# Patient Record
Sex: MALE | Race: WHITE | NOT HISPANIC OR LATINO | Employment: FULL TIME | URBAN - METROPOLITAN AREA
[De-identification: names, ages, dates, MRNs, and addresses within clinical notes are randomized per-mention and may not be internally consistent; named-entity substitution may affect disease eponyms.]

---

## 2017-11-10 ENCOUNTER — APPOINTMENT (OUTPATIENT)
Dept: LAB | Facility: MEDICAL CENTER | Age: 57
End: 2017-11-10
Attending: FAMILY MEDICINE

## 2017-11-10 ENCOUNTER — TRANSCRIBE ORDERS (OUTPATIENT)
Dept: URGENT CARE | Facility: MEDICAL CENTER | Age: 57
End: 2017-11-10

## 2017-11-10 ENCOUNTER — APPOINTMENT (OUTPATIENT)
Dept: URGENT CARE | Facility: MEDICAL CENTER | Age: 57
End: 2017-11-10

## 2017-11-10 DIAGNOSIS — Z02.1 PHYSICAL EXAM, PRE-EMPLOYMENT: ICD-10-CM

## 2017-11-10 DIAGNOSIS — Z02.1 PHYSICAL EXAM, PRE-EMPLOYMENT: Primary | ICD-10-CM

## 2017-11-10 LAB — RUBV IGG SERPL IA-ACNC: 1.1 IU/ML

## 2017-11-10 PROCEDURE — 86765 RUBEOLA ANTIBODY: CPT

## 2017-11-10 PROCEDURE — 86480 TB TEST CELL IMMUN MEASURE: CPT

## 2017-11-10 PROCEDURE — 86787 VARICELLA-ZOSTER ANTIBODY: CPT

## 2017-11-10 PROCEDURE — 86762 RUBELLA ANTIBODY: CPT

## 2017-11-10 PROCEDURE — 86735 MUMPS ANTIBODY: CPT

## 2017-11-10 PROCEDURE — 36415 COLL VENOUS BLD VENIPUNCTURE: CPT

## 2017-11-12 LAB
ANNOTATION COMMENT IMP: NORMAL
GAMMA INTERFERON BACKGROUND BLD IA-ACNC: 0.12 IU/ML
M TB IFN-G BLD-IMP: NEGATIVE
M TB IFN-G CD4+ BCKGRND COR BLD-ACNC: 0.01 IU/ML
M TB IFN-G CD4+ T-CELLS BLD-ACNC: 0.13 IU/ML
MITOGEN IGNF BLD-ACNC: >10 IU/ML
QUANTIFERON-TB GOLD IN TUBE: NORMAL
SERVICE CMNT-IMP: NORMAL

## 2017-11-14 LAB
MEV IGG SER QL: NORMAL
MUV IGG SER QL: NORMAL
VZV IGG SER IA-ACNC: NORMAL

## 2018-01-05 ENCOUNTER — ALLSCRIPTS OFFICE VISIT (OUTPATIENT)
Dept: OTHER | Facility: OTHER | Age: 58
End: 2018-01-05

## 2018-01-05 DIAGNOSIS — E78.5 HYPERLIPIDEMIA: ICD-10-CM

## 2018-01-06 NOTE — PROGRESS NOTES
Assessment   1  Overweight (278 02) (E66 3)  2  Muscle cramping (729 82) (R25 2)  3  Nerve pain (729 2) (M79 2)  4  Spasm of muscle, back (724 8) (M62 830)  5  Hypertension (401 9) (I10)  6  Gastroesophageal reflux disease (530 81) (K21 9)  7  Hyperlipemia (272 4) (E78 5)  8  Arteriosclerotic coronary artery disease (414 00) (I25 10)    Plan   Gastroesophageal reflux disease    · Renew: Pantoprazole Sodium 40 MG Oral Tablet Delayed Release; Take 1 tablet daily  History of anaphylaxis    · Start: EpiPen 2-Herbie 0 3 MG/0 3ML Injection Solution Auto-injector; INJECT 0 3ML    INTRAMUSCULARLY AS DIRECTED  History of MI (myocardial infarction)    · Renew: Nitroglycerin 0 4 MG Sublingual Tablet Sublingual; DISSOLVE 1 TABLET UNDER    THE TONGUE AS NEEDED FOR CHEST PAIN  History of MI (myocardial infarction), Hypertension    · Renew: Metoprolol Tartrate 25 MG Oral Tablet; TAKE 1 TABLET DAILY  Hyperlipemia    · Renew: Atorvastatin Calcium 40 MG Oral Tablet; take one tablet by mouth every day   · (1) CBC/PLT/DIFF; Status:Active; Requested IVK:[id number];    · (1) COMPREHENSIVE METABOLIC PANEL; Status:Active; Requested NSO:[id number];    · (1) LIPID PANEL, FASTING; Status:Active; Requested BOP:[id number];    · (1) URINALYSIS (will reflex a microscopy if leukocytes, occult blood, protein or nitrites are    not within normal limits); Status:Active; Requested NRR:[id number];   Hypertension    · Renew: HydroCHLOROthiazide 12 5 MG Oral Tablet; TAKE 1 TABLET DAILY  Nerve pain    · Renew: Gabapentin 300 MG Oral Capsule; one capsule daily  Spasm of muscle, back    · Renew: Cyclobenzaprine HCl - 10 MG Oral Tablet; TAKE 1/2 TO 1 TABLET AT BEDTIME    AS NEEDED  Unlinked    · Stop: Celecoxib 200 MG Oral Capsule   · Stop: Venlafaxine HCl - 37 5 MG Oral Tablet    Discussion/Summary   Discussion Summary:    1  HTN: BP is stable today  I renewed his prescriptions for HCTZ and metoprolol without changes   HLD: status unknown, renewed statin at current dose, FLP ordered  CAD: stable, no reports of chest pain or SOB GERD: stable on meds, prescription renewed  overweight: discussed weight loss  encouraged pt to make lifestyle modifications including diet and exercise modification to minimize risk  spasms: well controlled with medication, prescription renewed nerve pain: well controlled with medication, prescription renewed muscle cramps: CMP/CBC order health maintenance: colonoscopy is current, labs ordered including CBC, CMP, FLP, UA, pt to return for f/u in 6 months  Counseling Documentation With Imm: The patient was counseled regarding risk factor reductions,-- risks and benefits of treatment options  Medication SE Review and Pt Understands Tx: Possible side effects of new medications were reviewed with the patient/guardian today  The treatment plan was reviewed with the patient/guardian  The patient/guardian understands and agrees with the treatment plan      Chief Complaint   Chief Complaint Free Text Note Form: patient is here to establish care      History of Present Illness   HPI: Pt is a 61 y/o male who is seen today to establish care as a new patient  PMH includes HTN, HLD, GERD, and CAD with hx of MI and stent placement, and back pain  Chronic medical conditions are stable, under previous care through [facility]; records yet to be obtained  He denies chest pain or palpitatios, SOB, wheezing, headaches/dizziness  No change in bowel/bladder, no abd pain or N/V  Review of Systems   Complete-Male:      Constitutional: No fever or chills, feels well, no tiredness, no recent weight gain or weight loss  Eyes: no eye pain-- and-- no eyesight problems  ENT: no hearing loss-- and-- no nasal discharge  Cardiovascular: no chest pain,-- no intermittent leg claudication,-- no palpitations-- and-- no extremity edema        Respiratory: no shortness of breath,-- no cough,-- no wheezing,-- no shortness of breath during exertion-- and-- no PND       Gastrointestinal: no abdominal pain,-- no nausea,-- no vomiting,-- no constipation,-- no diarrhea-- and-- no blood in stools  Genitourinary: no dysuria,-- no urinary hesitancy-- and-- no nocturia  Musculoskeletal: myalgias,-- joint stiffness-- and-- baseline 2/2 multiple spinal surgeries and shoulder surgery  Integumentary: no rashes  Neurological: no headache,-- no numbness,-- no tingling,-- no confusion,-- no dizziness,-- no limb weakness-- and-- no fainting  Psychiatric: no anxiety,-- no sleep disturbances-- and-- no depression  ROS Reviewed:    ROS reviewed  Active Problems   1  History of gastric bypass (V45 86) (Z98 84)  2  History of MI (myocardial infarction) (12) (I25 2)    Past Medical History   Active Problems And Past Medical History Reviewed: The active problems and past medical history were reviewed and updated today  Surgical History   1  History of Cath Stent Placement  2  History of Gastric Surgery For Morbid Obesity  3  History of Lower Back Surgery  4  History of Neck Surgery  5  History of Shoulder Surgery  Surgical History Reviewed: The surgical history was reviewed and updated today  Family History   Father   1  Family history of cardiac disorder (V17 49) (Z82 49)  2  Family history of diabetes mellitus (V18 0) (Z83 3)  Brother   3  Family history of coronary artery disease (V17 3) (Z82 49)  Family History Reviewed: The family history was reviewed and updated today  Social History    · Former smokeless tobacco user   · Never a smoker   · No illicit drug use   · Social drinker (V49 89) (Z78 9)  Social History Reviewed: The social history was reviewed and updated today  The social history was reviewed and is unchanged  Current Meds   1  Aspirin EC 81 MG Oral Tablet Delayed Release; TAKE 1 TABLET DAILY AS DIRECTED; Therapy: 85SBM2987 to Recorded  2   Atorvastatin Calcium 40 MG Oral Tablet; take one tablet by mouth every day;     Therapy: 35LYV4585 to (Evaluate:12Jan2018) Recorded  3  B Complex Vitamins Oral Capsule; TAKE 1 CAPSULE ORALLY DAILY (SUPPLEMENT)     *SELF ADMINISTER*;     Therapy: 96RAA3878 to (Evaluate:56Bgb4200) Recorded  4  Celecoxib 200 MG Oral Capsule; TAKE 1 CAPSULE BY MOUTH EVERY DAY; Therapy: 90QGO3102 to Recorded  5  Cyclobenzaprine HCl - 10 MG Oral Tablet; TAKE 1 TABLET 3 TIMES DAILY; Therapy: 33OHL3669 to Recorded  6  Gabapentin 300 MG Oral Capsule; Take one capsule twice daily; Therapy: 54ICC7494 to (Evaluate:15Jan2018) Recorded  7  HydroCHLOROthiazide 12 5 MG Oral Tablet; TAKE 1 TABLET DAILY; Therapy: 54SAL1598 to Recorded  8  Iron (Ferrous Sulfate) 142 (45 Fe) MG Oral Tablet Extended Release; Therapy: 16LTC7068 to Recorded  9  Methocarbamol 500 MG Oral Tablet; TAKE 1 TABLET Every 8 hours; Therapy: 32UMX1775 to (Evaluate:13Jan2018) Recorded  10  Metoprolol Tartrate 25 MG Oral Tablet; TAKE 1 TABLET TWICE DAILY; Therapy: 70SXB2623 to (rAnold Verduzco) Recorded  11  Multivitamins Oral Capsule; take 1 capsule daily; Therapy: 18WNP5967 to Recorded  12  Nitroglycerin 0 4 MG Sublingual Tablet Sublingual; DISSOLVE 1 TABLET UNDER THE      TONGUE AS NEEDED FOR CHEST PAIN;      Therapy: 11ORG6371 to Recorded  13  Omega-3 Fish Oil 1000 MG Oral Capsule; Therapy: 15QMF4941 to Recorded  14  Pantoprazole Sodium 40 MG Oral Tablet Delayed Release; Take 1 tablet daily; Therapy: 37BCU1776 to (Evaluate:40Hcy7595) Recorded  15  Tamsulosin HCl - 0 4 MG Oral Capsule; take 1 capsule daily; Therapy: 79WSL3237 to (Arnold Verduzco) Recorded  16  Venlafaxine HCl - 37 5 MG Oral Tablet; TAKE 1 TABLET DAILY; Therapy: 26MBO1644 to (ZUNUFGJZ:18LYB1761) Recorded  17  Zolpidem Tartrate 10 MG Oral Tablet; TAKE 1 TABLET AT BEDTIME AS NEEDED; Therapy: 49XCX5739 to Recorded    Allergies   1   Penicillins    Vitals   Signs   Recorded: 03ZCB3343 09:07AM   Temperature: 96 9 F  Heart Rate: 80  Respiration: 16  Systolic: 236  Diastolic: 90  Height: 5 ft 9 in  Weight: 253 lb   BMI Calculated: 37 36  BSA Calculated: 2 28  O2 Saturation: 92    Physical Exam        Constitutional      General appearance: No acute distress, well appearing and well nourished  Eyes      Conjunctiva and lids: No swelling, erythema, or discharge  Pupils and irises: Equal, round and reactive to light  Ears, Nose, Mouth, and Throat      External inspection of ears and nose: Normal        Otoscopic examination: Tympanic membrance translucent with normal light reflex  Canals patent without erythema  Nasal mucosa, septum, and turbinates: Normal without edema or erythema  Oropharynx: Normal with no erythema, edema, exudate or lesions  Pulmonary      Respiratory effort: No increased work of breathing or signs of respiratory distress  Auscultation of lungs: Clear to auscultation, equal breath sounds bilaterally, no wheezes, no rales, no rhonci  Cardiovascular      Auscultation of heart: Normal rate and rhythm, normal S1 and S2, without murmurs  Examination of extremities for edema and/or varicosities: Normal        Carotid pulses: Normal        Abdomen      Abdomen: Non-tender, no masses  Liver and spleen: No hepatomegaly or splenomegaly  Lymphatic      Palpation of lymph nodes in neck: No lymphadenopathy  Musculoskeletal      Gait and station: Normal        Neurologic      Reflexes: Abnormal   Deep tendon reflexes: 0 right patella-- and-- 0 left patella  -- baselline per pt 2/2 spinal surgery  Sensation: No sensory loss         Psychiatric      Orientation to person, place and time: Normal        Mood and affect: Normal           Future Appointments      Date/Time Provider Specialty Site   07/20/2018 08:00 AM Xavier Lobato Internal Medicine [facility]     Signatures    Electronically signed by : Gaviota Hogan, ; Jan 5 2018 12: 38PM EST                       (Author)     Electronically signed by : Cheyanne Ling DO; Jan 5 2018  3:59PM EST                       (Author)     Electronically signed by : Cheyanne Ling DO; Jan 5 2018  3:59PM EST                       (Author)

## 2018-01-08 ENCOUNTER — TRANSCRIBE ORDERS (OUTPATIENT)
Dept: ADMINISTRATIVE | Age: 58
End: 2018-01-08

## 2018-01-23 VITALS
WEIGHT: 253 LBS | RESPIRATION RATE: 16 BRPM | TEMPERATURE: 96.9 F | DIASTOLIC BLOOD PRESSURE: 90 MMHG | OXYGEN SATURATION: 92 % | HEART RATE: 80 BPM | BODY MASS INDEX: 37.47 KG/M2 | HEIGHT: 69 IN | SYSTOLIC BLOOD PRESSURE: 120 MMHG

## 2018-03-28 ENCOUNTER — TELEPHONE (OUTPATIENT)
Dept: INTERNAL MEDICINE CLINIC | Facility: CLINIC | Age: 58
End: 2018-03-28

## 2018-03-28 DIAGNOSIS — I10 ESSENTIAL HYPERTENSION: ICD-10-CM

## 2018-03-28 DIAGNOSIS — E78.5 HYPERLIPIDEMIA, UNSPECIFIED HYPERLIPIDEMIA TYPE: Primary | ICD-10-CM

## 2018-03-28 RX ORDER — CYCLOBENZAPRINE HCL 10 MG
.5-1 TABLET ORAL
COMMUNITY
Start: 2018-01-05 | End: 2018-11-08 | Stop reason: SDUPTHER

## 2018-03-28 RX ORDER — GABAPENTIN 300 MG/1
1 CAPSULE ORAL DAILY
COMMUNITY
Start: 2018-01-05 | End: 2018-05-04 | Stop reason: SDUPTHER

## 2018-03-28 RX ORDER — EPINEPHRINE 0.3 MG/.3ML
0.3 INJECTION SUBCUTANEOUS
COMMUNITY
Start: 2018-01-05 | End: 2018-06-08 | Stop reason: SDUPTHER

## 2018-03-28 RX ORDER — HYDROCHLOROTHIAZIDE 12.5 MG/1
12.5 TABLET ORAL DAILY
Qty: 90 TABLET | Refills: 3 | Status: SHIPPED | OUTPATIENT
Start: 2018-03-28 | End: 2018-06-08 | Stop reason: SDUPTHER

## 2018-03-28 RX ORDER — ATORVASTATIN CALCIUM 40 MG/1
1 TABLET, FILM COATED ORAL DAILY
COMMUNITY
Start: 2018-01-05 | End: 2018-03-28 | Stop reason: SDUPTHER

## 2018-03-28 RX ORDER — ATORVASTATIN CALCIUM 40 MG/1
40 TABLET, FILM COATED ORAL DAILY
Qty: 90 TABLET | Refills: 3 | Status: SHIPPED | OUTPATIENT
Start: 2018-03-28 | End: 2018-06-08 | Stop reason: SDUPTHER

## 2018-03-28 RX ORDER — HYDROCHLOROTHIAZIDE 12.5 MG/1
1 TABLET ORAL DAILY
COMMUNITY
Start: 2018-01-05 | End: 2018-03-28 | Stop reason: SDUPTHER

## 2018-03-28 NOTE — TELEPHONE ENCOUNTER
Wants a renewal of the following medications that were prescribed by you for Atorvastatin 40 mg 1 tablet by mouth daily and Hydrochlorothiazidt 12 5 by mouth daily

## 2018-04-20 ENCOUNTER — OFFICE VISIT (OUTPATIENT)
Dept: INTERNAL MEDICINE CLINIC | Facility: CLINIC | Age: 58
End: 2018-04-20
Payer: COMMERCIAL

## 2018-04-20 VITALS
RESPIRATION RATE: 16 BRPM | HEART RATE: 80 BPM | HEIGHT: 69 IN | TEMPERATURE: 97.3 F | OXYGEN SATURATION: 98 % | BODY MASS INDEX: 36.79 KG/M2 | DIASTOLIC BLOOD PRESSURE: 80 MMHG | WEIGHT: 248.4 LBS | SYSTOLIC BLOOD PRESSURE: 124 MMHG

## 2018-04-20 DIAGNOSIS — I10 ESSENTIAL HYPERTENSION: ICD-10-CM

## 2018-04-20 DIAGNOSIS — R19.04 BILATERAL LOWER QUADRANT ABDOMINAL MASS: ICD-10-CM

## 2018-04-20 DIAGNOSIS — M54.50 ACUTE RIGHT-SIDED LOW BACK PAIN WITHOUT SCIATICA: Primary | ICD-10-CM

## 2018-04-20 DIAGNOSIS — E66.9 NON MORBID OBESITY, UNSPECIFIED OBESITY TYPE: ICD-10-CM

## 2018-04-20 DIAGNOSIS — R19.03 BILATERAL LOWER QUADRANT ABDOMINAL MASS: ICD-10-CM

## 2018-04-20 PROBLEM — I25.2 HISTORY OF MI (MYOCARDIAL INFARCTION): Status: ACTIVE | Noted: 2018-01-05

## 2018-04-20 PROCEDURE — 99214 OFFICE O/P EST MOD 30 MIN: CPT | Performed by: NURSE PRACTITIONER

## 2018-04-20 PROCEDURE — 3079F DIAST BP 80-89 MM HG: CPT | Performed by: NURSE PRACTITIONER

## 2018-04-20 PROCEDURE — 3074F SYST BP LT 130 MM HG: CPT | Performed by: NURSE PRACTITIONER

## 2018-04-20 RX ORDER — ASPIRIN 81 MG/1
162 TABLET ORAL DAILY
COMMUNITY
Start: 2018-01-05

## 2018-04-20 RX ORDER — PANTOPRAZOLE SODIUM 40 MG/1
1 TABLET, DELAYED RELEASE ORAL DAILY
COMMUNITY
Start: 2018-01-05 | End: 2018-06-08 | Stop reason: SDUPTHER

## 2018-04-20 RX ORDER — NITROGLYCERIN 0.4 MG/1
1 TABLET SUBLINGUAL
COMMUNITY
Start: 2018-01-05 | End: 2019-10-17 | Stop reason: SDUPTHER

## 2018-04-20 RX ORDER — METHYLPREDNISOLONE 4 MG/1
TABLET ORAL
Qty: 21 TABLET | Refills: 0 | Status: SHIPPED | OUTPATIENT
Start: 2018-04-20 | End: 2018-07-20 | Stop reason: ALTCHOICE

## 2018-04-20 RX ORDER — METHOCARBAMOL 500 MG/1
1 TABLET, FILM COATED ORAL EVERY 8 HOURS
COMMUNITY
Start: 2018-01-05 | End: 2018-06-08 | Stop reason: ALTCHOICE

## 2018-04-20 RX ORDER — BIOTIN 5 MG
TABLET ORAL
COMMUNITY
End: 2020-10-19

## 2018-04-20 RX ORDER — VENLAFAXINE 37.5 MG/1
1 TABLET ORAL DAILY
COMMUNITY
Start: 2018-01-10 | End: 2018-06-08 | Stop reason: SDUPTHER

## 2018-04-20 NOTE — ASSESSMENT & PLAN NOTE
I have ordered a CT of the abdomen to evaluate a mass noted in the R abdomen  Pt has a shellfish allergy and is unable to take contrast so I did order this test without contrast     I did explain to pt that the protruding bulge of tissue he described does sound consistent with a hernia  He is not symptomatic at present but CT may be useful to determine if it requires intervention  Pt to return for f/u in one month  Pt instructed to call for reevaluation sooner if sx worsen or persist

## 2018-04-20 NOTE — PROGRESS NOTES
Assessment/Plan:    Acute right-sided low back pain without sciatica  I feel that pts symptoms represent muscle spasms secondary to over use with work related activities  Pt is already taking flexeril and reports that this does not help prevent morning symptoms  I have referred him to PT for evaluation and treatment recommendations  I did also prescribe a medrol dose pack to help decrease inflammation  Pt states he has taken the medrol dose pack before and tolerated tx well  Bilateral lower quadrant abdominal mass  I have ordered a CT of the abdomen to evaluate a mass noted in the R abdomen  Pt has a shellfish allergy and is unable to take contrast so I did order this test without contrast     I did explain to pt that the protruding bulge of tissue he described does sound consistent with a hernia  He is not symptomatic at present but CT may be useful to determine if it requires intervention  Pt to return for f/u in one month  Pt instructed to call for reevaluation sooner if sx worsen or persist       Hypertension  Blood pressure shows good control on current medication  No change indicated at present  Non morbid obesity, unspecified obesity type  I reinforced lifestyle modifications pt has been working on and commended him for the weight loss he has achieved in the past couple of months  He reports his clothes are all big on him and he feels more healthy and has more energy  He does aim to lose at least 25 more pounds and I encouraged him to continue with everything he has done  Pt will be doing f/u blood work prior to visit scheduled in July Diagnoses and all orders for this visit:    Acute right-sided low back pain without sciatica  -     Methylprednisolone 4 MG TBPK; Use as directed on package  -     Ambulatory referral to Physical Therapy;  Future    Bilateral lower quadrant abdominal mass  -     CT abdomen pelvis wo contrast; Future    Essential hypertension    Non morbid obesity, unspecified obesity type    Other orders  -     venlafaxine (EFFEXOR) 37 5 mg tablet; Take 1 tablet by mouth daily  -     pantoprazole (PROTONIX) 40 mg tablet; Take 1 tablet by mouth daily  -     nitroglycerin (NITROSTAT) 0 4 mg SL tablet; Place 1 tablet under the tongue  -     Nutritional Supplements (OSTEO-MULTIVITAMINS/MINERALS PO); Take 1 capsule by mouth daily  -     metoprolol tartrate (LOPRESSOR) 25 mg tablet; Take 1 tablet by mouth daily  -     methocarbamol (ROBAXIN) 500 mg tablet; Take 1 tablet by mouth every 8 (eight) hours  -     Iron, Ferrous Sulfate, 142 (45 Fe) MG TBCR; Take by mouth  -     B COMPLEX VITAMINS ER PO; Take by mouth  -     aspirin (ECOTRIN LOW STRENGTH) 81 mg EC tablet; Take 1 tablet by mouth daily  -     Krill Oil 1000 MG CAPS; Take by mouth          Subjective:      Patient ID: Ariela Villa is a 62 y o  male  Pt is a 62 y o  y/o male who is seen today for evaluation of back pain and a suspected abdominal hernia  Pt reports that for several days he has woken up first think in the morning with sever pain in the muscles of his R lower back  He states that he massages them out and they resolve within seconds but they are recurrent  He has no associated muscle weakness, numbness or tingling, pain is non radiating, no loss of bowel or bladder control  He does have a lot of occupational overuse of the muscles in his back with bending, lifting, twisting, and pulling of heavy equipment  Symptoms have not caused a change is ADL's  Pt also reports that several days ago he noted a RLQ protruding mass after lifting something heavy  There was no associated pain, no changes in bowel function, no nausea or vomiting  The protruding mass did eventually reduce spontaneously and has not bothered him since  His appetite is normal   He does have a history of gastric bypass  Back Pain   This is a new problem  The current episode started in the past 7 days   The problem occurs daily  The quality of the pain is described as stabbing  The pain does not radiate  The pain is severe  Worse during: first thing in the morning  The symptoms are aggravated by position and lying down  Stiffness is present all day  Pertinent negatives include no abdominal pain, bladder incontinence, bowel incontinence, chest pain, dysuria, fever, headaches, leg pain, numbness, paresis, paresthesias, pelvic pain, perianal numbness, tingling, weakness or weight loss  Risk factors include obesity  He has tried NSAIDs and muscle relaxant for the symptoms  The treatment provided no relief  The following portions of the patient's history were reviewed and updated as appropriate: allergies, current medications, past family history, past medical history, past social history, past surgical history and problem list     Review of Systems   Constitutional: Negative for activity change, appetite change, chills, fatigue, fever, unexpected weight change and weight loss  HENT: Negative for hearing loss  Eyes: Negative for visual disturbance  Respiratory: Negative for cough, chest tightness and shortness of breath  Cardiovascular: Negative for chest pain, palpitations and leg swelling  Gastrointestinal: Positive for abdominal distention  Negative for abdominal pain, blood in stool, bowel incontinence, constipation, diarrhea, nausea, rectal pain and vomiting  Genitourinary: Negative for bladder incontinence, dysuria, frequency and pelvic pain  Musculoskeletal: Positive for back pain  Negative for arthralgias and myalgias  Neurological: Negative for dizziness, tingling, weakness, numbness, headaches and paresthesias  Psychiatric/Behavioral: Negative for dysphoric mood and sleep disturbance  The patient is not nervous/anxious            Objective:      /80 (BP Location: Left arm, Patient Position: Sitting)   Pulse 80   Temp (!) 97 3 °F (36 3 °C)   Resp 16   Ht 5' 9" (1 753 m)   Wt 113 kg (248 lb 6 4 oz)   SpO2 98%   BMI 36 68 kg/m²          Physical Exam   Constitutional: He is oriented to person, place, and time  Vital signs are normal  He appears well-developed and well-nourished  He is cooperative  HENT:   Head: Normocephalic  Right Ear: Hearing, tympanic membrane, external ear and ear canal normal    Left Ear: Hearing, tympanic membrane, external ear and ear canal normal    Nose: Nose normal    Mouth/Throat: Oropharynx is clear and moist and mucous membranes are normal    Eyes: Conjunctivae and lids are normal  Pupils are equal, round, and reactive to light  Neck: Normal range of motion and full passive range of motion without pain  Neck supple  No JVD present  No muscular tenderness present  Carotid bruit is not present  No neck rigidity  Normal range of motion present  No thyromegaly present  Cardiovascular: Normal rate, regular rhythm, S1 normal, S2 normal, normal heart sounds and intact distal pulses  No murmur heard  Pulmonary/Chest: Effort normal and breath sounds normal    Abdominal: Soft  Normal appearance and bowel sounds are normal  He exhibits mass  He exhibits no pulsatile midline mass  There is no tenderness  Musculoskeletal: Normal range of motion  He exhibits no edema  Lumbar back: He exhibits tenderness and spasm  He exhibits normal range of motion and no swelling  Muscle tightness evident on palpation of L lower back; pt did experience spasm when supine for abdominal examination  Pain was worse with legs fully extended and improved slightly by flexion of the knees  No bony abnormality or tenderness over the spine  Lymphadenopathy:     He has no cervical adenopathy  Neurological: He is alert and oriented to person, place, and time  He has normal strength and normal reflexes  No sensory deficit  Skin: Skin is warm, dry and intact  Psychiatric: He has a normal mood and affect   His speech is normal and behavior is normal  Judgment and thought content normal  Cognition and memory are normal

## 2018-04-20 NOTE — ASSESSMENT & PLAN NOTE
I feel that pts symptoms represent muscle spasms secondary to over use with work related activities  Pt is already taking flexeril and reports that this does not help prevent morning symptoms  I have referred him to PT for evaluation and treatment recommendations  I did also prescribe a medrol dose pack to help decrease inflammation  Pt states he has taken the medrol dose pack before and tolerated tx well

## 2018-05-02 ENCOUNTER — HOSPITAL ENCOUNTER (OUTPATIENT)
Dept: RADIOLOGY | Age: 58
Discharge: HOME | End: 2018-05-02
Payer: COMMERCIAL

## 2018-05-02 DIAGNOSIS — R19.04 BILATERAL LOWER QUADRANT ABDOMINAL MASS: ICD-10-CM

## 2018-05-02 DIAGNOSIS — R19.03 BILATERAL LOWER QUADRANT ABDOMINAL MASS: ICD-10-CM

## 2018-05-02 PROCEDURE — 74176 CT ABD & PELVIS W/O CONTRAST: CPT

## 2018-05-04 DIAGNOSIS — M79.2 NERVE PAIN: Primary | ICD-10-CM

## 2018-05-04 RX ORDER — GABAPENTIN 300 MG/1
300 CAPSULE ORAL DAILY
Qty: 90 CAPSULE | Refills: 1 | Status: SHIPPED | OUTPATIENT
Start: 2018-05-04 | End: 2018-06-08 | Stop reason: SDUPTHER

## 2018-05-10 DIAGNOSIS — R19.00 ABDOMINAL MASS, UNSPECIFIED ABDOMINAL LOCATION: Primary | ICD-10-CM

## 2018-05-25 ENCOUNTER — HOSPITAL ENCOUNTER (OUTPATIENT)
Dept: RADIOLOGY | Facility: HOSPITAL | Age: 58
Discharge: HOME | End: 2018-05-25
Attending: RADIOLOGY | Admitting: RADIOLOGY
Payer: COMMERCIAL

## 2018-05-25 VITALS
HEART RATE: 84 BPM | SYSTOLIC BLOOD PRESSURE: 144 MMHG | HEIGHT: 70 IN | WEIGHT: 250 LBS | RESPIRATION RATE: 18 BRPM | DIASTOLIC BLOOD PRESSURE: 76 MMHG | TEMPERATURE: 99.1 F | BODY MASS INDEX: 35.79 KG/M2 | OXYGEN SATURATION: 94 %

## 2018-05-25 DIAGNOSIS — R19.00 ABDOMINAL MASS, UNSPECIFIED ABDOMINAL LOCATION: ICD-10-CM

## 2018-05-25 LAB
ERYTHROCYTE [DISTWIDTH] IN BLOOD BY AUTOMATED COUNT: 12.3 % (ref 11.6–15.1)
HCT VFR BLD AUTO: 47.2 % (ref 36.5–49.3)
HGB BLD-MCNC: 16.1 G/DL (ref 12–17)
MCH RBC QN AUTO: 32.5 PG (ref 26.8–34.3)
MCHC RBC AUTO-ENTMCNC: 34.1 G/DL (ref 31.4–37.4)
MCV RBC AUTO: 95 FL (ref 82–98)
PLATELET # BLD AUTO: 222 THOUSANDS/UL (ref 149–390)
PMV BLD AUTO: 10.4 FL (ref 8.9–12.7)
RBC # BLD AUTO: 4.96 MILLION/UL (ref 3.88–5.62)
WBC # BLD AUTO: 8.26 THOUSAND/UL (ref 4.31–10.16)

## 2018-05-25 PROCEDURE — 88112 CYTOPATH CELL ENHANCE TECH: CPT | Performed by: PATHOLOGY

## 2018-05-25 PROCEDURE — 85027 COMPLETE CBC AUTOMATED: CPT | Performed by: RADIOLOGY

## 2018-05-25 PROCEDURE — 10030 IMG GID FLU COLL DRG SFT TIS: CPT

## 2018-05-25 PROCEDURE — 99152 MOD SED SAME PHYS/QHP 5/>YRS: CPT | Performed by: RADIOLOGY

## 2018-05-25 PROCEDURE — 76942 ECHO GUIDE FOR BIOPSY: CPT

## 2018-05-25 PROCEDURE — 87205 SMEAR GRAM STAIN: CPT | Performed by: INTERNAL MEDICINE

## 2018-05-25 PROCEDURE — 87070 CULTURE OTHR SPECIMN AEROBIC: CPT | Performed by: INTERNAL MEDICINE

## 2018-05-25 PROCEDURE — 99152 MOD SED SAME PHYS/QHP 5/>YRS: CPT

## 2018-05-25 PROCEDURE — 88305 TISSUE EXAM BY PATHOLOGIST: CPT | Performed by: PATHOLOGY

## 2018-05-25 PROCEDURE — 76942 ECHO GUIDE FOR BIOPSY: CPT | Performed by: RADIOLOGY

## 2018-05-25 PROCEDURE — 10022 PR FINE NEEDLE ASP;W/IMAGING GUIDANCE: CPT | Performed by: RADIOLOGY

## 2018-05-25 PROCEDURE — 99153 MOD SED SAME PHYS/QHP EA: CPT

## 2018-05-25 RX ORDER — FENTANYL CITRATE 50 UG/ML
INJECTION, SOLUTION INTRAMUSCULAR; INTRAVENOUS CODE/TRAUMA/SEDATION MEDICATION
Status: COMPLETED | OUTPATIENT
Start: 2018-05-25 | End: 2018-05-25

## 2018-05-25 RX ORDER — MIDAZOLAM HYDROCHLORIDE 1 MG/ML
INJECTION INTRAMUSCULAR; INTRAVENOUS CODE/TRAUMA/SEDATION MEDICATION
Status: COMPLETED | OUTPATIENT
Start: 2018-05-25 | End: 2018-05-25

## 2018-05-25 RX ORDER — SODIUM CHLORIDE 9 MG/ML
75 INJECTION, SOLUTION INTRAVENOUS CONTINUOUS
Status: DISCONTINUED | OUTPATIENT
Start: 2018-05-25 | End: 2018-05-25 | Stop reason: HOSPADM

## 2018-05-25 RX ADMIN — FENTANYL CITRATE 50 MCG: 50 INJECTION, SOLUTION INTRAMUSCULAR; INTRAVENOUS at 10:55

## 2018-05-25 RX ADMIN — MIDAZOLAM 1 MG: 1 INJECTION INTRAMUSCULAR; INTRAVENOUS at 10:50

## 2018-05-25 RX ADMIN — FENTANYL CITRATE 50 MCG: 50 INJECTION, SOLUTION INTRAMUSCULAR; INTRAVENOUS at 10:50

## 2018-05-25 RX ADMIN — SODIUM CHLORIDE 75 ML/HR: 0.9 INJECTION, SOLUTION INTRAVENOUS at 08:08

## 2018-05-25 RX ADMIN — MIDAZOLAM 1 MG: 1 INJECTION INTRAMUSCULAR; INTRAVENOUS at 10:55

## 2018-05-25 NOTE — DISCHARGE INSTRUCTIONS
Routine conscious sedation protocol - notify if abdominal pain continues/worsens and any increased temperatures  No heavy lifting until return to work  Superficial Mass Needle Biopsy   WHAT YOU NEED TO KNOW:   A superficial mass needle biopsy is a procedure to remove cells or tissue located just under your skin  A fine needle aspiration (FNA) biopsy is used to remove a sample of cells or fluid  A core needle biopsy is used to remove tissue  The samples are then sent to a lab and tested for cancer  DISCHARGE INSTRUCTIONS:   Follow up with your healthcare provider as directed: You may need to return for more tests or another procedure  Write down your questions so you remember to ask them during your visits  Contact your healthcare provider if:   · You have a fever  · You have more pain than usual near the mass  · The skin around the mass is more red or swollen than usual     · You have a large bruise near the mass  · You feel a new mass  · You have questions or concerns about your condition or care  Seek care immediately or call 911 if:   · Blood soaks through your bandage  · You cannot feel or move the area near the mass  © 2017 [facility] Information is for End User's use only and may not be sold, redistributed or otherwise used for commercial purposes  All illustrations and images included in CareNotes® are the copyrighted property of [employer]  or Gustavo Squires  The above information is an  only  It is not intended as medical advice for individual conditions or treatments  Talk to your doctor, nurse or pharmacist before following any medical regimen to see if it is safe and effective for you

## 2018-05-25 NOTE — H&P
H&P Exam - Ethelle Libby 62 y o  male MRN: [id number]    Unit/Bed#: [facility] 05-01 Encounter: [id number]    Assessment:  Abdominal Mass  HTN  s/p Gastric bypass    Plan:  IR image guided aspiration/biopsy of abdominal mass  -orders per IR physician    History of Present Illness   63 y/o male presents with abdominal mass present for several months  Patient denies any pain associated with mass  Mass always present  CT scan done 5/2/18 showed peripherally enhancing subcutaneous ventral abdominal collection with some internal heterogeneity suspicious for hematoma formation  Review of Systems   Constitutional: Negative for chills, fatigue and fever  HENT: Negative for congestion, ear pain, hearing loss, postnasal drip, sinus pressure and sore throat  Eyes: Negative for pain, redness and visual disturbance  Respiratory: Negative for cough, chest tightness, shortness of breath and wheezing  Cardiovascular: Negative for chest pain, palpitations and leg swelling  Gastrointestinal: Negative for abdominal distention, abdominal pain, constipation, diarrhea, nausea and vomiting  Abdominal wall mass   Genitourinary: Negative for difficulty urinating, dysuria, frequency, hematuria and urgency  Musculoskeletal: Negative for arthralgias, back pain, joint swelling and myalgias  Skin: Negative for rash and wound  Neurological: Negative for dizziness, weakness, numbness and headaches  Hematological: Negative for adenopathy  Psychiatric/Behavioral: Negative for dysphoric mood  The patient is not nervous/anxious          Historical Information   Past Medical History:   Diagnosis Date    Hypertension      Past Surgical History:   Procedure Laterality Date    BACK SURGERY      GASTRIC BYPASS       Social History   History   Alcohol Use    Yes     Comment: social     History   Drug Use No     History   Smoking Status    Never Smoker   Smokeless Tobacco    Never Used     Family History:   Family History Problem Relation Age of Onset    Heart disease Mother     Diabetes Mother        Meds/Allergies   PTA meds:   Prior to Admission Medications   Prescriptions Last Dose Informant Patient Reported? Taking?    B COMPLEX VITAMINS ER PO   Yes No   Sig: Take by mouth   EPINEPHrine (EPIPEN 2-MOE) 0 3 mg/0 3 mL SOAJ   Yes No   Sig: Inject 0 3 mL as directed   Iron, Ferrous Sulfate, 142 (45 Fe) MG TBCR   Yes No   Sig: Take by mouth   Krill Oil 1000 MG CAPS  Self Yes No   Sig: Take by mouth   Methylprednisolone 4 MG TBPK   No No   Sig: Use as directed on package   Nutritional Supplements (OSTEO-MULTIVITAMINS/MINERALS PO)   Yes No   Sig: Take 1 capsule by mouth daily   aspirin (ECOTRIN LOW STRENGTH) 81 mg EC tablet   Yes No   Sig: Take 1 tablet by mouth daily   atorvastatin (LIPITOR) 40 mg tablet   No No   Sig: Take 1 tablet (40 mg total) by mouth daily   cyclobenzaprine (FLEXERIL) 10 mg tablet   Yes No   Sig: Take 0 5-1 tablets by mouth   gabapentin (NEURONTIN) 300 mg capsule   No No   Sig: Take 1 capsule (300 mg total) by mouth daily   hydrochlorothiazide (HYDRODIURIL) 12 5 mg tablet   No No   Sig: Take 1 tablet (12 5 mg total) by mouth daily   methocarbamol (ROBAXIN) 500 mg tablet   Yes No   Sig: Take 1 tablet by mouth every 8 (eight) hours   metoprolol tartrate (LOPRESSOR) 25 mg tablet   Yes No   Sig: Take 1 tablet by mouth daily   nitroglycerin (NITROSTAT) 0 4 mg SL tablet   Yes No   Sig: Place 1 tablet under the tongue   pantoprazole (PROTONIX) 40 mg tablet   Yes No   Sig: Take 1 tablet by mouth daily   venlafaxine (EFFEXOR) 37 5 mg tablet   Yes No   Sig: Take 1 tablet by mouth daily      Facility-Administered Medications: None     Allergies   Allergen Reactions    Nuts Anaphylaxis    Peanut Oil Anaphylaxis    Isoflavones     Penicillins     Shellfish Allergy        Objective   First Vitals:   Blood Pressure: 139/77 (05/25/18 0800)  Pulse: 93 (05/25/18 0800)  Temperature: 99 1 °F (37 3 °C) (05/25/18 0800)  Temp Source: Oral (05/25/18 0800)  Respirations: 16 (05/25/18 0800)  Height: 5' 10" (177 8 cm) (05/25/18 0800)  Weight - Scale: 113 kg (250 lb) (05/25/18 0800)  SpO2: 95 % (05/25/18 0800)    Current Vitals:   Blood Pressure: 139/77 (05/25/18 0800)  Pulse: 93 (05/25/18 0800)  Temperature: 99 1 °F (37 3 °C) (05/25/18 0800)  Temp Source: Oral (05/25/18 0800)  Respirations: 16 (05/25/18 0800)  Height: 5' 10" (177 8 cm) (05/25/18 0800)  Weight - Scale: 113 kg (250 lb) (05/25/18 0800)  SpO2: 95 % (05/25/18 0800)        Physical Exam   Constitutional: He is oriented to person, place, and time  He appears well-developed and well-nourished  HENT:   Head: Normocephalic and atraumatic  Mouth/Throat: Oropharynx is clear and moist    Eyes: EOM are normal  Pupils are equal, round, and reactive to light  Neck: Neck supple  Cardiovascular: Normal rate and regular rhythm  No murmur heard  Pulmonary/Chest: Breath sounds normal  He has no wheezes  He has no rales  Abdominal: Soft  Bowel sounds are normal  He exhibits no distension  There is no tenderness  There is no rebound and no guarding  Palpable abdominal mass periumbilical  Firm, nontender   Genitourinary:   Genitourinary Comments: deferred   Musculoskeletal: Normal range of motion  He exhibits no edema  Lymphadenopathy:     He has no cervical adenopathy  Neurological: He is alert and oriented to person, place, and time  No cranial nerve deficit  Skin: Skin is warm  No rash noted  No erythema  Psychiatric: He has a normal mood and affect  Lab Results:      No results found for this or any previous visit (from the past 36 hour(s))

## 2018-05-25 NOTE — BRIEF OP NOTE (RAD/CATH)
IR IMAGE GUIDED ASPIRATION / DRAINAGE  Procedure Note    PATIENT NAME: Leilani Dixon  : 1960  MRN: [id number]     Pre-op Diagnosis:   1  Abdominal mass, unspecified abdominal location      Post-op Diagnosis:   1  Abdominal mass, unspecified abdominal location        Surgeon:   Royal Tu MD  Assistants:     No qualified resident was available, Resident is only observing    Estimated Blood Loss: none    Findings:     Abdominal wall collection appeared to be more hemoatoma than mass  Uncertain however  Aspiration of 55 mL of brown fluid was obtained, sent for culture and cytology  Repeat examination to confirm resolution of hematoma suggested  Specimens: 55 mL of brown  Fluid aspirated       Complications:  none    Anesthesia: Conscious sedation and Jerrell Butler MD     Date: 2018  Time: 11:03 AM

## 2018-05-25 NOTE — H&P
HnP Interval Note:     History and physical reviewed, no significant changes  CT reviewed, will plain for aspiration and possible drain placement

## 2018-05-28 LAB
BACTERIA SPEC BFLD CULT: NO GROWTH
GRAM STN SPEC: NORMAL
GRAM STN SPEC: NORMAL

## 2018-05-29 ENCOUNTER — TELEPHONE (OUTPATIENT)
Dept: INTERNAL MEDICINE CLINIC | Facility: CLINIC | Age: 58
End: 2018-05-29

## 2018-05-29 NOTE — TELEPHONE ENCOUNTER
Patient would like you to call him with the results of his biopsy from the mass in his stomach liquor

## 2018-06-08 ENCOUNTER — OFFICE VISIT (OUTPATIENT)
Dept: INTERNAL MEDICINE CLINIC | Facility: CLINIC | Age: 58
End: 2018-06-08
Payer: COMMERCIAL

## 2018-06-08 VITALS
BODY MASS INDEX: 35.65 KG/M2 | RESPIRATION RATE: 14 BRPM | OXYGEN SATURATION: 98 % | WEIGHT: 249 LBS | DIASTOLIC BLOOD PRESSURE: 80 MMHG | HEART RATE: 98 BPM | SYSTOLIC BLOOD PRESSURE: 162 MMHG | HEIGHT: 70 IN | TEMPERATURE: 99.6 F

## 2018-06-08 DIAGNOSIS — B02.8 HERPES ZOSTER WITH COMPLICATION: Primary | ICD-10-CM

## 2018-06-08 DIAGNOSIS — F41.8 DEPRESSION WITH ANXIETY: ICD-10-CM

## 2018-06-08 DIAGNOSIS — I10 ESSENTIAL HYPERTENSION: ICD-10-CM

## 2018-06-08 DIAGNOSIS — Z13.1 SCREENING FOR DIABETES MELLITUS: ICD-10-CM

## 2018-06-08 DIAGNOSIS — Z88.0 PENICILLIN ALLERGY: ICD-10-CM

## 2018-06-08 DIAGNOSIS — K21.9 GASTROESOPHAGEAL REFLUX DISEASE WITHOUT ESOPHAGITIS: ICD-10-CM

## 2018-06-08 DIAGNOSIS — M79.2 NERVE PAIN: ICD-10-CM

## 2018-06-08 DIAGNOSIS — H92.02 EAR PAIN, LEFT: ICD-10-CM

## 2018-06-08 DIAGNOSIS — E78.5 HYPERLIPIDEMIA, UNSPECIFIED HYPERLIPIDEMIA TYPE: ICD-10-CM

## 2018-06-08 DIAGNOSIS — R19.04 BILATERAL LOWER QUADRANT ABDOMINAL MASS: ICD-10-CM

## 2018-06-08 DIAGNOSIS — R19.03 BILATERAL LOWER QUADRANT ABDOMINAL MASS: ICD-10-CM

## 2018-06-08 PROCEDURE — 99214 OFFICE O/P EST MOD 30 MIN: CPT | Performed by: NURSE PRACTITIONER

## 2018-06-08 RX ORDER — GABAPENTIN 300 MG/1
300 CAPSULE ORAL DAILY
Qty: 90 CAPSULE | Refills: 3 | Status: SHIPPED | OUTPATIENT
Start: 2018-06-08 | End: 2018-07-12 | Stop reason: SDUPTHER

## 2018-06-08 RX ORDER — EPINEPHRINE 0.3 MG/.3ML
0.3 INJECTION SUBCUTANEOUS
Qty: 1 EACH | Refills: 0 | Status: SHIPPED | OUTPATIENT
Start: 2018-06-08 | End: 2018-12-21

## 2018-06-08 RX ORDER — VALACYCLOVIR HYDROCHLORIDE 1 G/1
1000 TABLET, FILM COATED ORAL 2 TIMES DAILY
Qty: 14 TABLET | Refills: 0 | Status: SHIPPED | OUTPATIENT
Start: 2018-06-08 | End: 2018-07-20 | Stop reason: ALTCHOICE

## 2018-06-08 RX ORDER — PANTOPRAZOLE SODIUM 40 MG/1
40 TABLET, DELAYED RELEASE ORAL DAILY
Qty: 90 TABLET | Refills: 3 | Status: SHIPPED | OUTPATIENT
Start: 2018-06-08 | End: 2018-12-21 | Stop reason: SDUPTHER

## 2018-06-08 RX ORDER — HYDROCHLOROTHIAZIDE 12.5 MG/1
12.5 TABLET ORAL DAILY
Qty: 90 TABLET | Refills: 3 | Status: SHIPPED | OUTPATIENT
Start: 2018-06-08 | End: 2018-12-21

## 2018-06-08 RX ORDER — VENLAFAXINE 37.5 MG/1
37.5 TABLET ORAL DAILY
Qty: 90 TABLET | Refills: 3 | Status: SHIPPED | OUTPATIENT
Start: 2018-06-08 | End: 2018-12-21 | Stop reason: SDUPTHER

## 2018-06-08 RX ORDER — ATORVASTATIN CALCIUM 40 MG/1
40 TABLET, FILM COATED ORAL DAILY
Qty: 90 TABLET | Refills: 3 | Status: SHIPPED | OUTPATIENT
Start: 2018-06-08 | End: 2018-12-21 | Stop reason: SDUPTHER

## 2018-06-08 NOTE — ASSESSMENT & PLAN NOTE
Blood pressure elevated today but pt reports he had not yet taken his medications  With recheck at the end of his visit, bp had decreased to 140/88

## 2018-06-08 NOTE — ASSESSMENT & PLAN NOTE
This mass detected on prior exam was successfully aspirated, contents were non-infectious  Plan is to repeat abdominal ultrasound to confirm complete resolution in about 3 months

## 2018-06-08 NOTE — ASSESSMENT & PLAN NOTE
Rash on chest consistent in appearance with shingles outbreak  Pt started on valacyclovir 1000 mg TID x 7 days  Skin care reviewed: pt to avoid hot showers/baths and allow skin to be open to air or in loose fitting clothing  Pt has been applying topical hydrocortisone cream and he can continue with this if he feels it helps manage symptoms of itching  Discussed possible spread of infection to pregnant women and unvaccinated individuals  Discussed post herpetic neuralgia; less risk as pt is already on gabapentin  F/u eval in 1 week

## 2018-06-08 NOTE — PROGRESS NOTES
Assessment/Plan:    Hypertension  Blood pressure elevated today but pt reports he had not yet taken his medications  With recheck at the end of his visit, bp had decreased to 140/88  Bilateral lower quadrant abdominal mass  This mass detected on prior exam was successfully aspirated, contents were non-infectious  Plan is to repeat abdominal ultrasound to confirm complete resolution in about 3 months  Herpes zoster with complication  Rash on chest consistent in appearance with shingles outbreak  Pt started on valacyclovir 1000 mg TID x 7 days  Skin care reviewed: pt to avoid hot showers/baths and allow skin to be open to air or in loose fitting clothing  Pt has been applying topical hydrocortisone cream and he can continue with this if he feels it helps manage symptoms of itching  Discussed possible spread of infection to pregnant women and unvaccinated individuals  Discussed post herpetic neuralgia; less risk as pt is already on gabapentin  F/u eval in 1 week  Ear pain, left  Ear canal swelling does not appear to be infectious in nature as there is no erythema  Likely 2/2 to shingles infection on pts neck and should hopefully improve upon starting treatment with anti-virals  Diagnoses and all orders for this visit:    Herpes zoster with complication  -     valACYclovir (VALTREX) 1,000 mg tablet; Take 1 tablet (1,000 mg total) by mouth 2 (two) times a day for 7 days    Essential hypertension  -     hydrochlorothiazide (HYDRODIURIL) 12 5 mg tablet; Take 1 tablet (12 5 mg total) by mouth daily  -     metoprolol tartrate (LOPRESSOR) 25 mg tablet; Take 1 tablet (25 mg total) by mouth daily    Hyperlipidemia, unspecified hyperlipidemia type  -     atorvastatin (LIPITOR) 40 mg tablet; Take 1 tablet (40 mg total) by mouth daily  -     Lipid panel; Future    Nerve pain  -     gabapentin (NEURONTIN) 300 mg capsule;  Take 1 capsule (300 mg total) by mouth daily    Depression with anxiety  - venlafaxine (EFFEXOR) 37 5 mg tablet; Take 1 tablet (37 5 mg total) by mouth daily    Gastroesophageal reflux disease without esophagitis  -     pantoprazole (PROTONIX) 40 mg tablet; Take 1 tablet (40 mg total) by mouth daily    Penicillin allergy  -     EPINEPHrine (EPIPEN 2-MOE) 0 3 mg/0 3 mL SOAJ; Inject 0 3 mL (0 3 mg total) into the shoulder, thigh, or buttocks 1 (one) time orthopaedic injection for anaphylaxis for up to 1 dose    Screening for diabetes mellitus  -     Hemoglobin A1C; Future    Bilateral lower quadrant abdominal mass    Ear pain, left          Subjective:      Patient ID: Delia Grider is a 62 y o  male  Pt is a 62 y o  y/o male who is seen today for evaluation of L ear pain since Thursday  Pt reports that he has pain, pressure, and decreased hearing  He feels very warm, his temperature is slightly elevated  Appetite is normal, no N/V/D  He does have some nasal congestion but denies headache, post nasal drip, sore throat, and coughing  He is very fatigued  He did try to alleviate ear pain by using a q-tip dipped in peroxide  He also has rash on his L neck/chest   The rash was itchy but he has been applying hydrocortisone cream to it and it is no longer pruritic  Earache    Associated symptoms include a rash  Pertinent negatives include no coughing, diarrhea, headaches, hearing loss, rhinorrhea, sore throat or vomiting  The following portions of the patient's history were reviewed and updated as appropriate: allergies, current medications, past family history, past medical history, past social history, past surgical history and problem list     Review of Systems   Constitutional: Positive for fatigue and fever  Negative for appetite change, chills and diaphoresis  HENT: Positive for congestion and ear pain  Negative for hearing loss, postnasal drip, rhinorrhea, sinus pressure, sore throat and tinnitus      Respiratory: Negative for cough, chest tightness, shortness of breath and wheezing  Cardiovascular: Negative for chest pain and palpitations  Gastrointestinal: Negative for diarrhea, nausea and vomiting  Musculoskeletal: Positive for myalgias  Skin: Positive for rash  Neurological: Negative for dizziness, light-headedness and headaches  Hematological: Negative for adenopathy  Psychiatric/Behavioral: Negative for sleep disturbance  Objective:      /80 (BP Location: Right arm, Patient Position: Sitting, Cuff Size: Large)   Pulse 98   Temp 99 6 °F (37 6 °C)   Resp 14   Ht 5' 10" (1 778 m)   Wt 113 kg (249 lb)   SpO2 98%   BMI 35 73 kg/m²          Physical Exam   Constitutional: He is oriented to person, place, and time  He appears well-developed and well-nourished  He is cooperative  HENT:   Right Ear: Hearing, tympanic membrane, external ear and ear canal normal  No middle ear effusion  Left Ear: There is drainage, swelling and tenderness  Decreased hearing is noted  Nose: No mucosal edema  Mouth/Throat: Mucous membranes are not dry  No oropharyngeal exudate, posterior oropharyngeal edema, posterior oropharyngeal erythema or tonsillar abscesses  L ear canal is swollen shut  There is possibly some clear fluid within the canal but visibility is minimal     Eyes: Conjunctivae and lids are normal    Neck: No JVD present  Carotid bruit is not present  Cardiovascular: Normal rate, regular rhythm, normal heart sounds and normal pulses  Pulmonary/Chest: Effort normal and breath sounds normal    Lymphadenopathy:        Head (right side): No submental, no submandibular, no tonsillar, no preauricular, no posterior auricular and no occipital adenopathy present  Head (left side): No submental, no submandibular, no tonsillar, no preauricular, no posterior auricular and no occipital adenopathy present  He has no cervical adenopathy  Neurological: He is alert and oriented to person, place, and time   He has normal strength and normal reflexes  No sensory deficit  Skin: Skin is warm and dry  Rash noted  Rash is vesicular  Vesicles noted on the left chest with an erythematic base; erythema then extends upward on the left chest toward the neck  No distinct vesicles on the neck, the rash is urticarial in appearance  No broken skin or drainage  Psychiatric: He has a normal mood and affect   His speech is normal and behavior is normal  Judgment and thought content normal  Cognition and memory are normal

## 2018-06-08 NOTE — PATIENT INSTRUCTIONS
Shingles   AMBULATORY CARE:   Shingles  is a painful rash  Shingles is caused by the same virus that causes chickenpox (varicella-zoster virus)  After you get chickenpox, the virus stays in your body for several years without causing any symptoms  Shingles occurs when the virus becomes active again  Once active, the virus will travel along a nerve to your skin and cause a rash  Common signs and symptoms include the following:  Shingles often starts with pain in the back, chest, neck, or face  A rash then develops in the same area  The rash is usually found on only one side of the body  The rash may feel itchy or painful  It starts as red dots that become blisters filled with fluid  The blisters usually grow bigger, become filled with pus, and then crust over after a few days  You may also have any of the following:  · Fatigue and muscle weakness    · Pain when your skin is lightly touched    · Headache    · Fever    · Eye pain when exposed to light  Seek care immediately if:   · You have painful, red, warm skin around the blisters, or the blisters drain pus  · Your neck is stiff or you have trouble moving it  · You have trouble moving your arms, legs, or face  · You have a seizure  · You have weakness in an arm or leg  · You become confused, or have difficulty speaking  · You have dizziness, a severe headache, or hearing or vision loss  Contact your healthcare provider if:   · You feel weak or have a headache  · You have a cough, chills, or a fever  · You have abdominal pain or nausea, or you are vomiting  · Your rash becomes more itchy or painful  · Your rash spreads to other parts of your body  · Your pain worsens and does not go away even after you take medicine  · You have questions or concerns about your condition or care  Medicines:   · Antiviral medicine  helps decrease symptoms and healing time  They may also decrease your risk of developing nerve pain   You will need to start taking them within 3 days of the start of symptoms to prevent nerve pain  · Pain medicine  may be prescribed or suggested by your healthcare provider  You may need NSAIDs, acetaminophen, or opioid medicine depending on how much pain you are in  Do not wait until the pain is severe before you take more pain medicine  · Topical anesthetics  are used to numb the skin and decrease pain  They can be a cream, gel, spray, or patch  · Anticonvulsants  decrease nerve pain and may help you sleep at night  · Antidepressants  may be used to decrease nerve pain  Follow up with your healthcare provider as directed:  Write down your questions so you remember to ask them during your visits  Self-care:  Keep your rash clean and dry  Cover your rash with a bandage or clothing  Do not use bandages that stick to your skin  The sticky part may irritate your skin and make your rash last longer  Prevent the spread of shingles: The virus can be passed to a person who has never had chickenpox  This person may get chickenpox, but not shingles  You may pass the virus to others as long as you have a rash  The virus is spread by direct contact with the fluid from the blisters  Usually, you cannot spread the virus once the blisters dry up  Prevent shingles or another shingles outbreak:  A vaccine may be given to help prevent shingles  Ask for more information about this vaccine  © 2017 [employer] Information is for End User's use only and may not be sold, redistributed or otherwise used for commercial purposes  All illustrations and images included in CareNotes® are the copyrighted property of [employer]  or [employer]avo Squires  The above information is an  only  It is not intended as medical advice for individual conditions or treatments  Talk to your doctor, nurse or pharmacist before following any medical regimen to see if it is safe and effective for you

## 2018-06-08 NOTE — ASSESSMENT & PLAN NOTE
Ear canal swelling does not appear to be infectious in nature as there is no erythema  Likely 2/2 to shingles infection on pts neck and should hopefully improve upon starting treatment with anti-virals

## 2018-06-14 ENCOUNTER — OFFICE VISIT (OUTPATIENT)
Dept: INTERNAL MEDICINE CLINIC | Facility: CLINIC | Age: 58
End: 2018-06-14
Payer: COMMERCIAL

## 2018-06-14 VITALS
SYSTOLIC BLOOD PRESSURE: 160 MMHG | BODY MASS INDEX: 35.24 KG/M2 | DIASTOLIC BLOOD PRESSURE: 98 MMHG | WEIGHT: 246.2 LBS | HEIGHT: 70 IN | RESPIRATION RATE: 14 BRPM | HEART RATE: 68 BPM | TEMPERATURE: 97.1 F | OXYGEN SATURATION: 96 %

## 2018-06-14 DIAGNOSIS — H92.02 EAR PAIN, LEFT: Primary | ICD-10-CM

## 2018-06-14 DIAGNOSIS — B02.8 HERPES ZOSTER WITH COMPLICATION: ICD-10-CM

## 2018-06-14 PROCEDURE — 99213 OFFICE O/P EST LOW 20 MIN: CPT | Performed by: NURSE PRACTITIONER

## 2018-06-14 RX ORDER — METHYLPREDNISOLONE 4 MG/1
TABLET ORAL
Qty: 21 TABLET | Refills: 0 | Status: SHIPPED | OUTPATIENT
Start: 2018-06-14 | End: 2018-07-20 | Stop reason: ALTCHOICE

## 2018-06-14 NOTE — ASSESSMENT & PLAN NOTE
Rash has cleared, there is no pain, burning, or sensitivity at the site of the outbreak  Pt is advised to continue with his valacyclovir until tx complete

## 2018-06-14 NOTE — ASSESSMENT & PLAN NOTE
Most likely a complication of shingles outbreak  His ear pain and swelling of the ear canal have worsened since initial evaluation 6/8  I have prescribed a medrol dose pack and referred him to ENT to be evaluated as the inner ear is not visible at all  Upon review of ENT's recommendations I will call the pt to establish when he should return for follow up

## 2018-06-14 NOTE — LETTER
June 14, 2018     Patient: Humberto Cervantes   YOB: 1960   Date of Visit: 6/14/2018       To Whom it May Concern:    Lorrainemelissa Fothergill is under my professional care  He was seen in my office on 6/14/2018  He may return to work on 6/14/18 and his absence on 6/12-6/13 is excused due to a medical condition  If you have any questions or concerns, please don't hesitate to call           Sincerely,          NU Marin        CC: No Recipients

## 2018-06-14 NOTE — PROGRESS NOTES
Assessment/Plan:    Herpes zoster with complication  Rash has cleared, there is no pain, burning, or sensitivity at the site of the outbreak  Pt is advised to continue with his valacyclovir until tx complete  Ear pain, left  Most likely a complication of shingles outbreak  His ear pain and swelling of the ear canal have worsened since initial evaluation 6/8  I have prescribed a medrol dose pack and referred him to ENT to be evaluated as the inner ear is not visible at all  Upon review of ENT's recommendations I will call the pt to establish when he should return for follow up  Diagnoses and all orders for this visit:    Ear pain, left  -     Ambulatory Referral to Otolaryngology; Future  -     Methylprednisolone 4 MG TBPK; Use as directed on package    Herpes zoster with complication          Subjective:      Patient ID: Delia Grider is a 62 y o  male  Pt is a 62 y o  y/o male who is seen today for follow up to treatment of shingles outbreak on 6/8  Pt was prescribed valacyclovir x 7 days, which he continues to use  His rash, which was on the left chest and lateral neck, has resolved today  He presented initially with L ear pain and swelling of the ear canal   These symptoms continue still and are worse  Pt reports that there is drainage out of the ear and his hearing is decreased  When he pulls the tragus of the ear backward he can hear  He reports swelling around the front of the ear as well over the TMJ  He hears popping/crackling sounds in his ear with palpation as well  He denies pain with jaw movement  Pt is very fatigued with intermittent dizziness and headache and a decreased appetite  He reports that he did have a fever yesterday but now denies fever/chills  No nausea/vomiting  Pt has taken tylenol with only slight relief          The following portions of the patient's history were reviewed and updated as appropriate: allergies, current medications, past family history, past medical history, past social history, past surgical history and problem list     Review of Systems   Constitutional: Positive for activity change, appetite change and fatigue  Negative for chills and fever  HENT: Positive for ear discharge, ear pain and hearing loss  Negative for congestion, sinus pressure, sore throat and trouble swallowing  Respiratory: Negative for cough, chest tightness, shortness of breath and wheezing  Cardiovascular: Negative for chest pain and palpitations  Gastrointestinal: Negative for diarrhea, nausea and vomiting  Musculoskeletal: Negative for arthralgias, myalgias and neck pain  Skin: Negative for rash  Neurological: Positive for dizziness and headaches  Negative for light-headedness  Hematological: Negative for adenopathy  Objective:      /98   Pulse 68   Temp (!) 97 1 °F (36 2 °C)   Resp 14   Ht 5' 10" (1 778 m)   Wt 112 kg (246 lb 3 2 oz)   SpO2 96%   BMI 35 33 kg/m²          Physical Exam   Constitutional: He is oriented to person, place, and time  He appears well-developed and well-nourished  He is cooperative  HENT:   Head: Normocephalic  Right Ear: Hearing, tympanic membrane, external ear and ear canal normal    Left Ear: There is swelling and tenderness  No drainage  No mastoid tenderness  Decreased hearing is noted  Mouth/Throat: Oropharynx is clear and moist and mucous membranes are normal    The ear canal of the L ear is swollen closed with no visibility into the canal or of the TM  Tenderness elicited with exam   There does not appear to be any erythema and there is no visible drainage  There is audible crackling with palpation around the front of the ear  Eyes: Conjunctivae and lids are normal    Neck: Normal range of motion and full passive range of motion without pain  Neck supple  No edema and no erythema present  Cardiovascular: Normal rate, regular rhythm and normal pulses      Pulmonary/Chest: Effort normal and breath sounds normal    Lymphadenopathy:        Head (right side): No submental, no submandibular, no tonsillar, no preauricular, no posterior auricular and no occipital adenopathy present  Head (left side): No submental, no submandibular, no tonsillar, no preauricular, no posterior auricular and no occipital adenopathy present  He has no cervical adenopathy  Neurological: He is alert and oriented to person, place, and time  No cranial nerve deficit or sensory deficit  Skin: Skin is warm, dry and intact  Rash on left chest and lateral neck is resolved  Pt does have a flushed complexion as his baseline and says that he is more "red" at present because he just shaved  Psychiatric: He has a normal mood and affect  His speech is normal and behavior is normal  Judgment and thought content normal  Cognition and memory are normal    Vitals reviewed

## 2018-07-12 DIAGNOSIS — M79.2 NERVE PAIN: ICD-10-CM

## 2018-07-12 RX ORDER — GABAPENTIN 300 MG/1
300 CAPSULE ORAL DAILY
Qty: 90 CAPSULE | Refills: 0 | Status: SHIPPED | OUTPATIENT
Start: 2018-07-12 | End: 2019-08-14 | Stop reason: SDUPTHER

## 2018-07-16 ENCOUNTER — APPOINTMENT (OUTPATIENT)
Dept: LAB | Facility: CLINIC | Age: 58
End: 2018-07-16
Payer: COMMERCIAL

## 2018-07-16 ENCOUNTER — TRANSCRIBE ORDERS (OUTPATIENT)
Dept: LAB | Facility: CLINIC | Age: 58
End: 2018-07-16

## 2018-07-16 DIAGNOSIS — Z00.8 HEALTH EXAMINATION IN POPULATION SURVEY: Primary | ICD-10-CM

## 2018-07-16 DIAGNOSIS — Z00.8 HEALTH EXAMINATION IN POPULATION SURVEY: ICD-10-CM

## 2018-07-16 LAB
CHOLEST SERPL-MCNC: 155 MG/DL (ref 50–200)
EST. AVERAGE GLUCOSE BLD GHB EST-MCNC: 88 MG/DL
HBA1C MFR BLD: 4.7 % (ref 4.2–6.3)
HDLC SERPL-MCNC: 64 MG/DL (ref 40–60)
LDLC SERPL CALC-MCNC: 65 MG/DL (ref 0–100)
NONHDLC SERPL-MCNC: 91 MG/DL
TRIGL SERPL-MCNC: 131 MG/DL

## 2018-07-16 PROCEDURE — 80061 LIPID PANEL: CPT

## 2018-07-16 PROCEDURE — 36415 COLL VENOUS BLD VENIPUNCTURE: CPT

## 2018-07-16 PROCEDURE — 83036 HEMOGLOBIN GLYCOSYLATED A1C: CPT

## 2018-07-20 ENCOUNTER — OFFICE VISIT (OUTPATIENT)
Dept: INTERNAL MEDICINE CLINIC | Facility: CLINIC | Age: 58
End: 2018-07-20
Payer: COMMERCIAL

## 2018-07-20 VITALS
WEIGHT: 246 LBS | OXYGEN SATURATION: 94 % | HEIGHT: 70 IN | SYSTOLIC BLOOD PRESSURE: 138 MMHG | BODY MASS INDEX: 35.22 KG/M2 | TEMPERATURE: 98.2 F | DIASTOLIC BLOOD PRESSURE: 80 MMHG | HEART RATE: 73 BPM

## 2018-07-20 DIAGNOSIS — B02.8 HERPES ZOSTER WITH COMPLICATION: ICD-10-CM

## 2018-07-20 DIAGNOSIS — Z91.018 MULTIPLE FOOD ALLERGIES: Primary | ICD-10-CM

## 2018-07-20 DIAGNOSIS — E66.9 NON MORBID OBESITY, UNSPECIFIED OBESITY TYPE: ICD-10-CM

## 2018-07-20 DIAGNOSIS — Z95.5 H/O HEART ARTERY STENT: ICD-10-CM

## 2018-07-20 DIAGNOSIS — I10 ESSENTIAL HYPERTENSION: ICD-10-CM

## 2018-07-20 PROCEDURE — 99214 OFFICE O/P EST MOD 30 MIN: CPT | Performed by: NURSE PRACTITIONER

## 2018-07-20 NOTE — ASSESSMENT & PLAN NOTE
Resolved  L ear assessment is normal   Discussed shingrix vaccine, pt advised to get vaccine later this year or early next year to protect against subsequent outbreaks

## 2018-07-20 NOTE — ASSESSMENT & PLAN NOTE
Pt continues with weight loss and is reporting compliance with a heart healthy diet and exercise  We did discuss his BMI  According to BMI charts his ideal body weight is between 130-160 pounds which pt feels would be an unhealthy weight for him  For now he is encouraged to just continue doing what he was doing and continue to lose weight in this healthy manner  He is successfully managing his other CV risks with normal lipid panel and maintaining healthy blood sugar and A1C

## 2018-07-20 NOTE — ASSESSMENT & PLAN NOTE
Blood pressure has adequate control  Explained to pt that his blood pressure today is borderline for needing his medication increased  Pt will continue on his current medication and we will continue to monitor at f/u visit in 3 months

## 2018-07-20 NOTE — ASSESSMENT & PLAN NOTE
Pt tested for allergies last year and is requesting a second opinion  He states he was told he has an allergy to shrimp and crab but he has never had a problem eating these foods in the past   He is referred to Dr Lindsey Alvarenga

## 2018-07-20 NOTE — ASSESSMENT & PLAN NOTE
Pt in need of in network referral to cardiology for annual monitoring for hx of MI and stents, requests Dr Tez Peña

## 2018-07-20 NOTE — PROGRESS NOTES
Assessment/Plan:    Hypertension  Blood pressure has adequate control  Explained to pt that his blood pressure today is borderline for needing his medication increased  Pt will continue on his current medication and we will continue to monitor at f/u visit in 3 months  H/O heart artery stent  Pt in need of in network referral to cardiology for annual monitoring for hx of MI and stents, requests Dr Nettie Guillen  Herpes zoster with complication  Resolved  L ear assessment is normal   Discussed shingrix vaccine, pt advised to get vaccine later this year or early next year to protect against subsequent outbreaks  Multiple food allergies  Pt tested for allergies last year and is requesting a second opinion  He states he was told he has an allergy to shrimp and crab but he has never had a problem eating these foods in the past   He is referred to Dr Melia Alvarez  Non morbid obesity, unspecified obesity type  Pt continues with weight loss and is reporting compliance with a heart healthy diet and exercise  We did discuss his BMI  According to BMI charts his ideal body weight is between 130-160 pounds which pt feels would be an unhealthy weight for him  For now he is encouraged to just continue doing what he was doing and continue to lose weight in this healthy manner  He is successfully managing his other CV risks with normal lipid panel and maintaining healthy blood sugar and A1C  Diagnoses and all orders for this visit:    Multiple food allergies  -     Ambulatory referral to Allergy; Future    H/O heart artery stent  -     Ambulatory referral to Cardiology; Future    Essential hypertension    Herpes zoster with complication    Non morbid obesity, unspecified obesity type          Subjective:      Patient ID: Barb Murrieta is a 62 y o  male  Pt is a 62y o  year old male who is seen today for 1 month follow up to management of herpes zoster with ear complications    PMH includes hypertension, MI with cardiac stenting, chronic low back pain  Pt was seen in June and treated for shingles with valacyclovir  He ended up requiring referral to ENT for left ear involvement with swelling and severe pain  He was treated with steroids and today he reports that he is no longer symptomatic of ear pain  He does say he feels like there is a "bubble" in his ear  He denies hearing loss and tinnitus  Pt did also complete his blood work today and we reviewed his results which were normal     Pt denies chest pain, palpitations, shortness of breath, headache, dizziness, numbness, tingling  Appetite is normal, no N/V/D  He complains of left knee and ankle pain that occurs most days by the end of his shift at work  He walks a great deal at work and he states he takes aleve prior to his shift  No weakness or swelling reported  The following portions of the patient's history were reviewed and updated as appropriate: allergies, current medications, past family history, past medical history, past social history, past surgical history and problem list     Review of Systems   Constitutional: Negative for activity change, appetite change, chills, fatigue, fever and unexpected weight change  HENT: Negative for hearing loss  Eyes: Negative for visual disturbance  Respiratory: Negative for cough, chest tightness and shortness of breath  Cardiovascular: Negative for chest pain, palpitations and leg swelling  Gastrointestinal: Negative for abdominal distention, abdominal pain, blood in stool, constipation, diarrhea, nausea and vomiting  Genitourinary: Negative for dysuria and frequency  Musculoskeletal: Positive for arthralgias (left knee, left ankle) and back pain  Negative for myalgias  Skin: Negative for color change  Allergic/Immunologic: Positive for food allergies  Neurological: Negative for dizziness, weakness, numbness and headaches     Psychiatric/Behavioral: Negative for dysphoric mood and sleep disturbance  The patient is not nervous/anxious  Objective:      /80   Pulse 73   Temp 98 2 °F (36 8 °C)   Ht 5' 10" (1 778 m)   Wt 112 kg (246 lb)   SpO2 94%   BMI 35 30 kg/m²          Physical Exam   Constitutional: He is oriented to person, place, and time  Vital signs are normal  He appears well-developed and well-nourished  He is cooperative  HENT:   Head: Normocephalic  Right Ear: Hearing, tympanic membrane, external ear and ear canal normal    Left Ear: Hearing, tympanic membrane, external ear and ear canal normal    Nose: Nose normal    Mouth/Throat: Oropharynx is clear and moist and mucous membranes are normal    Eyes: Conjunctivae and lids are normal  Pupils are equal, round, and reactive to light  Neck: No JVD present  Carotid bruit is not present  No thyromegaly present  Cardiovascular: Normal rate, regular rhythm, S1 normal, S2 normal, normal heart sounds and intact distal pulses  No murmur heard  Pulmonary/Chest: Effort normal and breath sounds normal    Abdominal: Soft  Normal appearance and bowel sounds are normal  He exhibits mass  There is no tenderness  Palpable mass felt as noted on diagram at site where hematoma was recently drained  Non tender, non pulsatile  Musculoskeletal: Normal range of motion  He exhibits no edema  Left knee: He exhibits normal range of motion, no swelling, no effusion, no ecchymosis, no erythema, no LCL laxity, normal patellar mobility and no MCL laxity  Tenderness found  Medial joint line tenderness noted  Left ankle: He exhibits normal range of motion, no swelling, no ecchymosis and no deformity  No tenderness  Achilles tendon exhibits no pain  Lymphadenopathy:        Head (right side): No submental, no submandibular, no tonsillar, no preauricular, no posterior auricular and no occipital adenopathy present          Head (left side): No submental, no submandibular, no tonsillar, no preauricular, no posterior auricular and no occipital adenopathy present  He has no cervical adenopathy  Neurological: He is alert and oriented to person, place, and time  He has normal strength  No sensory deficit  Skin: Skin is warm, dry and intact  Psychiatric: He has a normal mood and affect  His speech is normal and behavior is normal  Judgment and thought content normal  Cognition and memory are normal    Vitals reviewed

## 2018-09-26 ENCOUNTER — OFFICE VISIT (OUTPATIENT)
Dept: INTERNAL MEDICINE CLINIC | Facility: CLINIC | Age: 58
End: 2018-09-26
Payer: COMMERCIAL

## 2018-09-26 VITALS
HEART RATE: 87 BPM | HEIGHT: 70 IN | DIASTOLIC BLOOD PRESSURE: 92 MMHG | SYSTOLIC BLOOD PRESSURE: 134 MMHG | OXYGEN SATURATION: 94 % | BODY MASS INDEX: 35.36 KG/M2 | WEIGHT: 247 LBS | TEMPERATURE: 98 F | RESPIRATION RATE: 16 BRPM

## 2018-09-26 DIAGNOSIS — I10 ESSENTIAL HYPERTENSION: ICD-10-CM

## 2018-09-26 DIAGNOSIS — R53.83 FATIGUE, UNSPECIFIED TYPE: Primary | ICD-10-CM

## 2018-09-26 DIAGNOSIS — Z23 NEED FOR INFLUENZA VACCINATION: ICD-10-CM

## 2018-09-26 PROCEDURE — 90682 RIV4 VACC RECOMBINANT DNA IM: CPT

## 2018-09-26 PROCEDURE — 99214 OFFICE O/P EST MOD 30 MIN: CPT | Performed by: NURSE PRACTITIONER

## 2018-09-26 PROCEDURE — 90471 IMMUNIZATION ADMIN: CPT

## 2018-09-26 PROCEDURE — 3008F BODY MASS INDEX DOCD: CPT | Performed by: NURSE PRACTITIONER

## 2018-09-26 NOTE — LETTER
September 26, 2018     Patient: Marie Kelley   YOB: 1960   Date of Visit: 9/26/2018       To Whom it May Concern:    Mukulanna Huntin is under my professional care  He was seen in my office on 9/26/2018  He may return to work 9/26/18  For health reasons it is my recommendation that he be moved to day shift in the near future to minimize risk of adverse health complications  If you have any questions or concerns, please don't hesitate to call           Sincerely,          NU Duggan        CC: No Recipients

## 2018-09-26 NOTE — PROGRESS NOTES
Assessment/Plan:    Hypertension  Blood pressure is mildly elevated today  Pt is concerned about this and he states that he plans to schedule his appointment to the cardiologist   He was referred several months ago but has not yet seen him  No complaints of chest pain, headaches, blurred vision, shortness of breath  Fatigue  Most likely due to his work schedule and active lifestyle  I did order additional blood work to ensure that there is no underlying medical condition contributing to his fatigue  Recommended he be moved to day shift if possible  Need for influenza vaccination  Immunization given  Diagnoses and all orders for this visit:    Fatigue, unspecified type  -     TSH, 3rd generation with Free T4 reflex; Future  -     Comprehensive metabolic panel; Future  -     CBC and differential; Future    Need for influenza vaccination  -     influenza vaccine, [id number], quadrivalent, recombinant, PF, 0 5 mL, for patients 18 yr+ (FLUBLOK)    Essential hypertension          Subjective:      Patient ID: Aime Reese is a 62 y o  male  Pt is a 62 y o  y/o male who is seen today for evaluation of exhaustion  He states 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 and it requires a lot of physical work with lifting and walking  Daytime sleep is frequently interrupted so sleep quality is poor  He denies fever/chills, night sweats, congestion, cough, appetite loss, abdominal pain  Weight is stable  He does report bilateral numbness intermittently in his first 3 fingers  He does not have any associated pain, swelling, erythema, or weakness  The following portions of the patient's history were reviewed and updated as appropriate: allergies, current medications, past family history, past medical history, past social history, past surgical history and problem list     Review of Systems   Constitutional: Positive for fatigue   Negative for activity change, appetite change, chills, fever and unexpected weight change  HENT: Negative for congestion, hearing loss, postnasal drip and sore throat  Eyes: Negative for visual disturbance  Respiratory: Negative for cough, chest tightness and shortness of breath  Cardiovascular: Negative for chest pain, palpitations and leg swelling  Gastrointestinal: Negative for abdominal pain, constipation, diarrhea, nausea and vomiting  Genitourinary: Negative for dysuria and frequency  Musculoskeletal: Negative for arthralgias and myalgias  Baseline aches and pains  Skin: Negative for rash  Allergic/Immunologic: Negative for environmental allergies  Neurological: Negative for dizziness, weakness, numbness and headaches  Hematological: Negative for adenopathy  Psychiatric/Behavioral: Positive for sleep disturbance  The patient is not nervous/anxious  Objective:      /92 (BP Location: Right arm, Patient Position: Sitting, Cuff Size: Large)   Pulse 87   Temp 98 °F (36 7 °C)   Resp 16   Ht 5' 10" (1 778 m)   Wt 112 kg (247 lb)   SpO2 94%   BMI 35 44 kg/m²          Physical Exam   Constitutional: He is oriented to person, place, and time  Vital signs are normal  He appears well-developed and well-nourished  He is cooperative  HENT:   Head: Normocephalic  Right Ear: Hearing, tympanic membrane, external ear and ear canal normal    Left Ear: Hearing, tympanic membrane, external ear and ear canal normal    Nose: Nose normal    Mouth/Throat: Oropharynx is clear and moist and mucous membranes are normal    Eyes: Conjunctivae are normal  Pupils are equal, round, and reactive to light  Neck: Normal range of motion  No JVD present  Carotid bruit is not present  No thyromegaly present  Cardiovascular: Normal rate, regular rhythm, S1 normal, S2 normal, normal heart sounds and intact distal pulses  No murmur heard  Pulmonary/Chest: Effort normal and breath sounds normal    Abdominal: Soft   Normal appearance and bowel sounds are normal  There is no tenderness  Musculoskeletal: Normal range of motion  He exhibits no edema  Lymphadenopathy:        Head (right side): No submental, no submandibular, no tonsillar, no preauricular, no posterior auricular and no occipital adenopathy present  Head (left side): No submental, no submandibular, no tonsillar, no preauricular, no posterior auricular and no occipital adenopathy present  He has no cervical adenopathy  Neurological: He is alert and oriented to person, place, and time  He has normal strength and normal reflexes  A sensory deficit is present  Decreased sensation in the R 1,2,3,5 fingertips  No weakness  Phalen test negative  Skin: Skin is warm, dry and intact  Psychiatric: He has a normal mood and affect  His speech is normal and behavior is normal  Judgment and thought content normal  Cognition and memory are normal    Vitals reviewed

## 2018-09-26 NOTE — ASSESSMENT & PLAN NOTE
Blood pressure is mildly elevated today  Pt is concerned about this and he states that he plans to schedule his appointment to the cardiologist   He was referred several months ago but has not yet seen him  No complaints of chest pain, headaches, blurred vision, shortness of breath

## 2018-09-26 NOTE — ASSESSMENT & PLAN NOTE
Most likely due to his work schedule and active lifestyle  I did order additional blood work to ensure that there is no underlying medical condition contributing to his fatigue  Recommended he be moved to day shift if possible

## 2018-10-19 ENCOUNTER — OFFICE VISIT (OUTPATIENT)
Dept: INTERNAL MEDICINE CLINIC | Facility: CLINIC | Age: 58
End: 2018-10-19
Payer: COMMERCIAL

## 2018-10-19 VITALS
SYSTOLIC BLOOD PRESSURE: 146 MMHG | HEART RATE: 66 BPM | HEIGHT: 70 IN | WEIGHT: 249 LBS | BODY MASS INDEX: 35.65 KG/M2 | TEMPERATURE: 97.6 F | OXYGEN SATURATION: 96 % | DIASTOLIC BLOOD PRESSURE: 88 MMHG

## 2018-10-19 DIAGNOSIS — I25.2 HISTORY OF MI (MYOCARDIAL INFARCTION): ICD-10-CM

## 2018-10-19 DIAGNOSIS — R53.83 FATIGUE, UNSPECIFIED TYPE: ICD-10-CM

## 2018-10-19 DIAGNOSIS — R19.09 ABDOMINAL MASS OF OTHER SITE: Primary | ICD-10-CM

## 2018-10-19 DIAGNOSIS — I10 ESSENTIAL HYPERTENSION: ICD-10-CM

## 2018-10-19 PROBLEM — R19.00 ABDOMINAL LUMP: Status: ACTIVE | Noted: 2018-04-20

## 2018-10-19 PROCEDURE — 3008F BODY MASS INDEX DOCD: CPT | Performed by: NURSE PRACTITIONER

## 2018-10-19 PROCEDURE — 99214 OFFICE O/P EST MOD 30 MIN: CPT | Performed by: NURSE PRACTITIONER

## 2018-10-19 RX ORDER — LISINOPRIL 10 MG/1
10 TABLET ORAL DAILY
Qty: 60 TABLET | Refills: 0 | Status: SHIPPED | OUTPATIENT
Start: 2018-10-19 | End: 2018-12-21

## 2018-10-19 NOTE — ASSESSMENT & PLAN NOTE
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

## 2018-10-19 NOTE — ASSESSMENT & PLAN NOTE
Blood pressure control needs to be tightened  He will continue with current dosage of metoprolol and hctz; I have also added on lisinopril 10 mg daily  Pt will return for f/u in January

## 2018-10-19 NOTE — PROGRESS NOTES
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  He is going to be moved to day shift in January and this will likely help improve his symptoms of fatigue  Pt did not complete the blood work ordered at last visit and he will do this prior to his f/u  History of MI (myocardial infarction)  No sx of chest pain  Pt does have nitroglycerin available and he continues on aspirin and atorvastatin  Diagnoses and all orders for this visit:    Abdominal mass of other site  -     CT abdomen pelvis wo contrast; Future    Essential hypertension  -     lisinopril (ZESTRIL) 10 mg tablet; Take 1 tablet (10 mg total) by mouth daily    Fatigue, unspecified type    History of MI (myocardial infarction)          Subjective:      Patient ID: Nicki Pérez is a 62 y o  male  Pt is a 62y o  year old male who is seen today for 3 month follow up to management of HTN, CVD, and back pain  There was not blood work done prior to today's visit  Pt is currently prescribed hctz and metoprolol for his blood pressure but control has not been marginal with elevation today  He denies chest pain, palpitations, shortness of breath, headache, blurred vision, and dizziness  He does continue with chronic back pain that is adequately controlled with flexeril and gabapentin  Pt states he also just found out that he is being switched to day shift schedule at work              The following portions of the patient's history were reviewed and updated as appropriate: allergies, current medications, past family history, past medical history, past social history, past surgical history and problem list     Review of Systems   Constitutional: Negative for activity change, appetite change, chills, fatigue, fever and unexpected weight change  HENT: Negative for hearing loss  Eyes: Negative for visual disturbance  Respiratory: Negative for cough, chest tightness and shortness of breath  Cardiovascular: Negative for chest pain, palpitations and leg swelling  Gastrointestinal: Negative for constipation, diarrhea, nausea and vomiting  Genitourinary: Negative for dysuria and frequency  Musculoskeletal: Negative for arthralgias and myalgias  Allergic/Immunologic: Negative for environmental allergies  Neurological: Negative for dizziness, weakness, numbness and headaches  Psychiatric/Behavioral: Negative for sleep disturbance  The patient is not nervous/anxious  Objective:      /88 (BP Location: Left arm, Patient Position: Sitting, Cuff Size: Adult)   Pulse 66   Temp 97 6 °F (36 4 °C) (Tympanic)   Ht 5' 10" (1 778 m)   Wt 113 kg (249 lb)   SpO2 96%   BMI 35 73 kg/m²          Physical Exam   Constitutional: He is oriented to person, place, and time  He appears well-developed and well-nourished  He is cooperative  HENT:   Head: Normocephalic  Right Ear: Hearing, tympanic membrane, external ear and ear canal normal    Left Ear: Hearing, tympanic membrane, external ear and ear canal normal    Nose: Nose normal    Mouth/Throat: Oropharynx is clear and moist and mucous membranes are normal    Eyes: Pupils are equal, round, and reactive to light  Conjunctivae are normal    Neck: Normal range of motion  No JVD present  Carotid bruit is not present  Cardiovascular: Normal rate, regular rhythm, S1 normal, S2 normal, normal heart sounds and intact distal pulses  No murmur heard    Pulmonary/Chest: Effort normal and breath sounds normal  Abdominal: Soft  Normal appearance and bowel sounds are normal  There is no tenderness  Musculoskeletal: Normal range of motion  He exhibits no edema  Lumbar back: He exhibits tenderness and spasm  Lymphadenopathy:        Head (right side): No submental, no submandibular, no tonsillar, no preauricular, no posterior auricular and no occipital adenopathy present  Head (left side): No submental, no submandibular, no tonsillar, no preauricular, no posterior auricular and no occipital adenopathy present  He has no cervical adenopathy  Neurological: He is alert and oriented to person, place, and time  He has normal strength  No sensory deficit  Skin: Skin is warm, dry and intact  Psychiatric: He has a normal mood and affect   His speech is normal and behavior is normal  Judgment and thought content normal  Cognition and memory are normal

## 2018-10-19 NOTE — ASSESSMENT & PLAN NOTE
No sx of chest pain  Pt does have nitroglycerin available and he continues on aspirin and atorvastatin

## 2018-10-19 NOTE — ASSESSMENT & PLAN NOTE
Aspiration in may of fluid in the abdomen  At that time, it was recommended to do f/u imaging to confirm resolution  No evidence on exam of recurrence, I have ordered an abd CT to reassess to be done next month

## 2018-11-02 ENCOUNTER — HOSPITAL ENCOUNTER (OUTPATIENT)
Dept: RADIOLOGY | Age: 58
Discharge: HOME | End: 2018-11-02
Payer: COMMERCIAL

## 2018-11-02 DIAGNOSIS — R19.09 ABDOMINAL MASS OF OTHER SITE: ICD-10-CM

## 2018-11-02 PROCEDURE — 74176 CT ABD & PELVIS W/O CONTRAST: CPT

## 2018-11-08 DIAGNOSIS — M54.9 CHRONIC BILATERAL BACK PAIN, UNSPECIFIED BACK LOCATION: Primary | ICD-10-CM

## 2018-11-08 DIAGNOSIS — G89.29 CHRONIC BILATERAL BACK PAIN, UNSPECIFIED BACK LOCATION: Primary | ICD-10-CM

## 2018-11-08 RX ORDER — CYCLOBENZAPRINE HCL 10 MG
TABLET ORAL
Qty: 90 TABLET | Refills: 2 | Status: SHIPPED | OUTPATIENT
Start: 2018-11-08 | End: 2019-08-14 | Stop reason: SDUPTHER

## 2018-11-12 ENCOUNTER — TELEPHONE (OUTPATIENT)
Dept: INTERNAL MEDICINE CLINIC | Facility: CLINIC | Age: 58
End: 2018-11-12

## 2018-11-12 NOTE — TELEPHONE ENCOUNTER
I filled it out last week and it is sitting in my bin  We had called him because he needs a clean EKG on record so I believe he was scheduled for a nurse visit to come get that done

## 2018-11-15 ENCOUNTER — CLINICAL SUPPORT (OUTPATIENT)
Dept: INTERNAL MEDICINE CLINIC | Facility: CLINIC | Age: 58
End: 2018-11-15
Payer: COMMERCIAL

## 2018-11-15 DIAGNOSIS — Z00.00 HEALTH CARE MAINTENANCE: Primary | ICD-10-CM

## 2018-11-26 ENCOUNTER — OFFICE VISIT (OUTPATIENT)
Dept: INTERNAL MEDICINE CLINIC | Facility: CLINIC | Age: 58
End: 2018-11-26
Payer: COMMERCIAL

## 2018-11-26 VITALS
OXYGEN SATURATION: 99 % | SYSTOLIC BLOOD PRESSURE: 160 MMHG | DIASTOLIC BLOOD PRESSURE: 90 MMHG | TEMPERATURE: 97.8 F | WEIGHT: 248 LBS | BODY MASS INDEX: 35.58 KG/M2 | HEART RATE: 87 BPM | RESPIRATION RATE: 16 BRPM

## 2018-11-26 DIAGNOSIS — R19.00 ABDOMINAL MASS, UNSPECIFIED ABDOMINAL LOCATION: Primary | ICD-10-CM

## 2018-11-26 DIAGNOSIS — R42 EPISODE OF DIZZINESS: ICD-10-CM

## 2018-11-26 DIAGNOSIS — R23.8 SKIN IRRITATION: ICD-10-CM

## 2018-11-26 DIAGNOSIS — N40.0 BENIGN PROSTATIC HYPERPLASIA, UNSPECIFIED WHETHER LOWER URINARY TRACT SYMPTOMS PRESENT: ICD-10-CM

## 2018-11-26 PROCEDURE — 1036F TOBACCO NON-USER: CPT | Performed by: NURSE PRACTITIONER

## 2018-11-26 PROCEDURE — 99213 OFFICE O/P EST LOW 20 MIN: CPT | Performed by: NURSE PRACTITIONER

## 2018-11-26 RX ORDER — TAMSULOSIN HYDROCHLORIDE 0.4 MG/1
0.4 CAPSULE ORAL
Qty: 90 CAPSULE | Refills: 3 | Status: SHIPPED | OUTPATIENT
Start: 2018-11-26 | End: 2019-02-22

## 2018-11-26 NOTE — ASSESSMENT & PLAN NOTE
Neurologic exam is normal   Suspect allergic process  Recommended patient is oral antihistamines once daily such as loratadine  Patient also encouraged to increase fluid intake and saline irrigation of the nasal passages  Patient is not tolerant of nasal sprays, however, recommended application of Vaseline over the outer most lining of the nares  Reassured patient that this does not appear to be did a neurological problem and I do not believe he would benefit from being on antibiotics at this point  I have asked him to try this conservative treatment for the time being and he will should call if symptoms worsen or persist   He is already scheduled for follow-up in January and we will re-evaluate at that time

## 2018-11-26 NOTE — ASSESSMENT & PLAN NOTE
Patient requesting referral to Dermatology for evaluation of skin irritation that has been ongoing" for ages   No response to topicals  Referral ordered to Dr Ezio Warner

## 2018-11-26 NOTE — PROGRESS NOTES
Assessment/Plan:    Abdominal lump  Patient is referred to General surgery surgery for discussion of treatment options for recurrent mass in the abdomen following IR drainage earlier this year  CT report suggests seroma versus abscess  Patient is asymptomatic at this time with just mild sensation of abdominal bloating  Skin irritation  Patient requesting referral to Dermatology for evaluation of skin irritation that has been ongoing" for ages   No response to topicals  Referral ordered to Dr Dorita Sierra  Benign prostatic hyperplasia  Renewed prescription for tamsulosin  Episode of dizziness  Neurologic exam is normal   Suspect allergic process  Recommended patient is oral antihistamines once daily such as loratadine  Patient also encouraged to increase fluid intake and saline irrigation of the nasal passages  Patient is not tolerant of nasal sprays, however, recommended application of Vaseline over the outer most lining of the nares  Reassured patient that this does not appear to be did a neurological problem and I do not believe he would benefit from being on antibiotics at this point  I have asked him to try this conservative treatment for the time being and he will should call if symptoms worsen or persist   He is already scheduled for follow-up in January and we will re-evaluate at that time  Diagnoses and all orders for this visit:    Abdominal mass, unspecified abdominal location  -     Ambulatory referral to General Surgery; Future    Benign prostatic hyperplasia, unspecified whether lower urinary tract symptoms present  -     tamsulosin (FLOMAX) 0 4 mg; Take 1 capsule (0 4 mg total) by mouth daily with dinner    Skin irritation  -     Ambulatory referral to Dermatology; Future    Episode of dizziness          Subjective:      Patient ID: Mignon Patel is a 62 y o  male  Pt is a 62 y o  y/o male who is seen today for evaluation of questionable sinus infection    Pt states that last week he started having brief episodes of dizziness associated with turning his head while his eyes were closed  He has also been feeling congested in his frontal sinuses with "burning" in his nose and intermittent headache  No loss of consciousness, no muscle weakness  He is not aware of any environmental allergies  He denies fever/chills  Appetite is normal, no nausea/vomiting  No cough, sputum production, or SOB  Patient is not taking any medications at this time for his symptoms  The following portions of the patient's history were reviewed and updated as appropriate: allergies, current medications, past family history, past medical history, past social history, past surgical history and problem list     Review of Systems   Constitutional: Negative for appetite change, chills, fatigue and fever  HENT: Positive for sinus pain (Burning)  Negative for congestion, ear pain, hearing loss, nosebleeds, postnasal drip, rhinorrhea, sinus pressure and sore throat  Respiratory: Negative for cough, chest tightness, shortness of breath and wheezing  Cardiovascular: Negative for chest pain, palpitations and leg swelling  Gastrointestinal: Negative for abdominal pain, diarrhea, nausea and vomiting  Musculoskeletal: Positive for arthralgias (Knees, secondary to arthritis)  Negative for myalgias  Skin: Positive for color change (Erythema and burning of the cheeks and above the eyebrows  )  Neurological: Positive for dizziness and headaches  Negative for syncope and light-headedness  Hematological: Negative for adenopathy  Psychiatric/Behavioral: Negative for sleep disturbance           Objective:      /90 (BP Location: Right arm, Patient Position: Sitting, Cuff Size: Standard) Comment: pt didn't take bp meds yet  Pulse 87   Temp 97 8 °F (36 6 °C)   Resp 16   Wt 112 kg (248 lb)   SpO2 99%   BMI 35 58 kg/m²          Physical Exam   Constitutional: He is oriented to person, place, and time  He appears well-developed and well-nourished  He is cooperative  HENT:   Head: Normocephalic  Right Ear: Hearing, tympanic membrane, external ear and ear canal normal  No middle ear effusion  Left Ear: Hearing, tympanic membrane, external ear and ear canal normal   No middle ear effusion  Nose: No mucosal edema  Mouth/Throat: Mucous membranes are not dry  No oropharyngeal exudate, posterior oropharyngeal edema, posterior oropharyngeal erythema or tonsillar abscesses  Erythema of the nasal passages noted, no significant swelling  Eyes: Conjunctivae and lids are normal    Neck: Normal range of motion  No JVD present  Carotid bruit is not present  Cardiovascular: Normal rate, regular rhythm, normal heart sounds and normal pulses  Pulmonary/Chest: Effort normal and breath sounds normal    Lymphadenopathy:        Head (right side): No submental, no submandibular, no tonsillar, no preauricular, no posterior auricular and no occipital adenopathy present  Head (left side): No submental, no submandibular, no tonsillar, no preauricular, no posterior auricular and no occipital adenopathy present  He has no cervical adenopathy  Neurological: He is alert and oriented to person, place, and time  He has normal strength  No cranial nerve deficit or sensory deficit  He displays a negative Romberg sign  Skin: Skin is warm, dry and intact  There is erythema  Erythema of the skin on the face noted, no skin breakdown, no abnormal lesions  Psychiatric: He has a normal mood and affect  His speech is normal and behavior is normal  Judgment and thought content normal  Cognition and memory are normal    Vitals reviewed

## 2018-11-26 NOTE — ASSESSMENT & PLAN NOTE
Patient is referred to General surgery surgery for discussion of treatment options for recurrent mass in the abdomen following IR drainage earlier this year  CT report suggests seroma versus abscess  Patient is asymptomatic at this time with just mild sensation of abdominal bloating

## 2018-12-06 PROCEDURE — 93000 ELECTROCARDIOGRAM COMPLETE: CPT | Performed by: NURSE PRACTITIONER

## 2018-12-11 NOTE — PROGRESS NOTES
Cardiology Follow Up    [facility]  1960  [id number]  [address]    1  Essential (primary) hypertension     2  Pure hypercholesterolemia     3  Coronary arteriosclerosis     4  H/O heart artery stent  Ambulatory referral to Cardiology       Interval History:  Patient is here to establish a relationship for ongoing cardiology follow-up  He has a prior history of hypertension and hyperlipidemia  He has a prior history of coronary artery disease with two vessel stenting performed 12/27/ 2010  His most recent nuclear stress test was done year 2015 and showed no evidence of prognostically important ischemia  He exercised 8 min on a treadmill  He had a lipid profile in July of this year which looked quite good  He has had issues with drainage from an abdominal source  He has seen Dr Roselyn Rendon about this  This is a seroma of the abdominal wall status post prior gastric bypass  He will follow up with interventional radiology in reference to drainage options  His blood pressure is high today and he has not been taking his lisinopril  I have instructed him to go back on 10 mg per day in combination with hydrochlorothiazide 12 5 mg per day  Additionally he had been taking his metoprolol only once a day instead of twice a day  I instructed him to take his pills as he is supposed to  He has had no chest pain or significant dyspnea  He has been working for [employer] for the past year in receiving  His prior intervention was a drug-eluting stent in the LAD of 3-0 x 18 size and a drug-eluting stent in the diagonal branch of two 5 x 12 size  Promus stents were utilized  He has had some issues with his knee  EKG today demonstrates sinus rhythm with nonspecific ST segment changes with no significant change compared to a prior tracing done November 15, 2018      Patient Active Problem List   Diagnosis    Acute right-sided low back pain without sciatica    Abdominal lump    Hypertension    History of MI (myocardial infarction)    Non morbid obesity, unspecified obesity type    Herpes zoster with complication    Ear pain, left    Multiple food allergies    H/O heart artery stent    Fatigue    Need for influenza vaccination    Skin irritation    Episode of dizziness    Benign prostatic hyperplasia     Past Medical History:   Diagnosis Date    Hypertension      Social History     Social History    Marital status: /Civil Union     Spouse name: N/A    Number of children: N/A    Years of education: N/A     Occupational History    Not on file       Social History Main Topics    Smoking status: Never Smoker    Smokeless tobacco: Never Used    Alcohol use Yes      Comment: social    Drug use: No    Sexual activity: Yes     Partners: Female     Other Topics Concern    Not on file     Social History Narrative    No narrative on file      Family History   Problem Relation Age of Onset    Heart disease Mother     Diabetes Mother      Past Surgical History:   Procedure Laterality Date    BACK SURGERY      GASTRIC BYPASS         Current Outpatient Prescriptions:     aspirin (ECOTRIN LOW STRENGTH) 81 mg EC tablet, Take 1 tablet by mouth daily, Disp: , Rfl:     atorvastatin (LIPITOR) 40 mg tablet, Take 1 tablet (40 mg total) by mouth daily, Disp: 90 tablet, Rfl: 3    B COMPLEX VITAMINS ER PO, Take by mouth, Disp: , Rfl:     cyclobenzaprine (FLEXERIL) 10 mg tablet, TAKE 1/2 TO 1 TABLET AT BEDTIME AS NEEDED, Disp: 90 tablet, Rfl: 2    EPINEPHrine (EPIPEN 2-MOE) 0 3 mg/0 3 mL SOAJ, Inject 0 3 mL (0 3 mg total) into the shoulder, thigh, or buttocks 1 (one) time orthopaedic injection for anaphylaxis for up to 1 dose, Disp: 1 each, Rfl: 0    gabapentin (NEURONTIN) 300 mg capsule, Take 1 capsule (300 mg total) by mouth daily, Disp: 90 capsule, Rfl: 0    hydrochlorothiazide (HYDRODIURIL) 12 5 mg tablet, Take 1 tablet (12 5 mg total) by mouth daily, Disp: 90 tablet, Rfl: 3    Iron, Ferrous Sulfate, 142 (45 Fe) MG TBCR, Take by mouth, Disp: , Rfl:     Krill Oil 1000 MG CAPS, Take by mouth, Disp: , Rfl:     metoprolol tartrate (LOPRESSOR) 25 mg tablet, Take 1 tablet (25 mg total) by mouth daily (Patient taking differently: Take 25 mg by mouth 2 (two) times a day  ), Disp: 90 tablet, Rfl: 3    nitroglycerin (NITROSTAT) 0 4 mg SL tablet, Place 1 tablet under the tongue, Disp: , Rfl:     Nutritional Supplements (OSTEO-MULTIVITAMINS/MINERALS PO), Take 1 capsule by mouth daily, Disp: , Rfl:     pantoprazole (PROTONIX) 40 mg tablet, Take 1 tablet (40 mg total) by mouth daily, Disp: 90 tablet, Rfl: 3    tamsulosin (FLOMAX) 0 4 mg, Take 1 capsule (0 4 mg total) by mouth daily with dinner (Patient taking differently: Take 0 4 mg by mouth as needed  ), Disp: 90 capsule, Rfl: 3    venlafaxine (EFFEXOR) 37 5 mg tablet, Take 1 tablet (37 5 mg total) by mouth daily, Disp: 90 tablet, Rfl: 3    lisinopril (ZESTRIL) 10 mg tablet, Take 1 tablet (10 mg total) by mouth daily (Patient not taking: Reported on 12/13/2018 ), Disp: 60 tablet, Rfl: 0  Allergies   Allergen Reactions    Nuts Anaphylaxis    Peanut Oil Anaphylaxis    Isoflavones     Penicillins     Shellfish Allergy        Labs:not applicable  Imaging: No results found  Review of Systems:  Review of Systems   HENT:        Occasional positional dizziness   All other systems reviewed and are negative  Physical Exam:  /90 (BP Location: Left arm, Patient Position: Sitting, Cuff Size: Standard)   Pulse 71   Ht 5' 10" (1 778 m)   Wt 112 kg (246 lb 6 4 oz)   BMI 35 35 kg/m²   Physical Exam   Constitutional: He is oriented to person, place, and time  He appears well-developed and well-nourished  HENT:   Head: Normocephalic and atraumatic  Eyes: Pupils are equal, round, and reactive to light  Conjunctivae are normal    Neck: Normal range of motion   Neck supple  Cardiovascular: Normal rate and normal heart sounds  Pulmonary/Chest: Effort normal and breath sounds normal    Neurological: He is alert and oriented to person, place, and time  Skin: Skin is warm and dry  Psychiatric: He has a normal mood and affect  Vitals reviewed  Discussion/Summary:  Patient's blood pressure is mildly elevated today  I have advised that he go back on lisinopril 10 mg per day and he will have his blood pressure checked in about three weeks and call me with the results  I have also instructed him to take his metoprolol as previously prescribed at a dose of 25 mg twice a day  This will help with his blood pressure control as well  He is otherwise well compensated  He is due for an echo prior to his next visit  I will see him in six months time and I have asked him to call me if there is a problem in the interim  He has a physically demanding job and has had no angina

## 2018-12-13 ENCOUNTER — OFFICE VISIT (OUTPATIENT)
Dept: CARDIOLOGY CLINIC | Facility: CLINIC | Age: 58
End: 2018-12-13
Payer: COMMERCIAL

## 2018-12-13 VITALS
HEIGHT: 70 IN | BODY MASS INDEX: 35.28 KG/M2 | WEIGHT: 246.4 LBS | DIASTOLIC BLOOD PRESSURE: 90 MMHG | HEART RATE: 71 BPM | SYSTOLIC BLOOD PRESSURE: 150 MMHG

## 2018-12-13 DIAGNOSIS — I25.10 CORONARY ARTERIOSCLEROSIS: ICD-10-CM

## 2018-12-13 DIAGNOSIS — E78.00 PURE HYPERCHOLESTEROLEMIA: ICD-10-CM

## 2018-12-13 DIAGNOSIS — Z95.5 H/O HEART ARTERY STENT: ICD-10-CM

## 2018-12-13 DIAGNOSIS — I10 ESSENTIAL (PRIMARY) HYPERTENSION: Primary | ICD-10-CM

## 2018-12-13 PROCEDURE — 99244 OFF/OP CNSLTJ NEW/EST MOD 40: CPT | Performed by: INTERNAL MEDICINE

## 2018-12-13 NOTE — PATIENT INSTRUCTIONS
Please restart lisinopril 10 mg per day  Please take your metoprolol twice a day  Please call if there is a problem in the interim  I will arrange an echocardiogram at her convenience  I will see you at the follow-up visit

## 2018-12-21 ENCOUNTER — APPOINTMENT (EMERGENCY)
Dept: RADIOLOGY | Facility: HOSPITAL | Age: 58
End: 2018-12-21
Payer: COMMERCIAL

## 2018-12-21 ENCOUNTER — OFFICE VISIT (OUTPATIENT)
Dept: INTERNAL MEDICINE CLINIC | Facility: CLINIC | Age: 58
End: 2018-12-21
Payer: COMMERCIAL

## 2018-12-21 ENCOUNTER — HOSPITAL ENCOUNTER (EMERGENCY)
Facility: HOSPITAL | Age: 58
Discharge: HOME | End: 2018-12-21
Attending: EMERGENCY MEDICINE
Payer: COMMERCIAL

## 2018-12-21 ENCOUNTER — APPOINTMENT (OUTPATIENT)
Dept: LAB | Facility: CLINIC | Age: 58
End: 2018-12-21
Payer: COMMERCIAL

## 2018-12-21 VITALS
SYSTOLIC BLOOD PRESSURE: 139 MMHG | OXYGEN SATURATION: 94 % | RESPIRATION RATE: 18 BRPM | HEART RATE: 84 BPM | DIASTOLIC BLOOD PRESSURE: 76 MMHG | WEIGHT: 243 LBS | BODY MASS INDEX: 34.87 KG/M2 | TEMPERATURE: 98.5 F

## 2018-12-21 VITALS
BODY MASS INDEX: 34.79 KG/M2 | OXYGEN SATURATION: 96 % | SYSTOLIC BLOOD PRESSURE: 108 MMHG | TEMPERATURE: 97.6 F | HEART RATE: 111 BPM | DIASTOLIC BLOOD PRESSURE: 76 MMHG | WEIGHT: 243 LBS | HEIGHT: 70 IN

## 2018-12-21 DIAGNOSIS — R74.8 ABNORMAL LIVER ENZYMES: Primary | ICD-10-CM

## 2018-12-21 DIAGNOSIS — R17 SCLERAL ICTERUS: ICD-10-CM

## 2018-12-21 DIAGNOSIS — K21.9 GASTROESOPHAGEAL REFLUX DISEASE WITHOUT ESOPHAGITIS: ICD-10-CM

## 2018-12-21 DIAGNOSIS — R74.8 ELEVATED LIVER ENZYMES: Primary | ICD-10-CM

## 2018-12-21 DIAGNOSIS — I10 ESSENTIAL HYPERTENSION: ICD-10-CM

## 2018-12-21 DIAGNOSIS — E78.5 HYPERLIPIDEMIA, UNSPECIFIED HYPERLIPIDEMIA TYPE: ICD-10-CM

## 2018-12-21 DIAGNOSIS — R68.89 FLU-LIKE SYMPTOMS: Primary | ICD-10-CM

## 2018-12-21 DIAGNOSIS — E78.5 HYPERLIPIDEMIA: ICD-10-CM

## 2018-12-21 DIAGNOSIS — F41.8 DEPRESSION WITH ANXIETY: ICD-10-CM

## 2018-12-21 DIAGNOSIS — R53.83 FATIGUE, UNSPECIFIED TYPE: ICD-10-CM

## 2018-12-21 LAB
ALBUMIN SERPL BCP-MCNC: 3.8 G/DL (ref 3.5–5)
ALP SERPL-CCNC: 304 U/L (ref 46–116)
ALT SERPL W P-5'-P-CCNC: 395 U/L (ref 12–78)
ANION GAP SERPL CALCULATED.3IONS-SCNC: 6 MMOL/L (ref 4–13)
AST SERPL W P-5'-P-CCNC: 155 U/L (ref 5–45)
BACTERIA UR QL AUTO: ABNORMAL /HPF
BASOPHILS # BLD AUTO: 0.03 THOUSANDS/ΜL (ref 0–0.1)
BASOPHILS NFR BLD AUTO: 1 % (ref 0–1)
BILIRUB DIRECT SERPL-MCNC: 2.15 MG/DL (ref 0–0.2)
BILIRUB SERPL-MCNC: 3.07 MG/DL (ref 0.2–1)
BILIRUB UR QL STRIP: ABNORMAL
BUN SERPL-MCNC: 32 MG/DL (ref 5–25)
CALCIUM SERPL-MCNC: 9.4 MG/DL (ref 8.3–10.1)
CHLORIDE SERPL-SCNC: 103 MMOL/L (ref 100–108)
CK MB SERPL-MCNC: 2.1 % (ref 0–2.5)
CK MB SERPL-MCNC: 3.6 NG/ML (ref 0–5)
CK SERPL-CCNC: 174 U/L (ref 39–308)
CLARITY UR: ABNORMAL
CO2 SERPL-SCNC: 24 MMOL/L (ref 21–32)
COLOR UR: ABNORMAL
CREAT SERPL-MCNC: 1.24 MG/DL (ref 0.6–1.3)
EOSINOPHIL # BLD AUTO: 0.15 THOUSAND/ΜL (ref 0–0.61)
EOSINOPHIL NFR BLD AUTO: 3 % (ref 0–6)
ERYTHROCYTE [DISTWIDTH] IN BLOOD BY AUTOMATED COUNT: 12.6 % (ref 11.6–15.1)
FINE GRAN CASTS URNS QL MICRO: ABNORMAL /LPF
GFR SERPL CREATININE-BSD FRML MDRD: 64 ML/MIN/1.73SQ M
GLUCOSE P FAST SERPL-MCNC: 105 MG/DL (ref 65–99)
GLUCOSE UR STRIP-MCNC: NEGATIVE MG/DL
HCT VFR BLD AUTO: 47.6 % (ref 36.5–49.3)
HGB BLD-MCNC: 15.9 G/DL (ref 12–17)
HGB UR QL STRIP.AUTO: NEGATIVE
IMM GRANULOCYTES # BLD AUTO: 0.01 THOUSAND/UL (ref 0–0.2)
IMM GRANULOCYTES NFR BLD AUTO: 0 % (ref 0–2)
KETONES UR STRIP-MCNC: ABNORMAL MG/DL
LEUKOCYTE ESTERASE UR QL STRIP: ABNORMAL
LIPASE SERPL-CCNC: 141 U/L (ref 73–393)
LYMPHOCYTES # BLD AUTO: 0.96 THOUSANDS/ΜL (ref 0.6–4.47)
LYMPHOCYTES NFR BLD AUTO: 20 % (ref 14–44)
MCH RBC QN AUTO: 31.5 PG (ref 26.8–34.3)
MCHC RBC AUTO-ENTMCNC: 33.4 G/DL (ref 31.4–37.4)
MCV RBC AUTO: 94 FL (ref 82–98)
MONOCYTES # BLD AUTO: 0.57 THOUSAND/ΜL (ref 0.17–1.22)
MONOCYTES NFR BLD AUTO: 12 % (ref 4–12)
NEUTROPHILS # BLD AUTO: 3.08 THOUSANDS/ΜL (ref 1.85–7.62)
NEUTS SEG NFR BLD AUTO: 64 % (ref 43–75)
NITRITE UR QL STRIP: NEGATIVE
NON-SQ EPI CELLS URNS QL MICRO: ABNORMAL /HPF
NRBC BLD AUTO-RTO: 0 /100 WBCS
PH UR STRIP.AUTO: 6 [PH] (ref 4.5–8)
PLATELET # BLD AUTO: 171 THOUSANDS/UL (ref 149–390)
PMV BLD AUTO: 11.1 FL (ref 8.9–12.7)
POTASSIUM SERPL-SCNC: 3.4 MMOL/L (ref 3.5–5.3)
PROT SERPL-MCNC: 7.9 G/DL (ref 6.4–8.2)
PROT UR STRIP-MCNC: ABNORMAL MG/DL
RBC # BLD AUTO: 5.04 MILLION/UL (ref 3.88–5.62)
RBC #/AREA URNS AUTO: ABNORMAL /HPF
SODIUM SERPL-SCNC: 133 MMOL/L (ref 136–145)
SP GR UR STRIP.AUTO: 1.02 (ref 1–1.03)
TROPONIN I SERPL-MCNC: <0.02 NG/ML
TSH SERPL DL<=0.05 MIU/L-ACNC: 2.04 UIU/ML (ref 0.36–3.74)
UROBILINOGEN UR QL STRIP.AUTO: 2 E.U./DL
WBC # BLD AUTO: 4.8 THOUSAND/UL (ref 4.31–10.16)
WBC #/AREA URNS AUTO: ABNORMAL /HPF
WBC CASTS URNS QL MICRO: ABNORMAL /LPF

## 2018-12-21 PROCEDURE — 3008F BODY MASS INDEX DOCD: CPT | Performed by: NURSE PRACTITIONER

## 2018-12-21 PROCEDURE — 99214 OFFICE O/P EST MOD 30 MIN: CPT | Performed by: NURSE PRACTITIONER

## 2018-12-21 PROCEDURE — 84484 ASSAY OF TROPONIN QUANT: CPT | Performed by: EMERGENCY MEDICINE

## 2018-12-21 PROCEDURE — 82550 ASSAY OF CK (CPK): CPT | Performed by: EMERGENCY MEDICINE

## 2018-12-21 PROCEDURE — 82248 BILIRUBIN DIRECT: CPT | Performed by: EMERGENCY MEDICINE

## 2018-12-21 PROCEDURE — 93005 ELECTROCARDIOGRAM TRACING: CPT

## 2018-12-21 PROCEDURE — 81001 URINALYSIS AUTO W/SCOPE: CPT

## 2018-12-21 PROCEDURE — 3074F SYST BP LT 130 MM HG: CPT | Performed by: NURSE PRACTITIONER

## 2018-12-21 PROCEDURE — 1036F TOBACCO NON-USER: CPT | Performed by: NURSE PRACTITIONER

## 2018-12-21 PROCEDURE — 85025 COMPLETE CBC W/AUTO DIFF WBC: CPT

## 2018-12-21 PROCEDURE — 99284 EMERGENCY DEPT VISIT MOD MDM: CPT

## 2018-12-21 PROCEDURE — 96360 HYDRATION IV INFUSION INIT: CPT

## 2018-12-21 PROCEDURE — 82553 CREATINE MB FRACTION: CPT | Performed by: EMERGENCY MEDICINE

## 2018-12-21 PROCEDURE — 80053 COMPREHEN METABOLIC PANEL: CPT

## 2018-12-21 PROCEDURE — 74177 CT ABD & PELVIS W/CONTRAST: CPT

## 2018-12-21 PROCEDURE — 83690 ASSAY OF LIPASE: CPT | Performed by: EMERGENCY MEDICINE

## 2018-12-21 PROCEDURE — 3078F DIAST BP <80 MM HG: CPT | Performed by: NURSE PRACTITIONER

## 2018-12-21 PROCEDURE — 36415 COLL VENOUS BLD VENIPUNCTURE: CPT

## 2018-12-21 PROCEDURE — 80074 ACUTE HEPATITIS PANEL: CPT | Performed by: EMERGENCY MEDICINE

## 2018-12-21 PROCEDURE — 84443 ASSAY THYROID STIM HORMONE: CPT

## 2018-12-21 RX ORDER — PANTOPRAZOLE SODIUM 40 MG/1
40 TABLET, DELAYED RELEASE ORAL DAILY
Qty: 90 TABLET | Refills: 0 | Status: SHIPPED | OUTPATIENT
Start: 2018-12-21 | End: 2019-08-29 | Stop reason: SDUPTHER

## 2018-12-21 RX ORDER — VENLAFAXINE 37.5 MG/1
37.5 TABLET ORAL DAILY
Qty: 90 TABLET | Refills: 0 | Status: SHIPPED | OUTPATIENT
Start: 2018-12-21 | End: 2019-08-29 | Stop reason: SDUPTHER

## 2018-12-21 RX ORDER — ATORVASTATIN CALCIUM 40 MG/1
40 TABLET, FILM COATED ORAL DAILY
Qty: 90 TABLET | Refills: 4 | Status: SHIPPED | OUTPATIENT
Start: 2018-12-21 | End: 2020-01-23

## 2018-12-21 RX ORDER — LISINOPRIL AND HYDROCHLOROTHIAZIDE 12.5; 1 MG/1; MG/1
1 TABLET ORAL DAILY
Qty: 90 TABLET | Refills: 0 | Status: SHIPPED | OUTPATIENT
Start: 2018-12-21 | End: 2019-05-21 | Stop reason: SDUPTHER

## 2018-12-21 RX ADMIN — IOHEXOL 100 ML: 350 INJECTION, SOLUTION INTRAVENOUS at 20:16

## 2018-12-21 RX ADMIN — SODIUM CHLORIDE 1000 ML: 0.9 INJECTION, SOLUTION INTRAVENOUS at 21:08

## 2018-12-21 NOTE — ASSESSMENT & PLAN NOTE
Yellowing of the eyes noted with questionably enlarged liver on exam   No present symptoms of abdominal pain or tenderness to palpation  Denies nausea vomiting diarrhea  I have sent patient for a CMP and CBC with further evaluation and workup as indicated by his lab results

## 2018-12-21 NOTE — PROGRESS NOTES
Assessment/Plan:    Hypertension  Blood pressure is well controlled now the patient is taking his lisinopril in addition to hydrochlorothiazide and metoprolol  Patient encouraged to continue medications as prescribed  Will continue to monitor  Flu-like symptoms  Patient appears to be recovering from acute viral illness  Ramon Klinefelter concern at this time is for dehydration secondary to poor p o  intake  Patient encouraged to increase fluid intake and food as tolerated  Recommended that patient stay home from work again [employer] as his job is very physically demanding and he should rest as much as possible  Patient should call if symptoms worsen or persist     Scleral icterus  Yellowing of the eyes noted with questionably enlarged liver on exam   No present symptoms of abdominal pain or tenderness to palpation  Denies nausea vomiting diarrhea  I have sent patient for a CMP and CBC with further evaluation and workup as indicated by his lab results  Diagnoses and all orders for this visit:    Flu-like symptoms    Hyperlipidemia, unspecified hyperlipidemia type  -     atorvastatin (LIPITOR) 40 mg tablet; Take 1 tablet (40 mg total) by mouth daily    Gastroesophageal reflux disease without esophagitis  -     pantoprazole (PROTONIX) 40 mg tablet; Take 1 tablet (40 mg total) by mouth daily    Depression with anxiety  -     venlafaxine (EFFEXOR) 37 5 mg tablet; Take 1 tablet (37 5 mg total) by mouth daily    Essential hypertension  -     lisinopril-hydrochlorothiazide (PRINZIDE,ZESTORETIC) 10-12 5 MG per tablet; Take 1 tablet by mouth daily    Scleral icterus  -     Comprehensive metabolic panel; Future  -     CBC and differential; Future          Subjective:      Patient ID: Estela Ax is a 62 y o  male  Pt is a 62 y o  y/o male who is seen today for evaluation of illness that started on Wednesday morning    His symptoms include fever, chills, myalgias, appetite loss with little p o  intake since Tuesday, fatigue, and headache  He denies sinus congestion, cough, shortness of breath, ear pain  He has been taking tylenol for symptom relief  He reports that today he is feeling little bit better though very fatigued  He is also concerned because he feels he looks yellow  No reports of nausea or vomiting, he did initially have abdominal pain at symptom onset but this has resolved  The following portions of the patient's history were reviewed and updated as appropriate: allergies, current medications, past family history, past medical history, past social history, past surgical history and problem list     Review of Systems   Constitutional: Positive for appetite change, chills, fatigue and fever  HENT: Negative for congestion, hearing loss, sinus pressure and sore throat  Eyes: Negative for discharge and visual disturbance  Respiratory: Negative for cough, chest tightness, shortness of breath and wheezing  Gastrointestinal: Negative for abdominal pain, diarrhea, nausea and vomiting  Genitourinary: Negative for dysuria  Musculoskeletal: Negative for myalgias  Skin: Positive for color change  Neurological: Negative for dizziness, light-headedness and headaches  Hematological: Negative for adenopathy  Objective:      /76 (BP Location: Left arm, Patient Position: Sitting, Cuff Size: Adult)   Pulse (!) 111   Temp 97 6 °F (36 4 °C) (Tympanic)   Ht 5' 10" (1 778 m)   Wt 110 kg (243 lb)   SpO2 96%   BMI 34 87 kg/m²          Physical Exam   Constitutional: He is oriented to person, place, and time  He appears well-developed and well-nourished  He is cooperative  Tachycardia possibly secondary to dehydration  HENT:   Right Ear: Hearing, tympanic membrane, external ear and ear canal normal  No middle ear effusion  Left Ear: Hearing, tympanic membrane, external ear and ear canal normal   No middle ear effusion  Nose: No mucosal edema     Mouth/Throat: Mucous membranes are not dry  No oropharyngeal exudate, posterior oropharyngeal edema, posterior oropharyngeal erythema or tonsillar abscesses  Eyes: Pupils are equal, round, and reactive to light  Conjunctivae and lids are normal  Scleral icterus is present  Cardiovascular: Regular rhythm and normal heart sounds  Tachycardia present  Pulmonary/Chest: Effort normal and breath sounds normal    Abdominal: Soft  Normal appearance and bowel sounds are normal  He exhibits mass (Previously diagnosed abdominal mass is again noted on exam today)  There is hepatomegaly (Questionably enlarged liver with no noted tenderness to palpation  )  There is no tenderness  Musculoskeletal: Normal range of motion  Lymphadenopathy:        Head (right side): No submental, no submandibular, no tonsillar, no preauricular, no posterior auricular and no occipital adenopathy present  Head (left side): No submental, no submandibular, no tonsillar, no preauricular, no posterior auricular and no occipital adenopathy present  He has no cervical adenopathy  Neurological: He is alert and oriented to person, place, and time  He has normal strength  Skin: Skin is warm, dry and intact  Psychiatric: He has a normal mood and affect  His speech is normal and behavior is normal  Judgment and thought content normal  Cognition and memory are normal    Vitals reviewed

## 2018-12-21 NOTE — ASSESSMENT & PLAN NOTE
Patient appears to be recovering from acute viral illness  St vines concern at this time is for dehydration secondary to poor p o  intake  Patient encouraged to increase fluid intake and food as tolerated  Recommended that patient stay home from work again [employer] as his job is very physically demanding and he should rest as much as possible    Patient should call if symptoms worsen or persist

## 2018-12-21 NOTE — ASSESSMENT & PLAN NOTE
Blood pressure is well controlled now the patient is taking his lisinopril in addition to hydrochlorothiazide and metoprolol  Patient encouraged to continue medications as prescribed  Will continue to monitor

## 2018-12-21 NOTE — LETTER
December 21, 2018     Patient: Ghislaine Place   YOB: 1960   Date of Visit: 12/21/2018       To Whom it May Concern:    Funmi Solis is under my professional care  He was seen in my office on 12/21/2018  He may return to work on 12/24/18  If you have any questions or concerns, please don't hesitate to call           Sincerely,          NU Mary        CC: No Recipients

## 2018-12-22 LAB
ATRIAL RATE: 85 BPM
HAV IGM SER QL: NORMAL
HBV CORE IGM SER QL: NORMAL
HBV SURFACE AG SER QL: NORMAL
HCV AB SER QL: NORMAL
P AXIS: 42 DEGREES
PR INTERVAL: 174 MS
QRS AXIS: 17 DEGREES
QRSD INTERVAL: 102 MS
QT INTERVAL: 344 MS
QTC INTERVAL: 409 MS
T WAVE AXIS: -8 DEGREES
VENTRICULAR RATE: 85 BPM

## 2018-12-22 PROCEDURE — 93010 ELECTROCARDIOGRAM REPORT: CPT | Performed by: INTERNAL MEDICINE

## 2018-12-22 NOTE — ED PROVIDER NOTES
History  Chief Complaint   Patient presents with    Abnormal Lab     Pt was sent in for elevated liver enzymes     Patient is a 70-year-old male who presents for elevated liver enzymes  Patient says that he had blood work done at his PCP and was called and told to come to the ED for further evaluation  Patient says that for the past few days he has had muscle aches, fatigue and generally not feeling well  He also admits to his eyes looking yellow over the past few days  Patient says he had a temperature of 102° yesterday  Patient denies nausea, vomiting, abdominal pain, diarrhea, blood in his stools  He says that yesterday he had some minor epigastric pain that radiated into his scapulas that went away on its own  He admits to a history of gastric bypass 12 years ago but denies any other abdominal surgical history  Prior to Admission Medications   Prescriptions Last Dose Informant Patient Reported? Taking?    B COMPLEX VITAMINS ER PO   Yes Yes   Sig: Take by mouth   Iron, Ferrous Sulfate, 142 (45 Fe) MG TBCR   Yes Yes   Sig: Take by mouth   Krill Oil 1000 MG CAPS  Self Yes Yes   Sig: Take by mouth   Nutritional Supplements (OSTEO-MULTIVITAMINS/MINERALS PO)   Yes Yes   Sig: Take 1 capsule by mouth daily   aspirin (ECOTRIN LOW STRENGTH) 81 mg EC tablet   Yes Yes   Sig: Take 1 tablet by mouth daily   atorvastatin (LIPITOR) 40 mg tablet   No Yes   Sig: Take 1 tablet (40 mg total) by mouth daily   cyclobenzaprine (FLEXERIL) 10 mg tablet   No Yes   Sig: TAKE 1/2 TO 1 TABLET AT BEDTIME AS NEEDED   gabapentin (NEURONTIN) 300 mg capsule   No Yes   Sig: Take 1 capsule (300 mg total) by mouth daily   lisinopril-hydrochlorothiazide (PRINZIDE,ZESTORETIC) 10-12 5 MG per tablet   No Yes   Sig: Take 1 tablet by mouth daily   metoprolol tartrate (LOPRESSOR) 25 mg tablet   No Yes   Sig: Take 1 tablet (25 mg total) by mouth daily   Patient taking differently: Take 25 mg by mouth 2 (two) times a day     nitroglycerin (NITROSTAT) 0 4 mg SL tablet   Yes Yes   Sig: Place 1 tablet under the tongue   pantoprazole (PROTONIX) 40 mg tablet   No Yes   Sig: Take 1 tablet (40 mg total) by mouth daily   tamsulosin (FLOMAX) 0 4 mg   No Yes   Sig: Take 1 capsule (0 4 mg total) by mouth daily with dinner   Patient taking differently: Take 0 4 mg by mouth as needed     venlafaxine (EFFEXOR) 37 5 mg tablet   No Yes   Sig: Take 1 tablet (37 5 mg total) by mouth daily      Facility-Administered Medications: None       Past Medical History:   Diagnosis Date    Cardiac disease     Hypertension        Past Surgical History:   Procedure Laterality Date    BACK SURGERY      CARDIAC SURGERY      GASTRIC BYPASS      NECK SURGERY      SHOULDER SURGERY         Family History   Problem Relation Age of Onset    Heart disease Mother     Diabetes Mother      I have reviewed and agree with the history as documented  Social History   Substance Use Topics    Smoking status: Never Smoker    Smokeless tobacco: Never Used    Alcohol use Yes      Comment: social        Review of Systems   Constitutional: Positive for appetite change and fever  Negative for chills and unexpected weight change  HENT: Negative for congestion, sore throat and trouble swallowing  Eyes: Negative for pain, discharge and itching  Respiratory: Negative for cough, chest tightness, shortness of breath and wheezing  Cardiovascular: Negative for chest pain, palpitations and leg swelling  Gastrointestinal: Negative for abdominal pain, blood in stool, diarrhea, nausea and vomiting  Endocrine: Negative for polyuria  Genitourinary: Negative for difficulty urinating, dysuria, frequency and hematuria  Musculoskeletal: Positive for myalgias  Negative for arthralgias, back pain, neck pain and neck stiffness  Skin: Negative for color change and rash  Neurological: Negative for dizziness, syncope, weakness, light-headedness and headaches         Physical Exam  ED Triage Vitals [12/21/18 1649]   Temperature Pulse Respirations Blood Pressure SpO2   98 5 °F (36 9 °C) 99 18 140/72 97 %      Temp Source Heart Rate Source Patient Position - Orthostatic VS BP Location FiO2 (%)   Tympanic Monitor Sitting Left arm --      Pain Score       3           Orthostatic Vital Signs  Vitals:    12/21/18 1649 12/21/18 1930 12/21/18 2030 12/21/18 2115   BP: 140/72 136/88 162/83 139/76   Pulse: 99 96 88 84   Patient Position - Orthostatic VS: Sitting Lying Lying Lying       Physical Exam   Constitutional: He is oriented to person, place, and time  He appears well-developed and well-nourished  No distress  HENT:   Head: Normocephalic and atraumatic  Right Ear: External ear normal    Left Ear: External ear normal    Eyes: Pupils are equal, round, and reactive to light  EOM are normal  Right eye exhibits no discharge  Left eye exhibits no discharge  Scleral icterus bilaterally   Neck: Normal range of motion  Cardiovascular: Normal rate, regular rhythm, normal heart sounds and intact distal pulses  Exam reveals no gallop and no friction rub  No murmur heard  Pulmonary/Chest: Effort normal and breath sounds normal  No respiratory distress  He has no wheezes  He has no rales  Abdominal: Soft  Bowel sounds are normal  He exhibits no distension  There is no tenderness  There is no guarding  Musculoskeletal: Normal range of motion  He exhibits no edema, tenderness or deformity  Lymphadenopathy:     He has no cervical adenopathy  Neurological: He is alert and oriented to person, place, and time  No cranial nerve deficit or sensory deficit  He exhibits normal muscle tone  Skin: Skin is warm and dry  Psychiatric: He has a normal mood and affect  His behavior is normal    Nursing note and vitals reviewed        ED Medications  Medications   iohexol (OMNIPAQUE) 350 MG/ML injection (MULTI-DOSE) 100 mL (100 mL Intravenous Given 12/21/18 2016)   sodium chloride 0 9 % bolus 1,000 mL (0 mL Intravenous Stopped 12/21/18 2145)       Diagnostic Studies  Results Reviewed     Procedure Component Value Units Date/Time    Hepatitis panel, acute [[id number]]  (Normal) Collected:  12/21/18 2027    Lab Status:  Final result Specimen:  Blood from Arm, Left Updated:  12/22/18 1132     Hepatitis B Surface Ag Non-reactive     Hep A IgM Non-reactive     Hepatitis C Ab Non-reactive     Hep B C IgM Non-reactive    CKMB [[id number]]  (Normal) Collected:  12/21/18 1944    Lab Status:  Final result Specimen:  Blood from Arm, Left Updated:  12/21/18 2137     CK-MB Index 2 1 %      CK-MB 3 6 ng/mL     Lipase [[id number]]  (Normal) Collected:  12/21/18 1944    Lab Status:  Final result Specimen:  Blood from Arm, Left Updated:  12/21/18 2121     Lipase 141 u/L     CK (with reflex to MB) [[id number]]  (Normal) Collected:  12/21/18 1944    Lab Status:  Final result Specimen:  Blood from Arm, Left Updated:  12/21/18 2121     Total  U/L     Troponin I [[id number]]  (Normal) Collected:  12/21/18 2027    Lab Status:  Final result Specimen:  Blood from Arm, Left Updated:  12/21/18 2100     Troponin I <0 02 ng/mL     Bilirubin, direct [[id number]]  (Abnormal) Collected:  12/21/18 1944    Lab Status:  Final result Specimen:  Blood from Arm, Left Updated:  12/21/18 2005     Bilirubin, Direct 2 15 (H) mg/dL                  CT abdomen pelvis with contrast   Final Result by Stevens Runner, MD (12/21 2043)      Unchanged appearance of the liver  No new biliary findings  Persistent, unchanged 15 7 x 4 0 x 7 8 cm collection in the anterior abdominal wall with a thick surrounding rind, likely hematoma seroma or abscess                 Workstation performed: [id number]               Procedures  ECG 12 Lead Documentation  Date/Time: 12/21/2018 8:50 PM  Performed by: Lars Brody  Authorized by: Lars Brody     Indications / Diagnosis:  Epigstric pain   ECG reviewed by me, the ED Provider: yes    Patient location:  ED  Previous ECG:     Previous ECG:  Unavailable    Comparison to cardiac monitor: Yes    Interpretation:     Interpretation: normal    Rate:     ECG rate:  85    ECG rate assessment: normal    Rhythm:     Rhythm: sinus rhythm    Ectopy:     Ectopy: none    QRS:     QRS axis:  Normal  Conduction:     Conduction: normal    ST segments:     ST segments:  Normal  T waves:     T waves: normal            Phone Consults  ED Phone Contact    ED Course                               MDM  Number of Diagnoses or Management Options  Elevated liver enzymes:   Diagnosis management comments: 72-year-old male presenting for elevated liver enzymes on blood work done today  Admits to fever and myalgias over the past few days  Denies abdominal pain, nausea, vomiting, diarrhea, blood in his stools  Admits to gastric bypass surgery 12 years ago  Will obtain lipase, hepatitis panel, direct bilirubin, will give IV fluids  Will get CT abdomen of the pelvis given elevated liver enzymes  CT negative for biliary disease or any other acute pathology  Patient feeling well  Believe patient's symptoms elevated liver enzymes are a viral etiology  Patient told that he will get results of hepatitis panel  Told to follow up with PCP  Told to return to the ED if he develops worsening abdominal pain, vomiting, diarrhea or any other concerning symptoms    CritCare Time    Disposition  Final diagnoses:   Elevated liver enzymes     Time reflects when diagnosis was documented in both MDM as applicable and the Disposition within this note     Time User Action Codes Description Comment    12/21/2018  9:34 PM Ambrose Graham Add [R74 8] Elevated liver enzymes       ED Disposition     ED Disposition Condition Comment    Discharge  179-00 Minesh Hawkins discharge to home/self care      Condition at discharge: Stable        Follow-up Information     Follow up With Specialties Details Why Contact Info Additional Michelle Galindo Internal Medicine, Nurse Practitioner Schedule an appointment as soon as possible for a visit in 1 week For follow up of liver enzymes [address] [address] [address] [facility]  [phone]       [address] [place] [address] Emergency Department Emergency Medicine  If symptoms worsen, if you develop worsening abdominal pain, nausea, vomiting, or any other concerning symptoms  [address] [place] [address], [address], [address]          Discharge Medication List as of 12/21/2018  9:36 PM      CONTINUE these medications which have NOT CHANGED    Details   aspirin (ECOTRIN LOW STRENGTH) 81 mg EC tablet Take 1 tablet by mouth daily, Starting Fri 1/5/2018, Historical Med      atorvastatin (LIPITOR) 40 mg tablet Take 1 tablet (40 mg total) by mouth daily, Starting Fri 12/21/2018, Normal      B COMPLEX VITAMINS ER PO Take by mouth, Starting Fri 1/5/2018, Historical Med      cyclobenzaprine (FLEXERIL) 10 mg tablet TAKE 1/2 TO 1 TABLET AT BEDTIME AS NEEDED, Normal      gabapentin (NEURONTIN) 300 mg capsule Take 1 capsule (300 mg total) by mouth daily, Starting u 7/12/2018, Normal      Iron, Ferrous Sulfate, 142 (45 Fe) MG TBCR Take by mouth, Starting Fri 1/5/2018, Historical Med      Krill Oil 1000 MG CAPS Take by mouth, Historical Med      lisinopril-hydrochlorothiazide (PRINZIDE,ZESTORETIC) 10-12 5 MG per tablet Take 1 tablet by mouth daily, Starting Fri 12/21/2018, Normal      metoprolol tartrate (LOPRESSOR) 25 mg tablet Take 1 tablet (25 mg total) by mouth daily, Starting Fri 6/8/2018, Normal      nitroglycerin (NITROSTAT) 0 4 mg SL tablet Place 1 tablet under the tongue, Starting Fri 1/5/2018, Historical Med      Nutritional Supplements (OSTEO-MULTIVITAMINS/MINERALS PO) Take 1 capsule by mouth daily, Starting Fri 1/5/2018, Historical Med      pantoprazole (PROTONIX) 40 mg tablet Take 1 tablet (40 mg total) by mouth daily, Starting Fri 12/21/2018, Normal      tamsulosin (FLOMAX) 0 4 mg Take 1 capsule (0 4 mg total) by mouth daily with dinner, Starting Mon 11/26/2018, Normal      venlafaxine (EFFEXOR) 37 5 mg tablet Take 1 tablet (37 5 mg total) by mouth daily, Starting Fri 12/21/2018, Normal           No discharge procedures on file  ED Provider  Attending physically available and evaluated Negin Gutiérrez I managed the patient along with the ED Attending      Electronically Signed by         Tato Morgan DO  12/22/18 5962

## 2018-12-22 NOTE — ED ATTENDING ATTESTATION
Nadira Roman MD, saw and evaluated the patient  I have discussed the patient with the resident/non-physician practitioner and agree with the resident's/non-physician practitioner's findings, Plan of Care, and MDM as documented in the resident's/non-physician practitioner's note, except where noted  All available labs and Radiology studies were reviewed  At this point I agree with the current assessment done in the Emergency Department  I have conducted an independent evaluation of this patient a history and physical is as follows:     The patient presents for evaluation of elevated liver enzymes the patient had a gastric bypass 12 years ago   roustephane horton     The patient also had a drainage of a abdominal wall seroma in July  Three days ago he developed chills with a fever to 102 but only had fever early wed morning and none since,  has myalgias and some mild upper abdominal pain  he had no nausea vomiting no melena or bright red blood per rectum he has noticed that his urine is somewhat darker than usual  Patient was recently started on lisinopril he is also on a statin    For elevated cholesterol  Patient still has a gallbladder,   occasional alcohol use no over-the-counter medication use    Patient denies headache  Patient had outpatient labs done by his family doctor which showed mildly elevated liver enzymes today AST was approximately 160s  ALT in the 390s   bilirubin is 3 07  White count/ hemoglobin normal /renal function normal  I suspect this is a viral illness with a mild transaminitis,  other possibilities include gallbladder disease pancreatitis   Will give a L of IV NSS   f Will check CT  A/p  Will add acute hepatitis panel  Lipase and cpk       Critical Care Time  CritCare Time    Procedures

## 2018-12-22 NOTE — DISCHARGE INSTRUCTIONS
Jaundice   WHAT YOU NEED TO KNOW:   What is jaundice? Jaundice is yellowing of your skin and eyes  It is caused by high levels of bilirubin in your body  Bilirubin is a substance that is produced when the liver breaks down red blood cells  Jaundice may be a symptom of liver, pancreas, or gallbladder problems  Genetic disorders, medicines such as acetaminophen, or excess alcohol use may also cause jaundice  What other signs and symptoms may occur with jaundice? · Tea-colored urine     · Pale or gray bowel movements    · Itching    · Fatigue or unexplained weight loss    · Muscle or joint pain    · Nausea, vomiting, or abdominal pain    · Confusion  How is jaundice diagnosed? Your healthcare provider will ask how long you have had symptoms  He will ask what medicines you take and how much alcohol you drink  He will also ask if you recently had surgery, an injury, or a blood transfusion  Tell him if you or anyone in your family has a history of liver disease  Your healthcare provider will order blood and urine tests to measure your bilirubin levels  You may need other tests to find the cause of your jaundice  How is jaundice treated? Your healthcare provider will try to find and treat the cause of your jaundice  Medicine may be given to decrease bilirubin levels and reduce itching  Your healthcare provider may have you stop taking a medicine if it is causing your symptoms  You may need one or more procedures to find or remove a blockage in your pancreas or gallbladder  How can I manage my symptoms? · Drink more liquids as directed  Liquids help you stay hydrated and urinate more  This helps prevent harm to your kidneys  Ask your healthcare provider how much liquid to drink each day and which liquids are best for you  · Eat foods low in fat  Healthy low-fat foods include fruits, vegetables, whole-grain breads, low-fat dairy products, beans, lean meats, and fish   These foods are easier to digest and may help reduce your symptoms  · Do not drink alcohol  Alcohol harms your liver and may make your symptoms worse  When should I seek immediate care? · You have severe abdominal pain or a fever  · You suddenly feel lightheaded or faint  When should I contact my healthcare provider? · You begin to have tea-colored urine or pale, gray bowel movements  · Your skin and eyes become more yellow, or other symptoms get worse  · You are confused, or others notice changes in your behavior  · You have questions or concerns about your condition or care  CARE AGREEMENT:   You have the right to help plan your care  Learn about your health condition and how it may be treated  Discuss treatment options with your caregivers to decide what care you want to receive  You always have the right to refuse treatment  The above information is an  only  It is not intended as medical advice for individual conditions or treatments  Talk to your doctor, nurse or pharmacist before following any medical regimen to see if it is safe and effective for you  © 2017 [employer] Information is for End User's use only and may not be sold, redistributed or otherwise used for commercial purposes  All illustrations and images included in CareNotes® are the copyrighted property of [employer]  or Gustavo Squires

## 2018-12-31 ENCOUNTER — TELEPHONE (OUTPATIENT)
Dept: RADIOLOGY | Facility: HOSPITAL | Age: 58
End: 2018-12-31

## 2018-12-31 RX ORDER — SODIUM CHLORIDE 9 MG/ML
75 INJECTION, SOLUTION INTRAVENOUS CONTINUOUS
Status: CANCELLED | OUTPATIENT
Start: 2018-12-31

## 2019-01-03 ENCOUNTER — TELEPHONE (OUTPATIENT)
Dept: INPATIENT UNIT | Facility: HOSPITAL | Age: 59
End: 2019-01-03

## 2019-01-04 ENCOUNTER — HOSPITAL ENCOUNTER (OUTPATIENT)
Dept: RADIOLOGY | Facility: HOSPITAL | Age: 59
Discharge: HOME | End: 2019-01-04
Attending: SURGERY | Admitting: RADIOLOGY
Payer: COMMERCIAL

## 2019-01-04 VITALS
BODY MASS INDEX: 34.79 KG/M2 | RESPIRATION RATE: 18 BRPM | TEMPERATURE: 97.9 F | DIASTOLIC BLOOD PRESSURE: 74 MMHG | HEART RATE: 61 BPM | OXYGEN SATURATION: 96 % | SYSTOLIC BLOOD PRESSURE: 149 MMHG | HEIGHT: 70 IN | WEIGHT: 243 LBS

## 2019-01-04 DIAGNOSIS — S30.1XXA ABDOMINAL WALL SEROMA, INITIAL ENCOUNTER: ICD-10-CM

## 2019-01-04 DIAGNOSIS — R10.9 STOMACH DISCOMFORT: ICD-10-CM

## 2019-01-04 PROCEDURE — C1769 GUIDE WIRE: HCPCS

## 2019-01-04 PROCEDURE — 10030 IMG GID FLU COLL DRG SFT TIS: CPT

## 2019-01-04 PROCEDURE — 87147 CULTURE TYPE IMMUNOLOGIC: CPT | Performed by: SURGERY

## 2019-01-04 PROCEDURE — 49185 SCLEROTX FLUID COLLECTION: CPT

## 2019-01-04 PROCEDURE — 99152 MOD SED SAME PHYS/QHP 5/>YRS: CPT | Performed by: RADIOLOGY

## 2019-01-04 PROCEDURE — 87070 CULTURE OTHR SPECIMN AEROBIC: CPT | Performed by: SURGERY

## 2019-01-04 PROCEDURE — 99152 MOD SED SAME PHYS/QHP 5/>YRS: CPT

## 2019-01-04 PROCEDURE — C1729 CATH, DRAINAGE: HCPCS

## 2019-01-04 PROCEDURE — 87205 SMEAR GRAM STAIN: CPT | Performed by: SURGERY

## 2019-01-04 PROCEDURE — 10030 IMG GID FLU COLL DRG SFT TIS: CPT | Performed by: RADIOLOGY

## 2019-01-04 RX ORDER — MIDAZOLAM HYDROCHLORIDE 1 MG/ML
INJECTION INTRAMUSCULAR; INTRAVENOUS CODE/TRAUMA/SEDATION MEDICATION
Status: COMPLETED | OUTPATIENT
Start: 2019-01-04 | End: 2019-01-04

## 2019-01-04 RX ORDER — FENTANYL CITRATE 50 UG/ML
INJECTION, SOLUTION INTRAMUSCULAR; INTRAVENOUS CODE/TRAUMA/SEDATION MEDICATION
Status: COMPLETED | OUTPATIENT
Start: 2019-01-04 | End: 2019-01-04

## 2019-01-04 RX ORDER — SODIUM CHLORIDE 9 MG/ML
75 INJECTION, SOLUTION INTRAVENOUS CONTINUOUS
Status: DISCONTINUED | OUTPATIENT
Start: 2019-01-04 | End: 2019-01-04 | Stop reason: HOSPADM

## 2019-01-04 RX ADMIN — MIDAZOLAM 2 MG: 1 INJECTION INTRAMUSCULAR; INTRAVENOUS at 08:08

## 2019-01-04 RX ADMIN — FENTANYL CITRATE 50 MCG: 50 INJECTION, SOLUTION INTRAMUSCULAR; INTRAVENOUS at 08:08

## 2019-01-04 RX ADMIN — SODIUM CHLORIDE 75 ML/HR: 0.9 INJECTION, SOLUTION INTRAVENOUS at 07:07

## 2019-01-04 RX ADMIN — MIDAZOLAM 2 MG: 1 INJECTION INTRAMUSCULAR; INTRAVENOUS at 08:14

## 2019-01-04 RX ADMIN — FENTANYL CITRATE 50 MCG: 50 INJECTION, SOLUTION INTRAMUSCULAR; INTRAVENOUS at 08:14

## 2019-01-04 NOTE — H&P (VIEW-ONLY)
History  Chief Complaint   Patient presents with    Abnormal Lab     Pt was sent in for elevated liver enzymes     Patient is a 60-year-old male who presents for elevated liver enzymes  Patient says that he had blood work done at his PCP and was called and told to come to the ED for further evaluation  Patient says that for the past few days he has had muscle aches, fatigue and generally not feeling well  He also admits to his eyes looking yellow over the past few days  Patient says he had a temperature of 102° yesterday  Patient denies nausea, vomiting, abdominal pain, diarrhea, blood in his stools  He says that yesterday he had some minor epigastric pain that radiated into his scapulas that went away on its own  He admits to a history of gastric bypass 12 years ago but denies any other abdominal surgical history  Prior to Admission Medications   Prescriptions Last Dose Informant Patient Reported? Taking?    B COMPLEX VITAMINS ER PO   Yes Yes   Sig: Take by mouth   Iron, Ferrous Sulfate, 142 (45 Fe) MG TBCR   Yes Yes   Sig: Take by mouth   Krill Oil 1000 MG CAPS  Self Yes Yes   Sig: Take by mouth   Nutritional Supplements (OSTEO-MULTIVITAMINS/MINERALS PO)   Yes Yes   Sig: Take 1 capsule by mouth daily   aspirin (ECOTRIN LOW STRENGTH) 81 mg EC tablet   Yes Yes   Sig: Take 1 tablet by mouth daily   atorvastatin (LIPITOR) 40 mg tablet   No Yes   Sig: Take 1 tablet (40 mg total) by mouth daily   cyclobenzaprine (FLEXERIL) 10 mg tablet   No Yes   Sig: TAKE 1/2 TO 1 TABLET AT BEDTIME AS NEEDED   gabapentin (NEURONTIN) 300 mg capsule   No Yes   Sig: Take 1 capsule (300 mg total) by mouth daily   lisinopril-hydrochlorothiazide (PRINZIDE,ZESTORETIC) 10-12 5 MG per tablet   No Yes   Sig: Take 1 tablet by mouth daily   metoprolol tartrate (LOPRESSOR) 25 mg tablet   No Yes   Sig: Take 1 tablet (25 mg total) by mouth daily   Patient taking differently: Take 25 mg by mouth 2 (two) times a day     nitroglycerin (NITROSTAT) 0 4 mg SL tablet   Yes Yes   Sig: Place 1 tablet under the tongue   pantoprazole (PROTONIX) 40 mg tablet   No Yes   Sig: Take 1 tablet (40 mg total) by mouth daily   tamsulosin (FLOMAX) 0 4 mg   No Yes   Sig: Take 1 capsule (0 4 mg total) by mouth daily with dinner   Patient taking differently: Take 0 4 mg by mouth as needed     venlafaxine (EFFEXOR) 37 5 mg tablet   No Yes   Sig: Take 1 tablet (37 5 mg total) by mouth daily      Facility-Administered Medications: None       Past Medical History:   Diagnosis Date    Cardiac disease     Hypertension        Past Surgical History:   Procedure Laterality Date    BACK SURGERY      CARDIAC SURGERY      GASTRIC BYPASS      NECK SURGERY      SHOULDER SURGERY         Family History   Problem Relation Age of Onset    Heart disease Mother     Diabetes Mother      I have reviewed and agree with the history as documented  Social History   Substance Use Topics    Smoking status: Never Smoker    Smokeless tobacco: Never Used    Alcohol use Yes      Comment: social        Review of Systems   Constitutional: Positive for appetite change and fever  Negative for chills and unexpected weight change  HENT: Negative for congestion, sore throat and trouble swallowing  Eyes: Negative for pain, discharge and itching  Respiratory: Negative for cough, chest tightness, shortness of breath and wheezing  Cardiovascular: Negative for chest pain, palpitations and leg swelling  Gastrointestinal: Negative for abdominal pain, blood in stool, diarrhea, nausea and vomiting  Endocrine: Negative for polyuria  Genitourinary: Negative for difficulty urinating, dysuria, frequency and hematuria  Musculoskeletal: Positive for myalgias  Negative for arthralgias, back pain, neck pain and neck stiffness  Skin: Negative for color change and rash  Neurological: Negative for dizziness, syncope, weakness, light-headedness and headaches         Physical Exam  ED Triage Vitals [12/21/18 1649]   Temperature Pulse Respirations Blood Pressure SpO2   98 5 °F (36 9 °C) 99 18 140/72 97 %      Temp Source Heart Rate Source Patient Position - Orthostatic VS BP Location FiO2 (%)   Tympanic Monitor Sitting Left arm --      Pain Score       3           Orthostatic Vital Signs  Vitals:    12/21/18 1649 12/21/18 1930 12/21/18 2030 12/21/18 2115   BP: 140/72 136/88 162/83 139/76   Pulse: 99 96 88 84   Patient Position - Orthostatic VS: Sitting Lying Lying Lying       Physical Exam   Constitutional: He is oriented to person, place, and time  He appears well-developed and well-nourished  No distress  HENT:   Head: Normocephalic and atraumatic  Right Ear: External ear normal    Left Ear: External ear normal    Eyes: Pupils are equal, round, and reactive to light  EOM are normal  Right eye exhibits no discharge  Left eye exhibits no discharge  Scleral icterus bilaterally   Neck: Normal range of motion  Cardiovascular: Normal rate, regular rhythm, normal heart sounds and intact distal pulses  Exam reveals no gallop and no friction rub  No murmur heard  Pulmonary/Chest: Effort normal and breath sounds normal  No respiratory distress  He has no wheezes  He has no rales  Abdominal: Soft  Bowel sounds are normal  He exhibits no distension  There is no tenderness  There is no guarding  Musculoskeletal: Normal range of motion  He exhibits no edema, tenderness or deformity  Lymphadenopathy:     He has no cervical adenopathy  Neurological: He is alert and oriented to person, place, and time  No cranial nerve deficit or sensory deficit  He exhibits normal muscle tone  Skin: Skin is warm and dry  Psychiatric: He has a normal mood and affect  His behavior is normal    Nursing note and vitals reviewed        ED Medications  Medications   iohexol (OMNIPAQUE) 350 MG/ML injection (MULTI-DOSE) 100 mL (100 mL Intravenous Given 12/21/18 2016)   sodium chloride 0 9 % bolus 1,000 mL (0 mL Intravenous Stopped 12/21/18 2145)       Diagnostic Studies  Results Reviewed     Procedure Component Value Units Date/Time    Hepatitis panel, acute [[id number]]  (Normal) Collected:  12/21/18 2027    Lab Status:  Final result Specimen:  Blood from Arm, Left Updated:  12/22/18 1132     Hepatitis B Surface Ag Non-reactive     Hep A IgM Non-reactive     Hepatitis C Ab Non-reactive     Hep B C IgM Non-reactive    CKMB [[id number]]  (Normal) Collected:  12/21/18 1944    Lab Status:  Final result Specimen:  Blood from Arm, Left Updated:  12/21/18 2137     CK-MB Index 2 1 %      CK-MB 3 6 ng/mL     Lipase [[id number]]  (Normal) Collected:  12/21/18 1944    Lab Status:  Final result Specimen:  Blood from Arm, Left Updated:  12/21/18 2121     Lipase 141 u/L     CK (with reflex to MB) [[id number]]  (Normal) Collected:  12/21/18 1944    Lab Status:  Final result Specimen:  Blood from Arm, Left Updated:  12/21/18 2121     Total  U/L     Troponin I [[id number]]  (Normal) Collected:  12/21/18 2027    Lab Status:  Final result Specimen:  Blood from Arm, Left Updated:  12/21/18 2100     Troponin I <0 02 ng/mL     Bilirubin, direct [[id number]]  (Abnormal) Collected:  12/21/18 1944    Lab Status:  Final result Specimen:  Blood from Arm, Left Updated:  12/21/18 2005     Bilirubin, Direct 2 15 (H) mg/dL                  CT abdomen pelvis with contrast   Final Result by Trudy Delarosa MD (12/21 2043)      Unchanged appearance of the liver  No new biliary findings  Persistent, unchanged 15 7 x 4 0 x 7 8 cm collection in the anterior abdominal wall with a thick surrounding rind, likely hematoma seroma or abscess                 Workstation performed: [id number]               Procedures  ECG 12 Lead Documentation  Date/Time: 12/21/2018 8:50 PM  Performed by: Bernardino Arzate  Authorized by: Bernardino Arzate     Indications / Diagnosis:  Epigstric pain   ECG reviewed by me, the ED Provider: yes    Patient location:  ED  Previous ECG:     Previous ECG:  Unavailable    Comparison to cardiac monitor: Yes    Interpretation:     Interpretation: normal    Rate:     ECG rate:  85    ECG rate assessment: normal    Rhythm:     Rhythm: sinus rhythm    Ectopy:     Ectopy: none    QRS:     QRS axis:  Normal  Conduction:     Conduction: normal    ST segments:     ST segments:  Normal  T waves:     T waves: normal            Phone Consults  ED Phone Contact    ED Course                               MDM  Number of Diagnoses or Management Options  Elevated liver enzymes:   Diagnosis management comments: 59-year-old male presenting for elevated liver enzymes on blood work done today  Admits to fever and myalgias over the past few days  Denies abdominal pain, nausea, vomiting, diarrhea, blood in his stools  Admits to gastric bypass surgery 12 years ago  Will obtain lipase, hepatitis panel, direct bilirubin, will give IV fluids  Will get CT abdomen of the pelvis given elevated liver enzymes  CT negative for biliary disease or any other acute pathology  Patient feeling well  Believe patient's symptoms elevated liver enzymes are a viral etiology  Patient told that he will get results of hepatitis panel  Told to follow up with PCP  Told to return to the ED if he develops worsening abdominal pain, vomiting, diarrhea or any other concerning symptoms    CritCare Time    Disposition  Final diagnoses:   Elevated liver enzymes     Time reflects when diagnosis was documented in both MDM as applicable and the Disposition within this note     Time User Action Codes Description Comment    12/21/2018  9:34 PM Arlen Garcia Add [R74 8] Elevated liver enzymes       ED Disposition     ED Disposition Condition Comment    Discharge  179-00 Minesh Hawkins discharge to home/self care      Condition at discharge: Stable        Follow-up Information     Follow up With Specialties Details Why Contact Info Additional Michelle Galindo Internal Medicine, Nurse Practitioner Schedule an appointment as soon as possible for a visit in 1 week For follow up of liver enzymes [facility]  [address]  [phone]       [address] [place] [address] Emergency Department Emergency Medicine  If symptoms worsen, if you develop worsening abdominal pain, nausea, vomiting, or any other concerning symptoms  [address] [place] [address], [address], [place], [address] [facility], [address]          Discharge Medication List as of 12/21/2018  9:36 PM      CONTINUE these medications which have NOT CHANGED    Details   aspirin (ECOTRIN LOW STRENGTH) 81 mg EC tablet Take 1 tablet by mouth daily, Starting Fri 1/5/2018, Historical Med      atorvastatin (LIPITOR) 40 mg tablet Take 1 tablet (40 mg total) by mouth daily, Starting Fri 12/21/2018, Normal      B COMPLEX VITAMINS ER PO Take by mouth, Starting Fri 1/5/2018, Historical Med      cyclobenzaprine (FLEXERIL) 10 mg tablet TAKE 1/2 TO 1 TABLET AT BEDTIME AS NEEDED, Normal      gabapentin (NEURONTIN) 300 mg capsule Take 1 capsule (300 mg total) by mouth daily, Starting Thu 7/12/2018, Normal      Iron, Ferrous Sulfate, 142 (45 Fe) MG TBCR Take by mouth, Starting Fri 1/5/2018, Historical Med      Krill Oil 1000 MG CAPS Take by mouth, Historical Med      lisinopril-hydrochlorothiazide (PRINZIDE,ZESTORETIC) 10-12 5 MG per tablet Take 1 tablet by mouth daily, Starting Fri 12/21/2018, Normal      metoprolol tartrate (LOPRESSOR) 25 mg tablet Take 1 tablet (25 mg total) by mouth daily, Starting Fri 6/8/2018, Normal      nitroglycerin (NITROSTAT) 0 4 mg SL tablet Place 1 tablet under the tongue, Starting Fri 1/5/2018, Historical Med      Nutritional Supplements (OSTEO-MULTIVITAMINS/MINERALS PO) Take 1 capsule by mouth daily, Starting Fri 1/5/2018, Historical Med      pantoprazole (PROTONIX) 40 mg tablet Take 1 tablet (40 mg total) by mouth daily, Starting Fri 12/21/2018, Normal      tamsulosin (FLOMAX) 0 4 mg Take 1 capsule (0 4 mg total) by mouth daily with dinner, Starting Mon 11/26/2018, Normal      venlafaxine (EFFEXOR) 37 5 mg tablet Take 1 tablet (37 5 mg total) by mouth daily, Starting Fri 12/21/2018, Normal           No discharge procedures on file  ED Provider  Attending physically available and evaluated Papo Landaverde I managed the patient along with the ED Attending      Electronically Signed by         Dileep Mota DO  12/22/18 5297

## 2019-01-04 NOTE — BRIEF OP NOTE (RAD/CATH)
IR ABSCESS/SEROMA DRAINAGE - Procedure Note    PATIENT NAME: Kathy Maddox  : 1960  MRN: [id number]     Pre-op Diagnosis:   1  Abdominal wall seroma, initial encounter ([facility] )    2  Stomach discomfort      Post-op Diagnosis:   1  Abdominal wall seroma, initial encounter ([facility] )    2  Stomach discomfort        Surgeon:   Alfa Caballero MD  Assistants:     No qualified resident was available, Resident is only observing    Estimated Blood Loss: minimal  Findings: Successful seroma drainage tube placement with 60cc of thick fluid withdrawn and sent for analysis  Tube to YOSEF bulb suction  Betadine sclerotherapy at home every other day and return in 2 weeks for an ultrasound check      Specimens: seroma fluid    Complications:  None immediate    Anesthesia: Conscious sedation    Alfa Caballero MD     Date: 2019  Time: 9:23 AM

## 2019-01-04 NOTE — DISCHARGE INSTRUCTIONS
TUBE CARE INSTRUCTIONS    Care after your procedure:    Resume your normal diet  Small sips of flat soda will help with nausea  1  The properly functioning catheter should be forward flushed once (1x) daily with 10ml of normal saline using clean technique  You will be given a prescription for flushes  To flush the tube, clean both connections with alcohol swab  Twist off the drainage bag/ bulb  tubing and twist the saline syringe into the drainage tube and flush  Remove the syringe and twist the drainage bag / bulb tubing tubing back on     2  The drainage bag/bulb may be emptied as necessary  Keep a record of the amount of fluid you drain from your tube  This should be done with clean technique as well  3  A fresh dressing should be applied daily over the tube insertion site  4  As the tube is secured to the skin with only a suture,try not to pull on your tube  Tub baths are not permitted  Showers are permitted if the patient's skin entry site is prevented from getting wet  Similarly, washcloth "baths" are acceptable  Contact Interventional Radiology at [phone] Jagjit PATIENTS: Contact Interventional Radiology at [phone]) Alexx Wick PATIENTS: Contact Interventional Radiology at [phone]) if:    1  Leakage or large amounts of liquid around the catheter  2  Fever of 101 degrees lasting several hours without other obvious cause (such as sore throat, flu, etc)  3  Persistent nausea or vomiting  4  Diminished drainage, which may be associated with pressure or pain  Or when the     drainage from your tube is less than 10mls for 48 hours  5  Catheter pulled back or falls out  The following pharmacies carry the flush syringes         [facility]                     [facility]  [address] [facility]                         [address] [facility]  Phone [phone]            Phone [phone]   [place] [address]                                [phone]  [address] [place] [address] Zachary RIZO                      [address] [place]  Phone [phone]            Phone [phone]                      Reva Grubbs                                                                                                          [phone]  [facility]  [facility] [address]    [address]  Phone [phone]

## 2019-01-04 NOTE — PROGRESS NOTES
Pt returned to unit with no complications  R abdominal drain dressing clean, dry and intact  Will continue to monitor pt until bedrest is complete

## 2019-01-04 NOTE — INTERVAL H&P NOTE
Update: (This section must be completed if the H&P was completed greater than 24 hrs to procedure or admission)    H&P reviewed  After examining the patient, I find no changed to the H&P since it had been written  Patient re-evaluated   Accept as history and physical     Debi Kelly MD/January 4, 2019/9:23 AM

## 2019-01-04 NOTE — SEDATION DOCUMENTATION
Right abdominal drain tube placed by Dr Bee Soto without complications  VSS  Denies pain  40 ml Betadine instilled via drain tube for sclerosis  To dwell for 2 hours  With repositioning every 15 minutes  Patient instructed to instill 40 ml betadine every other day into drain tube and to dwell x 2 hours each dwell otherwise place to bulb suction

## 2019-01-07 ENCOUNTER — HOSPITAL ENCOUNTER (OUTPATIENT)
Dept: RADIOLOGY | Facility: HOSPITAL | Age: 59
Discharge: HOME | End: 2019-01-07
Attending: SURGERY | Admitting: RADIOLOGY
Payer: COMMERCIAL

## 2019-01-07 DIAGNOSIS — S30.1XXA ABDOMINAL WALL SEROMA, INITIAL ENCOUNTER: ICD-10-CM

## 2019-01-07 LAB
BACTERIA SPEC BFLD CULT: NO GROWTH
GRAM STN SPEC: NORMAL
GRAM STN SPEC: NORMAL

## 2019-01-07 PROCEDURE — 75984 XRAY CONTROL CATHETER CHANGE: CPT

## 2019-01-07 PROCEDURE — C1769 GUIDE WIRE: HCPCS

## 2019-01-07 PROCEDURE — 75984 XRAY CONTROL CATHETER CHANGE: CPT | Performed by: RADIOLOGY

## 2019-01-07 PROCEDURE — 49423 EXCHANGE DRAINAGE CATHETER: CPT

## 2019-01-07 PROCEDURE — 49185 SCLEROTX FLUID COLLECTION: CPT | Performed by: RADIOLOGY

## 2019-01-07 PROCEDURE — 49423 EXCHANGE DRAINAGE CATHETER: CPT | Performed by: RADIOLOGY

## 2019-01-07 PROCEDURE — C1729 CATH, DRAINAGE: HCPCS

## 2019-01-07 RX ADMIN — IOHEXOL 50 ML: 300 INJECTION, SOLUTION INTRAVENOUS at 11:36

## 2019-01-07 NOTE — SEDATION DOCUMENTATION
Right abdominal drain tube exchanged by Dr Pita Horne to 14 5 fr APD  5ml dehydrated alcohol instilled by Dr Pita Horne to dwell x 2 hours and then place to bulb suction  Instructed to  Flush drain with 10 ml Normal saline daily and place to bulb suction  Will follow up this Friday 1/11/19 for tube check with Dr Pita Horne

## 2019-01-07 NOTE — BRIEF OP NOTE (RAD/CATH)
IR TUBE CHANGE, upsize, and sclerosis  Procedure Note    PATIENT NAME: Ghislaine Jordan  : 1960  MRN: [id number]     Pre-op Diagnosis:   1  Abdominal wall seroma, initial encounter ([facility] )      Post-op Diagnosis:   1  Abdominal wall seroma, initial encounter [facility])        Surgeon:   Maddi Reyez MD  Assistants:     No qualified resident was available, Resident is only observing    Estimated Blood Loss: Minimal     Findings:     Indwelling abscess drain with large cavity remaining  Decreased output  This was exchanged over a wire and upsized to 14 [place]  50 mL additional fluid was aspirated  Sclerosis with 4 ml of alcohol was performed  Will have patient return on Friday for tube check, possible additional sclerosis       Specimens: none    Complications:  none    Anesthesia: Ayan Powell MD     Date: 2019  Time: 1:38 PM

## 2019-01-07 NOTE — LETTER
[facility]  [address] [address]  Dept: [phone]    January 8, 2019     Patient: Bobbi Cantrell   YOB: 1960   Date of Visit: 1/7/2019       To Whom it May Concern:    Diana Sewell is under my professional care  He was seen in the hospital from 1/7/2019   to 01/08/19  He may return to work on 1/9/19  If you have any questions or concerns, please don't hesitate to call           Sincerely,          Judd Quispe MD

## 2019-01-07 NOTE — LETTER
[facility]  [address] [id number]  Dept: [phone]    January 8, 2019     Patient: Ricardo Lopez   YOB: 1960   Date of Visit: 1/7/2019       To Whom it May Concern:    Prashant Andre is under my professional care  He was seen in the hospital from 1/7/2019   to 01/08/19  He may return to work on 1/9/2019  If you have any questions or concerns, please don't hesitate to call           Sincerely,          Ernestine Nagy MD

## 2019-01-08 NOTE — PROCEDURES
Procedures     Tube upsize and sclerosis       Patient is okay to return to work on 1/9/19     Dr Chavira Flatten

## 2019-01-11 ENCOUNTER — HOSPITAL ENCOUNTER (OUTPATIENT)
Dept: RADIOLOGY | Facility: HOSPITAL | Age: 59
Discharge: HOME | End: 2019-01-11
Attending: SURGERY | Admitting: RADIOLOGY
Payer: COMMERCIAL

## 2019-01-11 DIAGNOSIS — S30.1XXA ABDOMINAL WALL SEROMA, INITIAL ENCOUNTER: ICD-10-CM

## 2019-01-11 PROCEDURE — 49185 SCLEROTX FLUID COLLECTION: CPT | Performed by: RADIOLOGY

## 2019-01-11 PROCEDURE — 76080 X-RAY EXAM OF FISTULA: CPT

## 2019-01-11 PROCEDURE — 49185 SCLEROTX FLUID COLLECTION: CPT

## 2019-01-11 RX ORDER — LIDOCAINE HYDROCHLORIDE 10 MG/ML
INJECTION, SOLUTION INFILTRATION; PERINEURAL CODE/TRAUMA/SEDATION MEDICATION
Status: DISCONTINUED | OUTPATIENT
Start: 2019-01-11 | End: 2019-01-12 | Stop reason: HOSPADM

## 2019-01-11 RX ADMIN — IOHEXOL 2 ML: 300 INJECTION, SOLUTION INTRAVENOUS at 09:18

## 2019-01-11 RX ADMIN — LIDOCAINE HYDROCHLORIDE 5 ML: 10 INJECTION, SOLUTION INFILTRATION; PERINEURAL at 08:56

## 2019-01-14 ENCOUNTER — HOSPITAL ENCOUNTER (EMERGENCY)
Facility: HOSPITAL | Age: 59
Discharge: HOME | End: 2019-01-15
Attending: EMERGENCY MEDICINE | Admitting: EMERGENCY MEDICINE
Payer: COMMERCIAL

## 2019-01-14 ENCOUNTER — APPOINTMENT (EMERGENCY)
Dept: RADIOLOGY | Facility: HOSPITAL | Age: 59
End: 2019-01-14
Payer: COMMERCIAL

## 2019-01-14 DIAGNOSIS — G89.18 POST-OP PAIN: ICD-10-CM

## 2019-01-14 DIAGNOSIS — R23.8 SKIN IRRITATION: ICD-10-CM

## 2019-01-14 DIAGNOSIS — S30.1XXS ABDOMINAL WALL SEROMA, SEQUELA: ICD-10-CM

## 2019-01-14 DIAGNOSIS — J18.9 PNEUMONIA: Primary | ICD-10-CM

## 2019-01-14 PROCEDURE — 80053 COMPREHEN METABOLIC PANEL: CPT | Performed by: EMERGENCY MEDICINE

## 2019-01-14 PROCEDURE — 96374 THER/PROPH/DIAG INJ IV PUSH: CPT

## 2019-01-14 PROCEDURE — 96361 HYDRATE IV INFUSION ADD-ON: CPT

## 2019-01-14 PROCEDURE — 99284 EMERGENCY DEPT VISIT MOD MDM: CPT

## 2019-01-14 PROCEDURE — 36415 COLL VENOUS BLD VENIPUNCTURE: CPT | Performed by: EMERGENCY MEDICINE

## 2019-01-14 PROCEDURE — 85025 COMPLETE CBC W/AUTO DIFF WBC: CPT | Performed by: EMERGENCY MEDICINE

## 2019-01-14 RX ORDER — KETOROLAC TROMETHAMINE 30 MG/ML
15 INJECTION, SOLUTION INTRAMUSCULAR; INTRAVENOUS ONCE
Status: COMPLETED | OUTPATIENT
Start: 2019-01-14 | End: 2019-01-14

## 2019-01-14 RX ADMIN — SODIUM CHLORIDE 1000 ML: 0.9 INJECTION, SOLUTION INTRAVENOUS at 23:43

## 2019-01-14 RX ADMIN — KETOROLAC TROMETHAMINE 15 MG: 30 INJECTION, SOLUTION INTRAMUSCULAR at 23:44

## 2019-01-15 ENCOUNTER — APPOINTMENT (EMERGENCY)
Dept: RADIOLOGY | Facility: HOSPITAL | Age: 59
End: 2019-01-15
Payer: COMMERCIAL

## 2019-01-15 VITALS
RESPIRATION RATE: 16 BRPM | OXYGEN SATURATION: 98 % | TEMPERATURE: 99.2 F | HEART RATE: 84 BPM | SYSTOLIC BLOOD PRESSURE: 152 MMHG | DIASTOLIC BLOOD PRESSURE: 83 MMHG

## 2019-01-15 LAB
ALBUMIN SERPL BCP-MCNC: 3.8 G/DL (ref 3.5–5)
ALP SERPL-CCNC: 181 U/L (ref 46–116)
ALT SERPL W P-5'-P-CCNC: 58 U/L (ref 12–78)
ANION GAP SERPL CALCULATED.3IONS-SCNC: 8 MMOL/L (ref 4–13)
AST SERPL W P-5'-P-CCNC: 29 U/L (ref 5–45)
BASOPHILS # BLD AUTO: 0.06 THOUSANDS/ΜL (ref 0–0.1)
BASOPHILS NFR BLD AUTO: 1 % (ref 0–1)
BILIRUB SERPL-MCNC: 1.58 MG/DL (ref 0.2–1)
BUN SERPL-MCNC: 15 MG/DL (ref 5–25)
CALCIUM SERPL-MCNC: 9.2 MG/DL (ref 8.3–10.1)
CHLORIDE SERPL-SCNC: 99 MMOL/L (ref 100–108)
CO2 SERPL-SCNC: 27 MMOL/L (ref 21–32)
CREAT SERPL-MCNC: 1.06 MG/DL (ref 0.6–1.3)
EOSINOPHIL # BLD AUTO: 0.13 THOUSAND/ΜL (ref 0–0.61)
EOSINOPHIL NFR BLD AUTO: 1 % (ref 0–6)
ERYTHROCYTE [DISTWIDTH] IN BLOOD BY AUTOMATED COUNT: 11.8 % (ref 11.6–15.1)
GFR SERPL CREATININE-BSD FRML MDRD: 77 ML/MIN/1.73SQ M
GLUCOSE SERPL-MCNC: 131 MG/DL (ref 65–140)
HCT VFR BLD AUTO: 44.3 % (ref 36.5–49.3)
HGB BLD-MCNC: 15 G/DL (ref 12–17)
IMM GRANULOCYTES # BLD AUTO: 0.04 THOUSAND/UL (ref 0–0.2)
IMM GRANULOCYTES NFR BLD AUTO: 0 % (ref 0–2)
LYMPHOCYTES # BLD AUTO: 0.99 THOUSANDS/ΜL (ref 0.6–4.47)
LYMPHOCYTES NFR BLD AUTO: 10 % (ref 14–44)
MCH RBC QN AUTO: 31.9 PG (ref 26.8–34.3)
MCHC RBC AUTO-ENTMCNC: 33.9 G/DL (ref 31.4–37.4)
MCV RBC AUTO: 94 FL (ref 82–98)
MONOCYTES # BLD AUTO: 0.95 THOUSAND/ΜL (ref 0.17–1.22)
MONOCYTES NFR BLD AUTO: 10 % (ref 4–12)
NEUTROPHILS # BLD AUTO: 7.66 THOUSANDS/ΜL (ref 1.85–7.62)
NEUTS SEG NFR BLD AUTO: 78 % (ref 43–75)
NRBC BLD AUTO-RTO: 0 /100 WBCS
PLATELET # BLD AUTO: 219 THOUSANDS/UL (ref 149–390)
PMV BLD AUTO: 10.7 FL (ref 8.9–12.7)
POTASSIUM SERPL-SCNC: 3.7 MMOL/L (ref 3.5–5.3)
PROT SERPL-MCNC: 8.3 G/DL (ref 6.4–8.2)
RBC # BLD AUTO: 4.7 MILLION/UL (ref 3.88–5.62)
SODIUM SERPL-SCNC: 134 MMOL/L (ref 136–145)
WBC # BLD AUTO: 9.83 THOUSAND/UL (ref 4.31–10.16)

## 2019-01-15 PROCEDURE — 71046 X-RAY EXAM CHEST 2 VIEWS: CPT

## 2019-01-15 PROCEDURE — 74177 CT ABD & PELVIS W/CONTRAST: CPT

## 2019-01-15 RX ORDER — SULFAMETHOXAZOLE AND TRIMETHOPRIM 800; 160 MG/1; MG/1
1 TABLET ORAL 2 TIMES DAILY
Qty: 14 TABLET | Refills: 0 | Status: SHIPPED | OUTPATIENT
Start: 2019-01-15 | End: 2019-01-22

## 2019-01-15 RX ORDER — AZITHROMYCIN 250 MG/1
TABLET, FILM COATED ORAL
Qty: 6 TABLET | Refills: 0 | Status: SHIPPED | OUTPATIENT
Start: 2019-01-15 | End: 2019-01-19

## 2019-01-15 RX ADMIN — IOHEXOL 100 ML: 350 INJECTION, SOLUTION INTRAVENOUS at 01:53

## 2019-01-15 NOTE — ED ATTENDING ATTESTATION
I, [address], DO, saw and evaluated the patient  I have discussed the patient with the resident/non-physician practitioner and agree with the resident's/non-physician practitioner's findings, Plan of Care, and MDM as documented in the resident's/non-physician practitioner's note, except where noted  All available labs and Radiology studies were reviewed  At this point I agree with the current assessment done in the Emergency Department  I have conducted an independent evaluation of this patient a history and physical is as follows:     70-year-old male presents with abdominal pain  Patient had an abdominal wall seroma drained by interventional radiology on January 4th with 8 tube placed at that time  Tube was exchanged for larger tube on January 7  Patient has been getting alcohol injections into it weekly  Patient states has been having worsening pain over the past few days  Tonight noted that he had surrounding erythema around the site  Had fever earlier in the day to 100 5  Also complains of diffuse body aches  No nausea or vomiting  Does admit to some diarrhea  On exam-no acute distress, heart regular, no respiratory distress, abdomen soft with diffuse tenderness, positive rebound, drain noted lower right of midline with surrounding erythema and induration    Plan-CT abdomen, check labs, IV fluids    Critical Care Time  CritCare Time    Procedures

## 2019-01-15 NOTE — DISCHARGE INSTRUCTIONS
Community Acquired Pneumonia   WHAT YOU NEED TO KNOW:   Community-acquired pneumonia (CAP) is a lung infection that you get outside of a hospital or nursing home setting  Your lungs become inflamed and cannot work well  CAP may be caused by bacteria, viruses, or fungi  DISCHARGE INSTRUCTIONS:   Return to the emergency department if:   · You are confused and cannot think clearly  · You have increased trouble breathing  · Your lips or fingernails turn gray or blue  Contact your healthcare provider if:   · Your symptoms do not get better, or they get worse  · You are urinating less, or not at all  · You have questions or concerns about your condition or care  Medicines:   · Medicines  may be given to treat a bacterial, viral, or fungal infection  You may also be given medicines to dilate your bronchial tubes to help you breathe more easily  · Take your medicine as directed  Contact your healthcare provider if you think your medicine is not helping or if you have side effects  Tell him or her if you are allergic to any medicine  Keep a list of the medicines, vitamins, and herbs you take  Include the amounts, and when and why you take them  Bring the list or the pill bottles to follow-up visits  Carry your medicine list with you in case of an emergency  Follow up with your healthcare provider within 3 days or as directed: You may need another x-ray  Write down your questions so you remember to ask them during your visits  Deep breathing and coughing:  Deep breathing helps open the air passages in your lungs  Coughing helps bring up mucus from your lungs  Take a deep breath and hold the breath as long as you can  Then push the air out of your lungs with a deep, strong cough  Spit out any mucus you have coughed up  Take 10 deep breaths in a row every hour that you are awake  Remember to follow each deep breath with a cough     Do not smoke or allow others to smoke around you:  Nicotine and other chemicals in cigarettes and cigars can cause lung damage  Ask your healthcare provider for information if you currently smoke and need help to quit  E-cigarettes or smokeless tobacco still contain nicotine  Talk to your healthcare provider before you use these products  Manage CAP at home:   · Breathe warm, moist air  This helps loosen mucus  Loosely place a warm, wet washcloth over your nose and mouth  A room humidifier may also help make the air moist     · Drink liquids as directed  Ask your healthcare provider how much liquid to drink each day and which liquids to drink  Liquids help make mucus thin and easier to get out of your body  · Gently tap your chest   This helps loosen mucus so it is easier to cough  Lie with your head lower than your chest several times a day and tap your chest      · Get plenty of rest   Rest helps your body heal   Prevent CAP:   · Wash your hands often with soap and water  Carry germ-killing hand gel with you  You can use the gel to clean your hands when soap and water are not available  Do not touch your eyes, nose, or mouth unless you have washed your hands first      · Clean surfaces often  Clean doorknobs, countertops, cell phones, and other surfaces that are touched often  · Always cover your mouth when you cough  Cough into a tissue or your shirtsleeve so you do not spread germs from your hands  · Try to avoid people who have a cold or the flu  If you are sick, stay away from others as much as possible  · Ask about vaccines  You may need a vaccine to help prevent pneumonia  Get an influenza (flu) vaccine every year as soon as it becomes available  © 2017 [employer] Information is for End User's use only and may not be sold, redistributed or otherwise used for commercial purposes  All illustrations and images included in CareNotes® are the copyrighted property of [employer]  or Gustavo Squires    The above information is an  only  It is not intended as medical advice for individual conditions or treatments  Talk to your doctor, nurse or pharmacist before following any medical regimen to see if it is safe and effective for you  Cellulitis   WHAT YOU NEED TO KNOW:   Cellulitis is a skin infection caused by bacteria  Cellulitis may go away on its own or you may need treatment  Your healthcare provider may draw a Unga around the outside edges of your cellulitis  If your cellulitis spreads, your healthcare provider will see it outside of the Unga  DISCHARGE INSTRUCTIONS:   Call 911 if:   · You have sudden trouble breathing or chest pain  Return to the emergency department if:   · Your wound gets larger and more painful  · You feel a crackling under your skin when you touch it  · You have purple dots or bumps on your skin, or you see bleeding under your skin  · You have new swelling and pain in your legs  · The red, warm, swollen area gets larger  · You see red streaks coming from the infected area  Contact your healthcare provider if:   · You have a fever  · Your fever or pain does not go away or gets worse  · The area does not get smaller after 2 days of antibiotics  · Your skin is flaking or peeling off  · You have questions or concerns about your condition or care  Medicines:   · Antibiotics  help treat the bacterial infection  · NSAIDs , such as ibuprofen, help decrease swelling, pain, and fever  NSAIDs can cause stomach bleeding or kidney problems in certain people  If you take blood thinner medicine, always ask if NSAIDs are safe for you  Always read the medicine label and follow directions  Do not give these medicines to children under 10months of age without direction from your child's healthcare provider  · Acetaminophen  decreases pain and fever  It is available without a doctor's order  Ask how much to take and how often to take it  Follow directions   Read the labels of all other medicines you are using to see if they also contain acetaminophen, or ask your doctor or pharmacist  Acetaminophen can cause liver damage if not taken correctly  Do not use more than 4 grams (4,000 milligrams) total of acetaminophen in one day  · Take your medicine as directed  Contact your healthcare provider if you think your medicine is not helping or if you have side effects  Tell him or her if you are allergic to any medicine  Keep a list of the medicines, vitamins, and herbs you take  Include the amounts, and when and why you take them  Bring the list or the pill bottles to follow-up visits  Carry your medicine list with you in case of an emergency  Self-care:   · Elevate the area above the level of your heart  as often as you can  This will help decrease swelling and pain  Prop the area on pillows or blankets to keep it elevated comfortably  · Clean the area daily until the wound scabs over  Gently wash the area with soap and water  Pat dry  Use dressings as directed  · Place cool or warm, wet cloths on the area as directed  Use clean cloths and clean water  Leave it on the area until the cloth is room temperature  Pat the area dry with a clean, dry cloth  The cloths may help decrease pain  Prevent cellulitis:   · Do not scratch bug bites or areas of injury  You increase your risk for cellulitis by scratching these areas  · Do not share personal items, such as towels, clothing, and razors  · Clean exercise equipment  with germ-killing  before and after you use it  · Wash your hands often  Use soap and water  Wash your hands after you use the bathroom, change a child's diapers, or sneeze  Wash your hands before you prepare or eat food  Use lotion to prevent dry, cracked skin  · Wear pressure stockings as directed  You may be told to wear the stockings if you have peripheral edema   The stockings improve blood flow and decrease swelling  · Treat athlete's foot  This can help prevent the spread of a bacterial skin infection  Follow up with your healthcare provider within 3 days, or as directed: Your healthcare provider will check if your cellulitis is getting better  You may need different medicine  Write down your questions so you remember to ask them during your visits  © 2017 [employer] Information is for End User's use only and may not be sold, redistributed or otherwise used for commercial purposes  All illustrations and images included in CareNotes® are the copyrighted property of [employer]  or Gustavo Squires  The above information is an  only  It is not intended as medical advice for individual conditions or treatments  Talk to your doctor, nurse or pharmacist before following any medical regimen to see if it is safe and effective for you

## 2019-01-15 NOTE — ED PROVIDER NOTES
History  Chief Complaint   Patient presents with    Post-Op Infection     Per pt  report, "I have a seroma in my abdomen, and the tube was too small, so the switched it out the other day  Today, I was getting ready for work, and I took the dressing off, and it's purple around the insertion site  There's not too much coming out of it anymore  I don't know if I have an infection or what "  Reports fever of 100 5  This is a 77-year-old male with history of CAD, hypertension, hyperlipidemia who presents with a possible postop infection  The patient had an abdominal wall seroma drained by interventional radiology on January 4th  He subsequently had a tube exchange on on January 7th for a larger tube with sclerosis of seroma with alcohol injection  The patient presents today with 2 days of fatigue, intermittent fevers not being treated, worsening abdominal pain  The patient states that he called interventional Radiology today who told him that it is normal for the pain to become worse  The patient was getting ready for work tonight and noticed an area of erythema around the tube site  He decided to come the emergency department for evaluation  States that he had a fever of 100 5 at home  Of note, the patient states that he began experiencing symptoms of viral URI type symptoms on Saturday  States that he was having a runny nose, cough, and fevers  States that his fevers and runny nose have subsided but he still has a cough  He did receive the flu shot this year  Denies nausea/vomiting, lightheadedness/dizziness, numbness/weakness, headache, change in vision, URI symptoms, neck pain, chest pain, palpitations, shortness of breath, cough, back pain, flank pain, diarrhea, hematochezia, melena, dysuria, hematuria  Prior to Admission Medications   Prescriptions Last Dose Informant Patient Reported? Taking?    B COMPLEX VITAMINS ER PO 1/14/2019 at Unknown time  Yes Yes   Sig: Take by mouth   Iron, Ferrous Sulfate, 142 (45 Fe) MG TBCR 1/14/2019 at Unknown time  Yes Yes   Sig: Take by mouth   Krill Oil 1000 MG CAPS 1/14/2019 at Unknown time Self Yes Yes   Sig: Take by mouth   Nutritional Supplements (OSTEO-MULTIVITAMINS/MINERALS PO) 1/14/2019 at Unknown time  Yes Yes   Sig: Take 1 capsule by mouth daily   aspirin (ECOTRIN LOW STRENGTH) 81 mg EC tablet 1/14/2019 at Unknown time  Yes Yes   Sig: Take 1 tablet by mouth daily   atorvastatin (LIPITOR) 40 mg tablet 1/14/2019 at Unknown time  No Yes   Sig: Take 1 tablet (40 mg total) by mouth daily   cyclobenzaprine (FLEXERIL) 10 mg tablet 1/14/2019 at Unknown time  No Yes   Sig: TAKE 1/2 TO 1 TABLET AT BEDTIME AS NEEDED   gabapentin (NEURONTIN) 300 mg capsule 1/14/2019 at Unknown time  No Yes   Sig: Take 1 capsule (300 mg total) by mouth daily   lisinopril-hydrochlorothiazide (PRINZIDE,ZESTORETIC) 10-12 5 MG per tablet 1/14/2019 at Unknown time  No Yes   Sig: Take 1 tablet by mouth daily   metoprolol tartrate (LOPRESSOR) 25 mg tablet 1/14/2019 at Unknown time  No Yes   Sig: Take 1 tablet (25 mg total) by mouth daily   Patient taking differently: Take 25 mg by mouth 2 (two) times a day     nitroglycerin (NITROSTAT) 0 4 mg SL tablet 1/14/2019 at Unknown time  Yes Yes   Sig: Place 1 tablet under the tongue   pantoprazole (PROTONIX) 40 mg tablet 1/14/2019 at Unknown time  No Yes   Sig: Take 1 tablet (40 mg total) by mouth daily   tamsulosin (FLOMAX) 0 4 mg 1/14/2019 at Unknown time  No Yes   Sig: Take 1 capsule (0 4 mg total) by mouth daily with dinner   Patient taking differently: Take 0 4 mg by mouth as needed     venlafaxine (EFFEXOR) 37 5 mg tablet 1/14/2019 at Unknown time  No Yes   Sig: Take 1 tablet (37 5 mg total) by mouth daily      Facility-Administered Medications: None       Past Medical History:   Diagnosis Date    Cardiac disease     Hypertension        Past Surgical History:   Procedure Laterality Date    BACK SURGERY      CARDIAC SURGERY      GASTRIC BYPASS      IR ABSCESS/SEROMA DRAINAGE  1/4/2019    NECK SURGERY      SHOULDER SURGERY         Family History   Problem Relation Age of Onset    Heart disease Mother     Diabetes Mother      I have reviewed and agree with the history as documented  Social History   Substance Use Topics    Smoking status: Never Smoker    Smokeless tobacco: Never Used    Alcohol use No        Review of Systems   Constitutional: Positive for fatigue and fever  Negative for chills  HENT: Negative for rhinorrhea, sore throat and trouble swallowing  Eyes: Negative for photophobia and visual disturbance  Respiratory: Negative for cough, chest tightness and shortness of breath  Cardiovascular: Negative for chest pain, palpitations and leg swelling  Gastrointestinal: Positive for abdominal pain  Negative for blood in stool, diarrhea, nausea and vomiting  Endocrine: Negative for polyuria  Genitourinary: Negative for dysuria, flank pain and hematuria  Musculoskeletal: Negative for back pain and neck pain  Skin: Positive for wound  Negative for color change and rash  Allergic/Immunologic: Negative for immunocompromised state  Neurological: Negative for dizziness, weakness, light-headedness, numbness and headaches  All other systems reviewed and are negative        Physical Exam  ED Triage Vitals   Temperature Pulse Respirations Blood Pressure SpO2   01/14/19 2246 01/14/19 2246 01/14/19 2246 01/14/19 2246 01/14/19 2246   99 2 °F (37 3 °C) 85 18 109/76 95 %      Temp Source Heart Rate Source Patient Position - Orthostatic VS BP Location FiO2 (%)   01/14/19 2246 01/14/19 2246 01/14/19 2246 01/14/19 2246 --   Oral Monitor Sitting Right arm       Pain Score       01/14/19 2328       7           Orthostatic Vital Signs  Vitals:    01/14/19 2246 01/15/19 0310 01/15/19 0500   BP: 109/76 145/79 152/83   Pulse: 85 85 84   Patient Position - Orthostatic VS: Sitting         Physical Exam   Constitutional: Vital signs are normal  He appears well-developed and well-nourished  He is cooperative  No distress  HENT:   Mouth/Throat: Uvula is midline and oropharynx is clear and moist    Eyes: Pupils are equal, round, and reactive to light  Conjunctivae, EOM and lids are normal    Neck: Trachea normal  No thyroid mass and no thyromegaly present  Cardiovascular: Normal rate, regular rhythm, normal heart sounds, intact distal pulses and normal pulses  No murmur heard  Pulmonary/Chest: Effort normal and breath sounds normal    Abdominal: Soft  Normal appearance and bowel sounds are normal  There is generalized tenderness  There is no rebound, no guarding, no CVA tenderness and negative Orlando's sign  IR drain in right lower quadrant  Surrounding erythema and induration  No drainage noted  Neurological: He is alert  Skin: Skin is warm, dry and intact  Psychiatric: He has a normal mood and affect   His speech is normal and behavior is normal  Thought content normal        ED Medications  Medications   sodium chloride 0 9 % bolus 1,000 mL (0 mL Intravenous Stopped 1/15/19 0100)   ketorolac (TORADOL) injection 15 mg (15 mg Intravenous Given 1/14/19 2344)   iohexol (OMNIPAQUE) 350 MG/ML injection (MULTI-DOSE) 100 mL (100 mL Intravenous Given 1/15/19 0153)       Diagnostic Studies  Results Reviewed     Procedure Component Value Units Date/Time    Comprehensive metabolic panel [[id number]]  (Abnormal) Collected:  01/14/19 2345    Lab Status:  Final result Specimen:  Blood from Arm, Left Updated:  01/15/19 0134     Sodium 134 (L) mmol/L      Potassium 3 7 mmol/L      Chloride 99 (L) mmol/L      CO2 27 mmol/L      ANION GAP 8 mmol/L      BUN 15 mg/dL      Creatinine 1 06 mg/dL      Glucose 131 mg/dL      Calcium 9 2 mg/dL      AST 29 U/L      ALT 58 U/L      Alkaline Phosphatase 181 (H) U/L      Total Protein 8 3 (H) g/dL      Albumin 3 8 g/dL      Total Bilirubin 1 58 (H) mg/dL      eGFR 77 ml/min/1 73sq m     Narrative: National Kidney Disease Education Program recommendations are as follows:  GFR calculation is accurate only with a steady state creatinine  Chronic Kidney disease less than 60 ml/min/1 73 sq  meters  Kidney failure less than 15 ml/min/1 73 sq  meters  CBC and differential [[id number]]  (Abnormal) Collected:  01/14/19 0183    Lab Status:  Final result Specimen:  Blood from Arm, Left Updated:  01/15/19 0012     WBC 9 83 Thousand/uL      RBC 4 70 Million/uL      Hemoglobin 15 0 g/dL      Hematocrit 44 3 %      MCV 94 fL      MCH 31 9 pg      MCHC 33 9 g/dL      RDW 11 8 %      MPV 10 7 fL      Platelets 023 Thousands/uL      nRBC 0 /100 WBCs      Neutrophils Relative 78 (H) %      Immat GRANS % 0 %      Lymphocytes Relative 10 (L) %      Monocytes Relative 10 %      Eosinophils Relative 1 %      Basophils Relative 1 %      Neutrophils Absolute 7 66 (H) Thousands/µL      Immature Grans Absolute 0 04 Thousand/uL      Lymphocytes Absolute 0 99 Thousands/µL      Monocytes Absolute 0 95 Thousand/µL      Eosinophils Absolute 0 13 Thousand/µL      Basophils Absolute 0 06 Thousands/µL                  XR chest 2 views   ED Interpretation by Sivakumar Webster MD (01/15 0210)   Abnormal   JILLIAN, LLL infiltrates      Final Result by Octavia Jimenes MD (01/15 1926)      No acute cardiopulmonary disease  Workstation performed: [id number]         CT abdomen pelvis with contrast   Final Result by Rose Early MD (01/15 0240)      The liver demonstrates cirrhotic morphology  There is an ill-defined area of hypodensity in the right lobe of the liver concerning for an area of washout; recommend elective MRI of the abdomen without and with contrast         Previously seen seroma is again noted with interval placement of a drainage catheter in the cavity and a few droplets of air seen within the cavity possibly iatrogenic or representing superinfection; correlate with fluid in the collection bag; is it    purulent? Overall, the cavity is smaller in size  Bilateral nonobstructing renal stones measuring up to 4 mm  The study was marked in EPIC for significant notification  Workstation performed: [id number]               Procedures  Procedures      Phone Consults  ED Phone Contact    ED Course  ED Course as of Nick 15 2036   Tue Nick 15, 2019   0020 WBC: 9 83   0152 improved TOTAL BILIRUBIN: (!) 1 58                               [facility]  Number of Diagnoses or Management Options  Diagnosis management comments: Labs, fluids, Toradol for pain  Chest x-ray and CT abdomen and pelvis for infectious source  Disposition pending results  CritCare Time    Disposition  Final diagnoses:   Skin irritation   Post-op pain   Pneumonia   Abdominal wall seroma, sequela     Time reflects when diagnosis was documented in both MDM as applicable and the Disposition within this note     Time User Action Codes Description Comment    1/15/2019  3:03 AM Lucy Merigold Add [R23 8] Skin irritation     1/15/2019  3:03 AM Lucy Jacob Modify [R23 8] Skin irritation     1/15/2019  3:03 AM Lucy Merigold Modify [R23 8] Skin irritation     1/15/2019  3:04 AM Lucy Merigold Add [G89 18] Post-op pain     1/15/2019  5:02 AM Lucy Jacob Add [J18 9] Pneumonia     1/15/2019  5:03 AM Lucy Jacob Add [T88  8XXS,  T79 2XXS] Abdominal wall seroma, sequela     1/15/2019  5:03 AM Lucy Merigold Modify [R23 8] Skin irritation     1/15/2019  5:03 AM Lucy Merigold Modify [J18 9] Pneumonia       ED Disposition     ED Disposition Condition Comment    Discharge  179-00 Minesh Hawkins discharge to home/self care      Condition at discharge: Good        Follow-up Information     Follow up With Specialties Details Why Contact Info Michelle Delarosa Internal Medicine, Nurse Practitioner Go in 3 days As needed, If symptoms worsen [address] [facility]  [address] [place] [address] [facility] Emergency Department Emergency Medicine Go to As needed, If symptoms worsen [address]  [phone]  ED, [address] [facility], [place], [place], [facility], MD Diagnostic Radiology, Interventional Radiology Go in 3 days For wound re-check [facility] Michael [facility]  [phone]             Discharge Medication List as of 1/15/2019  5:11 AM      START taking these medications    Details   azithromycin (ZITHROMAX) 250 mg tablet Take 2 tablets today then 1 tablet daily x 4 days, Normal      sulfamethoxazole-trimethoprim (BACTRIM DS) 800-160 mg per tablet Take 1 tablet by mouth 2 (two) times a day for 7 days smx-tmp DS (BACTRIM) 800-160 mg tabs (1tab q12 D10), Starting Tue 1/15/2019, Until Tue 1/22/2019, Normal         CONTINUE these medications which have NOT CHANGED    Details   aspirin (ECOTRIN LOW STRENGTH) 81 mg EC tablet Take 1 tablet by mouth daily, Starting Fri 1/5/2018, Historical Med      atorvastatin (LIPITOR) 40 mg tablet Take 1 tablet (40 mg total) by mouth daily, Starting Fri 12/21/2018, Normal      B COMPLEX VITAMINS ER PO Take by mouth, Starting Fri 1/5/2018, Historical Med      cyclobenzaprine (FLEXERIL) 10 mg tablet TAKE 1/2 TO 1 TABLET AT BEDTIME AS NEEDED, Normal      gabapentin (NEURONTIN) 300 mg capsule Take 1 capsule (300 mg total) by mouth daily, Starting Thu 7/12/2018, Normal      Iron, Ferrous Sulfate, 142 (45 Fe) MG TBCR Take by mouth, Starting Fri 1/5/2018, Historical Med      Krill Oil 1000 MG CAPS Take by mouth, Historical Med      lisinopril-hydrochlorothiazide (PRINZIDE,ZESTORETIC) 10-12 5 MG per tablet Take 1 tablet by mouth daily, Starting Fri 12/21/2018, Normal      metoprolol tartrate (LOPRESSOR) 25 mg tablet Take 1 tablet (25 mg total) by mouth daily, Starting Fri 6/8/2018, Normal      nitroglycerin (NITROSTAT) 0 4 mg SL tablet Place 1 tablet under the tongue, Starting Fri 1/5/2018, Historical Med      Nutritional Supplements (OSTEO-MULTIVITAMINS/MINERALS PO) Take 1 capsule by mouth daily, Starting Fri 1/5/2018, Historical Med      pantoprazole (PROTONIX) 40 mg tablet Take 1 tablet (40 mg total) by mouth daily, Starting Fri 12/21/2018, Normal      tamsulosin (FLOMAX) 0 4 mg Take 1 capsule (0 4 mg total) by mouth daily with dinner, Starting Mon 11/26/2018, Normal      venlafaxine (EFFEXOR) 37 5 mg tablet Take 1 tablet (37 5 mg total) by mouth daily, Starting Fri 12/21/2018, Normal           No discharge procedures on file  ED Provider  Attending physically available and evaluated Dhaval Loera I managed the patient along with the ED Attending      Electronically Signed by         Snow Winslow MD  01/15/19 2037

## 2019-01-16 DIAGNOSIS — T79.2XXA SEROMA DUE TO TRAUMA (HCC): ICD-10-CM

## 2019-01-17 ENCOUNTER — HOSPITAL ENCOUNTER (OUTPATIENT)
Dept: NON INVASIVE DIAGNOSTICS | Facility: CLINIC | Age: 59
Discharge: HOME | End: 2019-01-17
Payer: COMMERCIAL

## 2019-01-17 ENCOUNTER — HOSPITAL ENCOUNTER (OUTPATIENT)
Dept: RADIOLOGY | Facility: HOSPITAL | Age: 59
Discharge: HOME | End: 2019-01-17
Attending: RADIOLOGY | Admitting: RADIOLOGY
Payer: COMMERCIAL

## 2019-01-17 DIAGNOSIS — S30.1XXD ABDOMINAL WALL SEROMA, SUBSEQUENT ENCOUNTER: ICD-10-CM

## 2019-01-17 DIAGNOSIS — I25.10 CORONARY ARTERIOSCLEROSIS: ICD-10-CM

## 2019-01-17 DIAGNOSIS — I10 ESSENTIAL (PRIMARY) HYPERTENSION: ICD-10-CM

## 2019-01-17 DIAGNOSIS — E78.00 PURE HYPERCHOLESTEROLEMIA: ICD-10-CM

## 2019-01-17 DIAGNOSIS — Z95.5 H/O HEART ARTERY STENT: ICD-10-CM

## 2019-01-17 PROCEDURE — 93306 TTE W/DOPPLER COMPLETE: CPT | Performed by: INTERNAL MEDICINE

## 2019-01-17 PROCEDURE — 49424 ASSESS CYST CONTRAST INJECT: CPT

## 2019-01-17 PROCEDURE — 49424 ASSESS CYST CONTRAST INJECT: CPT | Performed by: RADIOLOGY

## 2019-01-17 PROCEDURE — 76080 X-RAY EXAM OF FISTULA: CPT

## 2019-01-17 PROCEDURE — 76080 X-RAY EXAM OF FISTULA: CPT | Performed by: RADIOLOGY

## 2019-01-17 PROCEDURE — 93306 TTE W/DOPPLER COMPLETE: CPT

## 2019-01-17 RX ADMIN — IOHEXOL 5 ML: 300 INJECTION, SOLUTION INTRAVENOUS at 09:52

## 2019-01-17 NOTE — LETTER
[facility] [address]  [address] [address]  Dept: [phone]    January 17, 2019     Patient: Mariana Grullon   YOB: 1960   Date of Visit: 1/17/2019       To Whom it May Concern:    Yumiko Ralph is under my professional care  He was seen in the hospital from 1/17/2019   to 01/17/19  He may return to work on Monday 1/21/19  If you have any questions or concerns, please don't hesitate to call           Sincerely,          Dr Laurita Cummings

## 2019-01-17 NOTE — LETTER
January 17, 2019     Sherwin Espinosa MD  [facility]  [address] [address]    Patient: Jeff Delgado   YOB: 1960   Date of Visit: 1/17/2019       Dear Dr Amanda Ames: Thank you for referring Aurora Jeff to me for evaluation  Below are my notes for this consultation  If you have questions, please do not hesitate to call me  I look forward to following your patient along with you           Sincerely,        [facility]        CC: NU Bose, DO

## 2019-01-17 NOTE — SEDATION DOCUMENTATION
Right abdominal drain tube check completed  Seroma drain tube to remain to bulb suction and IR will repeat tube check in one week Wednesday 1/23/19 with Dr Nicolasa Cornell

## 2019-01-22 ENCOUNTER — OFFICE VISIT (OUTPATIENT)
Dept: INTERNAL MEDICINE CLINIC | Facility: CLINIC | Age: 59
End: 2019-01-22
Payer: COMMERCIAL

## 2019-01-22 ENCOUNTER — TELEPHONE (OUTPATIENT)
Dept: INTERNAL MEDICINE CLINIC | Facility: CLINIC | Age: 59
End: 2019-01-22

## 2019-01-22 VITALS
RESPIRATION RATE: 18 BRPM | OXYGEN SATURATION: 96 % | HEIGHT: 70 IN | BODY MASS INDEX: 35.65 KG/M2 | DIASTOLIC BLOOD PRESSURE: 80 MMHG | WEIGHT: 249 LBS | SYSTOLIC BLOOD PRESSURE: 132 MMHG | HEART RATE: 91 BPM | TEMPERATURE: 98.2 F

## 2019-01-22 DIAGNOSIS — S30.1XXS ABDOMINAL WALL SEROMA, SEQUELA: ICD-10-CM

## 2019-01-22 DIAGNOSIS — R93.5 ABNORMAL CT OF THE ABDOMEN: ICD-10-CM

## 2019-01-22 DIAGNOSIS — I10 ESSENTIAL HYPERTENSION: Primary | ICD-10-CM

## 2019-01-22 PROBLEM — S30.1XXA ABDOMINAL WALL SEROMA: Status: ACTIVE | Noted: 2019-01-22

## 2019-01-22 PROCEDURE — 3075F SYST BP GE 130 - 139MM HG: CPT | Performed by: NURSE PRACTITIONER

## 2019-01-22 PROCEDURE — 3079F DIAST BP 80-89 MM HG: CPT | Performed by: NURSE PRACTITIONER

## 2019-01-22 PROCEDURE — 99214 OFFICE O/P EST MOD 30 MIN: CPT | Performed by: NURSE PRACTITIONER

## 2019-01-22 NOTE — ASSESSMENT & PLAN NOTE
Abnormal findings of the liver on recent CT scan showing cirrhotic changes of the liver as well as an area in the right lower lobe of white out; recommendation for follow-up MRI with and without contrast   Patient will be seeing IR for routine visit tomorrow and I asked that he discuss this with them to ensure that this is not something related to his abdominal wall seroma

## 2019-01-22 NOTE — ASSESSMENT & PLAN NOTE
Case was discussed with internal medicine at the hospital because I felt it may be reasonable to have patient admitted for further evaluation and monitoring due to the persistent nature of his symptoms  The internist in the hospital felt that patient should proceed with his follow-up with IR which is scheduled for tomorrow and discuss with them whether they feel his symptoms are consistent with the typical course given his treatment  If IR feels that is necessary we can proceed with admission at that time  Patient will call the office to notify me wet IR wants him to do  Otherwise patient will return to our office for follow-up in 1 month

## 2019-01-22 NOTE — LETTER
Encounter Date: 1/23/2017       History     Chief Complaint   Patient presents with    Hemorrhoids     bleeding hemorrhoid since yesterday     Review of patient's allergies indicates:  No Known Allergies  HPI Comments: 57 year old male with complaint of bleeding hemorrhoid since yesterday.  No pain at present.  Reports pain resolved once bleeding started yesterday.  Reports mild bleeding.  No worsening or alleviating factors.      Past Medical History   Diagnosis Date    Fractures      Bilateral open tib/fib fractures    Hematuria - cause not known 7/18/2013    Hyperlipidemia     Osteomyelitis of ankle      right ankle - chronic    Testicular pain, right 7/18/2013    Testosterone deficiency 8/8/2013     No past medical history pertinent negatives.  Past Surgical History   Procedure Laterality Date    Nasal sx      External fixation femur fracture      Ankle fracture surgery      Rotational flap  2014     right leg     Family History   Problem Relation Age of Onset    Heart attack Father     Obesity Mother     Heart disease Brother     Heart disease Paternal Grandfather      Social History   Substance Use Topics    Smoking status: Former Smoker     Packs/day: 1.00     Years: 10.00     Types: Cigars     Quit date: 5/28/2013    Smokeless tobacco: Never Used    Alcohol use No      Comment: social     Review of Systems   Constitutional: Negative for fever.   HENT: Negative for sore throat.    Respiratory: Negative for shortness of breath.    Cardiovascular: Negative for chest pain.   Gastrointestinal: Positive for blood in stool. Negative for nausea.   Genitourinary: Negative for dysuria.   Musculoskeletal: Negative for back pain.   Skin: Negative for rash.   Neurological: Negative for weakness.   Hematological: Does not bruise/bleed easily.       Physical Exam   Initial Vitals   BP Pulse Resp Temp SpO2   01/23/17 0900 01/23/17 0900 01/23/17 0900 01/23/17 0900 01/23/17 0900   175/93 93 18 98.1 °F (36.7  January 22, 2019     Patient: Chaim Junior   YOB: 1960   Date of Visit: 1/22/2019       To Whom it May Concern:    Diamond Bates is under my professional care  He was seen in my office on 1/22/2019  He may return to work on 1/24/19  He was unable to work 1/21-1/24    If you have any questions or concerns, please don't hesitate to call           Sincerely,          NU Kumar        CC: No Recipients °C) 97 %     Physical Exam    Nursing note and vitals reviewed.  Constitutional: He appears well-developed and well-nourished.   HENT:   Head: Normocephalic and atraumatic.   Eyes: Conjunctivae are normal. Pupils are equal, round, and reactive to light.   Neck: Normal range of motion. Neck supple.   Cardiovascular: Normal rate, regular rhythm, normal heart sounds and intact distal pulses.   Pulmonary/Chest: Breath sounds normal.   Abdominal: Soft. There is no rebound and no guarding.   Genitourinary:   Genitourinary Comments: Small non thrombosed external hemorrhoid, no anal tenderness   Musculoskeletal: Normal range of motion.   Neurological: He is alert.   Skin: Skin is warm and dry.   Psychiatric: He has a normal mood and affect. His behavior is normal. Thought content normal.         ED Course   Procedures  Labs Reviewed - No data to display                            ED Course     Clinical Impression:   The encounter diagnosis was External hemorrhoid, bleeding.          Vincent Jane NP  01/23/17 0915

## 2019-01-22 NOTE — PROGRESS NOTES
Assessment/Plan:    Hypertension  Blood pressure is presently stable on lisinopril hydrochlorothiazide as well as metoprolol  No change indicated at this time  Will continue to monitor  Abdominal wall seroma ([address] )  Case was discussed with internal medicine at the hospital because I felt it may be reasonable to have patient admitted for further evaluation and monitoring due to the persistent nature of his symptoms  The internist in the hospital felt that patient should proceed with his follow-up with IR which is scheduled for tomorrow and discuss with them whether they feel his symptoms are consistent with the typical course given his treatment  If IR feels that is necessary we can proceed with admission at that time  Patient will call the office to notify me wet IR wants him to do  Otherwise patient will return to our office for follow-up in 1 month  Abnormal CT of the abdomen  Abnormal findings of the liver on recent CT scan showing cirrhotic changes of the liver as well as an area in the right lower lobe of white out; recommendation for follow-up MRI with and without contrast   Patient will be seeing IR for routine visit tomorrow and I asked that he discuss this with them to ensure that this is not something related to his abdominal wall seroma  Diagnoses and all orders for this visit:    Essential hypertension    Abdominal wall seroma, sequela    Abnormal CT of the abdomen          Subjective:      Patient ID: Bobbi Cantrell is a 62 y o  male  Patient is a 70-year-old male here today for follow-up to recent ER evaluation on 01/15  Patient has been under treatment for abdominal wall seroma with IR since January 4th this year  He has an indwelling to which is getting weekly alcohol flushes  Patient went to the emergency room for concern with intermittent low-grade temperatures and discoloration around the insertion site with brown discharge   He was evaluated in the emergency room with a CT scan and chest x-ray  He was started on a course of Bactrim as well as a Z-Herbie, diagnosed with both the pneumonia and an infection  Upon review of his chest x-ray it is unclear where the diagnosis of pneumonia came from however he did complete the course of antibiotics as prescribed  He states he did not feel any improvement while on the Bactrim for the azithromycin  He continues with pain, he is unable to work presently due to discomfort and inability to lift heavy things  He states he spends most of his time lying in bed  He is drinking plenty of water and urinating without difficulty, no constipation but he did have some episodes of diarrhea likely due to his antibiotics  He states he does continue with intermittent low-grade temperatures around 100° F  he continues with brown discharge from the insertion site of the tube  He states that he is are moving around 15-20 cc from his drain after each alcohol flush and discharge is clear with some blood  Patient does also have some elevation of his liver enzymes, a CT that was done in the ER shows cirrhotic changes in the liver and an area that the report describes as washout with recommendation for follow-up MRI of the abdomen with and without contrast         The following portions of the patient's history were reviewed and updated as appropriate: allergies, current medications, past family history, past medical history, past social history, past surgical history and problem list     Review of Systems   Constitutional: Positive for appetite change, fatigue, fever and unexpected weight change  Negative for activity change and chills  Respiratory: Negative for cough, chest tightness and shortness of breath  Cardiovascular: Negative for chest pain, palpitations and leg swelling  Gastrointestinal: Positive for abdominal pain  Negative for abdominal distention, constipation, diarrhea, nausea and vomiting     Genitourinary: Negative for dysuria and frequency  Musculoskeletal: Negative for arthralgias and myalgias  Skin: Positive for color change and wound  Allergic/Immunologic: Negative for environmental allergies  Neurological: Negative for dizziness, weakness, numbness and headaches  Psychiatric/Behavioral: Positive for sleep disturbance  The patient is not nervous/anxious  Objective:      /80 (BP Location: Left arm, Patient Position: Sitting, Cuff Size: Large)   Pulse 91   Temp 98 2 °F (36 8 °C)   Resp 18   Ht 5' 10" (1 778 m)   Wt 113 kg (249 lb)   SpO2 96%   BMI 35 73 kg/m²          Physical Exam   Constitutional: He is oriented to person, place, and time  Vital signs are normal  He appears well-developed and well-nourished  He is cooperative  HENT:   Head: Normocephalic  Right Ear: Hearing and external ear normal    Left Ear: Hearing and external ear normal    Mouth/Throat: Oropharynx is clear and moist and mucous membranes are normal    Eyes: Pupils are equal, round, and reactive to light  Conjunctivae and lids are normal    Neck: No JVD present  Carotid bruit is not present  Cardiovascular: Normal rate, regular rhythm, S1 normal, S2 normal, normal heart sounds and intact distal pulses  No murmur heard  Pulmonary/Chest: Effort normal and breath sounds normal    Abdominal: Soft  Normal appearance and bowel sounds are normal  There is no tenderness  Musculoskeletal: Normal range of motion  He exhibits no edema  Lymphadenopathy:     He has no cervical adenopathy  Neurological: He is alert and oriented to person, place, and time  He has normal strength  No sensory deficit  Skin: Skin is warm, dry and intact  There is erythema  Psychiatric: He has a normal mood and affect  His speech is normal and behavior is normal  Judgment and thought content normal  Cognition and memory are normal    Vitals reviewed

## 2019-01-22 NOTE — ASSESSMENT & PLAN NOTE
Blood pressure is presently stable on lisinopril hydrochlorothiazide as well as metoprolol  No change indicated at this time  Will continue to monitor

## 2019-01-23 ENCOUNTER — TELEPHONE (OUTPATIENT)
Dept: INTERNAL MEDICINE CLINIC | Facility: CLINIC | Age: 59
End: 2019-01-23

## 2019-01-23 ENCOUNTER — HOSPITAL ENCOUNTER (OUTPATIENT)
Dept: RADIOLOGY | Facility: HOSPITAL | Age: 59
Discharge: HOME | End: 2019-01-23
Attending: RADIOLOGY | Admitting: RADIOLOGY
Payer: COMMERCIAL

## 2019-01-23 DIAGNOSIS — S30.1XXS ABDOMINAL WALL SEROMA, SEQUELA: ICD-10-CM

## 2019-01-23 PROCEDURE — 49185 SCLEROTX FLUID COLLECTION: CPT | Performed by: RADIOLOGY

## 2019-01-23 PROCEDURE — 49185 SCLEROTX FLUID COLLECTION: CPT

## 2019-01-23 PROCEDURE — 76080 X-RAY EXAM OF FISTULA: CPT

## 2019-01-23 RX ADMIN — IOHEXOL 5 ML: 300 INJECTION, SOLUTION INTRAVENOUS at 08:03

## 2019-01-23 NOTE — TELEPHONE ENCOUNTER
I put in a new work not per pt request   We can print it out and let him know so he can pick it up  Thanks

## 2019-01-23 NOTE — SEDATION DOCUMENTATION
Pt had a tube check in IR with Dr Valentino Schooling  Dehydrated Alcohol and lidocaine injected into cavity  Pt to return for tube check in  Pt tube hooked up to YOSEF drain, bulb suction  Pt to return in 1 week for a tube check

## 2019-01-23 NOTE — DISCHARGE INSTRUCTIONS
TUBE CARE INSTRUCTIONS    Care after your procedure:    Resume your normal diet  Small sips of flat soda will help with nausea  1  The properly functioning catheter should be forward flushed once (1x) daily with 10ml of normal saline using clean technique  You will be given a prescription for flushes  To flush the tube, clean both connections with alcohol swab  Twist off the drainage bag/ bulb  tubing and twist the saline syringe into the drainage tube and flush  Remove the syringe and twist the drainage bag / bulb tubing tubing back on     2  The drainage bag/bulb may be emptied as necessary  Keep a record of the amount of fluid you drain from your tube  This should be done with clean technique as well  3  A fresh dressing should be applied daily over the tube insertion site  4  As the tube is secured to the skin with only a suture,try not to pull on your tube  Tub baths are not permitted  Showers are permitted if the patient's skin entry site is prevented from getting wet  Similarly, washcloth "baths" are acceptable  Contact Interventional Radiology at [phone] Jagjit PATIENTS: Contact Interventional Radiology at [phone]) Mark Saenz PATIENTS: Contact Interventional Radiology at [phone]) if:    1  Leakage or large amounts of liquid around the catheter  2  Fever of 101 degrees lasting several hours without other obvious cause (such as sore throat, flu, etc)  3  Persistent nausea or vomiting  4  Diminished drainage, which may be associated with pressure or pain  Or when the     drainage from your tube is less than 10mls for 48 hours  5  Catheter pulled back or falls out  The following pharmacies carry the flush syringes         [facility]                     [facility]  [address]                         [address] [facility] [place]  Phone [phone]            Phone [phone]   [place] [address]                                [phone]  [address] [facility] [address] [place]                      [address] [place]  Phone [phone]            Phone [phone]                      Lila Smith                                                                                                          [phone]  [facility]  [facility] [address]    [address]  Phone [phone]

## 2019-01-23 NOTE — TELEPHONE ENCOUNTER
Tooth wasn't removed  They dont believe in surgery  Do not think he needs to be admitted, no infection  Can you extend time off till Friday  He would like to return to work on Monday  Fax to [phone] karl Juan     PLEASE SEND BACK TO [facility]>>>>AS I WILL BE GONE BY THE TIME YOU GET IN THE OFFICE>

## 2019-01-23 NOTE — BRIEF OP NOTE (RAD/CATH)
IR TUBE CHECK  Procedure Note    PATIENT NAME: Klaudia Falcon  : 1960  MRN: [id number]     Pre-op Diagnosis:   1  Abdominal wall seroma, sequela      Post-op Diagnosis:   1  Abdominal wall seroma, sequela        Surgeon:   Merlin Morel, MD    Estimated Blood Loss: None    Findings:   1  5 cc of contrast injection through the abdominal wall drain showed persistent fluid cavity  2  Lidocaine and 5 cc alcohol was injected through the abdominal wall drain and drain capped for 2 hours  3  Patient will return in 1 week for drain check      Specimens: None    Complications:  None    Anesthesia: None    Merlin Morel, MD     Date: 2019  Time: 8:06 AM

## 2019-01-30 ENCOUNTER — HOSPITAL ENCOUNTER (OUTPATIENT)
Dept: RADIOLOGY | Facility: HOSPITAL | Age: 59
Discharge: HOME | End: 2019-01-30
Attending: SURGERY | Admitting: RADIOLOGY
Payer: COMMERCIAL

## 2019-01-30 DIAGNOSIS — S30.1XXD ABDOMINAL WALL SEROMA, SUBSEQUENT ENCOUNTER: ICD-10-CM

## 2019-01-30 PROCEDURE — 76080 X-RAY EXAM OF FISTULA: CPT | Performed by: RADIOLOGY

## 2019-01-30 PROCEDURE — 76080 X-RAY EXAM OF FISTULA: CPT

## 2019-01-30 PROCEDURE — 49424 ASSESS CYST CONTRAST INJECT: CPT | Performed by: RADIOLOGY

## 2019-01-30 PROCEDURE — 49424 ASSESS CYST CONTRAST INJECT: CPT

## 2019-01-30 RX ADMIN — IOHEXOL 10 ML: 300 INJECTION, SOLUTION INTRAVENOUS at 08:25

## 2019-02-06 ENCOUNTER — HOSPITAL ENCOUNTER (OUTPATIENT)
Dept: RADIOLOGY | Facility: HOSPITAL | Age: 59
Discharge: HOME | End: 2019-02-06
Attending: SURGERY | Admitting: RADIOLOGY
Payer: COMMERCIAL

## 2019-02-06 DIAGNOSIS — S30.1XXD ABDOMINAL WALL SEROMA, SUBSEQUENT ENCOUNTER: ICD-10-CM

## 2019-02-06 PROCEDURE — 76080 X-RAY EXAM OF FISTULA: CPT

## 2019-02-06 PROCEDURE — 49424 ASSESS CYST CONTRAST INJECT: CPT

## 2019-02-06 PROCEDURE — 49185 SCLEROTX FLUID COLLECTION: CPT | Performed by: RADIOLOGY

## 2019-02-06 RX ADMIN — ALTEPLASE: 2.2 INJECTION, POWDER, LYOPHILIZED, FOR SOLUTION INTRAVENOUS at 08:28

## 2019-02-06 NOTE — BRIEF OP NOTE (RAD/CATH)
IR TUBE CHECK  Procedure Note    PATIENT NAME: Chris Bartlett  : 1960  MRN: [id number]     Pre-op Diagnosis:   1  Abdominal wall seroma, subsequent encounter      Post-op Diagnosis:   1   Abdominal wall seroma, subsequent encounter        Surgeon:   Angelika Palm MD  Assistants:       Estimated Blood Loss: 0  Findings:  Increase in size of the cavity, now holding 30 cc    Cavity instilled with tPA and allowed to dwell    Repeat sclerosis performed with 5 CC ethanol after lidocaine    Specimens: none    Complications:  None immediate    Anesthesia: Local     Plan:  Patient to resume Betadine sclerosis at home, repeat drain check/sclerosis in 7-10 days    Angelika Palm MD     Date: 2019  Time: 9:59 AM

## 2019-02-06 NOTE — SEDATION DOCUMENTATION
Pt here for evaluation of persistent seroma  Pt reports flushing daily  Cavity thought to be larger as per Dr Koenig Morning  TPA injected as ordered and patient waiting in bay for 30 minutes  Appears stable and comfortable at this time  09 50: drain aspirated and lidocaina nd ethanol injected by Dr Koenig Morning  Pt to return in 10 days for further tube check

## 2019-02-15 ENCOUNTER — HOSPITAL ENCOUNTER (OUTPATIENT)
Dept: RADIOLOGY | Facility: HOSPITAL | Age: 59
Discharge: HOME | End: 2019-02-15
Attending: SURGERY | Admitting: RADIOLOGY
Payer: COMMERCIAL

## 2019-02-15 DIAGNOSIS — S30.1XXD ABDOMINAL WALL SEROMA, SUBSEQUENT ENCOUNTER: ICD-10-CM

## 2019-02-15 PROCEDURE — 49424 ASSESS CYST CONTRAST INJECT: CPT

## 2019-02-15 PROCEDURE — 49424 ASSESS CYST CONTRAST INJECT: CPT | Performed by: RADIOLOGY

## 2019-02-15 PROCEDURE — 76080 X-RAY EXAM OF FISTULA: CPT | Performed by: RADIOLOGY

## 2019-02-15 PROCEDURE — 76080 X-RAY EXAM OF FISTULA: CPT

## 2019-02-15 RX ADMIN — IOHEXOL 10 ML: 300 INJECTION, SOLUTION INTRAVENOUS at 09:30

## 2019-02-15 NOTE — SEDATION DOCUMENTATION
Pt had a abdominal wall seroma tube check in IR with Dr Johann Johns  Pt to follow up with surgeon Dr Lennox Ober, per Dr Johann Johns

## 2019-02-19 ENCOUNTER — OFFICE VISIT (OUTPATIENT)
Dept: INTERNAL MEDICINE CLINIC | Facility: CLINIC | Age: 59
End: 2019-02-19
Payer: COMMERCIAL

## 2019-02-19 VITALS
OXYGEN SATURATION: 97 % | HEART RATE: 90 BPM | WEIGHT: 243 LBS | BODY MASS INDEX: 34.79 KG/M2 | SYSTOLIC BLOOD PRESSURE: 132 MMHG | RESPIRATION RATE: 16 BRPM | DIASTOLIC BLOOD PRESSURE: 90 MMHG | TEMPERATURE: 98.4 F | HEIGHT: 70 IN

## 2019-02-19 DIAGNOSIS — R93.2 ABNORMAL CT OF LIVER: ICD-10-CM

## 2019-02-19 DIAGNOSIS — L24.9 IRRITANT CONTACT DERMATITIS, UNSPECIFIED TRIGGER: Primary | ICD-10-CM

## 2019-02-19 DIAGNOSIS — S30.1XXS ABDOMINAL WALL SEROMA, SEQUELA: ICD-10-CM

## 2019-02-19 PROCEDURE — 3008F BODY MASS INDEX DOCD: CPT | Performed by: NURSE PRACTITIONER

## 2019-02-19 PROCEDURE — 99214 OFFICE O/P EST MOD 30 MIN: CPT | Performed by: NURSE PRACTITIONER

## 2019-02-19 RX ORDER — CLOTRIMAZOLE AND BETAMETHASONE DIPROPIONATE 10; .64 MG/G; MG/G
CREAM TOPICAL 2 TIMES DAILY
Qty: 45 G | Refills: 0 | Status: SHIPPED | OUTPATIENT
Start: 2019-02-19 | End: 2019-02-22

## 2019-02-19 NOTE — ASSESSMENT & PLAN NOTE
Further testing ordered for follow-up assessment of abnormal findings on his abdominal CT done in January  There were some changes of the liver that were new compared with studies done in 2018 showing cirrhotic changes and evidence of washout  The recommendation on the radiology report was for an MRI of the abdomen with and without contrast   This will be scheduled at the patient's earliest convenience  Patient did have elevated liver enzymes in December however his recent blood work from January does show that his alk-phos and bilirubin both started trending toward normal   I have ordered a repeat of the metabolic panel to reassess

## 2019-02-19 NOTE — PROGRESS NOTES
Assessment/Plan:    Irritant contact dermatitis  Rash appears consistent with contact dermatitis  The offending agent is not clear  Patient is given a prescription for Lotrisone to apply twice daily to the affected area  Recommended only mild cleansers to the skin, allow skin to be opened areas often as possible  He will return for follow-up evaluation    Abnormal CT of liver  Further testing ordered for follow-up assessment of abnormal findings on his abdominal CT done in January  There were some changes of the liver that were new compared with studies done in 2018 showing cirrhotic changes and evidence of washout  The recommendation on the radiology report was for an MRI of the abdomen with and without contrast   This will be scheduled at the patient's earliest convenience  Patient did have elevated liver enzymes in December however his recent blood work from January does show that his alk-phos and bilirubin both started trending toward normal   I have ordered a repeat of the metabolic panel to reassess  Abdominal wall seroma (HCC)  No improvement with interventional radiology treatment with Betadine and alcohol instillation  He has seen General surgery today to discuss surgical removal   He does continue with a drain in place to bulb suction with serous output  Diagnoses and all orders for this visit:    Irritant contact dermatitis, unspecified trigger  -     clotrimazole-betamethasone (LOTRISONE) 1-0 05 % cream; Apply topically 2 (two) times a day    Abnormal CT of liver  -     MRI abdomen w wo contrast; Future  -     Comprehensive metabolic panel; Future    Abdominal wall seroma, sequela          Subjective:      Patient ID: Maury Garza is a 62 y o  male  Pt is a 62 y o  y/o male who is seen today for evaluation of a rash on his neck and chest that started about a week ago  The rash is intermittently pruritic and believes it has spread somewhat since he 1st noticed it    He does report that the day he 1st noted the rash she had used new lotion on his face neck and chest   He typically apply the lotion to his face her dry skin and he states that his wife recently bought him another bottle this however was not exactly the same product  He has not used it since  He does also note that they put new sheets on the bed however states that sheath were washed in the detergent that they have always use  The following portions of the patient's history were reviewed and updated as appropriate: allergies, current medications, past family history, past medical history, past social history, past surgical history and problem list     Review of Systems   Constitutional: Positive for fatigue  Negative for activity change, appetite change, chills and fever  Gastrointestinal: Positive for abdominal pain  Negative for constipation, diarrhea, nausea and vomiting  Musculoskeletal: Positive for back pain  Skin: Positive for rash  Neurological: Negative for weakness, numbness and headaches  Objective:      /90 (BP Location: Left arm, Patient Position: Sitting, Cuff Size: Large)   Pulse 90   Temp 98 4 °F (36 9 °C)   Resp 16   Ht 5' 10" (1 778 m)   Wt 110 kg (243 lb)   SpO2 97%   BMI 34 87 kg/m²          Physical Exam   Constitutional: He is oriented to person, place, and time  Vital signs are normal  He appears well-developed and well-nourished  He is cooperative  Neck: No JVD present  Carotid bruit is not present  Cardiovascular: Normal rate, regular rhythm and normal pulses  Pulmonary/Chest: Effort normal    Abdominal: Normal appearance and bowel sounds are normal    Abdominal drain to bulb suction with serous output   Neurological: He is alert and oriented to person, place, and time  Skin: Skin is warm, dry and intact  Rash noted  Rash is macular  There is an erythematous rash across the chest, slightly edematous with no skin breakdown, no lesion formation  Irregular border  Psychiatric: He has a normal mood and affect  His speech is normal and behavior is normal  Judgment and thought content normal  Cognition and memory are normal    Vitals reviewed

## 2019-02-19 NOTE — ASSESSMENT & PLAN NOTE
Rash appears consistent with contact dermatitis  The offending agent is not clear  Patient is given a prescription for Lotrisone to apply twice daily to the affected area  Recommended only mild cleansers to the skin, allow skin to be opened areas often as possible    He will return for follow-up evaluation

## 2019-02-19 NOTE — ASSESSMENT & PLAN NOTE
No improvement with interventional radiology treatment with Betadine and alcohol instillation  He has seen General surgery today to discuss surgical removal   He does continue with a drain in place to bulb suction with serous output

## 2019-02-22 NOTE — PRE-PROCEDURE INSTRUCTIONS
Pre-Surgery Instructions:   Medication Instructions    aspirin (ECOTRIN LOW STRENGTH) 81 mg EC tablet Instructed patient per Anesthesia Guidelines   atorvastatin (LIPITOR) 40 mg tablet Instructed patient per Anesthesia Guidelines   B COMPLEX VITAMINS ER PO Instructed patient per Anesthesia Guidelines   cyclobenzaprine (FLEXERIL) 10 mg tablet Instructed patient per Anesthesia Guidelines   gabapentin (NEURONTIN) 300 mg capsule Instructed patient per Anesthesia Guidelines   Iron, Ferrous Sulfate, 142 (45 Fe) MG TBCR Instructed patient per Anesthesia Guidelines   Krill Oil 1000 MG CAPS Instructed patient per Anesthesia Guidelines   lisinopril-hydrochlorothiazide (PRINZIDE,ZESTORETIC) 10-12 5 MG per tablet Instructed patient per Anesthesia Guidelines   metoprolol tartrate (LOPRESSOR) 25 mg tablet Instructed patient per Anesthesia Guidelines   nitroglycerin (NITROSTAT) 0 4 mg SL tablet Instructed patient per Anesthesia Guidelines   Nutritional Supplements (OSTEO-MULTIVITAMINS/MINERALS PO) Instructed patient per Anesthesia Guidelines   pantoprazole (PROTONIX) 40 mg tablet Instructed patient per Anesthesia Guidelines   venlafaxine (EFFEXOR) 37 5 mg tablet Instructed patient per Anesthesia Guidelines  REVIEWED  PRINTED SURGICAL INSTRUCTIONS WITH PATIENT , PATIENT VERBALIZED UNDERSTANDING  MEDICATIONS REVIEWED  Soap given by Dr Charlie Guillermo, reviewed instructions with patient  ACE/ARB Med Class     Continue this medication up to the evening before surgery/procedure, but do not take the morning of the day of surgery  Antidepressant Med Class     Continue to take this medication on your normal schedule  If this is an oral medication and you take it in the morning, then you may take this medicine with a sip of water  Antiepileptic Med Class     Continue to take this medication on your normal schedule    If this is an oral medication and you take it in the morning, then you may take this medicine with a sip of water  ASA Med Class: Aspirin     Should be discontinued at least one week prior to planned operation, unless specifically stated otherwise by surgical service  Your Surgeon may have patient stop taking aspirin up to a week before surgery if having intracranial, middle ear, posterior eye, spine surgery or prostate surgery  [Patients taking aspirin for coronary stents should be reviewed by an anesthesiologist in the optimization clinic  Please do not discontinue aspirin in patients with coronary stents unless given specific permission to do so by the cardiologist who prescribed medication ]   If your surgeon approves please continue to take this medication on your normal schedule  You may take this medication on the morning of your surgery with a sip of water  Beta blocker Med Class     Continue to take this heart medication on your normal schedule  If this is an oral medication and you take it in the morning, then you may take this medicine with a sip of water  Herbal Med Class     Stop taking this herbal medications at least one week prior to surgery/procedure  Nitroglycerin Med Class     Continue to take your nitroglyerin as directed by your prescribing Physician and notify your Physician and Anesthesiologist if you have needed to increase the frequency or dosage  Statin Med Class     Continue to take this medication on your normal schedule  If this is an oral medication and you take it in the morning, then you may take this medicine with a sip of water

## 2019-02-25 ENCOUNTER — APPOINTMENT (OUTPATIENT)
Dept: LAB | Facility: CLINIC | Age: 59
End: 2019-02-25
Payer: COMMERCIAL

## 2019-02-25 DIAGNOSIS — R93.2 ABNORMAL CT OF LIVER: ICD-10-CM

## 2019-02-25 DIAGNOSIS — E78.5 HYPERLIPIDEMIA, UNSPECIFIED HYPERLIPIDEMIA TYPE: ICD-10-CM

## 2019-02-25 DIAGNOSIS — Z13.1 SCREENING FOR DIABETES MELLITUS: ICD-10-CM

## 2019-02-25 LAB
ALBUMIN SERPL BCP-MCNC: 3.8 G/DL (ref 3.5–5)
ALP SERPL-CCNC: 186 U/L (ref 46–116)
ALT SERPL W P-5'-P-CCNC: 35 U/L (ref 12–78)
ANION GAP SERPL CALCULATED.3IONS-SCNC: 7 MMOL/L (ref 4–13)
AST SERPL W P-5'-P-CCNC: 22 U/L (ref 5–45)
BILIRUB SERPL-MCNC: 0.8 MG/DL (ref 0.2–1)
BUN SERPL-MCNC: 18 MG/DL (ref 5–25)
CALCIUM SERPL-MCNC: 9.2 MG/DL (ref 8.3–10.1)
CHLORIDE SERPL-SCNC: 103 MMOL/L (ref 100–108)
CHOLEST SERPL-MCNC: 227 MG/DL (ref 50–200)
CO2 SERPL-SCNC: 27 MMOL/L (ref 21–32)
CREAT SERPL-MCNC: 0.92 MG/DL (ref 0.6–1.3)
EST. AVERAGE GLUCOSE BLD GHB EST-MCNC: 105 MG/DL
GFR SERPL CREATININE-BSD FRML MDRD: 91 ML/MIN/1.73SQ M
GLUCOSE P FAST SERPL-MCNC: 115 MG/DL (ref 65–99)
HBA1C MFR BLD: 5.3 % (ref 4.2–6.3)
HDLC SERPL-MCNC: 54 MG/DL (ref 40–60)
LDLC SERPL CALC-MCNC: 143 MG/DL (ref 0–100)
NONHDLC SERPL-MCNC: 173 MG/DL
POTASSIUM SERPL-SCNC: 3.7 MMOL/L (ref 3.5–5.3)
PROT SERPL-MCNC: 8 G/DL (ref 6.4–8.2)
SODIUM SERPL-SCNC: 137 MMOL/L (ref 136–145)
TRIGL SERPL-MCNC: 150 MG/DL

## 2019-02-25 PROCEDURE — 80053 COMPREHEN METABOLIC PANEL: CPT

## 2019-02-25 PROCEDURE — 83036 HEMOGLOBIN GLYCOSYLATED A1C: CPT

## 2019-02-25 PROCEDURE — 36415 COLL VENOUS BLD VENIPUNCTURE: CPT

## 2019-02-25 PROCEDURE — 80061 LIPID PANEL: CPT

## 2019-02-26 ENCOUNTER — ANESTHESIA EVENT (OUTPATIENT)
Dept: PERIOP | Facility: HOSPITAL | Age: 59
End: 2019-02-26
Payer: COMMERCIAL

## 2019-02-27 ENCOUNTER — HOSPITAL ENCOUNTER (OUTPATIENT)
Facility: HOSPITAL | Age: 59
Setting detail: OUTPATIENT SURGERY
Discharge: HOME | End: 2019-02-27
Attending: SURGERY | Admitting: SURGERY
Payer: COMMERCIAL

## 2019-02-27 ENCOUNTER — ANESTHESIA (OUTPATIENT)
Dept: PERIOP | Facility: HOSPITAL | Age: 59
End: 2019-02-27
Payer: COMMERCIAL

## 2019-02-27 VITALS
BODY MASS INDEX: 34.36 KG/M2 | WEIGHT: 240 LBS | RESPIRATION RATE: 16 BRPM | SYSTOLIC BLOOD PRESSURE: 150 MMHG | TEMPERATURE: 96.9 F | HEART RATE: 95 BPM | DIASTOLIC BLOOD PRESSURE: 84 MMHG | HEIGHT: 70 IN | OXYGEN SATURATION: 95 %

## 2019-02-27 DIAGNOSIS — L76.34 POSTPROCEDURAL SEROMA OF SKIN AND SUBCUTANEOUS TISSUE FOLLOWING OTHER PROCEDURE: ICD-10-CM

## 2019-02-27 PROCEDURE — 87075 CULTR BACTERIA EXCEPT BLOOD: CPT | Performed by: SURGERY

## 2019-02-27 PROCEDURE — 88312 SPECIAL STAINS GROUP 1: CPT | Performed by: PATHOLOGY

## 2019-02-27 PROCEDURE — 88304 TISSUE EXAM BY PATHOLOGIST: CPT | Performed by: PATHOLOGY

## 2019-02-27 PROCEDURE — 87147 CULTURE TYPE IMMUNOLOGIC: CPT | Performed by: SURGERY

## 2019-02-27 PROCEDURE — 87070 CULTURE OTHR SPECIMN AEROBIC: CPT | Performed by: SURGERY

## 2019-02-27 PROCEDURE — 87205 SMEAR GRAM STAIN: CPT | Performed by: SURGERY

## 2019-02-27 PROCEDURE — 87176 TISSUE HOMOGENIZATION CULTR: CPT | Performed by: SURGERY

## 2019-02-27 PROCEDURE — 87186 SC STD MICRODIL/AGAR DIL: CPT | Performed by: SURGERY

## 2019-02-27 RX ORDER — FENTANYL CITRATE 50 UG/ML
INJECTION, SOLUTION INTRAMUSCULAR; INTRAVENOUS AS NEEDED
Status: DISCONTINUED | OUTPATIENT
Start: 2019-02-27 | End: 2019-02-27 | Stop reason: SURG

## 2019-02-27 RX ORDER — HYDROMORPHONE HCL/PF 1 MG/ML
0.5 SYRINGE (ML) INJECTION
Status: DISCONTINUED | OUTPATIENT
Start: 2019-02-27 | End: 2019-02-27 | Stop reason: HOSPADM

## 2019-02-27 RX ORDER — MIDAZOLAM HYDROCHLORIDE 1 MG/ML
INJECTION INTRAMUSCULAR; INTRAVENOUS AS NEEDED
Status: DISCONTINUED | OUTPATIENT
Start: 2019-02-27 | End: 2019-02-27 | Stop reason: SURG

## 2019-02-27 RX ORDER — OXYCODONE HYDROCHLORIDE AND ACETAMINOPHEN 5; 325 MG/1; MG/1
1 TABLET ORAL EVERY 4 HOURS PRN
Status: DISCONTINUED | OUTPATIENT
Start: 2019-02-27 | End: 2019-02-27 | Stop reason: HOSPADM

## 2019-02-27 RX ORDER — EPHEDRINE SULFATE 50 MG/ML
INJECTION, SOLUTION INTRAVENOUS AS NEEDED
Status: DISCONTINUED | OUTPATIENT
Start: 2019-02-27 | End: 2019-02-27 | Stop reason: SURG

## 2019-02-27 RX ORDER — SODIUM CHLORIDE, SODIUM LACTATE, POTASSIUM CHLORIDE, CALCIUM CHLORIDE 600; 310; 30; 20 MG/100ML; MG/100ML; MG/100ML; MG/100ML
75 INJECTION, SOLUTION INTRAVENOUS CONTINUOUS
Status: DISCONTINUED | OUTPATIENT
Start: 2019-02-27 | End: 2019-02-27 | Stop reason: HOSPADM

## 2019-02-27 RX ORDER — ONDANSETRON 2 MG/ML
4 INJECTION INTRAMUSCULAR; INTRAVENOUS EVERY 8 HOURS PRN
Status: DISCONTINUED | OUTPATIENT
Start: 2019-02-27 | End: 2019-02-27 | Stop reason: HOSPADM

## 2019-02-27 RX ORDER — BUPIVACAINE HYDROCHLORIDE AND EPINEPHRINE 2.5; 5 MG/ML; UG/ML
INJECTION, SOLUTION EPIDURAL; INFILTRATION; INTRACAUDAL; PERINEURAL AS NEEDED
Status: DISCONTINUED | OUTPATIENT
Start: 2019-02-27 | End: 2019-02-27 | Stop reason: HOSPADM

## 2019-02-27 RX ORDER — ONDANSETRON 2 MG/ML
INJECTION INTRAMUSCULAR; INTRAVENOUS AS NEEDED
Status: DISCONTINUED | OUTPATIENT
Start: 2019-02-27 | End: 2019-02-27 | Stop reason: SURG

## 2019-02-27 RX ORDER — DIPHENHYDRAMINE HYDROCHLORIDE 50 MG/ML
12.5 INJECTION INTRAMUSCULAR; INTRAVENOUS ONCE AS NEEDED
Status: DISCONTINUED | OUTPATIENT
Start: 2019-02-27 | End: 2019-02-27 | Stop reason: HOSPADM

## 2019-02-27 RX ORDER — ALBUMIN, HUMAN INJ 5% 5 %
SOLUTION INTRAVENOUS CONTINUOUS PRN
Status: DISCONTINUED | OUTPATIENT
Start: 2019-02-27 | End: 2019-02-27 | Stop reason: SURG

## 2019-02-27 RX ORDER — HYDROMORPHONE HCL/PF 1 MG/ML
SYRINGE (ML) INJECTION AS NEEDED
Status: DISCONTINUED | OUTPATIENT
Start: 2019-02-27 | End: 2019-02-27 | Stop reason: SURG

## 2019-02-27 RX ORDER — ONDANSETRON 2 MG/ML
4 INJECTION INTRAMUSCULAR; INTRAVENOUS ONCE AS NEEDED
Status: DISCONTINUED | OUTPATIENT
Start: 2019-02-27 | End: 2019-02-27 | Stop reason: HOSPADM

## 2019-02-27 RX ORDER — PROPOFOL 10 MG/ML
INJECTION, EMULSION INTRAVENOUS AS NEEDED
Status: DISCONTINUED | OUTPATIENT
Start: 2019-02-27 | End: 2019-02-27 | Stop reason: SURG

## 2019-02-27 RX ORDER — CLINDAMYCIN PHOSPHATE 900 MG/50ML
900 INJECTION INTRAVENOUS ONCE
Status: DISCONTINUED | OUTPATIENT
Start: 2019-02-27 | End: 2019-02-27 | Stop reason: HOSPADM

## 2019-02-27 RX ORDER — CLINDAMYCIN PHOSPHATE 900 MG/50ML
INJECTION INTRAVENOUS AS NEEDED
Status: DISCONTINUED | OUTPATIENT
Start: 2019-02-27 | End: 2019-02-27 | Stop reason: SURG

## 2019-02-27 RX ORDER — FENTANYL CITRATE/PF 50 MCG/ML
50 SYRINGE (ML) INJECTION
Status: DISCONTINUED | OUTPATIENT
Start: 2019-02-27 | End: 2019-02-27 | Stop reason: HOSPADM

## 2019-02-27 RX ADMIN — OXYCODONE HYDROCHLORIDE AND ACETAMINOPHEN 1 TABLET: 5; 325 TABLET ORAL at 15:53

## 2019-02-27 RX ADMIN — EPHEDRINE SULFATE 10 MG: 50 INJECTION, SOLUTION INTRAMUSCULAR; INTRAVENOUS; SUBCUTANEOUS at 12:49

## 2019-02-27 RX ADMIN — SODIUM CHLORIDE, SODIUM LACTATE, POTASSIUM CHLORIDE, AND CALCIUM CHLORIDE: .6; .31; .03; .02 INJECTION, SOLUTION INTRAVENOUS at 12:18

## 2019-02-27 RX ADMIN — DEXAMETHASONE SODIUM PHOSPHATE 10 MG: 10 INJECTION INTRAMUSCULAR; INTRAVENOUS at 12:40

## 2019-02-27 RX ADMIN — HYDROMORPHONE HYDROCHLORIDE 0.4 MG: 1 INJECTION, SOLUTION INTRAMUSCULAR; INTRAVENOUS; SUBCUTANEOUS at 13:07

## 2019-02-27 RX ADMIN — PROPOFOL 20 MG: 10 INJECTION, EMULSION INTRAVENOUS at 14:02

## 2019-02-27 RX ADMIN — HYDROMORPHONE HYDROCHLORIDE 0.3 MG: 1 INJECTION, SOLUTION INTRAMUSCULAR; INTRAVENOUS; SUBCUTANEOUS at 14:02

## 2019-02-27 RX ADMIN — HYDROMORPHONE HYDROCHLORIDE 0.3 MG: 1 INJECTION, SOLUTION INTRAMUSCULAR; INTRAVENOUS; SUBCUTANEOUS at 13:39

## 2019-02-27 RX ADMIN — ONDANSETRON 4 MG: 2 INJECTION INTRAMUSCULAR; INTRAVENOUS at 13:11

## 2019-02-27 RX ADMIN — FENTANYL CITRATE 50 MCG: 50 INJECTION, SOLUTION INTRAMUSCULAR; INTRAVENOUS at 12:23

## 2019-02-27 RX ADMIN — MIDAZOLAM 2 MG: 1 INJECTION INTRAMUSCULAR; INTRAVENOUS at 12:15

## 2019-02-27 RX ADMIN — PROPOFOL 100 MG: 10 INJECTION, EMULSION INTRAVENOUS at 12:26

## 2019-02-27 RX ADMIN — ALBUMIN (HUMAN): 12.5 SOLUTION INTRAVENOUS at 13:00

## 2019-02-27 RX ADMIN — CLINDAMYCIN PHOSPHATE 900 MG: 900 INJECTION, SOLUTION INTRAVENOUS at 12:32

## 2019-02-27 RX ADMIN — PROPOFOL 200 MG: 10 INJECTION, EMULSION INTRAVENOUS at 12:24

## 2019-02-27 RX ADMIN — SODIUM CHLORIDE, SODIUM LACTATE, POTASSIUM CHLORIDE, AND CALCIUM CHLORIDE 75 ML/HR: .6; .31; .03; .02 INJECTION, SOLUTION INTRAVENOUS at 12:00

## 2019-02-27 RX ADMIN — FENTANYL CITRATE 50 MCG: 50 INJECTION, SOLUTION INTRAMUSCULAR; INTRAVENOUS at 13:04

## 2019-02-27 RX ADMIN — EPHEDRINE SULFATE 10 MG: 50 INJECTION, SOLUTION INTRAMUSCULAR; INTRAVENOUS; SUBCUTANEOUS at 13:45

## 2019-02-27 RX ADMIN — FENTANYL CITRATE 50 MCG: 50 INJECTION, SOLUTION INTRAMUSCULAR; INTRAVENOUS at 14:30

## 2019-02-27 NOTE — ANESTHESIA PREPROCEDURE EVALUATION
Review of Systems/Medical History  Patient summary reviewed  Chart reviewed      Cardiovascular  Exercise tolerance (METS): >4,  Hyperlipidemia, Hypertension , Past MI > 6 months, CAD , Cardiac stents > 1 year No angina , No CHF ,    Pulmonary  Negative pulmonary ROS        GI/Hepatic    Bariatric surgery, Intestinal obstruction,        Kidney stones,        Endo/Other  Negative endo/other ROS      GYN  Negative gynecology ROS          Hematology  Negative hematology ROS      Musculoskeletal  Negative musculoskeletal ROS        Neurology  Negative neurology ROS      Psychology   Anxiety,              Physical Exam    Airway    Mallampati score: II         Dental   No notable dental hx     Cardiovascular  Rhythm: regular, Rate: normal,     Pulmonary  Pulmonary exam normal     Other Findings        Anesthesia Plan  ASA Score- 2     Anesthesia Type- general with ASA Monitors  Additional Monitors:   Airway Plan: LMA  Plan Factors-Patient not instructed to abstain from smoking on day of procedure  Patient did not smoke on day of surgery  Induction- intravenous  Postoperative Plan-     Informed Consent- Anesthetic plan and risks discussed with patient  I personally reviewed this patient with the CRNA  Discussed and agreed on the Anesthesia Plan with the CRNA  Matt Aguilar

## 2019-02-27 NOTE — DISCHARGE INSTRUCTIONS
Call the office to follow up next Monday 3/4/19 at 12pm to have your wound checked and skin jayshree (white tabs in your incision) removed    ([phone])    Keep your abdominal binder in place    Please call the office when you leave to schedule an appointment to be seen  Anesthesia Precautions:  1 ) Have a responsible person drive you home and someone to stay with you at home (in case of dizziness)  2 ) Rest and relax for 24 hours  3 ) Drink Clear liquids until there is no nausea or vomiting, then resume diet as normal   4 ) Diet as tolerated  5 ) Do not drink alcohol, drive any vehicle, operate mechanical equipment (e g  Sewing machine, kitchen stove, etc ) or make any critical decisions for 24 hours  Activity:    Do not lift more than 10 pounds (a gallon of milk) for 1-2 weeks post-operatively    Walking is encouraged  Normal daily activities including climbing steps are okay  Do not engage in strenuous activity or contact sports for 4-6 weeks post-operatively  Return to work:    Return to work to be discussed at first post-operative visit  Diet:    You may return to your normal heart healthy diet  Wound Care: Your wound is closed with sutures which will have to be removed    May use a sponge bath  No showers initially  No soaking or baths for several weeks  Do not soak incisions in bath water or swim for two weeks  Do not apply any creams or ointments  Ice as needed  Pain Medication:    Please take as directed  No driving while taking narcotic pain medications    Other:  If you have questions after discharge please call the office    If you have increased pain, fever >101 5, increased drainage, redness or a bad smell at your surgery site, please call us immediately or come directly to the Emergency Room

## 2019-02-27 NOTE — OP NOTE
OPERATIVE REPORT  PATIENT NAME: Allyn Garcia    :  1960  MRN: [id number]  Pt Location: [facility] OR ROOM 07    SURGERY DATE: 2019    Surgeon(s) and Role:     * Keya White MD - Primary     * Kesha Malone MD - Assisting    Preop Diagnosis:  Postprocedural seroma of skin and subcutaneous tissue following other procedure [L76 34]    Post-Op Diagnosis Codes:     * Postprocedural seroma of skin and subcutaneous tissue following other procedure [L76 34]    Procedure(s) (LRB):  ABDOMINAL SEROMA EXCISION (N/A)    Specimen(s):  ID Type Source Tests Collected by Time Destination   1 :  Tissue Abdominal TISSUE Miya Arnold MD 2019 1247    A :  Tissue Abdominal ANAEROBIC CULTURE AND GRAM STAIN, CULTURE, TISSUE AND GRAM STAIN Keya White MD 2019 1246        Estimated Blood Loss:   Minimal    Drains:  Closed/Suction Drain Right Abdomen 14 5 Fr  (Active)   Site Description Reddened 2019 11:27 AM   Status Clamped 2019 11:27 AM   Number of days: 51       Anesthesia Type:   General    Operative Indications:  Postprocedural seroma of skin and subcutaneous tissue following other procedure [L76 34]      Operative Findings:  Independent, nonsmoker, ASA 2, wound class  3, BMI 34, weight 240, heig  Ht 70  Cardiovascular  Exercise tolerance (METS): >4,  Hyperlipidemia, Hypertension , Past MI > 6 months, CAD , Cardiac stents > 1 year No angina , No CHF ,     Pulmonary  Negative pulmonary ROS          GI/Hepatic     Bariatric surgery, Intestinal obstruction,          Kidney stones,          Endo/Other  Negative endo/other ROS        GYN  Negative gynecology ROS             Hematology  Negative hematology ROS        Musculoskeletal  Negative musculoskeletal ROS          Neurology  Negative neurology ROS        Psychology   Anxiety,                  Complications:   None    Procedure and Technique:   patient was identified visually and via armband  Placed in the supine position    After general anesthesia the abdomen was prepped and draped in a sterile fashion  0 25% Marcaine with epinephrine was used high least throughout the procedure  Incision was made in the right mid abdomen  This carried down through skin subcutaneous tissue  The percutaneously placed IR drain was found in the deep subcutaneous space  This was eventually cut and removed  There was a very thick rind 1 cm in depth surrounding the old hematoma seroma pocket  This extended for  15 cm  The pseudo capsule and rind over this region was then excised  This was easily complete from the anterior and lateral aspects  Above defect of the cavity was contiguous with the fascia of the anterior abdominal wall  This was removed using sharp dissection with scalpel  The entire cyst cavity measured 15 cm by 6 cm  The wound was copiously irrigated  Aspirated dry  Hemostasis was assured  At this point the tract from the IR drain was also excised  Cultures were also taken during the procedure to help rule out contamination or infection  The wound was then reapproximated with several 0 Vicryl sutures placing subcutaneous tissue against the fascia all base  Were tied 3 0 vertical mattress sutures were applied to the skin  Wound jayshree were then applied  Sterile dressings were then applied  Abdominal binder was utilized  The patient was awakened anesthesia and transferred recovery room stable condition  Sponge instrument count correct     I was present for the entire procedure    Patient Disposition:  PACU     SIGNATURE: Vicki Arita MD  DATE: February 27, 2019  TIME: 2:55 PM

## 2019-02-27 NOTE — ANESTHESIA POSTPROCEDURE EVALUATION
Post-Op Assessment Note    CV Status:  Stable  Pain Score: 0    Pain management: satisfactory to patient     Mental Status:  Alert   Hydration Status:  Stable   PONV Controlled:  None   Airway Patency:  Patent   Post Op Vitals Reviewed: Yes      Staff: Anesthesiologist           BP   110/65   Temp   98   Pulse  80   Resp   16   SpO2   99

## 2019-02-27 NOTE — PROGRESS NOTES
Assumed care of patient at 1705 hours  Report received from [facility] in [address]  Patient in supine position with HOB elevated to 40 degrees  In NAD  Pt's wife Cintia Joana at bedside  Noted tolerated light snack PO

## 2019-03-01 ENCOUNTER — TELEPHONE (OUTPATIENT)
Dept: INTERNAL MEDICINE CLINIC | Facility: CLINIC | Age: 59
End: 2019-03-01

## 2019-03-01 LAB
BACTERIA SPEC ANAEROBE CULT: NORMAL
BACTERIA TISS AEROBE CULT: ABNORMAL
BACTERIA TISS AEROBE CULT: ABNORMAL
GRAM STN SPEC: ABNORMAL

## 2019-03-01 NOTE — TELEPHONE ENCOUNTER
Spoke with pt regarding his elevated cholesterol  He states that he ran out of his medication and he forgot to refill it  It has since been refilled and he states he restarted it yesterday  We will recheck his lipids in 3 months

## 2019-03-04 ENCOUNTER — HOSPITAL ENCOUNTER (INPATIENT)
Facility: HOSPITAL | Age: 59
LOS: 9 days | Discharge: HOME HEALTH | DRG: 858 | End: 2019-03-13
Attending: SURGERY | Admitting: SURGERY
Payer: COMMERCIAL

## 2019-03-04 DIAGNOSIS — S30.1XXD ABDOMINAL WALL SEROMA, SUBSEQUENT ENCOUNTER: ICD-10-CM

## 2019-03-04 DIAGNOSIS — S30.1XXS ABDOMINAL WALL SEROMA, SEQUELA: Primary | ICD-10-CM

## 2019-03-04 PROCEDURE — 87070 CULTURE OTHR SPECIMN AEROBIC: CPT | Performed by: PHYSICIAN ASSISTANT

## 2019-03-04 PROCEDURE — 87205 SMEAR GRAM STAIN: CPT | Performed by: PHYSICIAN ASSISTANT

## 2019-03-04 PROCEDURE — 87186 SC STD MICRODIL/AGAR DIL: CPT | Performed by: PHYSICIAN ASSISTANT

## 2019-03-04 PROCEDURE — 86803 HEPATITIS C AB TEST: CPT | Performed by: PHYSICIAN ASSISTANT

## 2019-03-04 PROCEDURE — 87147 CULTURE TYPE IMMUNOLOGIC: CPT | Performed by: PHYSICIAN ASSISTANT

## 2019-03-04 RX ORDER — ATORVASTATIN CALCIUM 40 MG/1
40 TABLET, FILM COATED ORAL DAILY
Status: DISCONTINUED | OUTPATIENT
Start: 2019-03-04 | End: 2019-03-04 | Stop reason: SDUPTHER

## 2019-03-04 RX ORDER — ONDANSETRON 2 MG/ML
4 INJECTION INTRAMUSCULAR; INTRAVENOUS EVERY 4 HOURS PRN
Status: DISCONTINUED | OUTPATIENT
Start: 2019-03-04 | End: 2019-03-13 | Stop reason: HOSPADM

## 2019-03-04 RX ORDER — ATORVASTATIN CALCIUM 40 MG/1
40 TABLET, FILM COATED ORAL
Status: DISCONTINUED | OUTPATIENT
Start: 2019-03-04 | End: 2019-03-04

## 2019-03-04 RX ORDER — ATORVASTATIN CALCIUM 40 MG/1
40 TABLET, FILM COATED ORAL DAILY
Status: DISCONTINUED | OUTPATIENT
Start: 2019-03-05 | End: 2019-03-13 | Stop reason: HOSPADM

## 2019-03-04 RX ORDER — VENLAFAXINE 37.5 MG/1
37.5 TABLET ORAL DAILY
Status: DISCONTINUED | OUTPATIENT
Start: 2019-03-04 | End: 2019-03-13 | Stop reason: HOSPADM

## 2019-03-04 RX ORDER — CIPROFLOXACIN 2 MG/ML
400 INJECTION, SOLUTION INTRAVENOUS EVERY 12 HOURS
Status: DISCONTINUED | OUTPATIENT
Start: 2019-03-04 | End: 2019-03-06

## 2019-03-04 RX ORDER — ACETAMINOPHEN 325 MG/1
650 TABLET ORAL EVERY 4 HOURS PRN
Status: DISCONTINUED | OUTPATIENT
Start: 2019-03-04 | End: 2019-03-13 | Stop reason: HOSPADM

## 2019-03-04 RX ORDER — TRAMADOL HYDROCHLORIDE 50 MG/1
50 TABLET ORAL EVERY 4 HOURS PRN
Status: DISCONTINUED | OUTPATIENT
Start: 2019-03-04 | End: 2019-03-13

## 2019-03-04 RX ORDER — GABAPENTIN 300 MG/1
300 CAPSULE ORAL
Status: DISCONTINUED | OUTPATIENT
Start: 2019-03-04 | End: 2019-03-05

## 2019-03-04 RX ORDER — OXYCODONE HYDROCHLORIDE 10 MG/1
10 TABLET ORAL EVERY 4 HOURS PRN
Status: DISCONTINUED | OUTPATIENT
Start: 2019-03-04 | End: 2019-03-04

## 2019-03-04 RX ORDER — HYDROMORPHONE HCL/PF 1 MG/ML
0.5 SYRINGE (ML) INJECTION
Status: DISCONTINUED | OUTPATIENT
Start: 2019-03-04 | End: 2019-03-13 | Stop reason: HOSPADM

## 2019-03-04 RX ORDER — PANTOPRAZOLE SODIUM 40 MG/1
40 TABLET, DELAYED RELEASE ORAL
Status: DISCONTINUED | OUTPATIENT
Start: 2019-03-05 | End: 2019-03-13 | Stop reason: HOSPADM

## 2019-03-04 RX ORDER — CYCLOBENZAPRINE HCL 10 MG
10 TABLET ORAL
Status: DISCONTINUED | OUTPATIENT
Start: 2019-03-04 | End: 2019-03-05

## 2019-03-04 RX ORDER — ASPIRIN 81 MG/1
162 TABLET ORAL DAILY
Status: DISCONTINUED | OUTPATIENT
Start: 2019-03-04 | End: 2019-03-13 | Stop reason: HOSPADM

## 2019-03-04 RX ORDER — NITROGLYCERIN 0.4 MG/1
0.4 TABLET SUBLINGUAL
Status: DISCONTINUED | OUTPATIENT
Start: 2019-03-04 | End: 2019-03-13 | Stop reason: HOSPADM

## 2019-03-04 RX ORDER — OXYCODONE HYDROCHLORIDE 5 MG/1
5 TABLET ORAL EVERY 4 HOURS PRN
Status: DISCONTINUED | OUTPATIENT
Start: 2019-03-04 | End: 2019-03-04

## 2019-03-04 RX ADMIN — CIPROFLOXACIN 400 MG: 2 INJECTION, SOLUTION INTRAVENOUS at 14:56

## 2019-03-04 RX ADMIN — TRAMADOL HYDROCHLORIDE 50 MG: 50 TABLET, COATED ORAL at 22:22

## 2019-03-04 RX ADMIN — CYCLOBENZAPRINE HYDROCHLORIDE 10 MG: 10 TABLET, FILM COATED ORAL at 22:03

## 2019-03-04 RX ADMIN — METOPROLOL TARTRATE 25 MG: 25 TABLET, FILM COATED ORAL at 22:02

## 2019-03-04 RX ADMIN — GABAPENTIN 300 MG: 300 CAPSULE ORAL at 22:03

## 2019-03-04 RX ADMIN — ENOXAPARIN SODIUM 40 MG: 40 INJECTION SUBCUTANEOUS at 14:56

## 2019-03-04 NOTE — H&P
H&P General Surgery  Dhaval Loera 62 y o  male   MRN: [id number]  Unit/Bed#: -01 Encounter: [id number]        ASSESSMENT/PLAN:  Problem List Abdominal wall seroma, s/p excision of seroma 2/27 now with post op infection    Hypertension    History of MI (myocardial infarction)    Non morbid obesity, unspecified obesity type    H/O heart artery stent    Benign prostatic hyperplasia   63 yo F s/p excision of abdominal wall seroma that failed outpatient therapy admitted for IV antibiotics for wound infection  Culture from 2/27 is growing staph aureus  · IV abx  · Pack changes Q shift  · Serial exams  · OOB and ambulate  · Pain control   · DVT ppx   · Continue home meds  · Follow CBC  · Follow up culture       Reason for Consult / Principal Problem: abdominal wall infection    HPI: Dhaval Loera is a 62y o  year old male who presents with history of seroma BLQ found last year on US  Seroma was aspirated and cultures were originally negative for growth  The seroma re accumulated and an IR drain was place and alcohol sclerotherapy was done which also failed  Dr Roc Catherine took him to the OR on 2/27 and the seroma rind was removed  Carroll were placed post op  The patient has had increasing pain and was seen in the office by Dr Roc Catherine today  The wound was opened and was found to have purulent drainage and malodorous  A culture was obtained and he has been referred for IV abx  Review of Systems   Constitutional: Positive for activity change, diaphoresis, fatigue and fever  Negative for appetite change, chills and unexpected weight change  HENT: Negative  Eyes: Negative  Respiratory: Negative  Cardiovascular: Negative  Gastrointestinal: Positive for abdominal pain  Negative for abdominal distention, blood in stool, constipation, diarrhea, nausea and vomiting  Endocrine: Negative  Genitourinary: Negative  Musculoskeletal: Negative      Allergic/Immunologic: Positive for environmental allergies and food allergies  Negative for immunocompromised state  Neurological: Negative  Hematological: Negative  Psychiatric/Behavioral: Negative          Historical Information   Past Medical History:   Diagnosis Date    Anxiety     Cardiac disease     Hyperlipidemia     Hypertension     Kidney stone     Myocardial infarction ([address] )     PONV (postoperative nausea and vomiting)     in the past     Past Surgical History:   Procedure Laterality Date    BACK SURGERY      CARDIAC SURGERY      angio with stents    GASTRIC BYPASS      IR ABSCESS/SEROMA DRAINAGE  1/4/2019    NECK SURGERY      MN EXC TUMOR SOFT TISSUE ABDOMINAL WALL SUBQ <3CM N/A 2/27/2019    Procedure: ABDOMINAL SEROMA EXCISION;  Surgeon: Vanessa Arizmendi MD;  Location: [facility];  Service: General    SHOULDER SURGERY       Social History   Social History     Substance and Sexual Activity   Alcohol Use Yes    Binge frequency: Less than monthly    Comment: occasional     Social History     Substance and Sexual Activity   Drug Use No     Social History     Tobacco Use   Smoking Status Never Smoker   Smokeless Tobacco Never Used     Family History   Problem Relation Age of Onset    Heart disease Mother     Diabetes Mother        Meds/Allergies     Medications Prior to Admission   Medication    aspirin (ECOTRIN LOW STRENGTH) 81 mg EC tablet    atorvastatin (LIPITOR) 40 mg tablet    B COMPLEX VITAMINS ER PO    cyclobenzaprine (FLEXERIL) 10 mg tablet    gabapentin (NEURONTIN) 300 mg capsule    Iron, Ferrous Sulfate, 142 (45 Fe) MG TBCR    Krill Oil 1000 MG CAPS    lisinopril-hydrochlorothiazide (PRINZIDE,ZESTORETIC) 10-12 5 MG per tablet    metoprolol tartrate (LOPRESSOR) 25 mg tablet    nitroglycerin (NITROSTAT) 0 4 mg SL tablet    Nutritional Supplements (OSTEO-MULTIVITAMINS/MINERALS PO)    pantoprazole (PROTONIX) 40 mg tablet    venlafaxine (EFFEXOR) 37 5 mg tablet     Current Facility-Administered Medications   Medication Dose Route Frequency    acetaminophen (TYLENOL) tablet 650 mg  650 mg Oral Q4H PRN    ciprofloxacin (CIPRO) IVPB (premix) 400 mg  400 mg Intravenous Q12H    enoxaparin (LOVENOX) subcutaneous injection 40 mg  40 mg Subcutaneous Daily    HYDROmorphone (DILAUDID) injection 0 5 mg  0 5 mg Intravenous Q1H PRN    ondansetron (ZOFRAN) injection 4 mg  4 mg Intravenous Q4H PRN    traMADol (ULTRAM) tablet 50 mg  50 mg Oral Q4H PRN       Allergies   Allergen Reactions    Nuts Anaphylaxis    Peanut Oil Anaphylaxis    Isoflavones     Other      Soy  Patient can not have an NGT due to bariatric surgery    Penicillins     Shellfish Allergy        Objective     Blood pressure 110/70, pulse 96, temperature 99 2 °F (37 3 °C), temperature source Oral, resp  rate 18, height 5' 10" (1 778 m), weight 109 kg (240 lb), SpO2 97 %  No intake or output data in the 24 hours ending 03/04/19 1434    PHYSICAL EXAM  General appearance: alert and oriented, in no acute distress  Skin: Skin color, texture, turgor normal  No rashes or lesions  Head: Normocephalic, without obvious abnormality  Heart: regular rate and rhythm, S1, S2 normal, no murmur, click, rub or gallop  Lungs: clear to auscultation bilaterally  Abdomen: rounded and soft, +BS, open wound central abdomen with wick and more later wound with wick  +slight erythema on left side  Back: negative  Rectal: deferred  Neurological: normal without focal findings    Lab Results:   No visits with results within 1 Day(s) from this visit     Latest known visit with results is:   Admission on 02/27/2019, Discharged on 02/27/2019   Component Date Value    Anaerobic Culture 02/27/2019 No anaerobes isolated     Tissue Culture 02/27/2019 3+ Growth of Staphylococcus aureus*    Tissue Culture 02/27/2019 2+ Growth of      Gram Stain Result 02/27/2019 1+ Polys*    Gram Stain Result 02/27/2019 1+ Gram positive cocci in clusters*    Gram Stain Result 02/27/2019 1+ Gram positive cocci in pairs*     Imaging Studies: N/A    Counseling / Coordination of Care  Total time spent today  20 minutes  Greater than 50% of total time was spent with the patient and / or family counseling and / or coordination of care

## 2019-03-05 LAB
BASOPHILS # BLD AUTO: 0.06 THOUSANDS/ΜL (ref 0–0.1)
BASOPHILS NFR BLD AUTO: 1 % (ref 0–1)
EOSINOPHIL # BLD AUTO: 0.4 THOUSAND/ΜL (ref 0–0.61)
EOSINOPHIL NFR BLD AUTO: 4 % (ref 0–6)
ERYTHROCYTE [DISTWIDTH] IN BLOOD BY AUTOMATED COUNT: 11.9 % (ref 11.6–15.1)
HCT VFR BLD AUTO: 37.2 % (ref 36.5–49.3)
HCV AB SER QL: NORMAL
HGB BLD-MCNC: 12.6 G/DL (ref 12–17)
IMM GRANULOCYTES # BLD AUTO: 0.04 THOUSAND/UL (ref 0–0.2)
IMM GRANULOCYTES NFR BLD AUTO: 0 % (ref 0–2)
LYMPHOCYTES # BLD AUTO: 1.87 THOUSANDS/ΜL (ref 0.6–4.47)
LYMPHOCYTES NFR BLD AUTO: 18 % (ref 14–44)
MCH RBC QN AUTO: 31.4 PG (ref 26.8–34.3)
MCHC RBC AUTO-ENTMCNC: 33.9 G/DL (ref 31.4–37.4)
MCV RBC AUTO: 93 FL (ref 82–98)
MONOCYTES # BLD AUTO: 1.33 THOUSAND/ΜL (ref 0.17–1.22)
MONOCYTES NFR BLD AUTO: 13 % (ref 4–12)
NEUTROPHILS # BLD AUTO: 6.6 THOUSANDS/ΜL (ref 1.85–7.62)
NEUTS SEG NFR BLD AUTO: 64 % (ref 43–75)
NRBC BLD AUTO-RTO: 0 /100 WBCS
PLATELET # BLD AUTO: 315 THOUSANDS/UL (ref 149–390)
PMV BLD AUTO: 10.1 FL (ref 8.9–12.7)
RBC # BLD AUTO: 4.01 MILLION/UL (ref 3.88–5.62)
WBC # BLD AUTO: 10.3 THOUSAND/UL (ref 4.31–10.16)

## 2019-03-05 PROCEDURE — 85025 COMPLETE CBC W/AUTO DIFF WBC: CPT | Performed by: PHYSICIAN ASSISTANT

## 2019-03-05 RX ORDER — CYCLOBENZAPRINE HCL 10 MG
10 TABLET ORAL EVERY 12 HOURS PRN
Status: DISCONTINUED | OUTPATIENT
Start: 2019-03-05 | End: 2019-03-13 | Stop reason: HOSPADM

## 2019-03-05 RX ORDER — GABAPENTIN 300 MG/1
300 CAPSULE ORAL EVERY 12 HOURS PRN
Status: DISCONTINUED | OUTPATIENT
Start: 2019-03-05 | End: 2019-03-13 | Stop reason: HOSPADM

## 2019-03-05 RX ADMIN — CIPROFLOXACIN 400 MG: 2 INJECTION, SOLUTION INTRAVENOUS at 02:49

## 2019-03-05 RX ADMIN — CYCLOBENZAPRINE HYDROCHLORIDE 10 MG: 10 TABLET, FILM COATED ORAL at 23:32

## 2019-03-05 RX ADMIN — ATORVASTATIN CALCIUM 40 MG: 40 TABLET, FILM COATED ORAL at 09:34

## 2019-03-05 RX ADMIN — PANTOPRAZOLE SODIUM 40 MG: 40 TABLET, DELAYED RELEASE ORAL at 06:10

## 2019-03-05 RX ADMIN — METRONIDAZOLE 500 MG: 500 INJECTION, SOLUTION INTRAVENOUS at 23:23

## 2019-03-05 RX ADMIN — METRONIDAZOLE 500 MG: 500 INJECTION, SOLUTION INTRAVENOUS at 14:15

## 2019-03-05 RX ADMIN — VENLAFAXINE 37.5 MG: 37.5 TABLET ORAL at 10:24

## 2019-03-05 RX ADMIN — ASPIRIN 162 MG: 81 TABLET, COATED ORAL at 09:34

## 2019-03-05 RX ADMIN — TRAMADOL HYDROCHLORIDE 50 MG: 50 TABLET, COATED ORAL at 09:37

## 2019-03-05 RX ADMIN — LISINOPRIL: 10 TABLET ORAL at 09:34

## 2019-03-05 RX ADMIN — GABAPENTIN 300 MG: 300 CAPSULE ORAL at 11:08

## 2019-03-05 RX ADMIN — CIPROFLOXACIN 400 MG: 2 INJECTION, SOLUTION INTRAVENOUS at 14:15

## 2019-03-05 RX ADMIN — CYCLOBENZAPRINE HYDROCHLORIDE 10 MG: 10 TABLET, FILM COATED ORAL at 11:08

## 2019-03-05 RX ADMIN — GABAPENTIN 300 MG: 300 CAPSULE ORAL at 23:32

## 2019-03-05 RX ADMIN — ENOXAPARIN SODIUM 40 MG: 40 INJECTION SUBCUTANEOUS at 09:34

## 2019-03-05 RX ADMIN — METOPROLOL TARTRATE 25 MG: 25 TABLET, FILM COATED ORAL at 23:23

## 2019-03-05 NOTE — PLAN OF CARE
Problem: SKIN/TISSUE INTEGRITY - ADULT  Goal: Incision(s), wounds(s) or drain site(s) healing without S/S of infection  Description  INTERVENTIONS  - Assess and document risk factors for skin impairment   - Assess and document dressing, incision, wound bed, drain sites and surrounding tissue  - Initiate Nutrition services consult and/or wound management as needed  Outcome: Progressing     Problem: PAIN - ADULT  Goal: Verbalizes/displays adequate comfort level or baseline comfort level  Description  Interventions:  - Encourage patient to monitor pain and request assistance  - Assess pain using appropriate pain scale  - Administer analgesics based on type and severity of pain and evaluate response  - Implement non-pharmacological measures as appropriate and evaluate response  - Consider cultural and social influences on pain and pain management  - Notify physician/advanced practitioner if interventions unsuccessful or patient reports new pain  Outcome: Progressing     Problem: INFECTION - ADULT  Goal: Absence or prevention of progression during hospitalization  Description  INTERVENTIONS:  - Assess and monitor for signs and symptoms of infection  - Monitor lab/diagnostic results  - Monitor all insertion sites, i e  indwelling lines, tubes, and drains  - Monitor endotracheal (as able) and nasal secretions for changes in amount and color  - [place] appropriate cooling/warming therapies per order  - Administer medications as ordered  - Instruct and encourage patient and family to use good hand hygiene technique  - Identify and instruct in appropriate isolation precautions for identified infection/condition  Outcome: Progressing

## 2019-03-05 NOTE — PLAN OF CARE
Problem: SKIN/TISSUE INTEGRITY - ADULT  Goal: Incision(s), wounds(s) or drain site(s) healing without S/S of infection  Description  INTERVENTIONS  - Assess and document risk factors for skin impairment   - Assess and document dressing, incision, wound bed, drain sites and surrounding tissue  - Initiate Nutrition services consult and/or wound management as needed  Outcome: Progressing     Problem: PAIN - ADULT  Goal: Verbalizes/displays adequate comfort level or baseline comfort level  Description  Interventions:  - Encourage patient to monitor pain and request assistance  - Assess pain using appropriate pain scale  - Administer analgesics based on type and severity of pain and evaluate response  - Implement non-pharmacological measures as appropriate and evaluate response  - Consider cultural and social influences on pain and pain management  - Notify physician/advanced practitioner if interventions unsuccessful or patient reports new pain  Outcome: Progressing     Problem: INFECTION - ADULT  Goal: Absence or prevention of progression during hospitalization  Description  INTERVENTIONS:  - Assess and monitor for signs and symptoms of infection  - Monitor lab/diagnostic results  - Monitor all insertion sites, i e  indwelling lines, tubes, and drains  - Monitor endotracheal (as able) and nasal secretions for changes in amount and color  - Center appropriate cooling/warming therapies per order  - Administer medications as ordered  - Instruct and encourage patient and family to use good hand hygiene technique  - Identify and instruct in appropriate isolation precautions for identified infection/condition  Outcome: Progressing

## 2019-03-05 NOTE — PROGRESS NOTES
Progress Note -Surgery PA  Reather Smoker 62 y o  male MRN: [id number]  Unit/Bed#: -01 Encounter: [id number]      Assessment:  62year old male with post op infection s/p excision of abdominal seroma  Plan:  -continue cipro  -pain control  -diet as tolerated  -ambulate  -irrigation with wall suction/packing change once a day/ Twice a day regular irrigation/packing change  Subjective/Objective     Subjective: Has some pain but overall feels well  Objective:     /67 (BP Location: Right arm)   Pulse 92   Temp 100 1 °F (37 8 °C) (Oral)   Resp 18   Ht 5' 10" (1 778 m)   Wt 109 kg (240 lb)   SpO2 97%   BMI 34 44 kg/m²   I/O last 24 hours: In: 480 [P O :480]  Out: 1475 [Urine:1475]        Intake/Output Summary (Last 24 hours) at 3/5/2019 0832  Last data filed at 3/5/2019 0819  Gross per 24 hour   Intake 480 ml   Output 1475 ml   Net -995 ml       Invasive Devices     Peripheral Intravenous Line            Peripheral IV 03/04/19 Right Forearm less than 1 day          Drain            Closed/Suction Drain Right Abdomen 14 5 Fr  56 days                Physical Exam:  /67 (BP Location: Right arm)   Pulse 92   Temp 100 1 °F (37 8 °C) (Oral)   Resp 18   Ht 5' 10" (1 778 m)   Wt 109 kg (240 lb)   SpO2 97%   BMI 34 44 kg/m²   General appearance: alert and oriented, in no acute distress and alert  Lungs: clear to auscultation bilaterally  Heart: regular rate and rhythm, S1, S2 normal, no murmur, click, rub or gallop  Abdomen: wound irrigated with 500ml of normal saline and suction  pus was sucked out of wound  wet to dry dressing placed        Current Facility-Administered Medications:     acetaminophen (TYLENOL) tablet 650 mg, 650 mg, Oral, Q4H PRN, Jeanne Nolasco PA-C    aspirin (ECOTRIN LOW STRENGTH) EC tablet 162 mg, 162 mg, Oral, Daily, Jeanne Nolasco PA-C    atorvastatin (LIPITOR) tablet 40 mg, 40 mg, Oral, Daily, Chadwick Rodriguez MD    ciprofloxacin (CIPRO) IVPB (premix) 400 mg, 400 mg, Intravenous, Q12H, DARRIUS Barboza-C, Last Rate: 200 mL/hr at 03/05/19 0249, 400 mg at 03/05/19 0249    cyclobenzaprine (FLEXERIL) tablet 10 mg, 10 mg, Oral, HS PRN, DARRIUS Hinson-C, 10 mg at 03/04/19 2203    enoxaparin (LOVENOX) subcutaneous injection 40 mg, 40 mg, Subcutaneous, Daily, Jeanne Nolasco PA-C, 40 mg at 03/04/19 1456    gabapentin (NEURONTIN) capsule 300 mg, 300 mg, Oral, HS, Marlo Nolasco PA-C, 300 mg at 03/04/19 2203    HYDROmorphone (DILAUDID) injection 0 5 mg, 0 5 mg, Intravenous, Q1H PRN, Fabiano Craig PA-C    lisinopril-hydrochlorothiazide (PRINZIDE 10/12  5) combo dose, , Oral, Daily, Marlo Nolasco PA-C    metoprolol tartrate (LOPRESSOR) tablet 25 mg, 25 mg, Oral, HS, Marlo Nolasco PA-C, 25 mg at 03/04/19 2202    nitroglycerin (NITROSTAT) SL tablet 0 4 mg, 0 4 mg, Sublingual, Q5 Min PRN, Jeanne Nolasco PA-C    ondansetron [facility]) injection 4 mg, 4 mg, Intravenous, Q4H PRN, Jeanne Nolasco PA-C    pantoprazole (PROTONIX) EC tablet 40 mg, 40 mg, Oral, Daily Before Breakfast, DARRIUS Barboza-C, 40 mg at 03/05/19 0610    traMADol (ULTRAM) tablet 50 mg, 50 mg, Oral, Q4H PRN, DARRIUS Wilks-MAGGIE, 50 mg at 03/04/19 2222    venlafaxine [facility]) tablet 37 5 mg, 37 5 mg, Oral, Daily, Fabiano Craig PA-C           Lab, Imaging and other studies:  CBC:   Lab Results   Component Value Date    WBC 10 30 (H) 03/05/2019    HGB 12 6 03/05/2019    HCT 37 2 03/05/2019    MCV 93 03/05/2019     03/05/2019    MCH 31 4 03/05/2019    MCHC 33 9 03/05/2019    RDW 11 9 03/05/2019    MPV 10 1 03/05/2019    NRBC 0 03/05/2019   , CMP: No results found for: SODIUM, K, CL, CO2, ANIONGAP, BUN, CREATININE, GLUCOSE, CALCIUM, AST, ALT, ALKPHOS, PROT, BILITOT, EGFR      VTE Pharmacologic Prophylaxis: Sequential compression device (Venodyne)  and Enoxaparin (Lovenox)  VTE Mechanical Prophylaxis: sequential compression device    Rounds performed with nursing

## 2019-03-05 NOTE — UTILIZATION REVIEW
Initial Clinical Review    Admission: Date/Time/Statement: 3/4/19 @ 1357   Orders Placed This Encounter   Procedures    Inpatient Admission     Standing Status:   Standing     Number of Occurrences:   1     Order Specific Question:   Admitting Physician     Answer:   Viet Wong [[id number]]     Order Specific Question:   Level of Care     Answer:   Med Surg [[id number]]     Order Specific Question:   Estimated length of stay     Answer:   More than 2 Midnights     Order Specific Question:   Certification     Answer:   I certify that inpatient services are medically necessary for this patient for a duration of greater than two midnights  See H&P and MD Progress Notes for additional information about the patient's course of treatment  Chief Complaint: No chief complaint on file  Assessment/Plan: This is a 62year old direct admission, who lives at home, has past medical history of hypertension, MI, obesity, BPH who had an excision of abdominal wall seroma on 2/27/2019 who presents with wound infection  Patient has a history of of seroma BLQ found last year on US  Seroma was aspirated and cultures were originally negative for growth  The seroma re accumulated and an IR drain was place and alcohol sclerotherapy was done which also failed  Then on 2/27, patient went to OR and seroma rind removed  Today the patient has increasing pain, presented to surgeon's office where wound was opened and found with purulent malodorous drainage and erythema   This was cultured  Patient is a direct inpatient admission for post op infection s/p excision of abdominal seroma    Plan includes: IV antibiotics, packing changes q shift, serial exams, pain control, follow up on culture, follow CBC       Vital Signs:   Triage Vitals   Temperature Pulse Respirations Blood Pressure SpO2   03/04/19 1401 03/04/19 1401 03/04/19 1401 03/04/19 1401 03/04/19 1401   99 2 °F (37 3 °C) 96 18 110/70 97 %      Temp Source Heart Rate Source Patient Position - Orthostatic VS BP Location FiO2 (%)   03/04/19 1401 -- 03/04/19 1401 03/04/19 1401 --   Oral  Sitting Left arm       Pain Score       03/04/19 1406       4        Wt Readings from Last 1 Encounters:   03/04/19 109 kg (240 lb)     Vital Signs (abnormal):   03/05/19 0756  100 1 °F (37 8 °C)  92  18  134/67  92  97 %  None (Room air)  Lying   03/04/19 2226  99 3 °F (37 4 °C)  124Abnormal   20  132/67    97 %  None (Room air)       Exam - open wound central abdomen with wick and more later wound with wick, erythema left side  Pertinent Labs/Diagnostic Test Results:   Wound culture and Gram stain [[id number]] (Abnormal) Collected: 03/04/19 1623   Lab Status: Preliminary result Specimen: Wound from Abdominal Updated: 03/04/19 2159    Gram Stain Result Rare PolysAbnormal      2+ Gram positive cocci in pairsAbnormal      2+ Gram positive rodsAbnormal        4/5/2019  Wbc 10 30, hgb 12 6, hct 37 2    Past Medical/Surgical History:  2/27/2019- OP - abdominal seroma excision  Past Medical History:   Diagnosis Date    Anxiety     Cardiac disease     Hyperlipidemia     Hypertension     Kidney stone     Myocardial infarction ([facility] [address] )     PONV (postoperative nausea and vomiting)      Admitting Diagnosis: Postoperative MRSA infection of lower abdominal wound [T81 49XA, B95 62]  Age/Sex: 62 y o  male     Admission Orders: 3/4/2019  1359 INPATIENT   Scheduled Meds:   Current Facility-Administered Medications:  acetaminophen 650 mg Oral Q4H PRN    aspirin 162 mg Oral Daily    atorvastatin 40 mg Oral Daily    ciprofloxacin 400 mg Intravenous Q12H Last Rate: 400 mg (03/05/19 0249)   cyclobenzaprine 10 mg Oral HS PRN    enoxaparin 40 mg Subcutaneous Daily    gabapentin 300 mg Oral HS    HYDROmorphone 0 5 mg Intravenous Q1H PRN    lisinopril-hydrochlorothiazide (PRINZIDE 10/12  5) combo dose  Oral Daily    metoprolol tartrate 25 mg Oral HS    nitroglycerin 0 4 mg Sublingual Q5 Min PRN    ondansetron 4 mg Intravenous Q4H PRN    pantoprazole 40 mg Oral Daily Before Breakfast    traMADol 50 mg Oral Q4H PRN    venlafaxine 37 5 mg Oral Daily      Continuous Infusions:    PRN Meds:     Cyclobenzaprine 10 mg - used x 1      traMADol - used x 1    Wound care BID - medial abdomen- 200 cc of NSS irrigation and guaze wicking  Cover with ABD  scds  Up as tolerated  Network Utilization Review Department  Phone: [phone]; Fax [phone]  [email or web address]  [email or web address]  ATTENTION: Please call with any questions or concerns to [phone]  and carefully listen to the prompts so that you are directed to the right person  Send all requests for admission clinical reviews, approved or denied determinations and any other requests to fax [phone]   All voicemails are confidential

## 2019-03-06 LAB
BASOPHILS # BLD AUTO: 0.05 THOUSANDS/ΜL (ref 0–0.1)
BASOPHILS NFR BLD AUTO: 1 % (ref 0–1)
EOSINOPHIL # BLD AUTO: 0.5 THOUSAND/ΜL (ref 0–0.61)
EOSINOPHIL NFR BLD AUTO: 5 % (ref 0–6)
ERYTHROCYTE [DISTWIDTH] IN BLOOD BY AUTOMATED COUNT: 11.9 % (ref 11.6–15.1)
HCT VFR BLD AUTO: 37.2 % (ref 36.5–49.3)
HGB BLD-MCNC: 12.5 G/DL (ref 12–17)
IMM GRANULOCYTES # BLD AUTO: 0.04 THOUSAND/UL (ref 0–0.2)
IMM GRANULOCYTES NFR BLD AUTO: 0 % (ref 0–2)
LYMPHOCYTES # BLD AUTO: 1.66 THOUSANDS/ΜL (ref 0.6–4.47)
LYMPHOCYTES NFR BLD AUTO: 18 % (ref 14–44)
MCH RBC QN AUTO: 30.9 PG (ref 26.8–34.3)
MCHC RBC AUTO-ENTMCNC: 33.6 G/DL (ref 31.4–37.4)
MCV RBC AUTO: 92 FL (ref 82–98)
MONOCYTES # BLD AUTO: 1.01 THOUSAND/ΜL (ref 0.17–1.22)
MONOCYTES NFR BLD AUTO: 11 % (ref 4–12)
NEUTROPHILS # BLD AUTO: 5.92 THOUSANDS/ΜL (ref 1.85–7.62)
NEUTS SEG NFR BLD AUTO: 65 % (ref 43–75)
NRBC BLD AUTO-RTO: 0 /100 WBCS
PLATELET # BLD AUTO: 346 THOUSANDS/UL (ref 149–390)
PMV BLD AUTO: 10 FL (ref 8.9–12.7)
RBC # BLD AUTO: 4.05 MILLION/UL (ref 3.88–5.62)
WBC # BLD AUTO: 9.18 THOUSAND/UL (ref 4.31–10.16)

## 2019-03-06 PROCEDURE — 85025 COMPLETE CBC W/AUTO DIFF WBC: CPT | Performed by: PHYSICIAN ASSISTANT

## 2019-03-06 RX ORDER — CEFAZOLIN SODIUM 2 G/50ML
2000 SOLUTION INTRAVENOUS EVERY 8 HOURS
Status: DISCONTINUED | OUTPATIENT
Start: 2019-03-06 | End: 2019-03-13 | Stop reason: HOSPADM

## 2019-03-06 RX ADMIN — CIPROFLOXACIN 400 MG: 2 INJECTION, SOLUTION INTRAVENOUS at 01:37

## 2019-03-06 RX ADMIN — VENLAFAXINE 37.5 MG: 37.5 TABLET ORAL at 09:13

## 2019-03-06 RX ADMIN — LISINOPRIL: 10 TABLET ORAL at 08:27

## 2019-03-06 RX ADMIN — GABAPENTIN 300 MG: 300 CAPSULE ORAL at 22:12

## 2019-03-06 RX ADMIN — ATORVASTATIN CALCIUM 40 MG: 40 TABLET, FILM COATED ORAL at 08:27

## 2019-03-06 RX ADMIN — TRAMADOL HYDROCHLORIDE 50 MG: 50 TABLET, COATED ORAL at 22:12

## 2019-03-06 RX ADMIN — ASPIRIN 162 MG: 81 TABLET, COATED ORAL at 08:27

## 2019-03-06 RX ADMIN — CEFAZOLIN SODIUM 2000 MG: 2 SOLUTION INTRAVENOUS at 10:12

## 2019-03-06 RX ADMIN — PANTOPRAZOLE SODIUM 40 MG: 40 TABLET, DELAYED RELEASE ORAL at 06:08

## 2019-03-06 RX ADMIN — ENOXAPARIN SODIUM 40 MG: 40 INJECTION SUBCUTANEOUS at 08:27

## 2019-03-06 RX ADMIN — METRONIDAZOLE 500 MG: 500 INJECTION, SOLUTION INTRAVENOUS at 06:07

## 2019-03-06 RX ADMIN — CEFAZOLIN SODIUM 2000 MG: 2 SOLUTION INTRAVENOUS at 18:01

## 2019-03-06 RX ADMIN — CYCLOBENZAPRINE HYDROCHLORIDE 10 MG: 10 TABLET, FILM COATED ORAL at 11:16

## 2019-03-06 RX ADMIN — METOPROLOL TARTRATE 25 MG: 25 TABLET, FILM COATED ORAL at 21:53

## 2019-03-06 RX ADMIN — TRAMADOL HYDROCHLORIDE 50 MG: 50 TABLET, COATED ORAL at 08:33

## 2019-03-06 RX ADMIN — GABAPENTIN 300 MG: 300 CAPSULE ORAL at 11:16

## 2019-03-06 RX ADMIN — CYCLOBENZAPRINE HYDROCHLORIDE 10 MG: 10 TABLET, FILM COATED ORAL at 22:12

## 2019-03-06 NOTE — PLAN OF CARE
Problem: SKIN/TISSUE INTEGRITY - ADULT  Goal: Incision(s), wounds(s) or drain site(s) healing without S/S of infection  Description  INTERVENTIONS  - Assess and document risk factors for skin impairment   - Assess and document dressing, incision, wound bed, drain sites and surrounding tissue  - Initiate Nutrition services consult and/or wound management as needed  Outcome: Progressing     Problem: PAIN - ADULT  Goal: Verbalizes/displays adequate comfort level or baseline comfort level  Description  Interventions:  - Encourage patient to monitor pain and request assistance  - Assess pain using appropriate pain scale  - Administer analgesics based on type and severity of pain and evaluate response  - Implement non-pharmacological measures as appropriate and evaluate response  - Consider cultural and social influences on pain and pain management  - Notify physician/advanced practitioner if interventions unsuccessful or patient reports new pain  Outcome: Progressing     Problem: INFECTION - ADULT  Goal: Absence or prevention of progression during hospitalization  Description  INTERVENTIONS:  - Assess and monitor for signs and symptoms of infection  - Monitor lab/diagnostic results  - Monitor all insertion sites, i e  indwelling lines, tubes, and drains  - Monitor endotracheal (as able) and nasal secretions for changes in amount and color  - Hamer appropriate cooling/warming therapies per order  - Administer medications as ordered  - Instruct and encourage patient and family to use good hand hygiene technique  - Identify and instruct in appropriate isolation precautions for identified infection/condition  Outcome: Progressing

## 2019-03-06 NOTE — PROGRESS NOTES
Progress Note -Surgery DARRIUS Lopez 62 y o  male MRN: [id number]  Unit/Bed#: -01 Encounter: [id number]      Assessment:  62year old male with post op infection s/p excision of abdominal seroma  Plan:  -Continue PO cipro  -await final cultures  -wound irrigated with 500cc of NS and wall suction  Area with significant foul smelling drainage  Not ready for vac  -Continue morning wash out by pa/resident and nursing to do wet to dry twice a day  -pain control as needed      Subjective/Objective     Subjective: Feels well overall  No significant pain  Objective:     /60 (BP Location: Right arm)   Pulse 87   Temp 97 9 °F (36 6 °C) (Oral)   Resp 18   Ht 5' 10" (1 778 m)   Wt 109 kg (240 lb)   SpO2 97%   BMI 34 44 kg/m²   I/O last 24 hours: In: 2160 [P O :2160]  Out: 1175 [Urine:1175]        Intake/Output Summary (Last 24 hours) at 3/6/2019 0819  Last data filed at 3/5/2019 2240  Gross per 24 hour   Intake 1560 ml   Output 300 ml   Net 1260 ml       Invasive Devices     Peripheral Intravenous Line            Peripheral IV 03/04/19 Right Forearm 1 day          Drain            Closed/Suction Drain Right Abdomen 14 5 Fr  57 days                Physical Exam:  /60 (BP Location: Right arm)   Pulse 87   Temp 97 9 °F (36 6 °C) (Oral)   Resp 18   Ht 5' 10" (1 778 m)   Wt 109 kg (240 lb)   SpO2 97%   BMI 34 44 kg/m²   General appearance: alert and oriented, in no acute distress and alert  Lungs: clear to auscultation bilaterally  Heart: regular rate and rhythm, S1, S2 normal, no murmur, click, rub or gallop  Abdomen: significant foul smelling drainage from wound  tissue with good perfusion         Current Facility-Administered Medications:     acetaminophen (TYLENOL) tablet 650 mg, 650 mg, Oral, Q4H PRN, Jeanne Nolasco PA-C    aspirin (ECOTRIN LOW STRENGTH) EC tablet 162 mg, 162 mg, Oral, Daily, Diilp Chung PA-C, 162 mg at 03/05/19 0934    atorvastatin (LIPITOR) tablet 40 mg, 40 mg, Oral, Daily, Abelardo Velazquez MD, 40 mg at 03/05/19 0934    ciprofloxacin (CIPRO) IVPB (premix) 400 mg, 400 mg, Intravenous, Q12H, Tereza Reyes PA-C, Last Rate: 200 mL/hr at 03/06/19 0137, 400 mg at 03/06/19 0137    cyclobenzaprine (FLEXERIL) tablet 10 mg, 10 mg, Oral, Q12H PRN, Tereza Reyes PA-C, 10 mg at 03/05/19 2332    enoxaparin (LOVENOX) subcutaneous injection 40 mg, 40 mg, Subcutaneous, Daily, Jeanne Nolasco PA-C, 40 mg at 03/05/19 3101    gabapentin (NEURONTIN) capsule 300 mg, 300 mg, Oral, Q12H PRN, Tereza Reyes PA-C, 300 mg at 03/05/19 2332    HYDROmorphone (DILAUDID) injection 0 5 mg, 0 5 mg, Intravenous, Q1H PRN, Tereza Reyes PA-C    lisinopril-hydrochlorothiazide (PRINZIDE 10/12  5) combo dose, , Oral, Daily, Vinnie Nolasco PA-C    metoprolol tartrate (LOPRESSOR) tablet 25 mg, 25 mg, Oral, HS, Jeanne Nolasco PA-C, 25 mg at 03/05/19 2323    metroNIDAZOLE (FLAGYL) IVPB (premix) 500 mg, 500 mg, Intravenous, Q8H, Abelardo Velazquez MD, Last Rate: 200 mL/hr at 03/06/19 0607, 500 mg at 03/06/19 0607    nitroglycerin (NITROSTAT) SL tablet 0 4 mg, 0 4 mg, Sublingual, Q5 Min PRN, Jeanne Nolasco PA-C    ondansetron [facility]) injection 4 mg, 4 mg, Intravenous, Q4H PRN, Jeanne Nolasco PA-C    pantoprazole (PROTONIX) EC tablet 40 mg, 40 mg, Oral, Daily Before Breakfast, Tereza Reyes PA-C, 40 mg at 03/06/19 0608    traMADol (ULTRAM) tablet 50 mg, 50 mg, Oral, Q4H PRN, Lysbeth Jyothi, PA-C, 50 mg at 03/05/19 0937    venlafaxine [facility]) tablet 37 5 mg, 37 5 mg, Oral, Daily, Tereza Reyes PA-C, 37 5 mg at 03/05/19 1024           Lab, Imaging and other studies:  CBC:   Lab Results   Component Value Date    WBC 9 18 03/06/2019    HGB 12 5 03/06/2019    HCT 37 2 03/06/2019    MCV 92 03/06/2019     03/06/2019    MCH 30 9 03/06/2019    MCHC 33 6 03/06/2019    RDW 11 9 03/06/2019    MPV 10 0 03/06/2019    NRBC 0 03/06/2019   , CMP: No results found for: SODIUM, K, CL, CO2, ANIONGAP, BUN, CREATININE, GLUCOSE, CALCIUM, AST, ALT, ALKPHOS, PROT, BILITOT, EGFR      VTE Pharmacologic Prophylaxis: Sequential compression device (Venodyne)  and Enoxaparin (Lovenox)  VTE Mechanical Prophylaxis: sequential compression device    Rounds performed with nursing

## 2019-03-06 NOTE — SOCIAL WORK
LOS 2, patient is not a bundle, patient is not a readmission  CM met with patient at bedside to discuss discharge planning  CM name and role reviewed  Patient reports living with his wife, kids and 4 dogs; reports being independent, employed with the [employer], drives, doesn't use any DME, fills his prescriptions at [facility]  Spouse Rosa Elena to transport  Patient is currently on IV Abx and according to surgery resident, plan is for Wound Vac once cultures are clear  CM will continue to follow patient's hospital progression and assist through discharge  CM reviewed discharge planning process including the following: identifying caregivers at home, preference for d/c planning needs, [facility] Meds to Bed program, availability of treatment team to discuss questions or concerns patient and/or family may have regarding diagnosis, plan of care, old or new medications and discharge planning   CM will continue to follow for care coordination and update assessment as necessary

## 2019-03-06 NOTE — PLAN OF CARE
LOS 2, patient is not a bundle, patient is not a readmission  CM met with patient at bedside to discuss discharge planning  CM name and role reviewed  Patient reports living with his wife, kids and 4 dogs; reports being independent, employed with the [employer], drives, doesn't use any DME, fills his prescriptions at [facility]  Spouse Rosa Elena to transport  Patient is currently on IV Abx and according to surgery resident, plan is for Wound Vac once cultures are clear  CM will continue to follow patient's hospital progression and assist through discharge  CM reviewed discharge planning process including the following: identifying caregivers at home, preference for d/c planning needs, [facility] Meds to Bed program, availability of treatment team to discuss questions or concerns patient and/or family may have regarding diagnosis, plan of care, old or new medications and discharge planning   CM will continue to follow for care coordination and update assessment as necessary

## 2019-03-07 ENCOUNTER — ANESTHESIA EVENT (INPATIENT)
Dept: PERIOP | Facility: HOSPITAL | Age: 59
DRG: 858 | End: 2019-03-07
Payer: COMMERCIAL

## 2019-03-07 ENCOUNTER — APPOINTMENT (INPATIENT)
Dept: MRI IMAGING | Facility: HOSPITAL | Age: 59
DRG: 858 | End: 2019-03-07
Payer: COMMERCIAL

## 2019-03-07 ENCOUNTER — ANESTHESIA (INPATIENT)
Dept: PERIOP | Facility: HOSPITAL | Age: 59
DRG: 858 | End: 2019-03-07
Payer: COMMERCIAL

## 2019-03-07 LAB
BACTERIA WND AEROBE CULT: ABNORMAL
BACTERIA WND AEROBE CULT: ABNORMAL
GRAM STN SPEC: ABNORMAL

## 2019-03-07 PROCEDURE — 87205 SMEAR GRAM STAIN: CPT | Performed by: SURGERY

## 2019-03-07 PROCEDURE — 87075 CULTR BACTERIA EXCEPT BLOOD: CPT | Performed by: SURGERY

## 2019-03-07 PROCEDURE — 74183 MRI ABD W/O CNTR FLWD CNTR: CPT

## 2019-03-07 PROCEDURE — A9585 GADOBUTROL INJECTION: HCPCS | Performed by: SURGERY

## 2019-03-07 PROCEDURE — 0KBL0ZZ EXCISION OF LEFT ABDOMEN MUSCLE, OPEN APPROACH: ICD-10-PCS | Performed by: SURGERY

## 2019-03-07 PROCEDURE — 87186 SC STD MICRODIL/AGAR DIL: CPT | Performed by: SURGERY

## 2019-03-07 PROCEDURE — 87147 CULTURE TYPE IMMUNOLOGIC: CPT | Performed by: SURGERY

## 2019-03-07 PROCEDURE — 87070 CULTURE OTHR SPECIMN AEROBIC: CPT | Performed by: SURGERY

## 2019-03-07 PROCEDURE — 0KBK0ZZ EXCISION OF RIGHT ABDOMEN MUSCLE, OPEN APPROACH: ICD-10-PCS | Performed by: SURGERY

## 2019-03-07 RX ORDER — ONDANSETRON 2 MG/ML
INJECTION INTRAMUSCULAR; INTRAVENOUS AS NEEDED
Status: DISCONTINUED | OUTPATIENT
Start: 2019-03-07 | End: 2019-03-07 | Stop reason: SURG

## 2019-03-07 RX ORDER — MAGNESIUM HYDROXIDE 1200 MG/15ML
LIQUID ORAL AS NEEDED
Status: DISCONTINUED | OUTPATIENT
Start: 2019-03-07 | End: 2019-03-07 | Stop reason: HOSPADM

## 2019-03-07 RX ORDER — HYDROMORPHONE HCL/PF 1 MG/ML
1 SYRINGE (ML) INJECTION ONCE
Status: DISCONTINUED | OUTPATIENT
Start: 2019-03-07 | End: 2019-03-07

## 2019-03-07 RX ORDER — DIPHENHYDRAMINE HYDROCHLORIDE 50 MG/ML
25 INJECTION INTRAMUSCULAR; INTRAVENOUS EVERY 6 HOURS PRN
Status: DISCONTINUED | OUTPATIENT
Start: 2019-03-07 | End: 2019-03-13

## 2019-03-07 RX ORDER — OXYCODONE HYDROCHLORIDE 5 MG/1
5 TABLET ORAL EVERY 4 HOURS PRN
Status: DISCONTINUED | OUTPATIENT
Start: 2019-03-07 | End: 2019-03-13 | Stop reason: HOSPADM

## 2019-03-07 RX ORDER — FENTANYL CITRATE 50 UG/ML
INJECTION, SOLUTION INTRAMUSCULAR; INTRAVENOUS AS NEEDED
Status: DISCONTINUED | OUTPATIENT
Start: 2019-03-07 | End: 2019-03-07 | Stop reason: SURG

## 2019-03-07 RX ORDER — CEFAZOLIN SODIUM 1 G/3ML
INJECTION, POWDER, FOR SOLUTION INTRAMUSCULAR; INTRAVENOUS AS NEEDED
Status: DISCONTINUED | OUTPATIENT
Start: 2019-03-07 | End: 2019-03-07 | Stop reason: SURG

## 2019-03-07 RX ORDER — MIDAZOLAM HYDROCHLORIDE 1 MG/ML
INJECTION INTRAMUSCULAR; INTRAVENOUS AS NEEDED
Status: DISCONTINUED | OUTPATIENT
Start: 2019-03-07 | End: 2019-03-07 | Stop reason: SURG

## 2019-03-07 RX ORDER — SUCCINYLCHOLINE/SOD CL,ISO/PF 100 MG/5ML
SYRINGE (ML) INTRAVENOUS AS NEEDED
Status: DISCONTINUED | OUTPATIENT
Start: 2019-03-07 | End: 2019-03-07 | Stop reason: SURG

## 2019-03-07 RX ORDER — SODIUM CHLORIDE 9 MG/ML
125 INJECTION, SOLUTION INTRAVENOUS CONTINUOUS
Status: DISPENSED | OUTPATIENT
Start: 2019-03-07 | End: 2019-03-07

## 2019-03-07 RX ORDER — LORAZEPAM 2 MG/ML
1 INJECTION INTRAMUSCULAR ONCE
Status: COMPLETED | OUTPATIENT
Start: 2019-03-07 | End: 2019-03-07

## 2019-03-07 RX ORDER — OXYCODONE HYDROCHLORIDE 10 MG/1
10 TABLET ORAL EVERY 4 HOURS PRN
Status: DISCONTINUED | OUTPATIENT
Start: 2019-03-07 | End: 2019-03-13 | Stop reason: HOSPADM

## 2019-03-07 RX ORDER — LIDOCAINE HYDROCHLORIDE 10 MG/ML
INJECTION, SOLUTION INFILTRATION; PERINEURAL AS NEEDED
Status: DISCONTINUED | OUTPATIENT
Start: 2019-03-07 | End: 2019-03-07 | Stop reason: SURG

## 2019-03-07 RX ORDER — HYDROMORPHONE HYDROCHLORIDE 2 MG/ML
INJECTION, SOLUTION INTRAMUSCULAR; INTRAVENOUS; SUBCUTANEOUS AS NEEDED
Status: DISCONTINUED | OUTPATIENT
Start: 2019-03-07 | End: 2019-03-07 | Stop reason: SURG

## 2019-03-07 RX ORDER — PROPOFOL 10 MG/ML
INJECTION, EMULSION INTRAVENOUS AS NEEDED
Status: DISCONTINUED | OUTPATIENT
Start: 2019-03-07 | End: 2019-03-07 | Stop reason: SURG

## 2019-03-07 RX ORDER — FENTANYL CITRATE/PF 50 MCG/ML
25 SYRINGE (ML) INJECTION
Status: DISCONTINUED | OUTPATIENT
Start: 2019-03-07 | End: 2019-03-07 | Stop reason: HOSPADM

## 2019-03-07 RX ORDER — ONDANSETRON 2 MG/ML
4 INJECTION INTRAMUSCULAR; INTRAVENOUS EVERY 4 HOURS PRN
Status: DISCONTINUED | OUTPATIENT
Start: 2019-03-07 | End: 2019-03-13

## 2019-03-07 RX ORDER — ONDANSETRON 2 MG/ML
4 INJECTION INTRAMUSCULAR; INTRAVENOUS ONCE AS NEEDED
Status: DISCONTINUED | OUTPATIENT
Start: 2019-03-07 | End: 2019-03-07 | Stop reason: HOSPADM

## 2019-03-07 RX ORDER — ACETAMINOPHEN 325 MG/1
650 TABLET ORAL EVERY 6 HOURS PRN
Status: DISCONTINUED | OUTPATIENT
Start: 2019-03-07 | End: 2019-03-13

## 2019-03-07 RX ORDER — SODIUM CHLORIDE, SODIUM LACTATE, POTASSIUM CHLORIDE, CALCIUM CHLORIDE 600; 310; 30; 20 MG/100ML; MG/100ML; MG/100ML; MG/100ML
100 INJECTION, SOLUTION INTRAVENOUS CONTINUOUS
Status: DISCONTINUED | OUTPATIENT
Start: 2019-03-07 | End: 2019-03-09

## 2019-03-07 RX ADMIN — CEFAZOLIN SODIUM 2000 MG: 1 INJECTION, POWDER, FOR SOLUTION INTRAMUSCULAR; INTRAVENOUS at 17:22

## 2019-03-07 RX ADMIN — SODIUM CHLORIDE, SODIUM LACTATE, POTASSIUM CHLORIDE, AND CALCIUM CHLORIDE 100 ML/HR: .6; .31; .03; .02 INJECTION, SOLUTION INTRAVENOUS at 11:17

## 2019-03-07 RX ADMIN — GABAPENTIN 300 MG: 300 CAPSULE ORAL at 22:52

## 2019-03-07 RX ADMIN — ASPIRIN 162 MG: 81 TABLET, COATED ORAL at 09:43

## 2019-03-07 RX ADMIN — Medication 100 MG: at 17:14

## 2019-03-07 RX ADMIN — LISINOPRIL: 10 TABLET ORAL at 09:43

## 2019-03-07 RX ADMIN — FENTANYL CITRATE 25 MCG: 50 INJECTION, SOLUTION INTRAMUSCULAR; INTRAVENOUS at 18:39

## 2019-03-07 RX ADMIN — MIDAZOLAM HYDROCHLORIDE 2 MG: 1 INJECTION, SOLUTION INTRAMUSCULAR; INTRAVENOUS at 17:09

## 2019-03-07 RX ADMIN — FENTANYL CITRATE 50 MCG: 50 INJECTION INTRAMUSCULAR; INTRAVENOUS at 17:51

## 2019-03-07 RX ADMIN — PHENYLEPHRINE HYDROCHLORIDE 200 MCG: 10 INJECTION INTRAVENOUS at 18:04

## 2019-03-07 RX ADMIN — ONDANSETRON 4 MG: 2 INJECTION INTRAMUSCULAR; INTRAVENOUS at 17:14

## 2019-03-07 RX ADMIN — PHENYLEPHRINE HYDROCHLORIDE 100 MCG: 10 INJECTION INTRAVENOUS at 18:03

## 2019-03-07 RX ADMIN — GADOBUTROL 11 ML: 604.72 INJECTION INTRAVENOUS at 10:33

## 2019-03-07 RX ADMIN — FENTANYL CITRATE 50 MCG: 50 INJECTION INTRAMUSCULAR; INTRAVENOUS at 18:24

## 2019-03-07 RX ADMIN — SODIUM CHLORIDE, SODIUM LACTATE, POTASSIUM CHLORIDE, AND CALCIUM CHLORIDE: .6; .31; .03; .02 INJECTION, SOLUTION INTRAVENOUS at 17:09

## 2019-03-07 RX ADMIN — METOPROLOL TARTRATE 25 MG: 25 TABLET, FILM COATED ORAL at 21:28

## 2019-03-07 RX ADMIN — FENTANYL CITRATE 50 MCG: 50 INJECTION INTRAMUSCULAR; INTRAVENOUS at 17:14

## 2019-03-07 RX ADMIN — FENTANYL CITRATE 25 MCG: 50 INJECTION, SOLUTION INTRAMUSCULAR; INTRAVENOUS at 18:46

## 2019-03-07 RX ADMIN — ATORVASTATIN CALCIUM 40 MG: 40 TABLET, FILM COATED ORAL at 09:43

## 2019-03-07 RX ADMIN — CYCLOBENZAPRINE HYDROCHLORIDE 10 MG: 10 TABLET, FILM COATED ORAL at 22:52

## 2019-03-07 RX ADMIN — FENTANYL CITRATE 25 MCG: 50 INJECTION, SOLUTION INTRAMUSCULAR; INTRAVENOUS at 18:34

## 2019-03-07 RX ADMIN — VENLAFAXINE 37.5 MG: 37.5 TABLET ORAL at 09:43

## 2019-03-07 RX ADMIN — FENTANYL CITRATE 50 MCG: 50 INJECTION INTRAMUSCULAR; INTRAVENOUS at 17:24

## 2019-03-07 RX ADMIN — HYDROMORPHONE HYDROCHLORIDE 0.5 MG: 2 INJECTION, SOLUTION INTRAMUSCULAR; INTRAVENOUS; SUBCUTANEOUS at 17:39

## 2019-03-07 RX ADMIN — PROPOFOL 200 MG: 10 INJECTION, EMULSION INTRAVENOUS at 17:14

## 2019-03-07 RX ADMIN — CEFAZOLIN SODIUM 2000 MG: 2 SOLUTION INTRAVENOUS at 09:38

## 2019-03-07 RX ADMIN — PHENYLEPHRINE HYDROCHLORIDE 100 MCG: 10 INJECTION INTRAVENOUS at 18:10

## 2019-03-07 RX ADMIN — CEFAZOLIN SODIUM 2000 MG: 2 SOLUTION INTRAVENOUS at 01:30

## 2019-03-07 RX ADMIN — LIDOCAINE HYDROCHLORIDE ANHYDROUS 50 MG: 10 INJECTION, SOLUTION INFILTRATION at 17:14

## 2019-03-07 RX ADMIN — LORAZEPAM 1 MG: 2 INJECTION INTRAMUSCULAR; INTRAVENOUS at 09:51

## 2019-03-07 RX ADMIN — DEXAMETHASONE SODIUM PHOSPHATE 4 MG: 10 INJECTION INTRAMUSCULAR; INTRAVENOUS at 17:14

## 2019-03-07 RX ADMIN — SODIUM CHLORIDE, SODIUM LACTATE, POTASSIUM CHLORIDE, AND CALCIUM CHLORIDE 100 ML/HR: .6; .31; .03; .02 INJECTION, SOLUTION INTRAVENOUS at 19:35

## 2019-03-07 RX ADMIN — ENOXAPARIN SODIUM 40 MG: 40 INJECTION SUBCUTANEOUS at 09:44

## 2019-03-07 RX ADMIN — TRAMADOL HYDROCHLORIDE 50 MG: 50 TABLET, COATED ORAL at 21:31

## 2019-03-07 RX ADMIN — PANTOPRAZOLE SODIUM 40 MG: 40 TABLET, DELAYED RELEASE ORAL at 06:38

## 2019-03-07 NOTE — OP NOTE
OPERATIVE REPORT  PATIENT NAME: Estela Jones    :  1960  MRN: [id number]  Pt Location: AN OR ROOM 02    SURGERY DATE: 3/7/2019    Surgeon(s) and Role:     * Keven Case MD - Primary     * Nelsy Cochran MD - Assisting    Preop Diagnosis:  Abdominal wall seroma, sequela [T88  8XXS, T79 2XXS]    Post-Op Diagnosis Codes:     * Abdominal wall seroma, sequela [T88  8XXS, T79 2XXS]    Procedure(s) (LRB):  DEBRIDEMENT WOUND ABDOMINAL ([address] OUT) AND WOUND VAC APPLICATION (N/A)    Specimen(s):  ID Type Source Tests Collected by Time Destination   A : abdomen Wound Wound ANAEROBIC CULTURE AND GRAM STAIN, WOUND CULTURE Keven Case MD 3/7/2019 1729        Estimated Blood Loss:   Minimal    Drains:  Closed/Suction Drain Right Abdomen 14 5 Fr  (Active)   Number of days: 59       Negative Pressure Wound Therapy (V A C ) Abdomen Mid (Active)   Black foam- # applied 1 3/7/2019  6:04 PM   Cycle Continuous 3/7/2019  6:04 PM   Target Pressure (mmHg) 125 3/7/2019  6:25 PM   Dressing Status Clean;Dry; Intact 3/7/2019  6:25 PM   Number of days: 0       Anesthesia Type:   General    Operative Indications:  Abdominal wall seroma, sequela [T88  8XXS, T79 2XXS]      Operative Findings:  Independent, nonsmoker, ASA 2, wound class 4, BMI 34, weight 240, height 70    History of anesthetic complications PONV    Cardiovascular  EKG reviewed, Hyperlipidemia, Hypertension , Past MI > 6 months, Cardiac stents (X2) > 1 year   Comment:   LEFT VENTRICLE: Size was normal  Systolic function was normal  Ejection fraction was estimated to be 60 %  There were no regional wall motion abnormalities  Wall thickness was mildly increased   There was mild concentric hypertrophy,  Pulmonary  Not a smoker ,          GI/Hepatic     Bariatric surgery,          Kidney stones,          Endo/Other     Obesity     GYN         Hematology    Musculoskeletal  Sciatica (R sided),          Neurology    Psychology   Anxiety,                      Complications:   None    Procedure and Technique:  Patient was identified bili and via armband  Placed in the supine position  After general anesthesia the abdomen was prepped in Betadine  Dressings were removed from the abdominal incision  Purulent fluid was cultured  Time-out was completed  Add the subcutaneous tissue was mostly viable however the base of the wound which includes the rectus abdominus fascia is the necrotic  Debridement continued of skin subcutaneous tissue fascia as well as muscle  Sharp blunt sharp dissection was utilized with scissor, knife, and uterine curette  The area debrided is 15 x 8 cm in size  Pulsavac was then utilized throughout the wound to obtain clean bleeding margins  A 15 x 8 inch cm medium VAC dressing was then inserted  This was hooked to low suction  This could create a good seal   The patient was awakened anesthesia and transferred to the recovery room stable condition  Sponge instrument count correct     I was present for the entire procedure    Patient Disposition:  PACU     SIGNATURE: Keven Case MD  DATE: March 7, 2019  TIME: 6:29 PM

## 2019-03-07 NOTE — ANESTHESIA PREPROCEDURE EVALUATION
Breakfast at 0915 [address]      Review of Systems/Medical History      History of anesthetic complications PONV    Cardiovascular  EKG reviewed, Hyperlipidemia, Hypertension , Past MI > 6 months, Cardiac stents (X2) > 1 year   Comment: 1/19  LEFT VENTRICLE: Size was normal  Systolic function was normal  Ejection fraction was estimated to be 60 %  There were no regional wall motion abnormalities  Wall thickness was mildly increased  There was mild concentric hypertrophy,  Pulmonary  Not a smoker ,        GI/Hepatic    Bariatric surgery,        Kidney stones,        Endo/Other    Obesity    GYN       Hematology   Musculoskeletal  Sciatica (R sided),        Neurology   Psychology   Anxiety,              Physical Exam    Airway    Mallampati score: I  TM Distance: >3 FB  Neck ROM: full     Dental   No notable dental hx     Cardiovascular      Pulmonary      Other Findings        Anesthesia Plan  ASA Score- 3     Anesthesia Type- general with ASA Monitors  Additional Monitors:   Airway Plan: ETT  Plan Factors-Patient not instructed to abstain from smoking on day of procedure       Induction- intravenous  Postoperative Plan-     Informed Consent- Anesthetic plan and risks discussed with patient  I personally reviewed this patient with the CRNA  Discussed and agreed on the Anesthesia Plan with the CRNA               Lab Results   Component Value Date    GLUC 131 01/14/2019    GLUF 115 (H) 02/25/2019    ALT 35 02/25/2019    AST 22 02/25/2019    BUN 18 02/25/2019    CALCIUM 9 2 02/25/2019     02/25/2019    CO2 27 02/25/2019    CREATININE 0 92 02/25/2019    HDL 54 02/25/2019    HCT 37 2 03/06/2019    HGB 12 5 03/06/2019    HGBA1C 5 3 02/25/2019     03/06/2019    K 3 7 02/25/2019    TRIG 150 02/25/2019    WBC 9 18 03/06/2019

## 2019-03-07 NOTE — ANESTHESIA POSTPROCEDURE EVALUATION
Post-Op Assessment Note    CV Status:  Stable  Pain Score: 3    Pain management: adequate     Mental Status:  Alert and awake   Hydration Status:  Euvolemic   PONV Controlled:  Controlled   Airway Patency:  Patent   Post Op Vitals Reviewed: Yes      Staff: CRNA, Anesthesiologist   Comments: fentanyl given for pain in PACU, pt talking and oriented          BP (P) 117/62 (03/07/19 1825)    Temp (P) 98 4 °F (36 9 °C) (03/07/19 1825)    Pulse (P) 92 (03/07/19 1825)   Resp (P) 16 (03/07/19 1825)    SpO2 (P) 96 % (03/07/19 1825)

## 2019-03-07 NOTE — PROGRESS NOTES
Remains with foul smelling thicker drainage from his abdominal wound  Vital signs stable      Will plan to bring to the OR for further washout and debridement if necessary    Discussed with patient and he agrees    Will make NPO

## 2019-03-08 RX ADMIN — OXYCODONE HYDROCHLORIDE 5 MG: 5 TABLET ORAL at 15:07

## 2019-03-08 RX ADMIN — CYCLOBENZAPRINE HYDROCHLORIDE 10 MG: 10 TABLET, FILM COATED ORAL at 10:34

## 2019-03-08 RX ADMIN — HYDROMORPHONE HYDROCHLORIDE 0.5 MG: 1 INJECTION, SOLUTION INTRAMUSCULAR; INTRAVENOUS; SUBCUTANEOUS at 12:09

## 2019-03-08 RX ADMIN — GABAPENTIN 300 MG: 300 CAPSULE ORAL at 22:35

## 2019-03-08 RX ADMIN — SODIUM CHLORIDE, SODIUM LACTATE, POTASSIUM CHLORIDE, AND CALCIUM CHLORIDE 100 ML/HR: .6; .31; .03; .02 INJECTION, SOLUTION INTRAVENOUS at 15:07

## 2019-03-08 RX ADMIN — OXYCODONE HYDROCHLORIDE 10 MG: 10 TABLET ORAL at 20:43

## 2019-03-08 RX ADMIN — SODIUM CHLORIDE, SODIUM LACTATE, POTASSIUM CHLORIDE, AND CALCIUM CHLORIDE 100 ML/HR: .6; .31; .03; .02 INJECTION, SOLUTION INTRAVENOUS at 01:03

## 2019-03-08 RX ADMIN — OXYCODONE HYDROCHLORIDE 10 MG: 10 TABLET ORAL at 01:08

## 2019-03-08 RX ADMIN — GABAPENTIN 300 MG: 300 CAPSULE ORAL at 10:34

## 2019-03-08 RX ADMIN — CEFAZOLIN SODIUM 2000 MG: 2 SOLUTION INTRAVENOUS at 17:25

## 2019-03-08 RX ADMIN — ATORVASTATIN CALCIUM 40 MG: 40 TABLET, FILM COATED ORAL at 08:13

## 2019-03-08 RX ADMIN — VENLAFAXINE 37.5 MG: 37.5 TABLET ORAL at 08:15

## 2019-03-08 RX ADMIN — OXYCODONE HYDROCHLORIDE 10 MG: 10 TABLET ORAL at 08:14

## 2019-03-08 RX ADMIN — CEFAZOLIN SODIUM 2000 MG: 2 SOLUTION INTRAVENOUS at 09:08

## 2019-03-08 RX ADMIN — HYDROMORPHONE HYDROCHLORIDE 0.5 MG: 1 INJECTION, SOLUTION INTRAMUSCULAR; INTRAVENOUS; SUBCUTANEOUS at 05:04

## 2019-03-08 RX ADMIN — CYCLOBENZAPRINE HYDROCHLORIDE 10 MG: 10 TABLET, FILM COATED ORAL at 22:38

## 2019-03-08 RX ADMIN — CEFAZOLIN SODIUM 2000 MG: 2 SOLUTION INTRAVENOUS at 01:04

## 2019-03-08 RX ADMIN — PANTOPRAZOLE SODIUM 40 MG: 40 TABLET, DELAYED RELEASE ORAL at 06:36

## 2019-03-08 RX ADMIN — ENOXAPARIN SODIUM 40 MG: 40 INJECTION SUBCUTANEOUS at 08:15

## 2019-03-08 RX ADMIN — LISINOPRIL: 10 TABLET ORAL at 08:13

## 2019-03-08 RX ADMIN — ASPIRIN 162 MG: 81 TABLET, COATED ORAL at 08:14

## 2019-03-08 RX ADMIN — METOPROLOL TARTRATE 25 MG: 25 TABLET, FILM COATED ORAL at 22:38

## 2019-03-08 NOTE — PLAN OF CARE
Problem: SKIN/TISSUE INTEGRITY - ADULT  Goal: Incision(s), wounds(s) or drain site(s) healing without S/S of infection  Description  INTERVENTIONS  - Assess and document risk factors for skin impairment   - Assess and document dressing, incision, wound bed, drain sites and surrounding tissue  - Initiate Nutrition services consult and/or wound management as needed  Outcome: Progressing     Problem: PAIN - ADULT  Goal: Verbalizes/displays adequate comfort level or baseline comfort level  Description  Interventions:  - Encourage patient to monitor pain and request assistance  - Assess pain using appropriate pain scale  - Administer analgesics based on type and severity of pain and evaluate response  - Implement non-pharmacological measures as appropriate and evaluate response  - Consider cultural and social influences on pain and pain management  - Notify physician/advanced practitioner if interventions unsuccessful or patient reports new pain  Outcome: Progressing     Problem: INFECTION - ADULT  Goal: Absence or prevention of progression during hospitalization  Description  INTERVENTIONS:  - Assess and monitor for signs and symptoms of infection  - Monitor lab/diagnostic results  - Monitor all insertion sites, i e  indwelling lines, tubes, and drains  - Monitor endotracheal (as able) and nasal secretions for changes in amount and color  - [place] appropriate cooling/warming therapies per order  - Administer medications as ordered  - Instruct and encourage patient and family to use good hand hygiene technique  - Identify and instruct in appropriate isolation precautions for identified infection/condition  Outcome: Progressing     Problem: DISCHARGE PLANNING - CARE MANAGEMENT  Goal: Discharge to post-acute care or home with appropriate resources  Description  INTERVENTIONS:  - Conduct assessment to determine patient/family and health care team treatment goals, and need for post-acute services based on payer coverage, community resources, and patient preferences, and barriers to discharge  - Address psychosocial, clinical, and financial barriers to discharge as identified in assessment in conjunction with the patient/family and health care team  - Arrange appropriate level of post-acute services according to patient?s   needs and preference and payer coverage in collaboration with the physician and health care team  - Communicate with and update the patient/family, physician, and health care team regarding progress on the discharge plan  - Arrange appropriate transportation to post-acute venues  Outcome: Progressing

## 2019-03-09 ENCOUNTER — ANESTHESIA EVENT (INPATIENT)
Dept: PERIOP | Facility: HOSPITAL | Age: 59
DRG: 858 | End: 2019-03-09
Payer: COMMERCIAL

## 2019-03-09 ENCOUNTER — ANESTHESIA (INPATIENT)
Dept: PERIOP | Facility: HOSPITAL | Age: 59
DRG: 858 | End: 2019-03-09
Payer: COMMERCIAL

## 2019-03-09 LAB — BACTERIA SPEC ANAEROBE CULT: NORMAL

## 2019-03-09 PROCEDURE — 0JD80ZZ EXTRACTION OF ABDOMEN SUBCUTANEOUS TISSUE AND FASCIA, OPEN APPROACH: ICD-10-PCS | Performed by: SURGERY

## 2019-03-09 RX ORDER — HYDROMORPHONE HCL/PF 1 MG/ML
0.5 SYRINGE (ML) INJECTION
Status: DISCONTINUED | OUTPATIENT
Start: 2019-03-09 | End: 2019-03-09 | Stop reason: HOSPADM

## 2019-03-09 RX ORDER — MAGNESIUM HYDROXIDE 1200 MG/15ML
LIQUID ORAL AS NEEDED
Status: DISCONTINUED | OUTPATIENT
Start: 2019-03-09 | End: 2019-03-09 | Stop reason: HOSPADM

## 2019-03-09 RX ORDER — FENTANYL CITRATE/PF 50 MCG/ML
25 SYRINGE (ML) INJECTION
Status: DISCONTINUED | OUTPATIENT
Start: 2019-03-09 | End: 2019-03-09 | Stop reason: HOSPADM

## 2019-03-09 RX ORDER — SODIUM CHLORIDE, SODIUM LACTATE, POTASSIUM CHLORIDE, CALCIUM CHLORIDE 600; 310; 30; 20 MG/100ML; MG/100ML; MG/100ML; MG/100ML
INJECTION, SOLUTION INTRAVENOUS CONTINUOUS PRN
Status: DISCONTINUED | OUTPATIENT
Start: 2019-03-09 | End: 2019-03-09 | Stop reason: SURG

## 2019-03-09 RX ORDER — FENTANYL CITRATE 50 UG/ML
INJECTION, SOLUTION INTRAMUSCULAR; INTRAVENOUS AS NEEDED
Status: DISCONTINUED | OUTPATIENT
Start: 2019-03-09 | End: 2019-03-09 | Stop reason: SURG

## 2019-03-09 RX ORDER — ONDANSETRON 2 MG/ML
4 INJECTION INTRAMUSCULAR; INTRAVENOUS EVERY 4 HOURS PRN
Status: DISCONTINUED | OUTPATIENT
Start: 2019-03-09 | End: 2019-03-09 | Stop reason: HOSPADM

## 2019-03-09 RX ORDER — SODIUM CHLORIDE, SODIUM LACTATE, POTASSIUM CHLORIDE, CALCIUM CHLORIDE 600; 310; 30; 20 MG/100ML; MG/100ML; MG/100ML; MG/100ML
50 INJECTION, SOLUTION INTRAVENOUS CONTINUOUS
Status: DISCONTINUED | OUTPATIENT
Start: 2019-03-09 | End: 2019-03-13 | Stop reason: HOSPADM

## 2019-03-09 RX ORDER — ONDANSETRON 2 MG/ML
4 INJECTION INTRAMUSCULAR; INTRAVENOUS ONCE AS NEEDED
Status: DISCONTINUED | OUTPATIENT
Start: 2019-03-09 | End: 2019-03-09 | Stop reason: HOSPADM

## 2019-03-09 RX ORDER — HYDROMORPHONE HCL/PF 1 MG/ML
0.2 SYRINGE (ML) INJECTION
Status: DISCONTINUED | OUTPATIENT
Start: 2019-03-09 | End: 2019-03-09 | Stop reason: HOSPADM

## 2019-03-09 RX ORDER — PROPOFOL 10 MG/ML
INJECTION, EMULSION INTRAVENOUS AS NEEDED
Status: DISCONTINUED | OUTPATIENT
Start: 2019-03-09 | End: 2019-03-09 | Stop reason: SURG

## 2019-03-09 RX ORDER — METOCLOPRAMIDE HYDROCHLORIDE 5 MG/ML
10 INJECTION INTRAMUSCULAR; INTRAVENOUS ONCE AS NEEDED
Status: DISCONTINUED | OUTPATIENT
Start: 2019-03-09 | End: 2019-03-09 | Stop reason: HOSPADM

## 2019-03-09 RX ADMIN — FENTANYL CITRATE 25 MCG: 50 INJECTION INTRAMUSCULAR; INTRAVENOUS at 12:13

## 2019-03-09 RX ADMIN — FENTANYL CITRATE 25 MCG: 50 INJECTION INTRAMUSCULAR; INTRAVENOUS at 12:04

## 2019-03-09 RX ADMIN — SODIUM CHLORIDE, SODIUM LACTATE, POTASSIUM CHLORIDE, AND CALCIUM CHLORIDE: .6; .31; .03; .02 INJECTION, SOLUTION INTRAVENOUS at 11:34

## 2019-03-09 RX ADMIN — LIDOCAINE HYDROCHLORIDE 100 MG: 20 INJECTION, SOLUTION INTRAVENOUS at 11:58

## 2019-03-09 RX ADMIN — OXYCODONE HYDROCHLORIDE 10 MG: 10 TABLET ORAL at 01:53

## 2019-03-09 RX ADMIN — CEFAZOLIN SODIUM 2000 MG: 2 SOLUTION INTRAVENOUS at 10:08

## 2019-03-09 RX ADMIN — OXYCODONE HYDROCHLORIDE 5 MG: 5 TABLET ORAL at 18:27

## 2019-03-09 RX ADMIN — DIPHENHYDRAMINE HYDROCHLORIDE 25 MG: 50 INJECTION, SOLUTION INTRAMUSCULAR; INTRAVENOUS at 14:25

## 2019-03-09 RX ADMIN — ASPIRIN 162 MG: 81 TABLET, COATED ORAL at 14:25

## 2019-03-09 RX ADMIN — TRAMADOL HYDROCHLORIDE 50 MG: 50 TABLET, COATED ORAL at 13:37

## 2019-03-09 RX ADMIN — ENOXAPARIN SODIUM 40 MG: 40 INJECTION SUBCUTANEOUS at 14:25

## 2019-03-09 RX ADMIN — SODIUM CHLORIDE, SODIUM LACTATE, POTASSIUM CHLORIDE, AND CALCIUM CHLORIDE 100 ML/HR: .6; .31; .03; .02 INJECTION, SOLUTION INTRAVENOUS at 01:38

## 2019-03-09 RX ADMIN — GABAPENTIN 300 MG: 300 CAPSULE ORAL at 21:09

## 2019-03-09 RX ADMIN — HYDROMORPHONE HYDROCHLORIDE 0.5 MG: 1 INJECTION, SOLUTION INTRAMUSCULAR; INTRAVENOUS; SUBCUTANEOUS at 19:41

## 2019-03-09 RX ADMIN — ATORVASTATIN CALCIUM 40 MG: 40 TABLET, FILM COATED ORAL at 14:27

## 2019-03-09 RX ADMIN — SODIUM CHLORIDE, SODIUM LACTATE, POTASSIUM CHLORIDE, AND CALCIUM CHLORIDE 50 ML/HR: .6; .31; .03; .02 INJECTION, SOLUTION INTRAVENOUS at 13:00

## 2019-03-09 RX ADMIN — METOPROLOL TARTRATE 25 MG: 25 TABLET, FILM COATED ORAL at 21:09

## 2019-03-09 RX ADMIN — CYCLOBENZAPRINE HYDROCHLORIDE 10 MG: 10 TABLET, FILM COATED ORAL at 21:09

## 2019-03-09 RX ADMIN — CEFAZOLIN SODIUM 2000 MG: 2 SOLUTION INTRAVENOUS at 01:40

## 2019-03-09 RX ADMIN — CEFAZOLIN SODIUM 2000 MG: 2 SOLUTION INTRAVENOUS at 16:57

## 2019-03-09 RX ADMIN — ONDANSETRON 4 MG: 2 INJECTION INTRAMUSCULAR; INTRAVENOUS at 12:00

## 2019-03-09 RX ADMIN — VENLAFAXINE 37.5 MG: 37.5 TABLET ORAL at 14:25

## 2019-03-09 RX ADMIN — PROPOFOL 200 MG: 10 INJECTION, EMULSION INTRAVENOUS at 11:58

## 2019-03-09 NOTE — ANESTHESIA POSTPROCEDURE EVALUATION
Post-Op Assessment Note    CV Status:  Stable  Pain Score: 0    Pain management: adequate     Mental Status:  Alert and awake   Hydration Status:  Euvolemic   PONV Controlled:  Controlled   Airway Patency:  Patent   Post Op Vitals Reviewed: Yes      Staff: CRNA           /74 (03/09/19 1229)    Temp 98 2 °F (36 8 °C) (03/09/19 1229)    Pulse 85 (03/09/19 1229)   Resp 15 (03/09/19 1229)    SpO2 98 % (03/09/19 1229)

## 2019-03-09 NOTE — PLAN OF CARE
Problem: SKIN/TISSUE INTEGRITY - ADULT  Goal: Incision(s), wounds(s) or drain site(s) healing without S/S of infection  Description  INTERVENTIONS  - Assess and document risk factors for skin impairment   - Assess and document dressing, incision, wound bed, drain sites and surrounding tissue  - Initiate Nutrition services consult and/or wound management as needed  Outcome: Progressing     Problem: PAIN - ADULT  Goal: Verbalizes/displays adequate comfort level or baseline comfort level  Description  Interventions:  - Encourage patient to monitor pain and request assistance  - Assess pain using appropriate pain scale  - Administer analgesics based on type and severity of pain and evaluate response  - Implement non-pharmacological measures as appropriate and evaluate response  - Consider cultural and social influences on pain and pain management  - Notify physician/advanced practitioner if interventions unsuccessful or patient reports new pain  Outcome: Progressing     Problem: INFECTION - ADULT  Goal: Absence or prevention of progression during hospitalization  Description  INTERVENTIONS:  - Assess and monitor for signs and symptoms of infection  - Monitor lab/diagnostic results  - Monitor all insertion sites, i e  indwelling lines, tubes, and drains  - Monitor endotracheal (as able) and nasal secretions for changes in amount and color  - [place] appropriate cooling/warming therapies per order  - Administer medications as ordered  - Instruct and encourage patient and family to use good hand hygiene technique  - Identify and instruct in appropriate isolation precautions for identified infection/condition  Outcome: Progressing

## 2019-03-09 NOTE — UTILIZATION REVIEW
Continued Stay Review    Date: 3/9/2019    SURGERY DATE: 3/9/2019  Procedure(s) (LRB):  DEBRIDEMENT WOUND ABDOMINAL [facility] OUT), wound vac dressing change (N/A)  Anesthesia Type:   General  Operative Findings:  Previous cavity was quite clean  Only scant amount of fibrinous material was debrided with a curette  Single black sponge was placed into the wound  Wound measured 15 cm wide by 6 cm in length by 4 cm deep     Height 70 inches weight 108 kg/240 lb  BMI 34  Wound class 2  ASA 2     Vital Signs: /61 (BP Location: Right arm)   Pulse 79   Temp 98 5 °F (36 9 °C) (Oral)   Resp 18   Ht 5' 10" (1 778 m)   Wt 108 kg (239 lb)   SpO2 98%   BMI 34 29 kg/m²      Assessment/Plan:   S/p washout and debridement of abdominal wound  NPO for OR today  Initial wound cx MSSA  · Continue NPO  · Follow up on repeat cxs  · Continue ancef  · OOB and ambulate  · Pain control        Medications:   Scheduled Meds:   Current Facility-Administered Medications:  acetaminophen 650 mg Oral Q4H PRN Walter Garcia, DO    acetaminophen 650 mg Oral Q6H PRN Walter Garcia, DO    aspirin 162 mg Oral Daily Walter Garcia, DO    atorvastatin 40 mg Oral Daily Walter Garcia, DO    cefazolin 2,000 mg Intravenous Q8H Walter Garcia DO Last Rate: 2,000 mg (03/09/19 1657)   cyclobenzaprine 10 mg Oral Q12H PRN Walter Garcia, DO    diphenhydrAMINE 25 mg Intravenous Q6H PRN Walter Garcia, DO    enoxaparin 40 mg Subcutaneous Daily Walter Garcia, DO    gabapentin 300 mg Oral Q12H PRN Walter Garcia, DO    HYDROmorphone 0 5 mg Intravenous Q1H PRN Walter Garcia, DO    lactated ringers 50 mL/hr Intravenous Continuous Walter Garcia DO Last Rate: 50 mL/hr (03/09/19 1300)   lisinopril-hydrochlorothiazide (PRINZIDE 10/12  5) combo dose  Oral Daily Walter Garcia, DO    metoprolol tartrate 25 mg Oral HS Walter Garcia, DO    nitroglycerin 0 4 mg Sublingual Q5 Min PRN Walter Garcia, DO    ondansetron 4 mg Intravenous Q4H PRN Walter Garcia, DO ondansetron 4 mg Intravenous Q4H PRN Walter Garcia, DO    oxyCODONE 10 mg Oral Q4H PRN Walter Garcia, DO    oxyCODONE 5 mg Oral Q4H PRN Walter Garcia, DO    pantoprazole 40 mg Oral Daily Before Breakfast Walter Garcia, DO    traMADol 50 mg Oral Q4H PRN Walter Garcia, DO    venlafaxine 37 5 mg Oral Daily Walter Garcia, DO      Continuous Infusions:   lactated ringers 50 mL/hr Last Rate: 50 mL/hr (03/09/19 1300)     PRN Meds:   Pertinent Labs/Diagnostic Results: none  Age/Sex: 62 y o  male   Discharge Plan: tbd

## 2019-03-09 NOTE — PLAN OF CARE
Problem: SKIN/TISSUE INTEGRITY - ADULT  Goal: Incision(s), wounds(s) or drain site(s) healing without S/S of infection  Description  INTERVENTIONS  - Assess and document risk factors for skin impairment   - Assess and document dressing, incision, wound bed, drain sites and surrounding tissue  - Initiate Nutrition services consult and/or wound management as needed  Outcome: Progressing     Problem: PAIN - ADULT  Goal: Verbalizes/displays adequate comfort level or baseline comfort level  Description  Interventions:  - Encourage patient to monitor pain and request assistance  - Assess pain using appropriate pain scale  - Administer analgesics based on type and severity of pain and evaluate response  - Implement non-pharmacological measures as appropriate and evaluate response  - Consider cultural and social influences on pain and pain management  - Notify physician/advanced practitioner if interventions unsuccessful or patient reports new pain  Outcome: Progressing     Problem: INFECTION - ADULT  Goal: Absence or prevention of progression during hospitalization  Description  INTERVENTIONS:  - Assess and monitor for signs and symptoms of infection  - Monitor lab/diagnostic results  - Monitor all insertion sites, i e  indwelling lines, tubes, and drains  - Monitor endotracheal (as able) and nasal secretions for changes in amount and color  - [place] appropriate cooling/warming therapies per order  - Administer medications as ordered  - Instruct and encourage patient and family to use good hand hygiene technique  - Identify and instruct in appropriate isolation precautions for identified infection/condition  Outcome: Progressing     Problem: DISCHARGE PLANNING - CARE MANAGEMENT  Goal: Discharge to post-acute care or home with appropriate resources  Description  INTERVENTIONS:  - Conduct assessment to determine patient/family and health care team treatment goals, and need for post-acute services based on payer coverage, community resources, and patient preferences, and barriers to discharge  - Address psychosocial, clinical, and financial barriers to discharge as identified in assessment in conjunction with the patient/family and health care team  - Arrange appropriate level of post-acute services according to patient?s   needs and preference and payer coverage in collaboration with the physician and health care team  - Communicate with and update the patient/family, physician, and health care team regarding progress on the discharge plan  - Arrange appropriate transportation to post-acute venues  Outcome: Progressing

## 2019-03-09 NOTE — OP NOTE
OPERATIVE REPORT  PATIENT NAME: Ricardo Lopez    :  1960  MRN: [id number]  Pt Location: AN OR ROOM 04    SURGERY DATE: 3/9/2019    Surgeon(s) and Role:     * Walter Garcia, DO - Primary    Preop Diagnosis:  Abdominal wall seroma, sequela [T88  8XXS, T79 2XXS]    Post-Op Diagnosis Codes:     * Abdominal wall seroma, sequela [T88  8XXS, T79 2XXS]    Procedure(s) (LRB):  DEBRIDEMENT WOUND ABDOMINAL ([address] OUT), wound vac dressing change (N/A)    Specimen(s):  * No specimens in log *    Estimated Blood Loss:   Minimal    Drains:  Closed/Suction Drain Right Abdomen 14 5 Fr  (Active)   Number of days: 61       Negative Pressure Wound Therapy (V A C ) Abdomen Mid (Active)   Black foam- # applied 1 3/9/2019 12:11 PM   Cycle Continuous 3/9/2019 10:27 AM   Target Pressure (mmHg) 125 3/9/2019 10:27 AM   Canister Changed Yes 3/9/2019 12:11 PM   Dressing Status Clean;Dry;New drainage 3/9/2019 12:11 PM   Black foam- # removed 1 3/9/2019 12:11 PM   Output (mL) 250 mL 3/9/2019  7:21 AM   Number of days: 2       Anesthesia Type:   General    Operative Indications:  Abdominal wall seroma, sequela [T88  8XXS, T79 2XXS]      Operative Findings:  Previous cavity was quite clean  Only scant amount of fibrinous material was debrided with a curette  Single black sponge was placed into the wound  Wound measured 15 cm wide by 6 cm in length by 4 cm deep    Height 70 inches weight 108 kg/240 lb  BMI 34  Wound class 2  ASA 2    Complications:   None    Procedure and Technique:  Patient was brought to the operative suite and identified by visualization, conversation, by armband  He was on antibiotics on the floor  Sequential compression pumps were placed  Once under anesthesia his previous VAC dressing was removed  The single black sponge was also removed  Wound was then prepped and draped in a sterile fashion  Time-out was performed  The wound was then thoroughly inspected    He had a scant amount of fibrinous material at the inferior aspect of the wound which was removed with a curette  Rest wound was quite healthy with normal appearing tissues  3 L of Pulsavac saline irrigation was then performed  A single black sponge was then fashioned into the wound  I cut the edge of it to create somewhat of a skin bridge to allow room for the suction portion of the VAC dressing  This was hooked up to 125 mm of suction  Suction held well  Was awakened in the operating returned to the recovery area in stable condition having tolerated the procedure well     I was present for the entire procedure    Patient Disposition:  PACU     SIGNATURE: Stu Villeda DO  DATE: March 9, 2019  TIME: 12:25 PM

## 2019-03-09 NOTE — PROGRESS NOTES
Progress Note -Surgery DARRIUS Barros Saima 62 y o  male MRN: [id number]  Unit/Bed#: -01 Encounter: [id number]    ASSESSMENT/PLAN:  Problem List     * (Principal) Abdominal wall seroma ([address] )    Hypertension    History of MI (myocardial infarction)    Non morbid obesity, unspecified obesity type    H/O heart artery stent    Benign prostatic hyperplasia   S/p washout and debridement of abdominal wound  NPO for OR today  Initial wound cx MSSA  · Continue NPO  · Follow up on repeat cxs  · Continue ancef  · OOB and ambulate  · Pain control     VTE Pharmacologic Prophylaxis: Sequential compression device (Venodyne)  and Enoxaparin (Lovenox)    Subjective/Objective     Subjective:   No complaints or events  NPO for OR    Objective/Physical Exam: Blood pressure 112/63, pulse 78, temperature 98 1 °F (36 7 °C), temperature source Oral, resp  rate 18, height 5' 10" (1 778 m), weight 108 kg (239 lb), SpO2 99 %  ,Body mass index is 34 29 kg/m²      General appearance: alert and oriented, in no acute distress  Heart: regular rate and rhythm, S1, S2 normal, no murmur, click, rub or gallop  Lungs: clear to auscultation bilaterally  Abdomen: VAC in place, soft, tender around wound, superficial skin irritation from tape        Lab, Imaging and other studies:  Admission on 03/04/2019   Component Date Value Ref Range Status    Hepatitis C Ab 03/04/2019 Non-reactive  Non-reactive Final    Wound Culture 03/04/2019 2+ Growth of Staphylococcus aureus*  Final    Wound Culture 03/04/2019 2+ Growth of    Final    Gram Stain Result 03/04/2019 Rare Polys*  Final    Gram Stain Result 03/04/2019 2+ Gram positive cocci in pairs*  Final    Gram Stain Result 03/04/2019 2+ Gram positive rods*  Final    WBC 03/05/2019 10 30* 4 31 - 10 16 Thousand/uL Final    RBC 03/05/2019 4 01  3 88 - 5 62 Million/uL Final    Hemoglobin 03/05/2019 12 6  12 0 - 17 0 g/dL Final    Hematocrit 03/05/2019 37 2  36 5 - 49 3 % Final    MCV 03/05/2019 93 82 - 98 fL Final    MCH 03/05/2019 31 4  26 8 - 34 3 pg Final    MCHC 03/05/2019 33 9  31 4 - 37 4 g/dL Final    RDW 03/05/2019 11 9  11 6 - 15 1 % Final    MPV 03/05/2019 10 1  8 9 - 12 7 fL Final    Platelets 53/78/8954 315  149 - 390 Thousands/uL Final    nRBC 03/05/2019 0  /100 WBCs Final    Neutrophils Relative 03/05/2019 64  43 - 75 % Final    Immat GRANS % 03/05/2019 0  0 - 2 % Final    Lymphocytes Relative 03/05/2019 18  14 - 44 % Final    Monocytes Relative 03/05/2019 13* 4 - 12 % Final    Eosinophils Relative 03/05/2019 4  0 - 6 % Final    Basophils Relative 03/05/2019 1  0 - 1 % Final    Neutrophils Absolute 03/05/2019 6 60  1 85 - 7 62 Thousands/µL Final    Immature Grans Absolute 03/05/2019 0 04  0 00 - 0 20 Thousand/uL Final    Lymphocytes Absolute 03/05/2019 1 87  0 60 - 4 47 Thousands/µL Final    Monocytes Absolute 03/05/2019 1 33* 0 17 - 1 22 Thousand/µL Final    Eosinophils Absolute 03/05/2019 0 40  0 00 - 0 61 Thousand/µL Final    Basophils Absolute 03/05/2019 0 06  0 00 - 0 10 Thousands/µL Final    WBC 03/06/2019 9 18  4 31 - 10 16 Thousand/uL Final    RBC 03/06/2019 4 05  3 88 - 5 62 Million/uL Final    Hemoglobin 03/06/2019 12 5  12 0 - 17 0 g/dL Final    Hematocrit 03/06/2019 37 2  36 5 - 49 3 % Final    MCV 03/06/2019 92  82 - 98 fL Final    MCH 03/06/2019 30 9  26 8 - 34 3 pg Final    MCHC 03/06/2019 33 6  31 4 - 37 4 g/dL Final    RDW 03/06/2019 11 9  11 6 - 15 1 % Final    MPV 03/06/2019 10 0  8 9 - 12 7 fL Final    Platelets 06/70/6068 346  149 - 390 Thousands/uL Final    nRBC 03/06/2019 0  /100 WBCs Final    Neutrophils Relative 03/06/2019 65  43 - 75 % Final    Immat GRANS % 03/06/2019 0  0 - 2 % Final    Lymphocytes Relative 03/06/2019 18  14 - 44 % Final    Monocytes Relative 03/06/2019 11  4 - 12 % Final    Eosinophils Relative 03/06/2019 5  0 - 6 % Final    Basophils Relative 03/06/2019 1  0 - 1 % Final    Neutrophils Absolute 03/06/2019 5 92 1 85 - 7 62 Thousands/µL Final    Immature Grans Absolute 03/06/2019 0 04  0 00 - 0 20 Thousand/uL Final    Lymphocytes Absolute 03/06/2019 1 66  0 60 - 4 47 Thousands/µL Final    Monocytes Absolute 03/06/2019 1 01  0 17 - 1 22 Thousand/µL Final    Eosinophils Absolute 03/06/2019 0 50  0 00 - 0 61 Thousand/µL Final    Basophils Absolute 03/06/2019 0 05  0 00 - 0 10 Thousands/µL Final    Anaerobic Culture 03/07/2019 Culture results to follow     Preliminary    Wound Culture 03/07/2019 Culture too young- will reincubate   Preliminary    Gram Stain Result 03/07/2019 3+ Polys*  Preliminary    Gram Stain Result 03/07/2019 2+ Gram positive cocci in clusters*  Preliminary    Gram Stain Result 03/07/2019 1+ Gram positive rods*  Preliminary

## 2019-03-09 NOTE — ANESTHESIA PREPROCEDURE EVALUATION
Review of Systems/Medical History      History of anesthetic complications PONV    Cardiovascular  EKG reviewed, Hyperlipidemia, Hypertension , Past MI > 6 months, Cardiac stents (X2) > 1 year   Comment: 1/19  LEFT VENTRICLE: Size was normal  Systolic function was normal  Ejection fraction was estimated to be 60 %  There were no regional wall motion abnormalities  Wall thickness was mildly increased  There was mild concentric hypertrophy,  Pulmonary  Not a smoker ,        GI/Hepatic    Bariatric surgery,        Kidney stones,        Endo/Other    Obesity    GYN       Hematology   Musculoskeletal  Sciatica (R sided),        Neurology   Psychology   Anxiety,              Physical Exam    Airway    Mallampati score: II  TM Distance: >3 FB  Neck ROM: full     Dental       Cardiovascular      Pulmonary      Other Findings        Anesthesia Plan  ASA Score- 2     Anesthesia Type- general with ASA Monitors  Additional Monitors:   Airway Plan: LMA  Plan Factors-    Induction- intravenous  Postoperative Plan-     Informed Consent- Anesthetic plan and risks discussed with patient  I personally reviewed this patient with the CRNA  Discussed and agreed on the Anesthesia Plan with the PATRICIA [place]

## 2019-03-10 LAB
BACTERIA WND AEROBE CULT: ABNORMAL
BACTERIA WND AEROBE CULT: ABNORMAL
GRAM STN SPEC: ABNORMAL

## 2019-03-10 RX ADMIN — DIPHENHYDRAMINE HYDROCHLORIDE 25 MG: 50 INJECTION, SOLUTION INTRAMUSCULAR; INTRAVENOUS at 05:38

## 2019-03-10 RX ADMIN — SODIUM CHLORIDE, SODIUM LACTATE, POTASSIUM CHLORIDE, AND CALCIUM CHLORIDE 50 ML/HR: .6; .31; .03; .02 INJECTION, SOLUTION INTRAVENOUS at 10:04

## 2019-03-10 RX ADMIN — TRAMADOL HYDROCHLORIDE 50 MG: 50 TABLET, COATED ORAL at 15:27

## 2019-03-10 RX ADMIN — LISINOPRIL: 10 TABLET ORAL at 09:09

## 2019-03-10 RX ADMIN — CEFAZOLIN SODIUM 2000 MG: 2 SOLUTION INTRAVENOUS at 03:10

## 2019-03-10 RX ADMIN — TRAMADOL HYDROCHLORIDE 50 MG: 50 TABLET, COATED ORAL at 09:12

## 2019-03-10 RX ADMIN — ASPIRIN 162 MG: 81 TABLET, COATED ORAL at 09:09

## 2019-03-10 RX ADMIN — CEFAZOLIN SODIUM 2000 MG: 2 SOLUTION INTRAVENOUS at 17:40

## 2019-03-10 RX ADMIN — METOPROLOL TARTRATE 25 MG: 25 TABLET, FILM COATED ORAL at 22:46

## 2019-03-10 RX ADMIN — PANTOPRAZOLE SODIUM 40 MG: 40 TABLET, DELAYED RELEASE ORAL at 09:18

## 2019-03-10 RX ADMIN — VENLAFAXINE 37.5 MG: 37.5 TABLET ORAL at 09:10

## 2019-03-10 RX ADMIN — ATORVASTATIN CALCIUM 40 MG: 40 TABLET, FILM COATED ORAL at 09:09

## 2019-03-10 RX ADMIN — ENOXAPARIN SODIUM 40 MG: 40 INJECTION SUBCUTANEOUS at 09:10

## 2019-03-10 RX ADMIN — GABAPENTIN 300 MG: 300 CAPSULE ORAL at 22:46

## 2019-03-10 RX ADMIN — DIPHENHYDRAMINE HYDROCHLORIDE 25 MG: 50 INJECTION, SOLUTION INTRAMUSCULAR; INTRAVENOUS at 15:36

## 2019-03-10 RX ADMIN — CYCLOBENZAPRINE HYDROCHLORIDE 10 MG: 10 TABLET, FILM COATED ORAL at 22:46

## 2019-03-10 RX ADMIN — CEFAZOLIN SODIUM 2000 MG: 2 SOLUTION INTRAVENOUS at 10:01

## 2019-03-10 NOTE — PROGRESS NOTES
Progress Note -Surgery PA  Debbie Francisco 62 y o  male MRN: [id number]  Unit/Bed#: -01 Encounter: [id number]    ASSESSMENT/PLAN:  Problem List     * (Principal) Abdominal wall seroma ([address] )    Hypertension    History of MI (myocardial infarction)    Non morbid obesity, unspecified obesity type   POD 1 s/p repeat washout and debridement of abdominal wound  Initial and prelim repeat wound cxs MSSA  · Diet as tolerated  · Follow up on final of repeat cxs  · Continue ancef  · OOB and ambulate  · Pain control PO    VTE Pharmacologic Prophylaxis: Sequential compression device (Venodyne)  and Enoxaparin (Lovenox)    Subjective/Objective     Subjective: no events or complaints    Objective/Physical Exam: Blood pressure 114/63, pulse 79, temperature 98 5 °F (36 9 °C), temperature source Oral, resp  rate 18, height 5' 10" (1 778 m), weight 108 kg (239 lb), SpO2 97 %  ,Body mass index is 34 29 kg/m²  General appearance: alert and oriented, in no acute distress  Heart: regular rate and rhythm, S1, S2 normal, no murmur, click, rub or gallop  Lungs: clear to auscultation bilaterally  Abdomen: soft and rounded  VAC in place   +BS appropriate tenderness    Current Facility-Administered Medications:     acetaminophen (TYLENOL) tablet 650 mg, 650 mg, Oral, Q4H PRN, Walter Garcia DO    acetaminophen (TYLENOL) tablet 650 mg, 650 mg, Oral, Q6H PRN, Walter Garcia DO    aspirin (ECOTRIN LOW STRENGTH) EC tablet 162 mg, 162 mg, Oral, Daily, Walter Garcia DO, 162 mg at 03/10/19 0909    atorvastatin (LIPITOR) tablet 40 mg, 40 mg, Oral, Daily, Walter Garcia DO, 40 mg at 03/10/19 0909    ceFAZolin (ANCEF) IVPB (premix) 2,000 mg, 2,000 mg, Intravenous, Q8H, Walter Garcia DO, Last Rate: 100 mL/hr at 03/10/19 0310, 2,000 mg at 03/10/19 0310    cyclobenzaprine (FLEXERIL) tablet 10 mg, 10 mg, Oral, Q12H PRN, Walter Garcia DO, 10 mg at 03/09/19 2109    diphenhydrAMINE (BENADRYL) injection 25 mg, 25 mg, Intravenous, Q6H PRN, Walter Garcia DO, 25 mg at 03/10/19 0538    enoxaparin (LOVENOX) subcutaneous injection 40 mg, 40 mg, Subcutaneous, Daily, Walter Garcia DO, 40 mg at 03/10/19 0910    gabapentin (NEURONTIN) capsule 300 mg, 300 mg, Oral, Q12H PRN, Walter Garcia DO, 300 mg at 03/09/19 2109    HYDROmorphone (DILAUDID) injection 0 5 mg, 0 5 mg, Intravenous, Q1H PRN, Walter Garcia DO, 0 5 mg at 03/09/19 1941    lactated ringers infusion, 50 mL/hr, Intravenous, Continuous, Walter Garcia DO, Last Rate: 50 mL/hr at 03/09/19 1300, 50 mL/hr at 03/09/19 1300    lisinopril-hydrochlorothiazide (PRINZIDE 10/12  5) combo dose, , Oral, Daily, Walter Garcia DO    metoprolol tartrate (LOPRESSOR) tablet 25 mg, 25 mg, Oral, HS, Walter Garcia DO, 25 mg at 03/09/19 2109    nitroglycerin (NITROSTAT) SL tablet 0 4 mg, 0 4 mg, Sublingual, Q5 Min PRN, Walter Garcia DO    ondansetron (ZOFRAN) injection 4 mg, 4 mg, Intravenous, Q4H PRN, Walter Garcia DO    ondansetron (ZOFRAN) injection 4 mg, 4 mg, Intravenous, Q4H PRN, Walter Garcia DO, 4 mg at 03/09/19 1200    oxyCODONE (ROXICODONE) immediate release tablet 10 mg, 10 mg, Oral, Q4H PRN, Walter Garcia DO, 10 mg at 03/09/19 0153    oxyCODONE (ROXICODONE) IR tablet 5 mg, 5 mg, Oral, Q4H PRN, Walter Garcia DO, 5 mg at 03/09/19 1827    pantoprazole (PROTONIX) EC tablet 40 mg, 40 mg, Oral, Daily Before Breakfast, Walter Garcia DO, 40 mg at 03/08/19 0636    traMADol (ULTRAM) tablet 50 mg, 50 mg, Oral, Q4H PRN, Walter Garcia DO, 50 mg at 03/10/19 0912    venlafaxine (EFFEXOR) tablet 37 5 mg, 37 5 mg, Oral, Daily, Walter Garcia DO, 37 5 mg at 03/10/19 8103          Lab, Imaging and other studies:  Admission on 03/04/2019   Component Date Value Ref Range Status    Hepatitis C Ab 03/04/2019 Non-reactive  Non-reactive Final    Wound Culture 03/04/2019 2+ Growth of Staphylococcus aureus*  Final    Wound Culture 03/04/2019 2+ Growth of    Final    Gram Stain Result 03/04/2019 Rare Polys*  Final    Gram Stain Result 03/04/2019 2+ Gram positive cocci in pairs*  Final    Gram Stain Result 03/04/2019 2+ Gram positive rods*  Final    WBC 03/05/2019 10 30* 4 31 - 10 16 Thousand/uL Final    RBC 03/05/2019 4 01  3 88 - 5 62 Million/uL Final    Hemoglobin 03/05/2019 12 6  12 0 - 17 0 g/dL Final    Hematocrit 03/05/2019 37 2  36 5 - 49 3 % Final    MCV 03/05/2019 93  82 - 98 fL Final    MCH 03/05/2019 31 4  26 8 - 34 3 pg Final    MCHC 03/05/2019 33 9  31 4 - 37 4 g/dL Final    RDW 03/05/2019 11 9  11 6 - 15 1 % Final    MPV 03/05/2019 10 1  8 9 - 12 7 fL Final    Platelets 83/24/7050 315  149 - 390 Thousands/uL Final    nRBC 03/05/2019 0  /100 WBCs Final    Neutrophils Relative 03/05/2019 64  43 - 75 % Final    Immat GRANS % 03/05/2019 0  0 - 2 % Final    Lymphocytes Relative 03/05/2019 18  14 - 44 % Final    Monocytes Relative 03/05/2019 13* 4 - 12 % Final    Eosinophils Relative 03/05/2019 4  0 - 6 % Final    Basophils Relative 03/05/2019 1  0 - 1 % Final    Neutrophils Absolute 03/05/2019 6 60  1 85 - 7 62 Thousands/µL Final    Immature Grans Absolute 03/05/2019 0 04  0 00 - 0 20 Thousand/uL Final    Lymphocytes Absolute 03/05/2019 1 87  0 60 - 4 47 Thousands/µL Final    Monocytes Absolute 03/05/2019 1 33* 0 17 - 1 22 Thousand/µL Final    Eosinophils Absolute 03/05/2019 0 40  0 00 - 0 61 Thousand/µL Final    Basophils Absolute 03/05/2019 0 06  0 00 - 0 10 Thousands/µL Final    WBC 03/06/2019 9 18  4 31 - 10 16 Thousand/uL Final    RBC 03/06/2019 4 05  3 88 - 5 62 Million/uL Final    Hemoglobin 03/06/2019 12 5  12 0 - 17 0 g/dL Final    Hematocrit 03/06/2019 37 2  36 5 - 49 3 % Final    MCV 03/06/2019 92  82 - 98 fL Final    MCH 03/06/2019 30 9  26 8 - 34 3 pg Final    MCHC 03/06/2019 33 6  31 4 - 37 4 g/dL Final    RDW 03/06/2019 11 9  11 6 - 15 1 % Final    MPV 03/06/2019 10 0  8 9 - 12 7 fL Final    Platelets 59/72/9026 346  149 - 390 Thousands/uL Final    nRBC 03/06/2019 0  /100 WBCs Final    Neutrophils Relative 03/06/2019 65  43 - 75 % Final    Immat GRANS % 03/06/2019 0  0 - 2 % Final    Lymphocytes Relative 03/06/2019 18  14 - 44 % Final    Monocytes Relative 03/06/2019 11  4 - 12 % Final    Eosinophils Relative 03/06/2019 5  0 - 6 % Final    Basophils Relative 03/06/2019 1  0 - 1 % Final    Neutrophils Absolute 03/06/2019 5 92  1 85 - 7 62 Thousands/µL Final    Immature Grans Absolute 03/06/2019 0 04  0 00 - 0 20 Thousand/uL Final    Lymphocytes Absolute 03/06/2019 1 66  0 60 - 4 47 Thousands/µL Final    Monocytes Absolute 03/06/2019 1 01  0 17 - 1 22 Thousand/µL Final    Eosinophils Absolute 03/06/2019 0 50  0 00 - 0 61 Thousand/µL Final    Basophils Absolute 03/06/2019 0 05  0 00 - 0 10 Thousands/µL Final    Anaerobic Culture 03/07/2019 No anaerobes isolated   Final    Wound Culture 03/07/2019 1+ Growth of Staphylococcus aureus*  Final    Wound Culture 03/07/2019 1+ Growth of    Final    Gram Stain Result 03/07/2019 3+ Polys*  Final    Gram Stain Result 03/07/2019 2+ Gram positive cocci in clusters*  Final    Gram Stain Result 03/07/2019 1+ Gram positive rods*  Final

## 2019-03-11 PROCEDURE — 2W03X6Z CHANGE PRESSURE DRESSING ON ABDOMINAL WALL: ICD-10-PCS | Performed by: SURGERY

## 2019-03-11 RX ADMIN — CYCLOBENZAPRINE HYDROCHLORIDE 10 MG: 10 TABLET, FILM COATED ORAL at 22:10

## 2019-03-11 RX ADMIN — CEFAZOLIN SODIUM 2000 MG: 2 SOLUTION INTRAVENOUS at 01:26

## 2019-03-11 RX ADMIN — CEFAZOLIN SODIUM 2000 MG: 2 SOLUTION INTRAVENOUS at 17:15

## 2019-03-11 RX ADMIN — PANTOPRAZOLE SODIUM 40 MG: 40 TABLET, DELAYED RELEASE ORAL at 07:44

## 2019-03-11 RX ADMIN — CEFAZOLIN SODIUM 2000 MG: 2 SOLUTION INTRAVENOUS at 09:05

## 2019-03-11 RX ADMIN — ATORVASTATIN CALCIUM 40 MG: 40 TABLET, FILM COATED ORAL at 09:05

## 2019-03-11 RX ADMIN — METOPROLOL TARTRATE 25 MG: 25 TABLET, FILM COATED ORAL at 22:10

## 2019-03-11 RX ADMIN — SODIUM CHLORIDE, SODIUM LACTATE, POTASSIUM CHLORIDE, AND CALCIUM CHLORIDE 50 ML/HR: .6; .31; .03; .02 INJECTION, SOLUTION INTRAVENOUS at 07:44

## 2019-03-11 RX ADMIN — GABAPENTIN 300 MG: 300 CAPSULE ORAL at 22:10

## 2019-03-11 RX ADMIN — DIPHENHYDRAMINE HYDROCHLORIDE 25 MG: 50 INJECTION, SOLUTION INTRAMUSCULAR; INTRAVENOUS at 07:44

## 2019-03-11 RX ADMIN — VENLAFAXINE 37.5 MG: 37.5 TABLET ORAL at 09:05

## 2019-03-11 RX ADMIN — LISINOPRIL: 10 TABLET ORAL at 09:04

## 2019-03-11 RX ADMIN — HYDROMORPHONE HYDROCHLORIDE 0.5 MG: 1 INJECTION, SOLUTION INTRAMUSCULAR; INTRAVENOUS; SUBCUTANEOUS at 09:39

## 2019-03-11 RX ADMIN — ASPIRIN 162 MG: 81 TABLET, COATED ORAL at 09:04

## 2019-03-11 RX ADMIN — ENOXAPARIN SODIUM 40 MG: 40 INJECTION SUBCUTANEOUS at 09:05

## 2019-03-11 NOTE — PROGRESS NOTES
VAC change          Drains:  Unit Type     V  A C ulta       Black foam- # applied     1   White foam- # applied     0   Cycle     Continuous       Target Pressure (mmHg)     125       Dressing Status     Clean;Dry; Intact           Wound:    1 black sponge removed from midline wound  Wound clean, pink, good blood supply  No malodor  1 black sponge applied to wound  13cm x 4cm x 4cm  Pt tolerated procedure well       Talib Noe PA-C

## 2019-03-11 NOTE — PLAN OF CARE
Problem: DISCHARGE PLANNING - CARE MANAGEMENT  Goal: Discharge to post-acute care or home with appropriate resources  Description  INTERVENTIONS:  - Conduct assessment to determine patient/family and health care team treatment goals, and need for post-acute services based on payer coverage, community resources, and patient preferences, and barriers to discharge  - Address psychosocial, clinical, and financial barriers to discharge as identified in assessment in conjunction with the patient/family and health care team  - Arrange appropriate level of post-acute services according to patient?s   needs and preference and payer coverage in collaboration with the physician and health care team  - Communicate with and update the patient/family, physician, and health care team regarding progress on the discharge plan  - Arrange appropriate transportation to post-acute venues  Outcome: Progressing  Note:   CM spoke with patient at the bedside  Patient is agreeable to [facility] for wound vac supplies  Patient is agreeable to [facility] delivering the wound vac today at the bedside  Patient is agreeable to following up at the outpatient wound care clinic  Patient is agreeable to [address] at discharge  Patient aware that [facility] will explain any copay charges and is able to deliver medications to the bedside  Patient's wife will transport home at discharge  CM spoke with nutritionist re:wound care and healing  Nutritionist will be up to see patient at the bedside  CM spoke with Zelda Petit from [facility] Petit will be at the hospital today around noon to deliver home wound vac supplies  CM fax attached required documents to [facility]

## 2019-03-11 NOTE — SOCIAL WORK
CM spoke with patient at the bedside  Patient is agreeable to [employer] [facility] for wound vac supplies  Patient is agreeable to [employer] delivering the wound vac today at the bedside  Patient is agreeable to following up at the outpatient wound care clinic  Patient is agreeable to [address] at discharge  Patient aware that [employer] will explain any copay charges and is able to deliver medications to the bedside  Patient's wife will transport home at discharge  CM spoke with nutritionist re:wound care and healing  Nutritionist will be up to see patient at the bedside  CM spoke with Avi Lezama from [employer] [facility] Lezama will be at the hospital today around noon to deliver home wound vac supplies  CM fax attached required documents to [employer] [facility]

## 2019-03-11 NOTE — PROGRESS NOTES
Progress Note -Surgery PA  Klaudia Falcon 62 y o  male MRN: [id number]  Unit/Bed#: -01 Encounter: [id number]    ASSESSMENT/PLAN:  Problem List     * (Principal) Abdominal wall seroma ([address] )    Hypertension    History of MI (myocardial infarction)    Non morbid obesity, unspecified obesity type    H/O heart artery stent    Skin irritation    Benign prostatic hyperplasia   63 yo M with open wound with staph infection  Cultures so far have shown MSSA  Last washout was much improved  Will plan on changing VAC today and patient can follow up with Dr Jennifer Early outpatient and wound care clinic  Per case management, patient qualifies for either wound clinic or VNA  Dr Rakesh Sigala would like him to go to wound clinic  · VAC change today  · IV abx  · Follow up on final culture reads  · Probable DC 3/13 with a prescription for Keflex 500 mg QID through 3/12/19  He should have a week total of combined  abx- Ancef and Keflex     VTE Pharmacologic Prophylaxis: Sequential compression device (Venodyne)  and Enoxaparin (Lovenox)    Subjective/Objective     Subjective: No events overnight  C/o pain at left caudal aspect of wound  No skin erythema  VAC in place    Objective/Physical Exam: Blood pressure 154/88, pulse 68, temperature 98 4 °F (36 9 °C), temperature source Oral, resp  rate 18, height 5' 10" (1 778 m), weight 108 kg (239 lb), SpO2 99 %  ,Body mass index is 34 29 kg/m²  General appearance: alert and oriented, in no acute distress  Heart: regular rate and rhythm, S1, S2 normal, no murmur, click, rub or gallop  Lungs: clear to auscultation bilaterally  Abdomen: rounded and soft, +BS  VAC in place  very tender superior left side of wound  No erythema or palpable collection    Neurological: normal without focal findings      Current Facility-Administered Medications:     acetaminophen (TYLENOL) tablet 650 mg, 650 mg, Oral, Q4H PRN, Walter Garcia DO    acetaminophen (TYLENOL) tablet 650 mg, 650 mg, Oral, Q6H PRN, Walter Rosa [facility], DO    aspirin (ECOTRIN LOW STRENGTH) EC tablet 162 mg, 162 mg, Oral, Daily, Walter Garcia DO, 162 mg at 03/11/19 0904    atorvastatin (LIPITOR) tablet 40 mg, 40 mg, Oral, Daily, Walter Garcia DO, 40 mg at 03/11/19 0905    ceFAZolin (ANCEF) IVPB (premix) 2,000 mg, 2,000 mg, Intravenous, Q8H, Walter Garcia DO, Last Rate: 100 mL/hr at 03/11/19 0905, 2,000 mg at 03/11/19 0905    cyclobenzaprine (FLEXERIL) tablet 10 mg, 10 mg, Oral, Q12H PRN, Walter Garcia DO, 10 mg at 03/10/19 2246    diphenhydrAMINE (BENADRYL) injection 25 mg, 25 mg, Intravenous, Q6H PRN, Walter Garcia DO, 25 mg at 03/11/19 0744    enoxaparin (LOVENOX) subcutaneous injection 40 mg, 40 mg, Subcutaneous, Daily, Walter Garcia DO, 40 mg at 03/11/19 0905    gabapentin (NEURONTIN) capsule 300 mg, 300 mg, Oral, Q12H PRN, Walter Garcia DO, 300 mg at 03/10/19 2246    HYDROmorphone (DILAUDID) injection 0 5 mg, 0 5 mg, Intravenous, Q1H PRN, Walter Garcia DO, 0 5 mg at 03/09/19 1941    lactated ringers infusion, 50 mL/hr, Intravenous, Continuous, Walter Garcia DO, Last Rate: 50 mL/hr at 03/11/19 0744, 50 mL/hr at 03/11/19 0744    lisinopril-hydrochlorothiazide (PRINZIDE 10/12  5) combo dose, , Oral, Daily, Walter Garcia DO    metoprolol tartrate (LOPRESSOR) tablet 25 mg, 25 mg, Oral, HS, Walter Garcia DO, 25 mg at 03/10/19 2246    nitroglycerin (NITROSTAT) SL tablet 0 4 mg, 0 4 mg, Sublingual, Q5 Min PRN, Walter Garcia DO    ondansetron (ZOFRAN) injection 4 mg, 4 mg, Intravenous, Q4H PRN, Walter Garcia DO    ondansetron (ZOFRAN) injection 4 mg, 4 mg, Intravenous, Q4H PRN, Walter Garcia DO, 4 mg at 03/09/19 1200    oxyCODONE (ROXICODONE) immediate release tablet 10 mg, 10 mg, Oral, Q4H PRN, Walter Garcia DO, 10 mg at 03/09/19 0153    oxyCODONE (ROXICODONE) IR tablet 5 mg, 5 mg, Oral, Q4H PRN, Walter Garcia DO, 5 mg at 03/09/19 1827    pantoprazole (PROTONIX) EC tablet 40 mg, 40 mg, Oral, Daily Before Breakfast, Walter Garcia DO, 40 mg at 03/11/19 0744    traMADol (ULTRAM) tablet 50 mg, 50 mg, Oral, Q4H PRN, Walter Garcia DO, 50 mg at 03/10/19 1527    venlafaxine (EFFEXOR) tablet 37 5 mg, 37 5 mg, Oral, Daily, Walter Garcia DO, 37 5 mg at 03/11/19 9997

## 2019-03-12 LAB
ERYTHROCYTE [DISTWIDTH] IN BLOOD BY AUTOMATED COUNT: 12 % (ref 11.6–15.1)
HCT VFR BLD AUTO: 36.7 % (ref 36.5–49.3)
HGB BLD-MCNC: 12.3 G/DL (ref 12–17)
MCH RBC QN AUTO: 31.1 PG (ref 26.8–34.3)
MCHC RBC AUTO-ENTMCNC: 33.5 G/DL (ref 31.4–37.4)
MCV RBC AUTO: 93 FL (ref 82–98)
PLATELET # BLD AUTO: 406 THOUSANDS/UL (ref 149–390)
PMV BLD AUTO: 9.7 FL (ref 8.9–12.7)
RBC # BLD AUTO: 3.95 MILLION/UL (ref 3.88–5.62)
WBC # BLD AUTO: 14.15 THOUSAND/UL (ref 4.31–10.16)

## 2019-03-12 PROCEDURE — 85027 COMPLETE CBC AUTOMATED: CPT | Performed by: PHYSICIAN ASSISTANT

## 2019-03-12 RX ADMIN — CEFAZOLIN SODIUM 2000 MG: 2 SOLUTION INTRAVENOUS at 17:15

## 2019-03-12 RX ADMIN — VENLAFAXINE 37.5 MG: 37.5 TABLET ORAL at 09:41

## 2019-03-12 RX ADMIN — CYCLOBENZAPRINE HYDROCHLORIDE 10 MG: 10 TABLET, FILM COATED ORAL at 21:47

## 2019-03-12 RX ADMIN — SODIUM CHLORIDE, SODIUM LACTATE, POTASSIUM CHLORIDE, AND CALCIUM CHLORIDE 50 ML/HR: .6; .31; .03; .02 INJECTION, SOLUTION INTRAVENOUS at 21:44

## 2019-03-12 RX ADMIN — ASPIRIN 162 MG: 81 TABLET, COATED ORAL at 09:41

## 2019-03-12 RX ADMIN — PANTOPRAZOLE SODIUM 40 MG: 40 TABLET, DELAYED RELEASE ORAL at 06:23

## 2019-03-12 RX ADMIN — GABAPENTIN 300 MG: 300 CAPSULE ORAL at 21:47

## 2019-03-12 RX ADMIN — TRAMADOL HYDROCHLORIDE 50 MG: 50 TABLET, COATED ORAL at 22:58

## 2019-03-12 RX ADMIN — ATORVASTATIN CALCIUM 40 MG: 40 TABLET, FILM COATED ORAL at 09:41

## 2019-03-12 RX ADMIN — CEFAZOLIN SODIUM 2000 MG: 2 SOLUTION INTRAVENOUS at 09:42

## 2019-03-12 RX ADMIN — ENOXAPARIN SODIUM 40 MG: 40 INJECTION SUBCUTANEOUS at 09:40

## 2019-03-12 RX ADMIN — LISINOPRIL: 10 TABLET ORAL at 09:41

## 2019-03-12 RX ADMIN — CEFAZOLIN SODIUM 2000 MG: 2 SOLUTION INTRAVENOUS at 00:51

## 2019-03-12 RX ADMIN — METOPROLOL TARTRATE 25 MG: 25 TABLET, FILM COATED ORAL at 21:45

## 2019-03-12 NOTE — PLAN OF CARE
Problem: SKIN/TISSUE INTEGRITY - ADULT  Goal: Incision(s), wounds(s) or drain site(s) healing without S/S of infection  Description  INTERVENTIONS  - Assess and document risk factors for skin impairment   - Assess and document dressing, incision, wound bed, drain sites and surrounding tissue  - Initiate Nutrition services consult and/or wound management as needed  Outcome: Progressing     Problem: PAIN - ADULT  Goal: Verbalizes/displays adequate comfort level or baseline comfort level  Description  Interventions:  - Encourage patient to monitor pain and request assistance  - Assess pain using appropriate pain scale  - Administer analgesics based on type and severity of pain and evaluate response  - Implement non-pharmacological measures as appropriate and evaluate response  - Consider cultural and social influences on pain and pain management  - Notify physician/advanced practitioner if interventions unsuccessful or patient reports new pain  Outcome: Progressing     Problem: INFECTION - ADULT  Goal: Absence or prevention of progression during hospitalization  Description  INTERVENTIONS:  - Assess and monitor for signs and symptoms of infection  - Monitor lab/diagnostic results  - Monitor all insertion sites, i e  indwelling lines, tubes, and drains  - Monitor endotracheal (as able) and nasal secretions for changes in amount and color  - [place] appropriate cooling/warming therapies per order  - Administer medications as ordered  - Instruct and encourage patient and family to use good hand hygiene technique  - Identify and instruct in appropriate isolation precautions for identified infection/condition  Outcome: Progressing     Problem: DISCHARGE PLANNING - CARE MANAGEMENT  Goal: Discharge to post-acute care or home with appropriate resources  Description  INTERVENTIONS:  - Conduct assessment to determine patient/family and health care team treatment goals, and need for post-acute services based on payer coverage, community resources, and patient preferences, and barriers to discharge  - Address psychosocial, clinical, and financial barriers to discharge as identified in assessment in conjunction with the patient/family and health care team  - Arrange appropriate level of post-acute services according to patient?s   needs and preference and payer coverage in collaboration with the physician and health care team  - Communicate with and update the patient/family, physician, and health care team regarding progress on the discharge plan  - Arrange appropriate transportation to post-acute venues  Outcome: Progressing     Problem: Potential for Falls  Goal: Patient will remain free of falls  Description  INTERVENTIONS:  - Assess patient frequently for physical needs  -  Identify cognitive and physical deficits and behaviors that affect risk of falls  -  [place] fall precautions as indicated by assessment   - Educate patient/family on patient safety including physical limitations  - Instruct patient to call for assistance with activity based on assessment  - Modify environment to reduce risk of injury  - Consider OT/PT consult to assist with strengthening/mobility  Outcome: Progressing     Problem: Nutrition/Hydration-ADULT  Goal: Nutrient/Hydration intake appropriate for improving, restoring or maintaining nutritional needs  Description  Monitor and assess patient's nutrition/hydration status for malnutrition (ex- brittle hair, bruises, dry skin, pale skin and conjunctiva, muscle wasting, smooth red tongue, and disorientation)  Collaborate with interdisciplinary team and initiate plan and interventions as ordered  Monitor patient's weight and dietary intake as ordered or per policy  Utilize nutrition screening tool and intervene per policy  Determine patient's food preferences and provide high-protein, high-caloric foods as appropriate       INTERVENTIONS:  - Monitor oral intake, urinary output, labs, and treatment plans  - Assess nutrition and hydration status and recommend course of action  - Evaluate amount of meals eaten  - Assist patient with eating if necessary   - Allow adequate time for meals  - Recommend/ encourage appropriate diets, oral nutritional supplements, and vitamin/mineral supplements  - Order, calculate, and assess calorie counts as needed  - Recommend, monitor, and adjust tube feedings and TPN/PPN based on assessed needs  - Assess need for intravenous fluids  - Provide specific nutrition/hydration education as appropriate  - Include patient/family/caregiver in decisions related to nutrition  Outcome: Progressing

## 2019-03-12 NOTE — PROGRESS NOTES
03/12/19 1000   Clinical Encounter Type   Visited With Patient   Routine Visit Introduction   Surgical Visit Post-op   Referral From    Mu-ism Encounters   Mu-ism Needs Prayer   Patient Spiritual Encounters   Spiritual Assessment 4   Suffering Severity 3   Fear Level 3   Feelings of Loneliness Yes   Feelings of Hopelessness No   Coping 4   Social Interaction 100% of the time   Spiritual Encounter Notes Visited with patient who expressed his hope that he will be discharged tomorrow afternoon  Patient reports being in the hospital with a post-operative infection related to the removal of an abdominal mass  He reports that this experience has been a struggle but the support of his family and the [facility] medical team has helped him through it   offered prayer and continued comfort and support

## 2019-03-12 NOTE — PROGRESS NOTES
Progress Note -Surgery PA  David Blue 62 y o  male MRN: [id number]  Unit/Bed#: -01 Encounter: [id number]    ASSESSMENT/PLAN:  Problem List     * (Principal) Abdominal wall seroma ([address] )    Hypertension    History of MI (myocardial infarction)    Non morbid obesity, unspecified obesity type    Benign prostatic hyperplasia    Irritant contact dermatitis   63 yo M with open wound with staph infection  Cultures have shown MSSA  VAC change yesterday  Patient can follow with Dr Grzegorz Worley outpatient and wound care clinic  · VAC change at DC  Will change tomorrow  · IV abx  · Pain meds PRN  · Probable DC 3/13 with a prescription for Keflex 500 mg QID through 3/12/19  He should have a week total of combined  abx- Ancef and Keflex     VTE Pharmacologic Prophylaxis: Sequential compression device (Venodyne)  and Enoxaparin (Lovenox)    Subjective/Objective     Subjective:VAC drained 360 cc drainage after a coughing spell last pm   Drainage has since slowed down   Denies dizziness    Objective/Physical Exam: Blood pressure 126/66, pulse 71, temperature 98 8 °F (37 1 °C), temperature source Oral, resp  rate 18, height 5' 10" (1 778 m), weight 108 kg (239 lb), SpO2 99 %  ,Body mass index is 34 29 kg/m²  General appearance: alert and oriented, in no acute distress  Heart: regular rate and rhythm, S1, S2 normal, no murmur, click, rub or gallop  Lungs: clear to auscultation bilaterally  Abdomen: VAC in place, bloody drainage in tube, flat, soft minimally tender     Neurological: normal without focal findings      Current Facility-Administered Medications:     acetaminophen (TYLENOL) tablet 650 mg, 650 mg, Oral, Q4H PRN, Walter Garcia DO    acetaminophen (TYLENOL) tablet 650 mg, 650 mg, Oral, Q6H PRN, Walter Garcia DO    aspirin (ECOTRIN LOW STRENGTH) EC tablet 162 mg, 162 mg, Oral, Daily, Walter Garcia DO, 162 mg at 03/11/19 0904    atorvastatin (LIPITOR) tablet 40 mg, 40 mg, Oral, Daily, Walter Garcia DO, 40 mg at 03/11/19 0905    ceFAZolin (ANCEF) IVPB (premix) 2,000 mg, 2,000 mg, Intravenous, Q8H, Walter Garcia DO, Last Rate: 100 mL/hr at 03/12/19 0051, 2,000 mg at 03/12/19 0051    cyclobenzaprine (FLEXERIL) tablet 10 mg, 10 mg, Oral, Q12H PRN, Walter Garcia DO, 10 mg at 03/11/19 2210    diphenhydrAMINE (BENADRYL) injection 25 mg, 25 mg, Intravenous, Q6H PRN, Walter Garcia DO, 25 mg at 03/11/19 0744    enoxaparin (LOVENOX) subcutaneous injection 40 mg, 40 mg, Subcutaneous, Daily, Walter Garcia DO, 40 mg at 03/11/19 0905    gabapentin (NEURONTIN) capsule 300 mg, 300 mg, Oral, Q12H PRN, Walter Garcia DO, 300 mg at 03/11/19 2210    HYDROmorphone (DILAUDID) injection 0 5 mg, 0 5 mg, Intravenous, Q1H PRN, Walter Garcia DO, 0 5 mg at 03/11/19 0939    lactated ringers infusion, 50 mL/hr, Intravenous, Continuous, Walter Garcia DO, Last Rate: 50 mL/hr at 03/11/19 0744, 50 mL/hr at 03/11/19 0744    lisinopril-hydrochlorothiazide (PRINZIDE 10/12  5) combo dose, , Oral, Daily, Walter Garcia DO    metoprolol tartrate (LOPRESSOR) tablet 25 mg, 25 mg, Oral, HS, Walter Garcia DO, 25 mg at 03/11/19 2210    nitroglycerin (NITROSTAT) SL tablet 0 4 mg, 0 4 mg, Sublingual, Q5 Min PRN, Walter Garcia DO    ondansetron (ZOFRAN) injection 4 mg, 4 mg, Intravenous, Q4H PRN, Walter Garcia DO    ondansetron (ZOFRAN) injection 4 mg, 4 mg, Intravenous, Q4H PRN, Walter Garcia DO, 4 mg at 03/09/19 1200    oxyCODONE (ROXICODONE) immediate release tablet 10 mg, 10 mg, Oral, Q4H PRN, Walter Garcia DO, 10 mg at 03/09/19 0153    oxyCODONE (ROXICODONE) IR tablet 5 mg, 5 mg, Oral, Q4H PRN, Walter Garcia DO, 5 mg at 03/09/19 1827    pantoprazole (PROTONIX) EC tablet 40 mg, 40 mg, Oral, Daily Before Breakfast, Walter Garcia DO, 40 mg at 03/12/19 8184    traMADol (ULTRAM) tablet 50 mg, 50 mg, Oral, Q4H PRN, Walter Garcia DO, 50 mg at 03/10/19 1527    venlafaxine (EFFEXOR) tablet 37 5 mg, 37 5 mg, Oral, Daily, Walter Garcia DO, 37 5 mg at 03/11/19 9600

## 2019-03-12 NOTE — NUTRITION
03/11/19 1325   Assessment   Timepoint Initial  (wound trigger per CM)   Labs   List Completed Labs   (atorvastatin, IVF; current labs )   Adequacy of Intake   Nutrition Modality PO  (regular)   Intake Meals 25-50%;50-75%   Estimated Calorie Intake 25-50%   Estimated Protein Intake  25-50%   Estimated Fluid Intake %  (IVF)   Nutrition Prognosis   Nutrition Concerns   (food allergies; see above; gastric bypass)   Comorbid Concerns   (abdominal wall seroma, obesity)   Nutrition Considerations   (provided education regarding adequate protein ; pt multiple food allergies, discussed protein sources avoiding such)   PES Statement   Problem Intake   Energy Balance (1) Predicted suboptimal energy intake NI-1 4   Related to Decreased Appetite   As evidenced by: Reported intake < usual;Per patient interview; Wound healing   Patient Nutrition Goals   Goal increase intake;comprehend education   Goal Status initiated   Timeframe to complete goal by next f/u   Recommendations/Interventions   Summary pt POD#2 debridement of abdominal wound, VAC placed   Malnutrition/BMI Present No   Interventions Diet: continued as ordered;Education initiated/completed  (unable to provide supplement due to food allergies)   Nutrition Recommendations Continue diet as ordered   Nutrition Complexity Risk   Nutrition complexity level Moderate risk   Nutrition review: 03/14/19  (PO%)   Follow up date 03/18/19

## 2019-03-13 VITALS
TEMPERATURE: 98.2 F | HEIGHT: 70 IN | SYSTOLIC BLOOD PRESSURE: 100 MMHG | HEART RATE: 71 BPM | RESPIRATION RATE: 18 BRPM | BODY MASS INDEX: 34.22 KG/M2 | DIASTOLIC BLOOD PRESSURE: 58 MMHG | WEIGHT: 239 LBS | OXYGEN SATURATION: 98 %

## 2019-03-13 PROCEDURE — 97606 NEG PRS WND THER DME>50 SQCM: CPT | Performed by: PHYSICIAN ASSISTANT

## 2019-03-13 PROCEDURE — 2W03X6Z CHANGE PRESSURE DRESSING ON ABDOMINAL WALL: ICD-10-PCS | Performed by: SURGERY

## 2019-03-13 RX ORDER — OXYCODONE HYDROCHLORIDE AND ACETAMINOPHEN 5; 325 MG/1; MG/1
1 TABLET ORAL EVERY 4 HOURS PRN
Qty: 30 TABLET | Refills: 0 | Status: SHIPPED | OUTPATIENT
Start: 2019-03-13 | End: 2019-03-23

## 2019-03-13 RX ORDER — DIPHENHYDRAMINE HCL 25 MG
25 TABLET ORAL EVERY 6 HOURS PRN
Status: DISCONTINUED | OUTPATIENT
Start: 2019-03-13 | End: 2019-03-13 | Stop reason: HOSPADM

## 2019-03-13 RX ORDER — TRAMADOL HYDROCHLORIDE 50 MG/1
50 TABLET ORAL EVERY 4 HOURS PRN
Status: DISCONTINUED | OUTPATIENT
Start: 2019-03-13 | End: 2019-03-13 | Stop reason: HOSPADM

## 2019-03-13 RX ADMIN — PANTOPRAZOLE SODIUM 40 MG: 40 TABLET, DELAYED RELEASE ORAL at 06:33

## 2019-03-13 RX ADMIN — VENLAFAXINE 37.5 MG: 37.5 TABLET ORAL at 08:10

## 2019-03-13 RX ADMIN — CEFAZOLIN SODIUM 2000 MG: 2 SOLUTION INTRAVENOUS at 08:11

## 2019-03-13 RX ADMIN — ENOXAPARIN SODIUM 40 MG: 40 INJECTION SUBCUTANEOUS at 08:07

## 2019-03-13 RX ADMIN — ASPIRIN 162 MG: 81 TABLET, COATED ORAL at 08:10

## 2019-03-13 RX ADMIN — HYDROMORPHONE HYDROCHLORIDE 0.5 MG: 1 INJECTION, SOLUTION INTRAMUSCULAR; INTRAVENOUS; SUBCUTANEOUS at 11:51

## 2019-03-13 RX ADMIN — OXYCODONE HYDROCHLORIDE 5 MG: 5 TABLET ORAL at 09:31

## 2019-03-13 RX ADMIN — CEFAZOLIN SODIUM 2000 MG: 2 SOLUTION INTRAVENOUS at 01:02

## 2019-03-13 RX ADMIN — ATORVASTATIN CALCIUM 40 MG: 40 TABLET, FILM COATED ORAL at 08:10

## 2019-03-13 NOTE — PLAN OF CARE
Problem: SKIN/TISSUE INTEGRITY - ADULT  Goal: Incision(s), wounds(s) or drain site(s) healing without S/S of infection  Description  INTERVENTIONS  - Assess and document risk factors for skin impairment   - Assess and document dressing, incision, wound bed, drain sites and surrounding tissue  - Initiate Nutrition services consult and/or wound management as needed  Outcome: Progressing     Problem: PAIN - ADULT  Goal: Verbalizes/displays adequate comfort level or baseline comfort level  Description  Interventions:  - Encourage patient to monitor pain and request assistance  - Assess pain using appropriate pain scale  - Administer analgesics based on type and severity of pain and evaluate response  - Implement non-pharmacological measures as appropriate and evaluate response  - Consider cultural and social influences on pain and pain management  - Notify physician/advanced practitioner if interventions unsuccessful or patient reports new pain  Outcome: Progressing     Problem: INFECTION - ADULT  Goal: Absence or prevention of progression during hospitalization  Description  INTERVENTIONS:  - Assess and monitor for signs and symptoms of infection  - Monitor lab/diagnostic results  - Monitor all insertion sites, i e  indwelling lines, tubes, and drains  - Monitor endotracheal (as able) and nasal secretions for changes in amount and color  - [place] appropriate cooling/warming therapies per order  - Administer medications as ordered  - Instruct and encourage patient and family to use good hand hygiene technique  - Identify and instruct in appropriate isolation precautions for identified infection/condition  Outcome: Progressing     Problem: DISCHARGE PLANNING - CARE MANAGEMENT  Goal: Discharge to post-acute care or home with appropriate resources  Description  INTERVENTIONS:  - Conduct assessment to determine patient/family and health care team treatment goals, and need for post-acute services based on payer coverage, community resources, and patient preferences, and barriers to discharge  - Address psychosocial, clinical, and financial barriers to discharge as identified in assessment in conjunction with the patient/family and health care team  - Arrange appropriate level of post-acute services according to patient?s   needs and preference and payer coverage in collaboration with the physician and health care team  - Communicate with and update the patient/family, physician, and health care team regarding progress on the discharge plan  - Arrange appropriate transportation to post-acute venues  Outcome: Progressing     Problem: Potential for Falls  Goal: Patient will remain free of falls  Description  INTERVENTIONS:  - Assess patient frequently for physical needs  -  Identify cognitive and physical deficits and behaviors that affect risk of falls  -  Lostant fall precautions as indicated by assessment   - Educate patient/family on patient safety including physical limitations  - Instruct patient to call for assistance with activity based on assessment  - Modify environment to reduce risk of injury  - Consider OT/PT consult to assist with strengthening/mobility  Outcome: Progressing     Problem: Nutrition/Hydration-ADULT  Goal: Nutrient/Hydration intake appropriate for improving, restoring or maintaining nutritional needs  Description  Monitor and assess patient's nutrition/hydration status for malnutrition (ex- brittle hair, bruises, dry skin, pale skin and conjunctiva, muscle wasting, smooth red tongue, and disorientation)  Collaborate with interdisciplinary team and initiate plan and interventions as ordered  Monitor patient's weight and dietary intake as ordered or per policy  Utilize nutrition screening tool and intervene per policy  Determine patient's food preferences and provide high-protein, high-caloric foods as appropriate       INTERVENTIONS:  - Monitor oral intake, urinary output, labs, and treatment plans  - Assess nutrition and hydration status and recommend course of action  - Evaluate amount of meals eaten  - Assist patient with eating if necessary   - Allow adequate time for meals  - Recommend/ encourage appropriate diets, oral nutritional supplements, and vitamin/mineral supplements  - Order, calculate, and assess calorie counts as needed  - Recommend, monitor, and adjust tube feedings and TPN/PPN based on assessed needs  - Assess need for intravenous fluids  - Provide specific nutrition/hydration education as appropriate  - Include patient/family/caregiver in decisions related to nutrition  Outcome: Progressing

## 2019-03-13 NOTE — DISCHARGE INSTRUCTIONS
Midline wound: Please change wound VAC MWF  1 black sponge placed in midline incision  VAC to 125 mmHg suction  Patient to follow up with [facility] in 2 weeks  Patient to follow up with Dr Lyle Damon in 2 weeks  Determine amount of time to be off work when you follow up with Dr Lyle Damon in 2 weeks  No driving while taking pain medications

## 2019-03-13 NOTE — DISCHARGE SUMMARY
Discharge Summary - Bobbi Cantrell 62 y o  male MRN: [id number]    Unit/Bed#: -01 Encounter: [id number]    Admission Date: 3/4/2019   Discharge Date: 03/13/19    Admitting Diagnosis:   Postoperative MRSA infection of lower abdominal wound [T81 49XA, B95 62]    Discharge Diagnoses: Principal Problem:    Abdominal wall seroma ([address] )      Consultations: None    Procedures Performed:   1  Debridement of abdominal wound and wound vac application 4/4/24 with Dr Gio Ramos  2  Debridement of abdominal wound and wound vac dressing change 3/9/19 with Dr Harmony Mcdaniel Course: Bobbi Cantrell is a 62 y o  male admitted for abdominal wall seroma  Patient was started on IV antibiotics  He was taken to the OR on 3/7/19 with Dr Gio Ramos for debridement of abdominal wound and vac application  Patient was taken back to the OR on 3/9/19 for an additional washout and vac change  Cultures from wound grew Staph aureus  Pt was kept on a full 7 days of IV abx  He was discharged in good condition  VNA set up for patient to have visiting nurse MWF for vac change  He is to follow up with wound care and Dr Gio Ramos in 2 weeks  Condition at Discharge: good     Discharge instructions/Information to patient and family:   See after visit summary for information provided to patient and family  Provisions for Follow-Up Care:  See after visit summary for information related to follow-up care and any pertinent home health orders  Disposition: See After Visit Summary for discharge disposition information  Planned Readmission: No    Discharge Statement   I spent 20 minutes discharging the patient  This time was spent on the day of discharge  I had direct contact with the patient on the day of discharge  Additional documentation is required if more than 30 minutes were spent on discharge  Discharge Medications:  See after visit summary for reconciled discharge medications provided to patient and family        Caleb To Kathya English PA-C

## 2019-03-13 NOTE — PROCEDURES
VAC change          Drains:  Unit Type     V  A C ulta       Black foam- # applied     1   White foam- # applied     0   Cycle     Continuous       Target Pressure (mmHg)     125       Dressing Status     Clean;Scant amount serosanguinous; Intact           Wound:    1 black sponge removed from midline incision  Large amount of clotted blood noted to wound bed  2L saline used to irrigate wound  Large clots removed  Wound pink without any evidence of active bleeding  1 black sponge replaced  Wound measured 13cm x 4cm x 4cm  Pt tolerated procedure well       Toni Preciado PA-C

## 2019-03-13 NOTE — SOCIAL WORK
CM spoke with patient at the bedside with surgical PA present  Patient is agreeable to [facility] for wound vac dressing changes three times a week at home  Patient is agreeable to following up with an outpatient wound care physician  Patient is agreeable to medications being filled at [address] prior to discharge  CM updated [facility] with plan of care

## 2019-03-13 NOTE — PROGRESS NOTES
Progress Note - General Surgery   Jennifer Simon 62 y o  male MRN: [id number]  Unit/Bed#: -01 Encounter: [id number]    Assessment:  62year old male with abdominal wall MSSA infected seroma    Plan:  Diet as tolerated  VAC change today  Transition to Keflex  Pain control  Lovenox ppx    Subjective/Objective     Subjective: No acute events overnight  Feels well  Objective:    Blood pressure 114/59, pulse 71, temperature 98 2 °F (36 8 °C), temperature source Oral, resp  rate 18, height 5' 10" (1 778 m), weight 108 kg (239 lb), SpO2 98 %  ,Body mass index is 34 29 kg/m²        Intake/Output Summary (Last 24 hours) at 3/13/2019 0721  Last data filed at 3/13/2019 0703  Gross per 24 hour   Intake 1840 ml   Output 1110 ml   Net 730 ml       Invasive Devices     Peripheral Intravenous Line            Peripheral IV 03/12/19 Left Antecubital 1 day          Drain            Closed/Suction Drain Right Abdomen 14 5 Fr  64 days    Negative Pressure Wound Therapy (V A C ) Abdomen Mid 5 days                Physical Exam:   General: NAD, AAOx3  Eyes: PERRL  ENT: moist mucous membranes  Neck: supple  CV: RRR +S1/S2  Chest: breath sounds bilaterally  Abdomen: soft, NT ND, VAC intact with no leak  Extremities: atraumatic, no edema      Results from last 7 days   Lab Units 03/12/19  1059   WBC Thousand/uL 14 15*   HEMOGLOBIN g/dL 12 3   HEMATOCRIT % 36 7   PLATELETS Thousands/uL 406*           Invalid input(s): LABGLOM

## 2019-03-15 ENCOUNTER — TRANSITIONAL CARE MANAGEMENT (OUTPATIENT)
Dept: INTERNAL MEDICINE CLINIC | Facility: CLINIC | Age: 59
End: 2019-03-15

## 2019-03-18 ENCOUNTER — OFFICE VISIT (OUTPATIENT)
Dept: INTERNAL MEDICINE CLINIC | Facility: CLINIC | Age: 59
End: 2019-03-18
Payer: COMMERCIAL

## 2019-03-18 VITALS
RESPIRATION RATE: 16 BRPM | HEART RATE: 109 BPM | OXYGEN SATURATION: 99 % | DIASTOLIC BLOOD PRESSURE: 70 MMHG | TEMPERATURE: 95.2 F | BODY MASS INDEX: 34.22 KG/M2 | SYSTOLIC BLOOD PRESSURE: 122 MMHG | WEIGHT: 239 LBS | HEIGHT: 70 IN

## 2019-03-18 DIAGNOSIS — S30.1XXS ABDOMINAL WALL SEROMA, SEQUELA: ICD-10-CM

## 2019-03-18 DIAGNOSIS — H61.22 CERUMEN DEBRIS ON TYMPANIC MEMBRANE OF LEFT EAR: ICD-10-CM

## 2019-03-18 DIAGNOSIS — I10 ESSENTIAL HYPERTENSION: ICD-10-CM

## 2019-03-18 DIAGNOSIS — G47.01 INSOMNIA DUE TO MEDICAL CONDITION: Primary | ICD-10-CM

## 2019-03-18 PROCEDURE — 99495 TRANSJ CARE MGMT MOD F2F 14D: CPT | Performed by: NURSE PRACTITIONER

## 2019-03-18 PROCEDURE — 69210 REMOVE IMPACTED EAR WAX UNI: CPT | Performed by: NURSE PRACTITIONER

## 2019-03-18 RX ORDER — TRAZODONE HYDROCHLORIDE 50 MG/1
50 TABLET ORAL
Qty: 30 TABLET | Refills: 0 | Status: SHIPPED | OUTPATIENT
Start: 2019-03-18 | End: 2019-10-17 | Stop reason: ALTCHOICE

## 2019-03-18 NOTE — ASSESSMENT & PLAN NOTE
Blood pressure well controlled on current medications  Continue with lisinopril hydrochlorothiazide and metoprolol  Will continue to monitor

## 2019-03-18 NOTE — LETTER
March 18, 2019     Patient: Rajwinder Farooq   YOB: 1960   Date of Visit: 3/18/2019       To Whom it May Concern:    Kelly Giordano is under my professional care  He was seen in my office on 3/18/2019  He may return to work on a date TBD by his complete recovery from post operative complications    At this point in time, the date of return to work is not able to be predicted and he continues to require ongoing management and follow up evaluation  If you have any questions or concerns, please don't hesitate to call           Sincerely,          NU Murguia        CC: No Recipients

## 2019-03-18 NOTE — PROGRESS NOTES
Assessment/Plan:    Hypertension  Blood pressure well controlled on current medications  Continue with lisinopril hydrochlorothiazide and metoprolol  Will continue to monitor  Abdominal wall seroma ([address] )  Status post hospitalization for IV antibiotics due to secondary staph infection in the abdominal cavity  The site now has an intact wound VAC Rich is putting out scant serosanguineous drainage  Patient has minimal pain  Visiting nurse is coming to do dressing changes Monday Wednesday Friday  He is scheduled for follow-up with General surgery and the wound treatment team next week  No evidence of continued infection with normal temperature and normal vital signs  Cerumen debris on tympanic membrane of left ear  Patient complaining of decreased hearing and pressure on the left eardrum with cerumen present over the surface of the eardrum in the left ear  Lavage successful for removal with subjective improvement in his symptoms following the procedure  Insomnia due to medical condition  Patient has been having difficulty sleeping since his hospitalization and is very tired  Will give patient trazodone to try to see if his sleep quality improves  Reviewed the medication with him, explained that and is not habit-forming  Patient should call if he has any problems or concerns  Diagnoses and all orders for this visit:    Insomnia due to medical condition  -     traZODone (DESYREL) 50 mg tablet; Take 1 tablet (50 mg total) by mouth daily at bedtime    Cerumen debris on tympanic membrane of left ear    Essential hypertension    Abdominal wall seroma, sequela    Other orders  -     Ear cerumen removal          Subjective:      Patient ID: Brooks Georges is a 62 y o  male  Pt is a 62y o  year old male who is here for TCM visit following hospitalization at [facility] from 3/4-3/13 with discharge to Home with VNA Services  Admission diagnosis was abdominal wall seroma with post-op staph infection  Abdominal wall seroma was surgically removed 2/27 and he was readmitted to the hospital 3/4 with a staph infection  He completed 7 days of IV antibiotics and had multiple debridements while in the hospital and had a wound vac placed  Discharge medications reviewed yes  Pt is not experiencing fever/chills, pain, n/v/d  He is feeling fatigued and is having trouble sleeping through the night  His appetite is poor but he does tolerate small amounts of PO intake and is maintaining fluid intake  He has VNA coming MWF for vac changes  He pretreats with T3 prior to the dressing changes and states he tolerates the procedure well  He denies chest pain, palpitations, and shortness of breath  The following portions of the patient's history were reviewed and updated as appropriate: allergies, current medications, past family history, past medical history, past social history, past surgical history and problem list     Review of Systems   Constitutional: Positive for activity change, appetite change and fatigue  Negative for chills, fever and unexpected weight change  HENT: Positive for hearing loss (left ear)  Eyes: Negative for visual disturbance  Respiratory: Negative for cough, chest tightness and shortness of breath  Cardiovascular: Negative for chest pain, palpitations and leg swelling  Gastrointestinal: Negative for abdominal pain, constipation, diarrhea, nausea and vomiting  Genitourinary: Negative for dysuria and frequency  Musculoskeletal: Negative for arthralgias and myalgias  Skin: Positive for wound (abdomen)  Neurological: Negative for dizziness, weakness, numbness and headaches  Psychiatric/Behavioral: Positive for sleep disturbance  Negative for dysphoric mood  The patient is not nervous/anxious            Objective:      /70 (BP Location: Right arm, Patient Position: Sitting, Cuff Size: Large)   Pulse (!) 109   Temp (!) 95 2 °F (35 1 °C)   Resp 16   Ht 5' 10" (1 484 m)   Wt 108 kg (239 lb)   SpO2 99%   BMI 34 29 kg/m²   3/4-3/13       Physical Exam   Constitutional: He is oriented to person, place, and time  Vital signs are normal  He appears well-developed and well-nourished  He is cooperative  HENT:   Right Ear: Hearing, tympanic membrane, external ear and ear canal normal    Left Ear: Hearing, tympanic membrane, external ear and ear canal normal    Nose: Nose normal    Mouth/Throat: Oropharynx is clear and moist  Mucous membranes are dry  Left ear with cerumen occluding the TM; lavage successful with clear TM  Eyes: Pupils are equal, round, and reactive to light  Conjunctivae and lids are normal    Neck: No JVD present  Carotid bruit is not present  Cardiovascular: Normal rate, regular rhythm, S1 normal, S2 normal, normal heart sounds and intact distal pulses  No murmur heard  Pulmonary/Chest: Effort normal and breath sounds normal    Abdominal: Soft  Bowel sounds are normal  He exhibits no distension  There is tenderness  Dressing for wound vac is in place with no evidence of surrounding soft tissue infection  The skin does show evidence of some irritation from the adhesive but there is no breakdown or drainage  Musculoskeletal: Normal range of motion  He exhibits no edema  Lymphadenopathy:     He has no cervical adenopathy  Neurological: He is alert and oriented to person, place, and time  He has normal strength  Skin: Skin is warm, dry and intact  Psychiatric: He has a normal mood and affect  His speech is normal and behavior is normal  Judgment and thought content normal  Cognition and memory are normal    Vitals reviewed          TCM Call (since 2/15/2019)     Date and time call was made  3/14/2019 11:28 AM    Hospital care reviewed  Records reviewed    Patient was hospitialized at  [address] [place] [address]    Date of Admission  03/04/19    Date of discharge  03/13/19    Diagnosis  Abdominal Wall Seroma    Disposition  Home      TCM Call (since 2/15/2019)     Scheduled for follow up? Yes    I have advised the patient to call PCP with any new or worsening symptoms  Littie Living    Counseling  Patient    Comments  Patient appointment scheduled for 3/18/19      Ear cerumen removal  Date/Time: 3/18/2019 9:33 AM  Performed by: NU Johnson  Authorized by: NU Johnson     Patient location:  Clinic  Consent:     Consent obtained:  Verbal    Consent given by:  Patient    Risks discussed:  Incomplete removal  Universal protocol:     Procedure explained and questions answered to patient or proxy's satisfaction: yes      Patient identity confirmed:  Verbally with patient  Procedure details:     Local anesthetic:  None    Location:  L ear and external auditory canal    Approach:  External and natural orifice  Post-procedure details:     Complication:  None    Hearing quality:  Improved    Patient tolerance of procedure:   Tolerated well, no immediate complications

## 2019-03-18 NOTE — ASSESSMENT & PLAN NOTE
Patient has been having difficulty sleeping since his hospitalization and is very tired  Will give patient trazodone to try to see if his sleep quality improves  Reviewed the medication with him, explained that and is not habit-forming  Patient should call if he has any problems or concerns

## 2019-03-18 NOTE — ASSESSMENT & PLAN NOTE
Status post hospitalization for IV antibiotics due to secondary staph infection in the abdominal cavity  The site now has an intact wound VAC Rich is putting out scant serosanguineous drainage  Patient has minimal pain  Visiting nurse is coming to do dressing changes Monday Wednesday Friday  He is scheduled for follow-up with General surgery and the wound treatment team next week  No evidence of continued infection with normal temperature and normal vital signs

## 2019-03-18 NOTE — ASSESSMENT & PLAN NOTE
Patient complaining of decreased hearing and pressure on the left eardrum with cerumen present over the surface of the eardrum in the left ear  Lavage successful for removal with subjective improvement in his symptoms following the procedure

## 2019-03-25 ENCOUNTER — APPOINTMENT (OUTPATIENT)
Dept: WOUND CARE | Facility: HOSPITAL | Age: 59
End: 2019-03-25
Payer: COMMERCIAL

## 2019-03-25 PROCEDURE — 99213 OFFICE O/P EST LOW 20 MIN: CPT

## 2019-04-01 ENCOUNTER — APPOINTMENT (OUTPATIENT)
Dept: WOUND CARE | Facility: HOSPITAL | Age: 59
End: 2019-04-01
Payer: COMMERCIAL

## 2019-04-01 PROCEDURE — 17250 CHEM CAUT OF GRANLTJ TISSUE: CPT

## 2019-04-08 ENCOUNTER — APPOINTMENT (OUTPATIENT)
Dept: WOUND CARE | Facility: HOSPITAL | Age: 59
End: 2019-04-08
Payer: COMMERCIAL

## 2019-04-08 PROCEDURE — 17250 CHEM CAUT OF GRANLTJ TISSUE: CPT

## 2019-04-15 ENCOUNTER — APPOINTMENT (OUTPATIENT)
Dept: WOUND CARE | Facility: HOSPITAL | Age: 59
End: 2019-04-15
Payer: COMMERCIAL

## 2019-04-15 PROCEDURE — 17250 CHEM CAUT OF GRANLTJ TISSUE: CPT

## 2019-04-22 ENCOUNTER — APPOINTMENT (OUTPATIENT)
Dept: LAB | Facility: CLINIC | Age: 59
End: 2019-04-22
Payer: COMMERCIAL

## 2019-04-22 ENCOUNTER — OFFICE VISIT (OUTPATIENT)
Dept: INTERNAL MEDICINE CLINIC | Facility: CLINIC | Age: 59
End: 2019-04-22
Payer: COMMERCIAL

## 2019-04-22 VITALS
DIASTOLIC BLOOD PRESSURE: 64 MMHG | RESPIRATION RATE: 16 BRPM | HEART RATE: 106 BPM | TEMPERATURE: 98.9 F | BODY MASS INDEX: 34.79 KG/M2 | WEIGHT: 243 LBS | HEIGHT: 70 IN | SYSTOLIC BLOOD PRESSURE: 112 MMHG | OXYGEN SATURATION: 96 %

## 2019-04-22 DIAGNOSIS — R50.9 FEVER, UNSPECIFIED FEVER CAUSE: ICD-10-CM

## 2019-04-22 DIAGNOSIS — R35.0 URINARY FREQUENCY: ICD-10-CM

## 2019-04-22 DIAGNOSIS — R19.09 ABDOMINAL MASS OF OTHER SITE: ICD-10-CM

## 2019-04-22 DIAGNOSIS — R19.09 ABDOMINAL MASS OF OTHER SITE: Primary | ICD-10-CM

## 2019-04-22 LAB
ALBUMIN SERPL BCP-MCNC: 4 G/DL (ref 3.5–5)
ALP SERPL-CCNC: 186 U/L (ref 46–116)
ALT SERPL W P-5'-P-CCNC: 40 U/L (ref 12–78)
ANION GAP SERPL CALCULATED.3IONS-SCNC: 5 MMOL/L (ref 4–13)
AST SERPL W P-5'-P-CCNC: 24 U/L (ref 5–45)
BASOPHILS # BLD AUTO: 0.04 THOUSANDS/ΜL (ref 0–0.1)
BASOPHILS NFR BLD AUTO: 0 % (ref 0–1)
BILIRUB SERPL-MCNC: 0.75 MG/DL (ref 0.2–1)
BUN SERPL-MCNC: 20 MG/DL (ref 5–25)
CALCIUM SERPL-MCNC: 9.6 MG/DL (ref 8.3–10.1)
CHLORIDE SERPL-SCNC: 100 MMOL/L (ref 100–108)
CO2 SERPL-SCNC: 28 MMOL/L (ref 21–32)
CREAT SERPL-MCNC: 1.2 MG/DL (ref 0.6–1.3)
EOSINOPHIL # BLD AUTO: 0.35 THOUSAND/ΜL (ref 0–0.61)
EOSINOPHIL NFR BLD AUTO: 3 % (ref 0–6)
ERYTHROCYTE [DISTWIDTH] IN BLOOD BY AUTOMATED COUNT: 12.7 % (ref 11.6–15.1)
GFR SERPL CREATININE-BSD FRML MDRD: 66 ML/MIN/1.73SQ M
GLUCOSE P FAST SERPL-MCNC: 88 MG/DL (ref 65–99)
HCT VFR BLD AUTO: 41.5 % (ref 36.5–49.3)
HGB BLD-MCNC: 13.5 G/DL (ref 12–17)
IMM GRANULOCYTES # BLD AUTO: 0.03 THOUSAND/UL (ref 0–0.2)
IMM GRANULOCYTES NFR BLD AUTO: 0 % (ref 0–2)
LYMPHOCYTES # BLD AUTO: 1.18 THOUSANDS/ΜL (ref 0.6–4.47)
LYMPHOCYTES NFR BLD AUTO: 11 % (ref 14–44)
MCH RBC QN AUTO: 30.7 PG (ref 26.8–34.3)
MCHC RBC AUTO-ENTMCNC: 32.5 G/DL (ref 31.4–37.4)
MCV RBC AUTO: 94 FL (ref 82–98)
MONOCYTES # BLD AUTO: 0.85 THOUSAND/ΜL (ref 0.17–1.22)
MONOCYTES NFR BLD AUTO: 8 % (ref 4–12)
NEUTROPHILS # BLD AUTO: 8.51 THOUSANDS/ΜL (ref 1.85–7.62)
NEUTS SEG NFR BLD AUTO: 78 % (ref 43–75)
NRBC BLD AUTO-RTO: 0 /100 WBCS
PLATELET # BLD AUTO: 307 THOUSANDS/UL (ref 149–390)
PMV BLD AUTO: 10.8 FL (ref 8.9–12.7)
POTASSIUM SERPL-SCNC: 3.7 MMOL/L (ref 3.5–5.3)
PROT SERPL-MCNC: 8.6 G/DL (ref 6.4–8.2)
RBC # BLD AUTO: 4.4 MILLION/UL (ref 3.88–5.62)
SODIUM SERPL-SCNC: 133 MMOL/L (ref 136–145)
WBC # BLD AUTO: 10.96 THOUSAND/UL (ref 4.31–10.16)

## 2019-04-22 PROCEDURE — 99214 OFFICE O/P EST MOD 30 MIN: CPT | Performed by: NURSE PRACTITIONER

## 2019-04-22 PROCEDURE — 80053 COMPREHEN METABOLIC PANEL: CPT

## 2019-04-22 PROCEDURE — 85025 COMPLETE CBC W/AUTO DIFF WBC: CPT

## 2019-04-22 PROCEDURE — 84154 ASSAY OF PSA FREE: CPT

## 2019-04-22 PROCEDURE — 84153 ASSAY OF PSA TOTAL: CPT

## 2019-04-22 PROCEDURE — 36415 COLL VENOUS BLD VENIPUNCTURE: CPT

## 2019-04-23 ENCOUNTER — TELEPHONE (OUTPATIENT)
Dept: INTERNAL MEDICINE CLINIC | Facility: CLINIC | Age: 59
End: 2019-04-23

## 2019-04-23 LAB
PSA FREE MFR SERPL: 22.8 %
PSA FREE SERPL-MCNC: 0.73 NG/ML
PSA SERPL-MCNC: 3.2 NG/ML (ref 0–4)

## 2019-04-24 ENCOUNTER — TELEPHONE (OUTPATIENT)
Dept: INTERNAL MEDICINE CLINIC | Facility: CLINIC | Age: 59
End: 2019-04-24

## 2019-04-29 ENCOUNTER — APPOINTMENT (OUTPATIENT)
Dept: WOUND CARE | Facility: HOSPITAL | Age: 59
End: 2019-04-29
Payer: COMMERCIAL

## 2019-04-29 ENCOUNTER — TRANSCRIBE ORDERS (OUTPATIENT)
Dept: ADMINISTRATIVE | Facility: HOSPITAL | Age: 59
End: 2019-04-29

## 2019-04-29 DIAGNOSIS — L02.211 ABSCESS OF ABDOMINAL WALL: Primary | ICD-10-CM

## 2019-04-29 DIAGNOSIS — T81.89XA NON-HEALING SURGICAL WOUND, INITIAL ENCOUNTER: Primary | ICD-10-CM

## 2019-04-29 PROCEDURE — 11042 DBRDMT SUBQ TIS 1ST 20SQCM/<: CPT

## 2019-04-30 ENCOUNTER — OFFICE VISIT (OUTPATIENT)
Dept: INTERNAL MEDICINE CLINIC | Facility: CLINIC | Age: 59
End: 2019-04-30
Payer: COMMERCIAL

## 2019-04-30 ENCOUNTER — HOSPITAL ENCOUNTER (OUTPATIENT)
Dept: RADIOLOGY | Facility: HOSPITAL | Age: 59
Discharge: HOME | End: 2019-04-30
Attending: SURGERY
Payer: COMMERCIAL

## 2019-04-30 ENCOUNTER — TELEPHONE (OUTPATIENT)
Dept: SURGERY | Facility: CLINIC | Age: 59
End: 2019-04-30

## 2019-04-30 VITALS
BODY MASS INDEX: 34.79 KG/M2 | WEIGHT: 243 LBS | RESPIRATION RATE: 16 BRPM | HEART RATE: 76 BPM | DIASTOLIC BLOOD PRESSURE: 80 MMHG | SYSTOLIC BLOOD PRESSURE: 112 MMHG | HEIGHT: 70 IN | OXYGEN SATURATION: 97 % | TEMPERATURE: 97.5 F

## 2019-04-30 DIAGNOSIS — S30.1XXS ABDOMINAL WALL SEROMA, SEQUELA: Primary | ICD-10-CM

## 2019-04-30 DIAGNOSIS — G47.01 INSOMNIA DUE TO MEDICAL CONDITION: ICD-10-CM

## 2019-04-30 DIAGNOSIS — I10 ESSENTIAL HYPERTENSION: ICD-10-CM

## 2019-04-30 DIAGNOSIS — L02.211 ABSCESS OF ABDOMINAL WALL: ICD-10-CM

## 2019-04-30 PROCEDURE — 76705 ECHO EXAM OF ABDOMEN: CPT

## 2019-04-30 PROCEDURE — 99214 OFFICE O/P EST MOD 30 MIN: CPT | Performed by: NURSE PRACTITIONER

## 2019-04-30 RX ORDER — NYSTATIN 100000 [USP'U]/G
POWDER TOPICAL 3 TIMES WEEKLY
COMMUNITY
Start: 2019-04-01 | End: 2020-10-19

## 2019-05-06 ENCOUNTER — APPOINTMENT (OUTPATIENT)
Dept: WOUND CARE | Facility: HOSPITAL | Age: 59
End: 2019-05-06
Payer: COMMERCIAL

## 2019-05-06 PROCEDURE — 99212 OFFICE O/P EST SF 10 MIN: CPT

## 2019-05-20 ENCOUNTER — APPOINTMENT (OUTPATIENT)
Dept: WOUND CARE | Facility: HOSPITAL | Age: 59
End: 2019-05-20
Payer: COMMERCIAL

## 2019-05-20 PROCEDURE — 17250 CHEM CAUT OF GRANLTJ TISSUE: CPT

## 2019-05-21 DIAGNOSIS — I10 ESSENTIAL HYPERTENSION: ICD-10-CM

## 2019-05-21 RX ORDER — LISINOPRIL AND HYDROCHLOROTHIAZIDE 12.5; 1 MG/1; MG/1
1 TABLET ORAL DAILY
Qty: 90 TABLET | Refills: 2 | Status: SHIPPED | OUTPATIENT
Start: 2019-05-21 | End: 2019-07-03 | Stop reason: SDUPTHER

## 2019-05-30 ENCOUNTER — OFFICE VISIT (OUTPATIENT)
Dept: INTERNAL MEDICINE CLINIC | Facility: CLINIC | Age: 59
End: 2019-05-30
Payer: COMMERCIAL

## 2019-05-30 VITALS
HEIGHT: 70 IN | OXYGEN SATURATION: 95 % | HEART RATE: 89 BPM | WEIGHT: 252.8 LBS | RESPIRATION RATE: 14 BRPM | BODY MASS INDEX: 36.19 KG/M2 | TEMPERATURE: 97.3 F | SYSTOLIC BLOOD PRESSURE: 128 MMHG | DIASTOLIC BLOOD PRESSURE: 74 MMHG

## 2019-05-30 DIAGNOSIS — I10 ESSENTIAL HYPERTENSION: ICD-10-CM

## 2019-05-30 DIAGNOSIS — S30.1XXS ABDOMINAL WALL SEROMA, SEQUELA: ICD-10-CM

## 2019-05-30 DIAGNOSIS — E66.01 CLASS 2 SEVERE OBESITY DUE TO EXCESS CALORIES WITH SERIOUS COMORBIDITY AND BODY MASS INDEX (BMI) OF 36.0 TO 36.9 IN ADULT (HCC): ICD-10-CM

## 2019-05-30 DIAGNOSIS — G47.01 INSOMNIA DUE TO MEDICAL CONDITION: ICD-10-CM

## 2019-05-30 DIAGNOSIS — E78.5 HYPERLIPIDEMIA, UNSPECIFIED HYPERLIPIDEMIA TYPE: Primary | ICD-10-CM

## 2019-05-30 DIAGNOSIS — Z01.118 ABNORMAL EXAM OF LEFT EAR: ICD-10-CM

## 2019-05-30 PROCEDURE — 3008F BODY MASS INDEX DOCD: CPT | Performed by: NURSE PRACTITIONER

## 2019-05-30 PROCEDURE — 3078F DIAST BP <80 MM HG: CPT | Performed by: NURSE PRACTITIONER

## 2019-05-30 PROCEDURE — 99214 OFFICE O/P EST MOD 30 MIN: CPT | Performed by: NURSE PRACTITIONER

## 2019-05-30 PROCEDURE — 3074F SYST BP LT 130 MM HG: CPT | Performed by: NURSE PRACTITIONER

## 2019-05-31 ENCOUNTER — APPOINTMENT (OUTPATIENT)
Dept: LAB | Facility: CLINIC | Age: 59
End: 2019-05-31
Payer: COMMERCIAL

## 2019-05-31 DIAGNOSIS — S30.1XXS ABDOMINAL WALL SEROMA, SEQUELA: ICD-10-CM

## 2019-05-31 DIAGNOSIS — E78.5 HYPERLIPIDEMIA, UNSPECIFIED HYPERLIPIDEMIA TYPE: ICD-10-CM

## 2019-05-31 LAB
ALBUMIN SERPL BCP-MCNC: 4.3 G/DL (ref 3.5–5)
ALP SERPL-CCNC: 144 U/L (ref 46–116)
ALT SERPL W P-5'-P-CCNC: 52 U/L (ref 12–78)
ANION GAP SERPL CALCULATED.3IONS-SCNC: 5 MMOL/L (ref 4–13)
AST SERPL W P-5'-P-CCNC: 32 U/L (ref 5–45)
BASOPHILS # BLD AUTO: 0.05 THOUSANDS/ΜL (ref 0–0.1)
BASOPHILS NFR BLD AUTO: 1 % (ref 0–1)
BILIRUB SERPL-MCNC: 0.6 MG/DL (ref 0.2–1)
BUN SERPL-MCNC: 30 MG/DL (ref 5–25)
CALCIUM SERPL-MCNC: 9 MG/DL (ref 8.3–10.1)
CHLORIDE SERPL-SCNC: 105 MMOL/L (ref 100–108)
CHOLEST SERPL-MCNC: 148 MG/DL (ref 50–200)
CO2 SERPL-SCNC: 27 MMOL/L (ref 21–32)
CREAT SERPL-MCNC: 1.16 MG/DL (ref 0.6–1.3)
EOSINOPHIL # BLD AUTO: 0.32 THOUSAND/ΜL (ref 0–0.61)
EOSINOPHIL NFR BLD AUTO: 5 % (ref 0–6)
ERYTHROCYTE [DISTWIDTH] IN BLOOD BY AUTOMATED COUNT: 13.2 % (ref 11.6–15.1)
GFR SERPL CREATININE-BSD FRML MDRD: 69 ML/MIN/1.73SQ M
GLUCOSE P FAST SERPL-MCNC: 86 MG/DL (ref 65–99)
HCT VFR BLD AUTO: 46.6 % (ref 36.5–49.3)
HDLC SERPL-MCNC: 56 MG/DL (ref 40–60)
HGB BLD-MCNC: 15 G/DL (ref 12–17)
IMM GRANULOCYTES # BLD AUTO: 0.02 THOUSAND/UL (ref 0–0.2)
IMM GRANULOCYTES NFR BLD AUTO: 0 % (ref 0–2)
LDLC SERPL CALC-MCNC: 71 MG/DL (ref 0–100)
LYMPHOCYTES # BLD AUTO: 1.85 THOUSANDS/ΜL (ref 0.6–4.47)
LYMPHOCYTES NFR BLD AUTO: 27 % (ref 14–44)
MCH RBC QN AUTO: 29.8 PG (ref 26.8–34.3)
MCHC RBC AUTO-ENTMCNC: 32.2 G/DL (ref 31.4–37.4)
MCV RBC AUTO: 93 FL (ref 82–98)
MONOCYTES # BLD AUTO: 0.51 THOUSAND/ΜL (ref 0.17–1.22)
MONOCYTES NFR BLD AUTO: 7 % (ref 4–12)
NEUTROPHILS # BLD AUTO: 4.14 THOUSANDS/ΜL (ref 1.85–7.62)
NEUTS SEG NFR BLD AUTO: 60 % (ref 43–75)
NONHDLC SERPL-MCNC: 92 MG/DL
NRBC BLD AUTO-RTO: 0 /100 WBCS
PLATELET # BLD AUTO: 261 THOUSANDS/UL (ref 149–390)
PMV BLD AUTO: 10.8 FL (ref 8.9–12.7)
POTASSIUM SERPL-SCNC: 4 MMOL/L (ref 3.5–5.3)
PROT SERPL-MCNC: 8.6 G/DL (ref 6.4–8.2)
RBC # BLD AUTO: 5.03 MILLION/UL (ref 3.88–5.62)
SODIUM SERPL-SCNC: 137 MMOL/L (ref 136–145)
TRIGL SERPL-MCNC: 105 MG/DL
WBC # BLD AUTO: 6.89 THOUSAND/UL (ref 4.31–10.16)

## 2019-05-31 PROCEDURE — 85025 COMPLETE CBC W/AUTO DIFF WBC: CPT

## 2019-05-31 PROCEDURE — 36415 COLL VENOUS BLD VENIPUNCTURE: CPT

## 2019-05-31 PROCEDURE — 80053 COMPREHEN METABOLIC PANEL: CPT

## 2019-05-31 PROCEDURE — 80061 LIPID PANEL: CPT

## 2019-06-03 ENCOUNTER — APPOINTMENT (OUTPATIENT)
Dept: WOUND CARE | Facility: HOSPITAL | Age: 59
End: 2019-06-03
Payer: COMMERCIAL

## 2019-06-03 ENCOUNTER — OFFICE VISIT (OUTPATIENT)
Dept: CARDIOLOGY CLINIC | Facility: CLINIC | Age: 59
End: 2019-06-03
Payer: COMMERCIAL

## 2019-06-03 VITALS
HEART RATE: 73 BPM | WEIGHT: 252.8 LBS | HEIGHT: 70 IN | DIASTOLIC BLOOD PRESSURE: 78 MMHG | BODY MASS INDEX: 36.19 KG/M2 | SYSTOLIC BLOOD PRESSURE: 118 MMHG

## 2019-06-03 DIAGNOSIS — I10 ESSENTIAL (PRIMARY) HYPERTENSION: Primary | ICD-10-CM

## 2019-06-03 DIAGNOSIS — I25.10 CORONARY ARTERIOSCLEROSIS: ICD-10-CM

## 2019-06-03 DIAGNOSIS — E78.00 PURE HYPERCHOLESTEROLEMIA: ICD-10-CM

## 2019-06-03 PROCEDURE — 99214 OFFICE O/P EST MOD 30 MIN: CPT | Performed by: INTERNAL MEDICINE

## 2019-06-03 PROCEDURE — 17250 CHEM CAUT OF GRANLTJ TISSUE: CPT | Performed by: SURGERY

## 2019-06-17 ENCOUNTER — APPOINTMENT (OUTPATIENT)
Dept: WOUND CARE | Facility: HOSPITAL | Age: 59
End: 2019-06-17
Payer: COMMERCIAL

## 2019-06-17 PROCEDURE — 17250 CHEM CAUT OF GRANLTJ TISSUE: CPT

## 2019-07-03 DIAGNOSIS — I10 ESSENTIAL HYPERTENSION: Primary | ICD-10-CM

## 2019-07-03 RX ORDER — HYDROCHLOROTHIAZIDE 12.5 MG/1
12.5 TABLET ORAL DAILY
Qty: 90 TABLET | Refills: 0 | Status: CANCELLED | OUTPATIENT
Start: 2019-07-03

## 2019-07-03 RX ORDER — LISINOPRIL AND HYDROCHLOROTHIAZIDE 12.5; 1 MG/1; MG/1
1 TABLET ORAL DAILY
Qty: 90 TABLET | Refills: 2 | Status: SHIPPED | OUTPATIENT
Start: 2019-07-03 | End: 2020-05-18 | Stop reason: SDUPTHER

## 2019-07-15 ENCOUNTER — APPOINTMENT (OUTPATIENT)
Dept: WOUND CARE | Facility: HOSPITAL | Age: 59
End: 2019-07-15
Payer: COMMERCIAL

## 2019-07-15 PROCEDURE — 17250 CHEM CAUT OF GRANLTJ TISSUE: CPT

## 2019-07-22 ENCOUNTER — OFFICE VISIT (OUTPATIENT)
Dept: INTERNAL MEDICINE CLINIC | Facility: CLINIC | Age: 59
End: 2019-07-22
Payer: COMMERCIAL

## 2019-07-22 VITALS
HEART RATE: 63 BPM | SYSTOLIC BLOOD PRESSURE: 130 MMHG | HEIGHT: 70 IN | BODY MASS INDEX: 36.94 KG/M2 | TEMPERATURE: 97.9 F | WEIGHT: 258 LBS | DIASTOLIC BLOOD PRESSURE: 82 MMHG | OXYGEN SATURATION: 97 %

## 2019-07-22 DIAGNOSIS — I10 ESSENTIAL HYPERTENSION: ICD-10-CM

## 2019-07-22 DIAGNOSIS — L24.9 IRRITANT CONTACT DERMATITIS, UNSPECIFIED TRIGGER: Primary | ICD-10-CM

## 2019-07-22 DIAGNOSIS — S30.1XXS ABDOMINAL WALL SEROMA, SEQUELA: ICD-10-CM

## 2019-07-22 DIAGNOSIS — E78.5 HYPERLIPIDEMIA, UNSPECIFIED HYPERLIPIDEMIA TYPE: ICD-10-CM

## 2019-07-22 DIAGNOSIS — M79.674 PAIN OF RIGHT GREAT TOE: ICD-10-CM

## 2019-07-22 PROCEDURE — 3008F BODY MASS INDEX DOCD: CPT | Performed by: NURSE PRACTITIONER

## 2019-07-22 PROCEDURE — 99214 OFFICE O/P EST MOD 30 MIN: CPT | Performed by: NURSE PRACTITIONER

## 2019-07-22 PROCEDURE — 3075F SYST BP GE 130 - 139MM HG: CPT | Performed by: NURSE PRACTITIONER

## 2019-07-22 NOTE — ASSESSMENT & PLAN NOTE
Redness and irritation on the skin of the abdomen due to prolonged adhesive with wound dressings  He is no longer in need of dressings and now skin may be left open to air and has started to improve  May treat topically with cortisone as needed

## 2019-07-22 NOTE — PROGRESS NOTES
Assessment/Plan:    Hypertension  Blood pressure is well controlled  Continue with lisinopril hctz and metoprolol  CTM  Irritant contact dermatitis  Redness and irritation on the skin of the abdomen due to prolonged adhesive with wound dressings  He is no longer in need of dressings and now skin may be left open to air and has started to improve  May treat topically with cortisone as needed  Abdominal wall seroma (HCC)  Wound healing appears to be complete  He is asymptomatic and awaits f/u with surgeon in coming weeks so that he may return to work  Hyperlipidemia  Cholesterol is well controlled on atorvastatin 40 mg daily  Continue as prescribed, will continue to monitor lipid panel  Pain of right great toe  Pain 2/2 injury that occurred several months ago  He was attempting to "cut away" at the underside of his toenail due to bruising and discoloration  He is advised to stop and to allow it to heal   He may soak in cool water for comfort  He should observe for s/sx of infection including redness, swelling, drainage  Diagnoses and all orders for this visit:    Irritant contact dermatitis, unspecified trigger    Essential hypertension    Abdominal wall seroma, sequela    Hyperlipidemia, unspecified hyperlipidemia type    Pain of right great toe          Subjective:      Patient ID: Umang Gutierrez is a 61 y o  male  Pt is a 60 y/o male here for 2 month f/u  PMH includes HTN, HLD, CAD, abdominal seroma, obesity  He has been in recovery from an abdominal wound following removal of abdominal seroma for several months  Recovery was complicated by infection and he had vna coming to his home regularly for wound care  He has completed care and wound is now healed  He is no longer having fevers or abdominal pain  His appetite is normal without vomiting, nausea, or diarrhea  He is scheduled for f/u with his surgeon and once cleared he will be going back to work    His employer has mandatory overtime and he was advised to obtain a letter excusing him from overtime during the initial return to work period  He does complain today about R great toe pain  He states that several months ago, he injured his toe when a heavy cart ran over it  Due to bruising and damage to the nail he states he had been "cutting away" at the nail  He has no redness, swelling or drainage  He denies chest pain, palpitations, Sob  The following portions of the patient's history were reviewed and updated as appropriate: allergies, current medications, past family history, past medical history, past social history, past surgical history and problem list     Review of Systems   Constitutional: Negative for activity change, appetite change, chills, fatigue, fever and unexpected weight change  HENT: Negative for ear discharge, ear pain and hearing loss  Respiratory: Negative for cough, chest tightness and shortness of breath  Cardiovascular: Negative for chest pain, palpitations and leg swelling  Gastrointestinal: Negative for abdominal pain, constipation, diarrhea, nausea and vomiting  Genitourinary: Negative for dysuria and frequency  Musculoskeletal: Positive for arthralgias (R great toe)  Negative for myalgias  Allergic/Immunologic: Negative for environmental allergies  Neurological: Negative for dizziness, weakness, numbness and headaches  Objective:      /82 (BP Location: Left arm, Patient Position: Sitting, Cuff Size: Adult)   Pulse 63   Temp 97 9 °F (36 6 °C) (Tympanic)   Ht 5' 10" (1 778 m)   Wt 117 kg (258 lb)   SpO2 97%   BMI 37 02 kg/m²          Physical Exam   Constitutional: He is oriented to person, place, and time  Vital signs are normal  He appears well-developed and well-nourished  He is cooperative  HENT:   Right Ear: Hearing and external ear normal    Left Ear: Hearing and external ear normal    Eyes: Pupils are equal, round, and reactive to light  Conjunctivae and lids are normal    Neck: Normal range of motion  No JVD present  Carotid bruit is not present  No thyromegaly present  Cardiovascular: Normal rate, regular rhythm, S1 normal, S2 normal, normal heart sounds and intact distal pulses  No murmur heard  Pulmonary/Chest: Effort normal and breath sounds normal    Abdominal: Bowel sounds are normal  There is no tenderness  Healed incision site with no drainage noted  The skin of the abdomen is erythematous due to irritation from adhesive dressings  Musculoskeletal: Normal range of motion  He exhibits no edema  Right foot: There is tenderness  There is normal range of motion, no bony tenderness, no swelling, normal capillary refill, no crepitus and no deformity  Lymphadenopathy:     He has no cervical adenopathy  Neurological: He is alert and oriented to person, place, and time  He has normal strength  No sensory deficit  Skin: Skin is warm, dry and intact  There is erythema (abdomen, see above)  Psychiatric: He has a normal mood and affect  His speech is normal and behavior is normal  Judgment and thought content normal  Cognition and memory are normal    Vitals reviewed

## 2019-07-22 NOTE — LETTER
July 22, 2019     Patient: Jayda Martin   YOB: 1960   Date of Visit: 7/22/2019       To Whom it May Concern:    Ashwini Pinzon is under my professional care  He was seen in my office on 7/22/2019  He may return to work with limitations : upon return to work 8/5, scheduled work time is to be restricted to 40 hours per week until 9/15/19       If you have any questions or concerns, please don't hesitate to call           Sincerely,          NU Prabhakar        CC: No Recipients

## 2019-07-22 NOTE — ASSESSMENT & PLAN NOTE
Wound healing appears to be complete  He is asymptomatic and awaits f/u with surgeon in coming weeks so that he may return to work

## 2019-07-22 NOTE — ASSESSMENT & PLAN NOTE
Pain 2/2 injury that occurred several months ago  He was attempting to "cut away" at the underside of his toenail due to bruising and discoloration  He is advised to stop and to allow it to heal   He may soak in cool water for comfort  He should observe for s/sx of infection including redness, swelling, drainage

## 2019-07-29 ENCOUNTER — APPOINTMENT (OUTPATIENT)
Dept: WOUND CARE | Facility: HOSPITAL | Age: 59
End: 2019-07-29
Payer: COMMERCIAL

## 2019-07-29 PROCEDURE — 99212 OFFICE O/P EST SF 10 MIN: CPT

## 2019-08-07 ENCOUNTER — VBI (OUTPATIENT)
Dept: ADMINISTRATIVE | Facility: OTHER | Age: 59
End: 2019-08-07

## 2019-08-07 DIAGNOSIS — Z12.11 SCREENING FOR COLON CANCER: Primary | ICD-10-CM

## 2019-08-14 DIAGNOSIS — G89.29 CHRONIC BILATERAL BACK PAIN, UNSPECIFIED BACK LOCATION: ICD-10-CM

## 2019-08-14 DIAGNOSIS — M54.9 CHRONIC BILATERAL BACK PAIN, UNSPECIFIED BACK LOCATION: ICD-10-CM

## 2019-08-14 DIAGNOSIS — M79.2 NERVE PAIN: ICD-10-CM

## 2019-08-14 RX ORDER — CYCLOBENZAPRINE HCL 10 MG
TABLET ORAL
Qty: 90 TABLET | Refills: 2 | Status: SHIPPED | OUTPATIENT
Start: 2019-08-14 | End: 2020-05-18 | Stop reason: SDUPTHER

## 2019-08-14 RX ORDER — GABAPENTIN 300 MG/1
300 CAPSULE ORAL DAILY
Qty: 90 CAPSULE | Refills: 0 | Status: SHIPPED | OUTPATIENT
Start: 2019-08-14 | End: 2019-10-17 | Stop reason: SDUPTHER

## 2019-08-21 ENCOUNTER — VBI (OUTPATIENT)
Dept: ADMINISTRATIVE | Facility: OTHER | Age: 59
End: 2019-08-21

## 2019-08-21 NOTE — TELEPHONE ENCOUNTER
Initial attempt made and documented for [facility] Screening Patient/Employee Outreach on 08-7-19  Follow-up outreach scheduled to be completed within 7 business days  FIT kit mailed    Hortensia Irizarry  Second attempt made and documented for [facility] Screening Patient/Employee Outreach on 08/21/19  Follow-up outreach scheduled to be completed within 7 business days      Marian Clark MA

## 2019-08-27 PROBLEM — E66.3 OVERWEIGHT: Status: ACTIVE | Noted: 2018-01-05

## 2019-08-27 PROBLEM — R25.2 MUSCLE CRAMPING: Status: ACTIVE | Noted: 2018-01-05

## 2019-08-27 PROBLEM — Q27.30: Status: ACTIVE | Noted: 2018-01-05

## 2019-08-27 PROBLEM — Z91.018 TREE NUT ALLERGY: Status: ACTIVE | Noted: 2019-08-27

## 2019-08-27 PROBLEM — Z88.8 ALLERGY TO IODINE: Status: ACTIVE | Noted: 2017-01-06

## 2019-08-27 PROBLEM — M62.830 SPASM OF MUSCLE, BACK: Status: ACTIVE | Noted: 2018-01-05

## 2019-08-27 PROBLEM — Z91.018 SOY ALLERGY: Status: ACTIVE | Noted: 2019-08-27

## 2019-08-27 PROBLEM — M79.2 NERVE PAIN: Status: ACTIVE | Noted: 2018-01-05

## 2019-08-27 PROBLEM — Z91.010 PEANUT ALLERGY: Status: ACTIVE | Noted: 2019-08-27

## 2019-08-27 PROBLEM — F41.9 ANXIETY: Status: ACTIVE | Noted: 2018-01-10

## 2019-08-27 PROBLEM — K21.9 GASTROESOPHAGEAL REFLUX DISEASE: Status: ACTIVE | Noted: 2018-01-05

## 2019-08-27 PROBLEM — T78.01XA ANAPHYLACTIC SHOCK DUE TO PEANUTS: Status: ACTIVE | Noted: 2019-08-27

## 2019-08-27 PROBLEM — E78.5 HYPERLIPIDEMIA: Status: ACTIVE | Noted: 2017-01-06

## 2019-08-28 DIAGNOSIS — K21.9 GASTROESOPHAGEAL REFLUX DISEASE WITHOUT ESOPHAGITIS: ICD-10-CM

## 2019-08-28 DIAGNOSIS — F41.8 DEPRESSION WITH ANXIETY: ICD-10-CM

## 2019-08-29 RX ORDER — VENLAFAXINE 37.5 MG/1
TABLET ORAL
Qty: 90 TABLET | Refills: 0 | Status: SHIPPED | OUTPATIENT
Start: 2019-08-29 | End: 2020-01-23

## 2019-08-29 RX ORDER — PANTOPRAZOLE SODIUM 40 MG/1
TABLET, DELAYED RELEASE ORAL
Qty: 90 TABLET | Refills: 0 | Status: SHIPPED | OUTPATIENT
Start: 2019-08-29 | End: 2019-10-17 | Stop reason: SDUPTHER

## 2019-09-04 ENCOUNTER — APPOINTMENT (OUTPATIENT)
Dept: LAB | Facility: HOSPITAL | Age: 59
End: 2019-09-04
Payer: COMMERCIAL

## 2019-09-04 DIAGNOSIS — Z12.11 SCREENING FOR COLON CANCER: ICD-10-CM

## 2019-09-04 LAB — HEMOCCULT STL QL IA: NEGATIVE

## 2019-09-04 PROCEDURE — G0328 FECAL BLOOD SCRN IMMUNOASSAY: HCPCS

## 2019-10-17 ENCOUNTER — OFFICE VISIT (OUTPATIENT)
Dept: INTERNAL MEDICINE CLINIC | Facility: CLINIC | Age: 59
End: 2019-10-17
Payer: COMMERCIAL

## 2019-10-17 VITALS
RESPIRATION RATE: 16 BRPM | HEART RATE: 80 BPM | SYSTOLIC BLOOD PRESSURE: 126 MMHG | BODY MASS INDEX: 35.36 KG/M2 | OXYGEN SATURATION: 96 % | WEIGHT: 247 LBS | TEMPERATURE: 97.8 F | HEIGHT: 70 IN | DIASTOLIC BLOOD PRESSURE: 80 MMHG

## 2019-10-17 DIAGNOSIS — K21.9 GASTROESOPHAGEAL REFLUX DISEASE WITHOUT ESOPHAGITIS: ICD-10-CM

## 2019-10-17 DIAGNOSIS — I25.2 HISTORY OF MI (MYOCARDIAL INFARCTION): ICD-10-CM

## 2019-10-17 DIAGNOSIS — T78.01XA PEANUT-INDUCED ANAPHYLAXIS, INITIAL ENCOUNTER: ICD-10-CM

## 2019-10-17 DIAGNOSIS — I10 ESSENTIAL HYPERTENSION: ICD-10-CM

## 2019-10-17 DIAGNOSIS — J01.90 ACUTE NON-RECURRENT SINUSITIS, UNSPECIFIED LOCATION: Primary | ICD-10-CM

## 2019-10-17 DIAGNOSIS — M79.2 NERVE PAIN: ICD-10-CM

## 2019-10-17 PROBLEM — M79.674 PAIN OF RIGHT GREAT TOE: Status: RESOLVED | Noted: 2019-07-22 | Resolved: 2019-10-17

## 2019-10-17 PROBLEM — H92.02 EAR PAIN, LEFT: Status: RESOLVED | Noted: 2018-06-08 | Resolved: 2019-10-17

## 2019-10-17 PROBLEM — B02.8 HERPES ZOSTER WITH COMPLICATION: Status: RESOLVED | Noted: 2018-06-08 | Resolved: 2019-10-17

## 2019-10-17 PROBLEM — R19.00 ABDOMINAL LUMP: Status: RESOLVED | Noted: 2018-04-20 | Resolved: 2019-10-17

## 2019-10-17 PROBLEM — E66.3 OVERWEIGHT: Status: RESOLVED | Noted: 2018-01-05 | Resolved: 2019-10-17

## 2019-10-17 PROBLEM — R50.9 FEVER: Status: RESOLVED | Noted: 2019-04-22 | Resolved: 2019-10-17

## 2019-10-17 PROBLEM — R42 EPISODE OF DIZZINESS: Status: RESOLVED | Noted: 2018-11-26 | Resolved: 2019-10-17

## 2019-10-17 PROBLEM — R68.89 FLU-LIKE SYMPTOMS: Status: RESOLVED | Noted: 2018-12-21 | Resolved: 2019-10-17

## 2019-10-17 PROCEDURE — 99214 OFFICE O/P EST MOD 30 MIN: CPT | Performed by: NURSE PRACTITIONER

## 2019-10-17 PROCEDURE — 3074F SYST BP LT 130 MM HG: CPT | Performed by: NURSE PRACTITIONER

## 2019-10-17 PROCEDURE — 3008F BODY MASS INDEX DOCD: CPT | Performed by: NURSE PRACTITIONER

## 2019-10-17 PROCEDURE — 3079F DIAST BP 80-89 MM HG: CPT | Performed by: NURSE PRACTITIONER

## 2019-10-17 RX ORDER — NITROGLYCERIN 0.4 MG/1
0.4 TABLET SUBLINGUAL
Qty: 30 TABLET | Refills: 0 | Status: SHIPPED | OUTPATIENT
Start: 2019-10-17 | End: 2021-08-23 | Stop reason: SDUPTHER

## 2019-10-17 RX ORDER — GABAPENTIN 300 MG/1
300 CAPSULE ORAL DAILY
Qty: 90 CAPSULE | Refills: 0 | Status: SHIPPED | OUTPATIENT
Start: 2019-10-17 | End: 2020-02-20

## 2019-10-17 RX ORDER — EPINEPHRINE 0.3 MG/.3ML
0.3 INJECTION SUBCUTANEOUS ONCE
Qty: 1 EACH | Refills: 3 | Status: SHIPPED | OUTPATIENT
Start: 2019-10-17 | End: 2021-08-23 | Stop reason: SDUPTHER

## 2019-10-17 RX ORDER — DOXYCYCLINE HYCLATE 100 MG/1
100 CAPSULE ORAL EVERY 12 HOURS SCHEDULED
Qty: 14 CAPSULE | Refills: 0 | Status: SHIPPED | OUTPATIENT
Start: 2019-10-17 | End: 2019-10-24

## 2019-10-17 RX ORDER — PANTOPRAZOLE SODIUM 40 MG/1
40 TABLET, DELAYED RELEASE ORAL DAILY
Qty: 90 TABLET | Refills: 1 | Status: SHIPPED | OUTPATIENT
Start: 2019-10-17 | End: 2020-11-05 | Stop reason: SDUPTHER

## 2019-10-17 NOTE — PROGRESS NOTES
Assessment/Plan:    Acute non-recurrent sinusitis  Sinus pain/tenderness and severe headache x 3 days with swelling and erythema of the nasal mucosa  Vitals are stable and lungs are clear  Because of the severity of symptoms reported by pt, he will be treated with doxycycline x 7 days  Recommended flonase and prn use of guaifenesin  Pt instructed to call for reevaluation if sx worsen or persist       Essential hypertension  Blood pressure is well controlled  Continue with lisinopril/hctz  Diagnoses and all orders for this visit:    Acute non-recurrent sinusitis, unspecified location  -     doxycycline hyclate (VIBRAMYCIN) 100 mg capsule; Take 1 capsule (100 mg total) by mouth every 12 (twelve) hours for 7 days    Gastroesophageal reflux disease without esophagitis  -     pantoprazole (PROTONIX) 40 mg tablet; Take 1 tablet (40 mg total) by mouth daily    Nerve pain  -     gabapentin (NEURONTIN) 300 mg capsule; Take 1 capsule (300 mg total) by mouth daily    Peanut-induced anaphylaxis, initial encounter  -     EPINEPHrine (EPIPEN) 0 3 mg/0 3 mL SOAJ; Inject 0 3 mL (0 3 mg total) into a muscle once for 1 dose    History of MI (myocardial infarction)  -     nitroglycerin (NITROSTAT) 0 4 mg SL tablet; Place 1 tablet (0 4 mg total) under the tongue every 5 (five) minutes as needed for chest pain    Essential hypertension          Subjective:      Patient ID: Jose Negrete is a 61 y o  male  60 y/o male presents with 3 days of nose pain, headache, and dyspnea on exertion  Reports chills, but denies fever  Patient reports feeling like his throat is raw, and reports difficulty swallowing  Headache is described as 8/10 bilateral frontal headache that is constant and throbbing  Patient reports that he took aspirin yesterday for the headache, which provided some relief  Patient also reports facial pain and burning in his nasal cavity    He reports nonproductive cough with accompanying chest pain, sneezing, nasal congestion  Patient denies environmental allergies and has not taken any other OTC medications besides aspirin for his symptoms  The following portions of the patient's history were reviewed and updated as appropriate: allergies, current medications, past family history, past medical history, past social history, past surgical history and problem list     Review of Systems   Constitutional: Positive for appetite change, chills and fatigue  HENT: Positive for congestion, postnasal drip, sinus pressure, sinus pain, sneezing, sore throat and trouble swallowing  Negative for ear discharge, ear pain, hearing loss and rhinorrhea  Eyes: Negative for photophobia, discharge and visual disturbance  Respiratory: Positive for cough, chest tightness and shortness of breath (with exertion)  Negative for wheezing  Cardiovascular: Positive for chest pain (musculoskeletal with cough)  Allergic/Immunologic: Positive for food allergies  Negative for environmental allergies  Neurological: Positive for headaches  Negative for dizziness, light-headedness and numbness  Objective:      /80   Pulse 80   Temp 97 8 °F (36 6 °C)   Resp 16   Ht 5' 10" (1 778 m)   Wt 112 kg (247 lb)   SpO2 96%   BMI 35 44 kg/m²          Physical Exam   Constitutional: He is oriented to person, place, and time  Vital signs are normal  He appears well-developed and well-nourished  He is cooperative  He appears ill  HENT:   Head: Normocephalic  Right Ear: Hearing, external ear and ear canal normal  Tympanic membrane is injected  Tympanic membrane is not erythematous and not bulging  Left Ear: Hearing, external ear and ear canal normal  Tympanic membrane is injected  Tympanic membrane is not erythematous and not bulging  Nose: Mucosal edema and sinus tenderness present  Right sinus exhibits maxillary sinus tenderness and frontal sinus tenderness   Left sinus exhibits maxillary sinus tenderness and frontal sinus tenderness  Mouth/Throat: Tonsils are 0 on the right  Tonsils are 0 on the left  Eyes: Pupils are equal, round, and reactive to light  Conjunctivae and lids are normal  Right eye exhibits no discharge  Left eye exhibits no discharge  Neck: Trachea normal and full passive range of motion without pain  No tracheal tenderness present  No edema present  Cardiovascular: Normal rate, regular rhythm and normal heart sounds  No murmur heard  Pulmonary/Chest: Effort normal and breath sounds normal    Abdominal: Normal appearance  Abdomen firm, well healed transverse supraumbilical incision   Lymphadenopathy:     He has no cervical adenopathy  Neurological: He is alert and oriented to person, place, and time  Psychiatric: He has a normal mood and affect  His speech is normal and behavior is normal  Judgment and thought content normal  Cognition and memory are normal    Vitals reviewed

## 2019-10-17 NOTE — ASSESSMENT & PLAN NOTE
Sinus pain/tenderness and severe headache x 3 days with swelling and erythema of the nasal mucosa  Vitals are stable and lungs are clear  Because of the severity of symptoms reported by pt, he will be treated with doxycycline x 7 days  Recommended flonase and prn use of guaifenesin    Pt instructed to call for reevaluation if sx worsen or persist

## 2019-10-17 NOTE — LETTER
October 17, 2019     Patient: Evan Be   YOB: 1960   Date of Visit: 10/17/2019       To Whom it May Concern:    Deepthi Efrenmarion is under my professional care  He was seen in my office on 10/17/2019  He may return to work on 10/21/19  If you have any questions or concerns, please don't hesitate to call  Sincerely,          NU Weber        CC: Wong/Kulwinder Mcdermott

## 2019-11-01 ENCOUNTER — DOCUMENTATION (OUTPATIENT)
Dept: CARDIOLOGY CLINIC | Facility: CLINIC | Age: 59
End: 2019-11-01

## 2019-11-01 NOTE — PROGRESS NOTES
Received and completed a Medical Response Form for Renato Piña reviewed and signed, and form was faxed to [facility] in [place] at [phone]

## 2019-11-05 ENCOUNTER — OFFICE VISIT (OUTPATIENT)
Dept: INTERNAL MEDICINE CLINIC | Facility: CLINIC | Age: 59
End: 2019-11-05
Payer: COMMERCIAL

## 2019-11-05 VITALS
RESPIRATION RATE: 16 BRPM | BODY MASS INDEX: 35.22 KG/M2 | OXYGEN SATURATION: 98 % | HEIGHT: 70 IN | WEIGHT: 246 LBS | SYSTOLIC BLOOD PRESSURE: 118 MMHG | HEART RATE: 93 BPM | TEMPERATURE: 97.8 F | DIASTOLIC BLOOD PRESSURE: 80 MMHG

## 2019-11-05 DIAGNOSIS — I25.9 CHRONIC ISCHEMIC HEART DISEASE, UNSPECIFIED: ICD-10-CM

## 2019-11-05 DIAGNOSIS — E66.01 CLASS 2 SEVERE OBESITY DUE TO EXCESS CALORIES WITH SERIOUS COMORBIDITY AND BODY MASS INDEX (BMI) OF 36.0 TO 36.9 IN ADULT (HCC): ICD-10-CM

## 2019-11-05 DIAGNOSIS — F41.1 GAD (GENERALIZED ANXIETY DISORDER): ICD-10-CM

## 2019-11-05 DIAGNOSIS — E78.5 HYPERLIPIDEMIA, UNSPECIFIED HYPERLIPIDEMIA TYPE: ICD-10-CM

## 2019-11-05 DIAGNOSIS — I10 ESSENTIAL HYPERTENSION: Primary | ICD-10-CM

## 2019-11-05 DIAGNOSIS — R93.2 ABNORMAL MRI, LIVER: ICD-10-CM

## 2019-11-05 DIAGNOSIS — R53.83 FATIGUE, UNSPECIFIED TYPE: ICD-10-CM

## 2019-11-05 DIAGNOSIS — K21.9 GASTROESOPHAGEAL REFLUX DISEASE, ESOPHAGITIS PRESENCE NOT SPECIFIED: ICD-10-CM

## 2019-11-05 DIAGNOSIS — R93.2 ABNORMAL CT OF LIVER: ICD-10-CM

## 2019-11-05 PROCEDURE — 99214 OFFICE O/P EST MOD 30 MIN: CPT | Performed by: NURSE PRACTITIONER

## 2019-11-05 NOTE — PROGRESS NOTES
Assessment/Plan:    Gastroesophageal reflux disease  Stable with use of pantoprazole  Essential hypertension  Blood pressure well controlled on current treatment  Continue with lisinopril hydrochlorothiazide, metoprolol  Continue to monitor  Will repeat metabolic panel after next visit for ongoing assessment of renal function  Class 2 severe obesity due to excess calories with serious comorbidity and body mass index (BMI) of 36 0 to 36 9 in [facility])  Weight has shown a decreased since returning to work  His work is very physically active and his BMI is now at 35 3  Patient reports he is eating well  Will continue to monitor  Fatigue  Patient does have a history of fatigue and he is currently doing 1-2 hours of mandatory overtime at his job on a regular basis which is taking a toll on him physically  This is causing him not only to feel fatigued, but he is also reporting higher levels of stress  There is concern with his history of cardiovascular disease that mandatory overtime may be putting him at undue risk for future complications  I recommended to patient that we attempt to get him a reasonable accommodation for limitation on his hours  Patient states he wishes to discuss this with his cardiologist at his upcoming follow-up in the beginning of December  I did tell him that I would be willing to write him a note if he decides to pursue this  PEYTON (generalized anxiety disorder)  Anxiety currently managed with Effexor  As noted above, he is under increased stress related to increased demands at work  Increased medication does not seem appropriate at this time  Will continue to monitor  Hyperlipidemia  Continue atorvastatin 40 mg, aspirin 81 mg daily  Will repeat lipid panel after his follow-up in 4 months  Continue to encourage heart healthy diet  Chronic ischemic heart disease, unspecified  Patient denies chest pain  Lipids are well controlled on current dose of atorvastatin  Ongoing follow-up with Cardiology  Patient should continue Georgiana Logan with statin and aspirin therapy but also with metoprolol and lisinopril hydrochlorothiazide for blood pressure management  Diagnoses and all orders for this visit:    Essential hypertension    Gastroesophageal reflux disease, esophagitis presence not specified    Class 2 severe obesity due to excess calories with serious comorbidity and body mass index (BMI) of 36 0 to 36 9 in adult (HCC)    Fatigue, unspecified type    PEYTON (generalized anxiety disorder)    Hyperlipidemia, unspecified hyperlipidemia type    Chronic ischemic heart disease, unspecified          Subjective:      Patient ID: Elgin Turcios is a 61 y o  male  Patient is a 80-year-old male here today for 4 month follow-up to management of chronic conditions  Past medical history includes CVD with history of MI, hypertension, hyperlipidemia, GERD, obesity, and arthritis  Patient reports fatigue and stress which he attributes to performing mandatory overtime at his job  His job is physically active and he has been having to do 1-2 hours of mandatory overtime on a regular basis  He denies chest pain, palpitations, SOB  He complains of bilateral knee pain due to arthritis, he has had steroid injections in the past which did not provide him with any pain relief  Appetite is fine, he reports he is eating well  Denies abdominal pain  The following portions of the patient's history were reviewed and updated as appropriate: allergies, current medications, past family history, past medical history, past social history, past surgical history and problem list     Review of Systems   Constitutional: Positive for fatigue  Negative for activity change, appetite change, chills, fever and unexpected weight change  HENT: Negative for hearing loss  Eyes: Negative for visual disturbance  Respiratory: Negative for cough, chest tightness and shortness of breath  Cardiovascular: Negative for chest pain, palpitations and leg swelling  Gastrointestinal: Negative for constipation, diarrhea, nausea and vomiting  Genitourinary: Negative for difficulty urinating and frequency  Musculoskeletal: Positive for arthralgias  Negative for myalgias  Neurological: Negative for dizziness, weakness, numbness and headaches  Psychiatric/Behavioral: Negative for sleep disturbance  The patient is not nervous/anxious  Objective:      /80   Pulse 93   Temp 97 8 °F (36 6 °C)   Resp 16   Ht 5' 10" (1 778 m)   Wt 112 kg (246 lb)   SpO2 98%   BMI 35 30 kg/m²          Physical Exam   Constitutional: He is oriented to person, place, and time  Vital signs are normal  He appears well-developed and well-nourished  He is cooperative  HENT:   Right Ear: Hearing, tympanic membrane, external ear and ear canal normal    Left Ear: Hearing, tympanic membrane, external ear and ear canal normal    Nose: Nose normal    Mouth/Throat: Oropharynx is clear and moist and mucous membranes are normal    Eyes: Pupils are equal, round, and reactive to light  Conjunctivae and lids are normal    Neck: No JVD present  Carotid bruit is not present  No thyromegaly present  Cardiovascular: Normal rate, regular rhythm, S1 normal, S2 normal, normal heart sounds and intact distal pulses  No murmur heard  Pulmonary/Chest: Effort normal and breath sounds normal    Abdominal: Normal appearance and bowel sounds are normal    Abdomen firm    Musculoskeletal: He exhibits no edema  Altered gait due to bilateral knee pain, no swelling or deformity  Lymphadenopathy:     He has no cervical adenopathy  Neurological: He is alert and oriented to person, place, and time  Skin: Skin is warm, dry and intact  There is erythema  Facial flushing, baseline   Psychiatric: He has a normal mood and affect   His speech is normal and behavior is normal  Judgment and thought content normal  Cognition and memory are normal    Vitals reviewed

## 2019-11-05 NOTE — ASSESSMENT & PLAN NOTE
Weight has shown a decreased since returning to work  His work is very physically active and his BMI is now at 35 3  Patient reports he is eating well  Will continue to monitor

## 2019-11-05 NOTE — ASSESSMENT & PLAN NOTE
Patient does have a history of fatigue and he is currently doing 1-2 hours of mandatory overtime at his job on a regular basis which is taking a toll on him physically  This is causing him not only to feel fatigued, but he is also reporting higher levels of stress  There is concern with his history of cardiovascular disease that mandatory overtime may be putting him at undue risk for future complications  I recommended to patient that we attempt to get him a reasonable accommodation for limitation on his hours  Patient states he wishes to discuss this with his cardiologist at his upcoming follow-up in the beginning of December  I did tell him that I would be willing to write him a note if he decides to pursue this

## 2019-11-05 NOTE — ASSESSMENT & PLAN NOTE
Anxiety currently managed with Effexor  As noted above, he is under increased stress related to increased demands at work  Increased medication does not seem appropriate at this time  Will continue to monitor

## 2019-11-05 NOTE — ASSESSMENT & PLAN NOTE
Continue atorvastatin 40 mg, aspirin 81 mg daily  Will repeat lipid panel after his follow-up in 4 months  Continue to encourage heart healthy diet

## 2019-11-05 NOTE — ASSESSMENT & PLAN NOTE
Blood pressure well controlled on current treatment  Continue with lisinopril hydrochlorothiazide, metoprolol  Continue to monitor  Will repeat metabolic panel after next visit for ongoing assessment of renal function

## 2019-11-05 NOTE — ASSESSMENT & PLAN NOTE
Patient denies chest pain  Lipids are well controlled on current dose of atorvastatin  Ongoing follow-up with Cardiology  Patient should continue Cathlyn Esters with statin and aspirin therapy but also with metoprolol and lisinopril hydrochlorothiazide for blood pressure management

## 2019-11-21 DIAGNOSIS — M79.2 NERVE PAIN: ICD-10-CM

## 2019-11-21 NOTE — TELEPHONE ENCOUNTER
Can we call him to confirm he actually needs this filled? Looks like I filled 90 days worth last month  Thanks

## 2019-11-27 ENCOUNTER — OFFICE VISIT (OUTPATIENT)
Dept: INTERNAL MEDICINE CLINIC | Facility: CLINIC | Age: 59
End: 2019-11-27
Payer: COMMERCIAL

## 2019-11-27 VITALS
HEART RATE: 76 BPM | DIASTOLIC BLOOD PRESSURE: 78 MMHG | SYSTOLIC BLOOD PRESSURE: 124 MMHG | RESPIRATION RATE: 18 BRPM | HEIGHT: 70 IN | OXYGEN SATURATION: 98 % | WEIGHT: 244 LBS | TEMPERATURE: 97.8 F | BODY MASS INDEX: 34.93 KG/M2

## 2019-11-27 DIAGNOSIS — R21 RASH: Primary | ICD-10-CM

## 2019-11-27 PROCEDURE — 99214 OFFICE O/P EST MOD 30 MIN: CPT | Performed by: NURSE PRACTITIONER

## 2019-11-27 RX ORDER — PREDNISONE 1 MG/1
TABLET ORAL
Qty: 42 TABLET | Refills: 0 | Status: SHIPPED | OUTPATIENT
Start: 2019-11-27 | End: 2020-09-01 | Stop reason: ALTCHOICE

## 2019-11-27 NOTE — ASSESSMENT & PLAN NOTE
Rash on face seems to be inflammatory, possibly consistent with atopic dermatis  We discussed skin care  Pt advised to switch his soap to either a dermatologic brand such as cerave facial cleanser and moisturizer or unscented dove bar soap  He should try to minimize his dogs licking his face  Pt to take a prednisone taper to see if rash calms down  Because he also has sx of fatigue and body aches, will also get labs to assess for markers of inflammation suggestive of autoimmune condition  Have also referred to dermatology  F/U here in 2 weeks

## 2019-11-27 NOTE — PROGRESS NOTES
Assessment/Plan:    Rash  Rash on face seems to be inflammatory, possibly consistent with atopic dermatis  We discussed skin care  Pt advised to switch his soap to either a dermatologic brand such as cerave facial cleanser and moisturizer or unscented dove bar soap  He should try to minimize his dogs licking his face  Pt to take a prednisone taper to see if rash calms down  Because he also has sx of fatigue and body aches, will also get labs to assess for markers of inflammation suggestive of autoimmune condition  Have also referred to dermatology  F/U here in 2 weeks  Diagnoses and all orders for this visit:    Rash  -     CBC and differential; Future  -     Comprehensive metabolic panel; Future  -     C-reactive protein; Future  -     DIMITRIS Screen w/ Reflex to Titer/Pattern; Future  -     Ambulatory referral to Dermatology; Future  -     predniSONE 5 mg tablet; Begin treatment with 30 mg (6 tablets) per day in divided doses  Decrease dose by 5 mg every 2 days  12 days total   Take with food  Subjective:      Patient ID: Larry Reardon is a 61 y o  male  Pt is a 60 y/o male here today for evaluation of a rash on the face/neck  He states the rash has been present for about 3 months though it seems to be slightly worsening  Rash is itchy and dry  No drainage reported  He states he has not changed any of his medications or skin care products recently  He washes his face with lever soap and he uses a store brand facial moisturizer  He did get a new puppy several months ago and he states the dog does lick his face, but he already had 4 other dogs who also lick his face  He works a very physically demanding job with long hours and he does also report fatigue and joint/muscle aches  No fever/chills        The following portions of the patient's history were reviewed and updated as appropriate: allergies, current medications, past family history, past medical history, past social history, past surgical history and problem list     Review of Systems   Constitutional: Positive for fatigue  Negative for chills and fever  Respiratory: Negative for shortness of breath  Cardiovascular: Negative for chest pain  Musculoskeletal: Positive for arthralgias and myalgias  Skin: Positive for rash  Negative for color change  Neurological: Negative for dizziness  Objective:      /78   Pulse 76   Temp 97 8 °F (36 6 °C)   Resp 18   Ht 5' 10" (1 778 m)   Wt 111 kg (244 lb)   SpO2 98%   BMI 35 01 kg/m²          Physical Exam   Constitutional: He is oriented to person, place, and time  Vital signs are normal  He appears well-developed and well-nourished  He is cooperative  Neck: No JVD present  Carotid bruit is not present  Cardiovascular: Normal rate and regular rhythm  Pulmonary/Chest: Effort normal    Neurological: He is alert and oriented to person, place, and time  Skin: Skin is warm, dry and intact  Rash noted  There is erythema  Baseline erythema of the face  Pt has dry, rough papular rash on the face, neck, above the brow and within the eyebrows  Normal temperature  Skin is intact, no drainage noted  Psychiatric: He has a normal mood and affect  His speech is normal and behavior is normal  Judgment and thought content normal  Cognition and memory are normal    Vitals reviewed

## 2019-11-29 NOTE — PROGRESS NOTES
Cardiology Follow Up    [facility]lices [facility]  1960  [id number]  [address] [place] [address]  [facility] ROSALIND Graham [id number] [facility]  [phone]    1  Essential (primary) hypertension     2  Pure hypercholesterolemia     3  Coronary arteriosclerosis         Interval History:  Patient is here for a follow-up visit  He has a history of hypertension and hyperlipidemia  He has a prior history of coronary artery disease with two vessel stenting performed 12/27/2010  His intervention was a drug-eluting stent in the LAD of 3 0 x 18 size and a drug-eluting stent in the diagonal branch of 2 5 x 12 size  Promus stents were utilized  His most recent nuclear stress test was done year 2015 and showed no evidence of prognostically important ischemia  He exercised 8 min on a treadmill  His most recent echocardiogram in January 2019 demonstrated preserved LV systolic function with mild LVH and mild left atrial cavity enlargement  There was no significant valvular heart disease  Patient has been well  He has had no chest pain or significant dyspnea  Lipid profile done May 2019 demonstrated total cholesterol of 148 with an HDL of 56 and a direct LDL of 71  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[address]   He has been doing over time intermittently       Patient Active Problem List   Diagnosis    Acute right-sided low back pain without sciatica    Essential hypertension    History of MI (myocardial infarction)    Class 2 severe obesity due to excess calories with serious comorbidity and body mass index (BMI) of 36 0 to 36 9 in adult [facility])    Multiple food allergies    H/O heart artery stent    Fatigue    Need for influenza vaccination    Skin irritation    Benign prostatic hyperplasia    Scleral icterus    Abdominal wall seroma    Abnormal CT of liver    Irritant contact dermatitis    Insomnia due to medical condition    Cerumen debris on tympanic membrane of left ear    Urinary frequency    Hyperlipidemia    Abnormal exam of left ear    Allergy to iodine    Anaphylactic reaction    Anxiety    AV malformation, peripheral, congenital    Chronic ischemic heart disease, unspecified    Coronary atherosclerosis    PEYTON (generalized anxiety disorder)    Gastroesophageal reflux disease    Nerve pain    Muscle cramping    Spasm of muscle, back    Status post percutaneous transluminal coronary angioplasty    Trigger finger of right hand    Anaphylactic shock due to peanuts    Allergy to tree nuts or seeds    Peanut allergy    Soy allergy    Acute non-recurrent sinusitis    Rash     Past Medical History:   Diagnosis Date    Abdominal lump 4/20/2018    Anxiety     Arthritis     Cardiac disease     Ear pain, left 6/8/2018    Episode of dizziness 11/26/2018    Fever 4/22/2019    Flu-like symptoms 12/21/2018    Herpes zoster with complication 3/1/7051    Hyperlipidemia     Hypertension     Kidney stone     Myocardial infarction ([facility] [address] )     Obesity     Overweight 1/5/2018    Pain of right great toe 7/22/2019    PONV (postoperative nausea and vomiting)     in the past     Social History     Socioeconomic History    Marital status: /Civil Union     Spouse name: Rigo Juarez Number of children: 4    Years of education: Not on file    Highest education level: Not on file   Occupational History    Not on file   Social Needs    Financial resource strain: Not on file    Food insecurity:     Worry: Not on file     Inability: Not on file   [employer] needs:     Medical: Not on file     Non-medical: Not on file   Tobacco Use    Smoking status: Never Smoker    Smokeless tobacco: Never Used   Substance and Sexual Activity    Alcohol use: Yes     Binge frequency: Less than monthly     Comment: occasional    Drug use: No    Sexual activity: Not on file   Lifestyle    Physical activity:     Days per week: 0 days     Minutes per session: 0 min    Stress: Not on file   Relationships    Social connections:     Talks on phone: Not on file     Gets together: Not on file     Attends Zoroastrianism service: Not on file     Active member of club or organization: Not on file     Attends meetings of clubs or organizations: Not on file     Relationship status: Not on file    Intimate partner violence:     Fear of current or ex partner: Not on file     Emotionally abused: Not on file     Physically abused: Not on file     Forced sexual activity: Not on file   Other Topics Concern    Not on file   Social History Narrative    Patient [place] in a [facility] that [facility] [place] [facility]     [address] in the bedroom     [place] air    Window air conditioning bedroom     Finished basement-dry-no mold or musty smell    Dehumidifier     Air  attached to [employer] in the winter months     Home is smoke free     Does not live near wooded are or open fields         Dog x5 ([place]) and there allowed in the bedroom         Caffeine: soda occasional                     Coffee is decaf 1 cup daily     Chocolate consumed rarely         Patient lives with wife and two children       Family History   Problem Relation Age of Onset    Heart disease Mother         passed during open heart surgery     Heart disease Father     Diabetes Father         caused blindness     Lung cancer Sister     No Known Problems Brother     No Known Problems Son     No Known Problems Son     No Known Problems Son     No Known Problems Daughter      Past Surgical History:   Procedure Laterality Date    BACK SURGERY      CARDIAC SURGERY      angio with stents    CIRCUMCISION      EGD AND COLONOSCOPY      GASTRIC BYPASS      IR ABSCESS/SEROMA DRAINAGE  1/4/2019    NECK SURGERY      OTHER SURGICAL HISTORY      removal of vocal cord lesion     WA EXC TUMOR SOFT TISSUE ABDOMINAL WALL SUBQ <3CM N/A 2/27/2019    Procedure: ABDOMINAL SEROMA EXCISION;  Surgeon: Samella Najjar, MD;  [facility];  Service: General    SHOULDER SURGERY      TONSILLECTOMY      WISDOM TOOTH EXTRACTION      WOUND DEBRIDEMENT N/A 3/7/2019    Procedure: DEBRIDEMENT WOUND ABDOMINAL ([address]) AND WOUND VAC APPLICATION;  Surgeon: Samella Najjar, MD;  [facility];  Service: General    WOUND DEBRIDEMENT N/A 3/9/2019    Procedure: DEBRIDEMENT WOUND ABDOMINAL ([address]), wound vac dressing change;  Surgeon: Marly Zamora DO;  [facility];  Service: General       Current Outpatient Medications:     aspirin (ECOTRIN LOW STRENGTH) 81 mg EC tablet, Take 162 mg by mouth daily , Disp: , Rfl:     atorvastatin (LIPITOR) 40 mg tablet, Take 1 tablet (40 mg total) by mouth daily, Disp: 90 tablet, Rfl: 4    B COMPLEX VITAMINS ER PO, Take by mouth, Disp: , Rfl:     gabapentin (NEURONTIN) 300 mg capsule, Take 1 capsule (300 mg total) by mouth daily, Disp: 90 capsule, Rfl: 0    Iron, Ferrous Sulfate, 142 (45 Fe) MG TBCR, Take by mouth, Disp: , Rfl:     Krill Oil 1000 MG CAPS, Take by mouth, Disp: , Rfl:     lisinopril-hydrochlorothiazide (PRINZIDE,ZESTORETIC) 10-12 5 MG per tablet, Take 1 tablet by mouth daily, Disp: 90 tablet, Rfl: 2    metoprolol tartrate (LOPRESSOR) 25 mg tablet, Take 1 tablet (25 mg total) by mouth daily (Patient taking differently: Take 25 mg by mouth daily at bedtime ), Disp: 90 tablet, Rfl: 3    Nutritional Supplements (OSTEO-MULTIVITAMINS/MINERALS PO), Take 1 capsule by mouth daily, Disp: , Rfl:     pantoprazole (PROTONIX) 40 mg tablet, Take 1 tablet (40 mg total) by mouth daily, Disp: 90 tablet, Rfl: 1    venlafaxine (EFFEXOR) 37 5 mg tablet, TAKE ONE TABLET BY MOUTH EVERY DAY, Disp: 90 tablet, Rfl: 0    ciprofloxacin-dexamethasone (CIPRODEX) otic suspension, Administer 4 drops into the left ear 2 (two) times a day (Patient not taking: Reported on 12/3/2019), Disp: 7 5 mL, Rfl: 0    cyclobenzaprine (FLEXERIL) 10 mg tablet, TAKE 1/2 TO 1 TABLET AT BEDTIME AS NEEDED (Patient not taking: Reported on 12/3/2019), Disp: 90 tablet, Rfl: 2    EPINEPHrine (EPIPEN) 0 3 mg/0 3 mL SOAJ, Inject 0 3 mL (0 3 mg total) into a muscle once for 1 dose (Patient not taking: Reported on 12/3/2019), Disp: 1 each, Rfl: 3    nitroglycerin (NITROSTAT) 0 4 mg SL tablet, Place 1 tablet (0 4 mg total) under the tongue every 5 (five) minutes as needed for chest pain (Patient not taking: Reported on 12/3/2019), Disp: 30 tablet, Rfl: 0    NYSTATIN powder, 3 (three) times a week , Disp: , Rfl:     predniSONE 5 mg tablet, Begin treatment with 30 mg (6 tablets) per day in divided doses  Decrease dose by 5 mg every 2 days  12 days total   Take with food  (Patient not taking: Reported on 12/3/2019), Disp: 42 tablet, Rfl: 0  Allergies   Allergen Reactions    Nuts Anaphylaxis    Peanut Oil Anaphylaxis    Isoflavones     Other      Soy  Patient can not have an NGT due to bariatric surgery    Penicillins     Shellfish Allergy      Betadine is Okay   Adhesive [Medical Tape] Rash       Labs:not applicable  Imaging: No results found  Review of Systems:  Review of Systems   All other systems reviewed and are negative  Physical Exam:  /84 (BP Location: Right arm, Patient Position: Sitting, Cuff Size: Large)   Pulse 64   Ht 5' 10" (1 778 m)   Wt 111 kg (245 lb 4 8 oz)   BMI 35 20 kg/m²   Physical Exam   Constitutional: He is oriented to person, place, and time  He appears well-developed and well-nourished  HENT:   Head: Normocephalic and atraumatic  Eyes: Pupils are equal, round, and reactive to light  Conjunctivae are normal    Neck: Normal range of motion  Neck supple  Cardiovascular: Normal rate and normal heart sounds  Pulmonary/Chest: Effort normal and breath sounds normal    Neurological: He is alert and oriented to person, place, and time  Skin: Skin is warm and dry     Psychiatric: He has a normal mood and affect  Vitals reviewed  Discussion/Summary:I will continue the patient's present medical regimen  The patient appears well compensated  I have asked the patient to call if there is a problem in the interim otherwise I will see the patient in six months time

## 2019-11-30 ENCOUNTER — APPOINTMENT (OUTPATIENT)
Dept: LAB | Facility: HOSPITAL | Age: 59
End: 2019-11-30
Payer: COMMERCIAL

## 2019-11-30 DIAGNOSIS — R21 RASH: ICD-10-CM

## 2019-11-30 DIAGNOSIS — R93.2 ABNORMAL MRI, LIVER: ICD-10-CM

## 2019-11-30 LAB
ALBUMIN SERPL BCP-MCNC: 4.3 G/DL (ref 3.5–5)
ALP SERPL-CCNC: 124 U/L (ref 46–116)
ALT SERPL W P-5'-P-CCNC: 42 U/L (ref 12–78)
ANION GAP SERPL CALCULATED.3IONS-SCNC: 3 MMOL/L (ref 4–13)
AST SERPL W P-5'-P-CCNC: 31 U/L (ref 5–45)
BASOPHILS # BLD AUTO: 0.04 THOUSANDS/ΜL (ref 0–0.1)
BASOPHILS NFR BLD AUTO: 1 % (ref 0–1)
BILIRUB SERPL-MCNC: 0.7 MG/DL (ref 0.2–1)
BUN SERPL-MCNC: 18 MG/DL (ref 5–25)
CALCIUM SERPL-MCNC: 9.3 MG/DL (ref 8.3–10.1)
CHLORIDE SERPL-SCNC: 108 MMOL/L (ref 100–108)
CO2 SERPL-SCNC: 28 MMOL/L (ref 21–32)
CREAT SERPL-MCNC: 0.9 MG/DL (ref 0.6–1.3)
CRP SERPL QL: <3 MG/L
EOSINOPHIL # BLD AUTO: 0.42 THOUSAND/ΜL (ref 0–0.61)
EOSINOPHIL NFR BLD AUTO: 5 % (ref 0–6)
ERYTHROCYTE [DISTWIDTH] IN BLOOD BY AUTOMATED COUNT: 12 % (ref 11.6–15.1)
GFR SERPL CREATININE-BSD FRML MDRD: 93 ML/MIN/1.73SQ M
GLUCOSE P FAST SERPL-MCNC: 75 MG/DL (ref 65–99)
HCT VFR BLD AUTO: 46.7 % (ref 36.5–49.3)
HGB BLD-MCNC: 15.7 G/DL (ref 12–17)
IMM GRANULOCYTES # BLD AUTO: 0.02 THOUSAND/UL (ref 0–0.2)
IMM GRANULOCYTES NFR BLD AUTO: 0 % (ref 0–2)
LYMPHOCYTES # BLD AUTO: 1.6 THOUSANDS/ΜL (ref 0.6–4.47)
LYMPHOCYTES NFR BLD AUTO: 20 % (ref 14–44)
MCH RBC QN AUTO: 32 PG (ref 26.8–34.3)
MCHC RBC AUTO-ENTMCNC: 33.6 G/DL (ref 31.4–37.4)
MCV RBC AUTO: 95 FL (ref 82–98)
MONOCYTES # BLD AUTO: 0.73 THOUSAND/ΜL (ref 0.17–1.22)
MONOCYTES NFR BLD AUTO: 9 % (ref 4–12)
NEUTROPHILS # BLD AUTO: 5.09 THOUSANDS/ΜL (ref 1.85–7.62)
NEUTS SEG NFR BLD AUTO: 65 % (ref 43–75)
NRBC BLD AUTO-RTO: 0 /100 WBCS
PLATELET # BLD AUTO: 206 THOUSANDS/UL (ref 149–390)
PMV BLD AUTO: 10.2 FL (ref 8.9–12.7)
POTASSIUM SERPL-SCNC: 4.3 MMOL/L (ref 3.5–5.3)
PROT SERPL-MCNC: 8 G/DL (ref 6.4–8.2)
RBC # BLD AUTO: 4.9 MILLION/UL (ref 3.88–5.62)
SODIUM SERPL-SCNC: 139 MMOL/L (ref 136–145)
WBC # BLD AUTO: 7.9 THOUSAND/UL (ref 4.31–10.16)

## 2019-11-30 PROCEDURE — 36415 COLL VENOUS BLD VENIPUNCTURE: CPT

## 2019-11-30 PROCEDURE — 86140 C-REACTIVE PROTEIN: CPT

## 2019-11-30 PROCEDURE — 86038 ANTINUCLEAR ANTIBODIES: CPT

## 2019-11-30 PROCEDURE — 80053 COMPREHEN METABOLIC PANEL: CPT

## 2019-11-30 PROCEDURE — 85025 COMPLETE CBC W/AUTO DIFF WBC: CPT

## 2019-12-02 LAB — RYE IGE QN: NEGATIVE

## 2019-12-03 ENCOUNTER — OFFICE VISIT (OUTPATIENT)
Dept: CARDIOLOGY CLINIC | Facility: CLINIC | Age: 59
End: 2019-12-03
Payer: COMMERCIAL

## 2019-12-03 VITALS
BODY MASS INDEX: 35.12 KG/M2 | HEIGHT: 70 IN | HEART RATE: 64 BPM | DIASTOLIC BLOOD PRESSURE: 84 MMHG | SYSTOLIC BLOOD PRESSURE: 138 MMHG | WEIGHT: 245.3 LBS

## 2019-12-03 DIAGNOSIS — I25.10 CORONARY ARTERIOSCLEROSIS: ICD-10-CM

## 2019-12-03 DIAGNOSIS — I10 ESSENTIAL (PRIMARY) HYPERTENSION: Primary | ICD-10-CM

## 2019-12-03 DIAGNOSIS — E78.00 PURE HYPERCHOLESTEROLEMIA: ICD-10-CM

## 2019-12-03 PROCEDURE — 3079F DIAST BP 80-89 MM HG: CPT | Performed by: INTERNAL MEDICINE

## 2019-12-03 PROCEDURE — 3075F SYST BP GE 130 - 139MM HG: CPT | Performed by: INTERNAL MEDICINE

## 2019-12-03 PROCEDURE — 99214 OFFICE O/P EST MOD 30 MIN: CPT | Performed by: INTERNAL MEDICINE

## 2019-12-03 NOTE — LETTER
December 3, 2019     Patient: Reji Guajardo   YOB: 1960   Date of Visit: 12/3/2019       To Whom it May Concern:    Emilia Schmid is under my professional care  He was seen in my office on 12/3/2019  He may return to work on 12/4/2019  If you have any questions or concerns, please don't hesitate to call           Sincerely,          Radha Marcano MD        CC: No Recipients

## 2019-12-04 RX ORDER — GABAPENTIN 300 MG/1
300 CAPSULE ORAL DAILY
Qty: 90 CAPSULE | Refills: 0 | OUTPATIENT
Start: 2019-12-04

## 2019-12-10 ENCOUNTER — OFFICE VISIT (OUTPATIENT)
Dept: INTERNAL MEDICINE CLINIC | Facility: CLINIC | Age: 59
End: 2019-12-10
Payer: COMMERCIAL

## 2019-12-10 VITALS
WEIGHT: 245 LBS | OXYGEN SATURATION: 96 % | BODY MASS INDEX: 35.07 KG/M2 | RESPIRATION RATE: 18 BRPM | HEIGHT: 70 IN | SYSTOLIC BLOOD PRESSURE: 138 MMHG | HEART RATE: 84 BPM | DIASTOLIC BLOOD PRESSURE: 92 MMHG | TEMPERATURE: 98.4 F

## 2019-12-10 DIAGNOSIS — R21 RASH: ICD-10-CM

## 2019-12-10 DIAGNOSIS — I10 ESSENTIAL HYPERTENSION: ICD-10-CM

## 2019-12-10 DIAGNOSIS — J01.90 ACUTE NON-RECURRENT SINUSITIS, UNSPECIFIED LOCATION: Primary | ICD-10-CM

## 2019-12-10 PROCEDURE — 1036F TOBACCO NON-USER: CPT | Performed by: NURSE PRACTITIONER

## 2019-12-10 PROCEDURE — 99214 OFFICE O/P EST MOD 30 MIN: CPT | Performed by: NURSE PRACTITIONER

## 2019-12-10 PROCEDURE — 3008F BODY MASS INDEX DOCD: CPT | Performed by: NURSE PRACTITIONER

## 2019-12-10 RX ORDER — DOXYCYCLINE HYCLATE 100 MG/1
100 CAPSULE ORAL EVERY 12 HOURS SCHEDULED
Qty: 14 CAPSULE | Refills: 0 | Status: SHIPPED | OUTPATIENT
Start: 2019-12-10 | End: 2019-12-17

## 2019-12-10 NOTE — LETTER
December 10, 2019     Patient: Miguel Mcdonald   YOB: 1960   Date of Visit: 12/10/2019       To Whom it May Concern:    Baltazar Gu is under my professional care  He was seen in my office on 12/10/2019  He may return to work on 12/12/19  If you have any questions or concerns, please don't hesitate to call           Sincerely,          NU Guaman        CC: No Recipients

## 2019-12-10 NOTE — ASSESSMENT & PLAN NOTE
Sinus pain and congestion x 1 week with yellow discharge  Pt not getting relief with mucinex  Will start him doxycycline 100 mg bid x 7 days  Suggested addition of flonase for additional symptom control    Encouraged fluids and rest   Pt instructed to call for reevaluation if sx worsen or persist

## 2019-12-10 NOTE — ASSESSMENT & PLAN NOTE
Rash improving after starting prednisone  He is almost finished with treatment  We had checked labs for assessment of inflammatory markers due to complaints also of fatigue and body aches  Labs were unremarkable  He has not yet scheduled with dermatology, encouraged him to do so

## 2019-12-10 NOTE — LETTER
To Whom it May Concern:    Emilia Schmid is a patient under my care  Due to the nature of his chronic medical conditions, I consider him to be at an increased risk of unfavorable outcomes with extension of his contracted hours with mandatory or required overtime  It is my recommendation that he be excused from mandatory or required overtime  Please do not hesitate to contact me with any questions        Thank you,          NU Rodrigez

## 2019-12-10 NOTE — PROGRESS NOTES
Assessment/Plan:    Essential hypertension  Mild elevation today, pt has been sick with a URI for the past week and he is very stressed by being forced to work overtime which may both be contributing to the elevated blood pressure  He is currently taking lisinopril-hctz  At present, will continue his current dose  I have given him a letter however, requesting that he be excused from mandatory overtime due to his chronic medical conditions  Acute non-recurrent sinusitis  Sinus pain and congestion x 1 week with yellow discharge  Pt not getting relief with mucinex  Will start him doxycycline 100 mg bid x 7 days  Suggested addition of flonase for additional symptom control  Encouraged fluids and rest   Pt instructed to call for reevaluation if sx worsen or persist       Rash  Rash improving after starting prednisone  He is almost finished with treatment  We had checked labs for assessment of inflammatory markers due to complaints also of fatigue and body aches  Labs were unremarkable  He has not yet scheduled with dermatology, encouraged him to do so  Diagnoses and all orders for this visit:    Acute non-recurrent sinusitis, unspecified location  -     doxycycline hyclate (VIBRAMYCIN) 100 mg capsule; Take 1 capsule (100 mg total) by mouth every 12 (twelve) hours for 7 days    Essential hypertension    Rash          Subjective:      Patient ID: Nina Arteaga is a 61 y o  male  Pt is a 61 y o  y/o male who is seen today for evaluation of URI symptoms  He has been sick x 7 days with sinus pain/congestion, yellow nasal discharge, headache, post nasal drip, cough productive of yellow sputum  He fluctuates between feeling overheated and chills, denies fever  No shortness of breath  He is fatigued, he has been doing mandatory overtime at work  His appetite is fair, no nausea/vomiting, he does have diarrhea though  He has been taking mucinex and cough drops, this has not helped          The following portions of the patient's history were reviewed and updated as appropriate: allergies, current medications, past family history, past medical history, past social history, past surgical history and problem list     Review of Systems   Constitutional: Positive for chills and fatigue  Negative for appetite change and fever  HENT: Positive for congestion, rhinorrhea, sinus pressure and sinus pain  Negative for ear pain, hearing loss and sore throat  Respiratory: Positive for cough  Negative for chest tightness, shortness of breath and wheezing  Gastrointestinal: Positive for diarrhea  Negative for abdominal pain, nausea and vomiting  Neurological: Positive for headaches  Negative for dizziness and light-headedness  Hematological: Negative for adenopathy  Objective:      /92   Pulse 84   Temp 98 4 °F (36 9 °C)   Resp 18   Ht 5' 10" (1 778 m)   Wt 111 kg (245 lb)   SpO2 96%   BMI 35 15 kg/m²          Physical Exam   Constitutional: He is oriented to person, place, and time  Vital signs are normal  He appears well-developed and well-nourished  He is cooperative  HENT:   Right Ear: Hearing, tympanic membrane, external ear and ear canal normal  No middle ear effusion  Left Ear: Hearing, tympanic membrane, external ear and ear canal normal   No middle ear effusion  Nose: Mucosal edema and rhinorrhea present  Mouth/Throat: Mucous membranes are not dry  Posterior oropharyngeal erythema present  No oropharyngeal exudate (post nasal drip), posterior oropharyngeal edema or tonsillar abscesses  Eyes: Conjunctivae are normal    Cardiovascular: Normal rate, regular rhythm and normal heart sounds  Pulmonary/Chest: Effort normal and breath sounds normal    Lymphadenopathy:        Head (right side): No submental, no submandibular, no tonsillar, no preauricular, no posterior auricular and no occipital adenopathy present          Head (left side): No submental, no submandibular, no tonsillar, no preauricular, no posterior auricular and no occipital adenopathy present  He has no cervical adenopathy  Neurological: He is alert and oriented to person, place, and time  Skin: Skin is warm, dry and intact  Psychiatric: He has a normal mood and affect  His speech is normal and behavior is normal  Judgment and thought content normal  Cognition and memory are normal    Vitals reviewed

## 2019-12-10 NOTE — ASSESSMENT & PLAN NOTE
Mild elevation today, pt has been sick with a URI for the past week and he is very stressed by being forced to work overtime which may both be contributing to the elevated blood pressure  He is currently taking lisinopril-hctz  At present, will continue his current dose  I have given him a letter however, requesting that he be excused from mandatory overtime due to his chronic medical conditions

## 2020-01-22 DIAGNOSIS — F41.8 DEPRESSION WITH ANXIETY: ICD-10-CM

## 2020-01-22 DIAGNOSIS — E78.5 HYPERLIPIDEMIA, UNSPECIFIED HYPERLIPIDEMIA TYPE: ICD-10-CM

## 2020-01-23 DIAGNOSIS — I10 ESSENTIAL HYPERTENSION: ICD-10-CM

## 2020-01-23 RX ORDER — VENLAFAXINE 37.5 MG/1
TABLET ORAL
Qty: 90 TABLET | Refills: 0 | Status: SHIPPED | OUTPATIENT
Start: 2020-01-23 | End: 2020-05-18 | Stop reason: SDUPTHER

## 2020-01-23 RX ORDER — ATORVASTATIN CALCIUM 40 MG/1
TABLET, FILM COATED ORAL
Qty: 90 TABLET | Refills: 0 | Status: SHIPPED | OUTPATIENT
Start: 2020-01-23 | End: 2020-05-18 | Stop reason: SDUPTHER

## 2020-02-05 ENCOUNTER — OFFICE VISIT (OUTPATIENT)
Dept: INTERNAL MEDICINE CLINIC | Facility: CLINIC | Age: 60
End: 2020-02-05
Payer: COMMERCIAL

## 2020-02-05 VITALS
HEIGHT: 70 IN | TEMPERATURE: 97.8 F | WEIGHT: 241 LBS | HEART RATE: 87 BPM | OXYGEN SATURATION: 95 % | RESPIRATION RATE: 16 BRPM | BODY MASS INDEX: 34.5 KG/M2 | SYSTOLIC BLOOD PRESSURE: 122 MMHG | DIASTOLIC BLOOD PRESSURE: 84 MMHG

## 2020-02-05 DIAGNOSIS — I10 ESSENTIAL HYPERTENSION: ICD-10-CM

## 2020-02-05 DIAGNOSIS — M25.562 PAIN IN BOTH KNEES, UNSPECIFIED CHRONICITY: ICD-10-CM

## 2020-02-05 DIAGNOSIS — R20.2 PARESTHESIA OF BOTH HANDS: Primary | ICD-10-CM

## 2020-02-05 DIAGNOSIS — R10.13 EPIGASTRIC PAIN: ICD-10-CM

## 2020-02-05 DIAGNOSIS — M25.561 PAIN IN BOTH KNEES, UNSPECIFIED CHRONICITY: ICD-10-CM

## 2020-02-05 PROCEDURE — 1036F TOBACCO NON-USER: CPT | Performed by: NURSE PRACTITIONER

## 2020-02-05 PROCEDURE — 99214 OFFICE O/P EST MOD 30 MIN: CPT | Performed by: NURSE PRACTITIONER

## 2020-02-05 PROCEDURE — 3008F BODY MASS INDEX DOCD: CPT | Performed by: NURSE PRACTITIONER

## 2020-02-05 PROCEDURE — 3079F DIAST BP 80-89 MM HG: CPT | Performed by: NURSE PRACTITIONER

## 2020-02-05 PROCEDURE — 3074F SYST BP LT 130 MM HG: CPT | Performed by: NURSE PRACTITIONER

## 2020-02-05 NOTE — PROGRESS NOTES
Assessment/Plan:    Essential hypertension  Blood pressure is well controlled  Continue current tx with lisinopril hctz, metoprolol         Pain in both knees  Inflammatory markers have been checked and are negative  Pt puts a significant amount of wear and tear on his joints with his extremely active job  I have ordered xrays of the knees to assess for arthritis  He has been taking nsaids which I advised against not only because of his cardiac history but because of his hx of bariatric surgery  He can change to tylenol for the time being and will await xray results  Paresthesia of both hands  High suspicion for CTS  He is advised to get wrist splints to wear overnight  In addition, we discussed some other options for management  I am referring him to ortho for evaluation and recommendations  Diagnoses and all orders for this visit:    Paresthesia of both hands  -     Ambulatory referral to Orthopedic Surgery; Future    Pain in both knees, unspecified chronicity  -     XR knee 3 vw left non injury; Future  -     XR knee 3 vw right non injury; Future    Essential hypertension    Epigastric pain          Subjective:      Patient ID: Ruby Velasquez is a 61 y o  male  Pt is a 60 y/o male here today for evaluation of bilateral hand pain  He c/o pain, decreased strength, tingling, and nocturnal burning in the first 3 fingers of both hands  He says that he has trouble making fist because his hands feel swollen  He also complains of multi-site joint pain: bilateral knees, ankles, shoulders  He works a very physically demanding work and walks hours per day with lifting/carrying, moving heavy objects  He complains of stiffness after periods of inactivity  We have previously done lab work up with markers of inflammation, which were unremarkable  He is also c/o abdominal pain though he states he has been taking ibuprofen daily for pain and he has a history of bariatric surgery    He denies swelling, chest pain, shortness of breath, dizziness, headaches  The following portions of the patient's history were reviewed and updated as appropriate: allergies, current medications, past family history, past medical history, past social history, past surgical history and problem list     Review of Systems   Constitutional: Negative for appetite change, chills, fatigue and fever  Respiratory: Negative for shortness of breath  Cardiovascular: Negative for chest pain, palpitations and leg swelling  Gastrointestinal: Positive for abdominal pain  Negative for diarrhea, nausea and vomiting  Genitourinary: Negative for difficulty urinating, dysuria and frequency  Musculoskeletal: Positive for arthralgias and neck pain  Negative for back pain, joint swelling and myalgias  Skin: Negative for color change, rash and wound  Neurological: Positive for weakness and numbness  Negative for dizziness and headaches  Objective:      /84   Pulse 87   Temp 97 8 °F (36 6 °C)   Resp 16   Ht 5' 10" (1 778 m)   Wt 109 kg (241 lb)   SpO2 95%   BMI 34 58 kg/m²          Physical Exam   Constitutional: He is oriented to person, place, and time  Vital signs are normal  He appears well-developed and well-nourished  He is cooperative  Neck: Normal range of motion  Cardiovascular: Normal rate, regular rhythm, normal heart sounds and intact distal pulses  Pulmonary/Chest: Effort normal and breath sounds normal    Abdominal: Soft  Normal appearance and bowel sounds are normal  There is no tenderness  Musculoskeletal: Normal range of motion  He exhibits no edema  Left knee: He exhibits bony tenderness  He exhibits normal range of motion, no swelling, no erythema, no LCL laxity and no MCL laxity  Tenderness found  Medial joint line tenderness noted  Positive phalen test with tingling in the first 3 fingers  Mild atrophy of thenar eminence noted bilaterally   strength is 4/5    Normal ROM of the arms/shoulders  Neurological: He is alert and oriented to person, place, and time  He displays abnormal reflex  He displays no atrophy  He exhibits normal muscle tone  Reflex Scores:       Brachioradialis reflexes are 0 on the right side and 0 on the left side  Patellar reflexes are 0 on the right side and 0 on the left side  Skin: Skin is warm, dry and intact  No rash noted  There is erythema (facial flushing)  Psychiatric: He has a normal mood and affect  His speech is normal and behavior is normal  Judgment and thought content normal  Cognition and memory are normal    Vitals reviewed

## 2020-02-05 NOTE — ASSESSMENT & PLAN NOTE
Inflammatory markers have been checked and are negative  Pt puts a significant amount of wear and tear on his joints with his extremely active job  I have ordered xrays of the knees to assess for arthritis  He has been taking nsaids which I advised against not only because of his cardiac history but because of his hx of bariatric surgery  He can change to tylenol for the time being and will await xray results

## 2020-02-05 NOTE — ASSESSMENT & PLAN NOTE
High suspicion for CTS  He is advised to get wrist splints to wear overnight  In addition, we discussed some other options for management  I am referring him to ortho for evaluation and recommendations

## 2020-02-05 NOTE — ASSESSMENT & PLAN NOTE
Blood pressure is well controlled  Continue current tx with lisinopril hctz, metoprolol  Dortha Plough

## 2020-02-07 ENCOUNTER — HOSPITAL ENCOUNTER (OUTPATIENT)
Dept: RADIOLOGY | Facility: HOSPITAL | Age: 60
Discharge: HOME | End: 2020-02-07

## 2020-02-07 ENCOUNTER — TRANSCRIBE ORDERS (OUTPATIENT)
Dept: RADIOLOGY | Facility: HOSPITAL | Age: 60
End: 2020-02-07

## 2020-02-07 ENCOUNTER — HOSPITAL ENCOUNTER (OUTPATIENT)
Dept: RADIOLOGY | Facility: HOSPITAL | Age: 60
Discharge: HOME | End: 2020-02-07
Payer: COMMERCIAL

## 2020-02-07 DIAGNOSIS — M25.561 PAIN IN BOTH KNEES, UNSPECIFIED CHRONICITY: ICD-10-CM

## 2020-02-07 DIAGNOSIS — M25.562 PAIN IN BOTH KNEES, UNSPECIFIED CHRONICITY: ICD-10-CM

## 2020-02-07 PROCEDURE — 73562 X-RAY EXAM OF KNEE 3: CPT

## 2020-02-11 ENCOUNTER — TELEPHONE (OUTPATIENT)
Dept: INTERNAL MEDICINE CLINIC | Facility: CLINIC | Age: 60
End: 2020-02-11

## 2020-02-11 DIAGNOSIS — M17.0 PRIMARY OSTEOARTHRITIS OF BOTH KNEES: Primary | ICD-10-CM

## 2020-02-20 DIAGNOSIS — M79.2 NERVE PAIN: ICD-10-CM

## 2020-02-20 RX ORDER — GABAPENTIN 300 MG/1
CAPSULE ORAL
Qty: 90 CAPSULE | Refills: 0 | Status: SHIPPED | OUTPATIENT
Start: 2020-02-20 | End: 2020-05-18 | Stop reason: SDUPTHER

## 2020-02-21 ENCOUNTER — TELEPHONE (OUTPATIENT)
Dept: INTERNAL MEDICINE CLINIC | Facility: CLINIC | Age: 60
End: 2020-02-21

## 2020-02-21 NOTE — TELEPHONE ENCOUNTER
Just a FYI pt wanted you to know that HR is questioning him  He said you could reach him at [phone]

## 2020-05-12 ENCOUNTER — APPOINTMENT (OUTPATIENT)
Dept: URGENT CARE | Age: 60
End: 2020-05-12
Payer: OTHER MISCELLANEOUS

## 2020-05-12 PROCEDURE — 99283 EMERGENCY DEPT VISIT LOW MDM: CPT | Performed by: FAMILY MEDICINE

## 2020-05-12 PROCEDURE — G0382 LEV 3 HOSP TYPE B ED VISIT: HCPCS | Performed by: FAMILY MEDICINE

## 2020-05-13 ENCOUNTER — HOSPITAL ENCOUNTER (EMERGENCY)
Facility: HOSPITAL | Age: 60
Discharge: HOME | End: 2020-05-13
Attending: EMERGENCY MEDICINE | Admitting: EMERGENCY MEDICINE
Payer: OTHER MISCELLANEOUS

## 2020-05-13 ENCOUNTER — APPOINTMENT (OUTPATIENT)
Dept: URGENT CARE | Age: 60
End: 2020-05-13
Payer: OTHER MISCELLANEOUS

## 2020-05-13 ENCOUNTER — APPOINTMENT (EMERGENCY)
Dept: RADIOLOGY | Facility: HOSPITAL | Age: 60
End: 2020-05-13
Payer: OTHER MISCELLANEOUS

## 2020-05-13 VITALS
HEART RATE: 68 BPM | SYSTOLIC BLOOD PRESSURE: 148 MMHG | OXYGEN SATURATION: 100 % | BODY MASS INDEX: 33.72 KG/M2 | TEMPERATURE: 98.9 F | WEIGHT: 235 LBS | RESPIRATION RATE: 18 BRPM | DIASTOLIC BLOOD PRESSURE: 82 MMHG

## 2020-05-13 DIAGNOSIS — S16.1XXA STRAIN OF NECK MUSCLE, INITIAL ENCOUNTER: ICD-10-CM

## 2020-05-13 DIAGNOSIS — S09.90XA CLOSED HEAD INJURY, INITIAL ENCOUNTER: Primary | ICD-10-CM

## 2020-05-13 PROCEDURE — 64405 NJX AA&/STRD GR OCPL NRV: CPT | Performed by: EMERGENCY MEDICINE

## 2020-05-13 PROCEDURE — 99283 EMERGENCY DEPT VISIT LOW MDM: CPT

## 2020-05-13 PROCEDURE — 99284 EMERGENCY DEPT VISIT MOD MDM: CPT | Performed by: EMERGENCY MEDICINE

## 2020-05-13 PROCEDURE — 72125 CT NECK SPINE W/O DYE: CPT

## 2020-05-13 PROCEDURE — 99213 OFFICE O/P EST LOW 20 MIN: CPT | Performed by: PHYSICIAN ASSISTANT

## 2020-05-13 PROCEDURE — 70450 CT HEAD/BRAIN W/O DYE: CPT

## 2020-05-13 RX ORDER — BUPIVACAINE HYDROCHLORIDE 5 MG/ML
10 INJECTION, SOLUTION EPIDURAL; INTRACAUDAL ONCE
Status: COMPLETED | OUTPATIENT
Start: 2020-05-13 | End: 2020-05-13

## 2020-05-13 RX ORDER — DIAZEPAM 5 MG/1
5 TABLET ORAL
Qty: 20 TABLET | Refills: 0 | Status: SHIPPED | OUTPATIENT
Start: 2020-05-13 | End: 2020-12-08 | Stop reason: ALTCHOICE

## 2020-05-13 RX ADMIN — BUPIVACAINE HYDROCHLORIDE 10 ML: 5 INJECTION, SOLUTION EPIDURAL; INTRACAUDAL at 10:28

## 2020-05-18 DIAGNOSIS — I10 ESSENTIAL HYPERTENSION: ICD-10-CM

## 2020-05-18 DIAGNOSIS — E78.5 HYPERLIPIDEMIA, UNSPECIFIED HYPERLIPIDEMIA TYPE: ICD-10-CM

## 2020-05-18 DIAGNOSIS — M54.9 CHRONIC BILATERAL BACK PAIN, UNSPECIFIED BACK LOCATION: ICD-10-CM

## 2020-05-18 DIAGNOSIS — F41.8 DEPRESSION WITH ANXIETY: ICD-10-CM

## 2020-05-18 DIAGNOSIS — M79.2 NERVE PAIN: ICD-10-CM

## 2020-05-18 DIAGNOSIS — G89.29 CHRONIC BILATERAL BACK PAIN, UNSPECIFIED BACK LOCATION: ICD-10-CM

## 2020-05-18 RX ORDER — LISINOPRIL AND HYDROCHLOROTHIAZIDE 12.5; 1 MG/1; MG/1
1 TABLET ORAL DAILY
Qty: 90 TABLET | Refills: 1 | Status: SHIPPED | OUTPATIENT
Start: 2020-05-18 | End: 2020-11-05 | Stop reason: SDUPTHER

## 2020-05-18 RX ORDER — GABAPENTIN 300 MG/1
300 CAPSULE ORAL DAILY
Qty: 90 CAPSULE | Refills: 1 | Status: SHIPPED | OUTPATIENT
Start: 2020-05-18 | End: 2020-09-01 | Stop reason: SDUPTHER

## 2020-05-18 RX ORDER — CYCLOBENZAPRINE HCL 10 MG
TABLET ORAL
Qty: 90 TABLET | Refills: 1 | Status: SHIPPED | OUTPATIENT
Start: 2020-05-18 | End: 2020-09-01 | Stop reason: SDUPTHER

## 2020-05-18 RX ORDER — VENLAFAXINE 37.5 MG/1
37.5 TABLET ORAL DAILY
Qty: 90 TABLET | Refills: 1 | Status: SHIPPED | OUTPATIENT
Start: 2020-05-18 | End: 2020-11-05 | Stop reason: SDUPTHER

## 2020-05-18 RX ORDER — ATORVASTATIN CALCIUM 40 MG/1
40 TABLET, FILM COATED ORAL DAILY
Qty: 90 TABLET | Refills: 1 | Status: SHIPPED | OUTPATIENT
Start: 2020-05-18 | End: 2020-11-05 | Stop reason: SDUPTHER

## 2020-06-05 ENCOUNTER — TELEMEDICINE (OUTPATIENT)
Dept: CARDIOLOGY CLINIC | Facility: CLINIC | Age: 60
End: 2020-06-05
Payer: COMMERCIAL

## 2020-06-05 VITALS
HEIGHT: 70 IN | WEIGHT: 233 LBS | BODY MASS INDEX: 33.36 KG/M2 | SYSTOLIC BLOOD PRESSURE: 148 MMHG | DIASTOLIC BLOOD PRESSURE: 82 MMHG | HEART RATE: 68 BPM

## 2020-06-05 DIAGNOSIS — Z95.5 H/O HEART ARTERY STENT: ICD-10-CM

## 2020-06-05 DIAGNOSIS — I10 ESSENTIAL (PRIMARY) HYPERTENSION: Primary | ICD-10-CM

## 2020-06-05 DIAGNOSIS — I25.10 CORONARY ARTERIOSCLEROSIS: ICD-10-CM

## 2020-06-05 DIAGNOSIS — E78.00 PURE HYPERCHOLESTEROLEMIA: ICD-10-CM

## 2020-06-05 PROCEDURE — 99213 OFFICE O/P EST LOW 20 MIN: CPT | Performed by: INTERNAL MEDICINE

## 2020-07-29 ENCOUNTER — OFFICE VISIT (OUTPATIENT)
Dept: INTERNAL MEDICINE CLINIC | Facility: CLINIC | Age: 60
End: 2020-07-29
Payer: COMMERCIAL

## 2020-07-29 VITALS
TEMPERATURE: 99.1 F | HEIGHT: 70 IN | WEIGHT: 222.4 LBS | SYSTOLIC BLOOD PRESSURE: 122 MMHG | RESPIRATION RATE: 16 BRPM | HEART RATE: 85 BPM | DIASTOLIC BLOOD PRESSURE: 78 MMHG | OXYGEN SATURATION: 97 % | BODY MASS INDEX: 31.84 KG/M2

## 2020-07-29 DIAGNOSIS — N50.819 TESTICULAR PAIN, UNSPECIFIED: Primary | ICD-10-CM

## 2020-07-29 DIAGNOSIS — N40.0 BENIGN PROSTATIC HYPERPLASIA, UNSPECIFIED WHETHER LOWER URINARY TRACT SYMPTOMS PRESENT: ICD-10-CM

## 2020-07-29 DIAGNOSIS — Z98.84 S/P BARIATRIC SURGERY: ICD-10-CM

## 2020-07-29 DIAGNOSIS — I10 ESSENTIAL HYPERTENSION: ICD-10-CM

## 2020-07-29 DIAGNOSIS — R10.32 LEFT LOWER QUADRANT ABDOMINAL PAIN: ICD-10-CM

## 2020-07-29 DIAGNOSIS — R63.4 WEIGHT LOSS, UNINTENTIONAL: ICD-10-CM

## 2020-07-29 PROBLEM — R10.13 EPIGASTRIC PAIN: Status: RESOLVED | Noted: 2020-02-05 | Resolved: 2020-07-29

## 2020-07-29 PROCEDURE — 3074F SYST BP LT 130 MM HG: CPT | Performed by: NURSE PRACTITIONER

## 2020-07-29 PROCEDURE — 3008F BODY MASS INDEX DOCD: CPT | Performed by: NURSE PRACTITIONER

## 2020-07-29 PROCEDURE — 1036F TOBACCO NON-USER: CPT | Performed by: NURSE PRACTITIONER

## 2020-07-29 PROCEDURE — 3078F DIAST BP <80 MM HG: CPT | Performed by: NURSE PRACTITIONER

## 2020-07-29 PROCEDURE — 99214 OFFICE O/P EST MOD 30 MIN: CPT | Performed by: NURSE PRACTITIONER

## 2020-07-29 NOTE — ASSESSMENT & PLAN NOTE
For the past 2 months, pt has been out of his bariatric vitamins  His bariatric surgeon sadly passed away  I would like for him to meet with a dietician with [facility] bariatrics to evaluate for deficiency risks and to ensure he is on the correct supplements  Referral placed

## 2020-07-29 NOTE — ASSESSMENT & PLAN NOTE
As described in HPI  Pt refuses to allow a physical assessment of the genitalia  Will get a scrotal ultrasound as well as labs  back pain general

## 2020-07-29 NOTE — ASSESSMENT & PLAN NOTE
LLQ is quite tender on exam, states this has been present for about 6 months  He does also have weight loss and appetite changes  Will get a CT of the abdomen for further evaluation

## 2020-07-29 NOTE — PROGRESS NOTES
Assessment/Plan:    Essential hypertension  Blood pressure is under good control, continue with lisinopril hctz, metoprolol  Benign prostatic hyperplasia  Hx of BPH, previously on tansulosin but no longer taking  Pt has been experiencing testicular pain that radiates into the lower abdomen in addition to unintentional weight loss  No change in LUTS, would like to check PSA  Weight loss, unintentional  Pt has lost about 24 pounds in the past 6 months with appetite loss  He is extremely physically active at work, he is unsure if caloric intake is meeting his demands  In addition to weight loss, he is experiencing testicular and abdominal pain  Will get labs to check psa, cbc, he also has additional labs ordered by cardiology including a metabolic panel and an L8T  Testicular pain, unspecified  As described in HPI  Pt refuses to allow a physical assessment of the genitalia  Will get a scrotal ultrasound as well as labs  S/P bariatric surgery  For the past 2 months, pt has been out of his bariatric vitamins  His bariatric surgeon sadly passed away  I would like for him to meet with a dietician with [facility] to evaluate for deficiency risks and to ensure he is on the correct supplements  Referral placed  Left lower quadrant abdominal pain  LLQ is quite tender on exam, states this has been present for about 6 months  He does also have weight loss and appetite changes  Will get a CT of the abdomen for further evaluation  Diagnoses and all orders for this visit:    Testicular pain, unspecified  -     UA w Reflex to Microscopic w Reflex to Culture; Future  -     CBC and differential; Future  -     US scrotum and testicles; Future  -     PSA, Total Screen; Future    Weight loss, unintentional  -     CBC and differential; Future  -     TSH, 3rd generation with Free T4 reflex; Future  -     PSA, Total Screen;  Future    S/P bariatric surgery  -     Ambulatory referral to Weight Management; Future    Left lower quadrant abdominal pain  -     CT abdomen pelvis w contrast; Future    Essential hypertension    Benign prostatic hyperplasia, unspecified whether lower urinary tract symptoms present          Subjective:      Patient ID: Ellen Whitley is a 61 y o  male  Pt is a 61 y o  y/o male who is seen today for evaluation of weight loss and testicular pain  He states that about a week and a half ago he started experiencing strong testicular pain that radiates into his belly  This occurs usually about 3 times per day, lasts about 30 minutes  Denies scrotal swelling, he has done TSA and no palpable masses  He is concerned that his weight loss, 24 pounds in the last few months  His appetite is decreased, he does eat meals but there are times when he does not feel like eating  He has no nausea/vomiting, bowel changes  He notes that for the past 2 months he has been off his bariatric vitamins  The following portions of the patient's history were reviewed and updated as appropriate: allergies, current medications, past family history, past medical history, past social history, past surgical history and problem list     Review of Systems   Constitutional: Positive for appetite change, fatigue and unexpected weight change  Negative for activity change, chills and fever  HENT: Negative for hearing loss  Eyes: Negative for visual disturbance  Respiratory: Negative for cough, chest tightness and shortness of breath  Cardiovascular: Negative for chest pain, palpitations and leg swelling  Gastrointestinal: Positive for abdominal pain  Negative for constipation, diarrhea, nausea and vomiting  Genitourinary: Positive for testicular pain  Negative for decreased urine volume, difficulty urinating, discharge, dysuria, frequency and scrotal swelling  Musculoskeletal: Negative for arthralgias and myalgias  Allergic/Immunologic: Negative for environmental allergies  Neurological: Negative for dizziness, weakness, numbness and headaches  Psychiatric/Behavioral: Negative for sleep disturbance  The patient is not nervous/anxious  Objective:      /78   Pulse 85   Temp 99 1 °F (37 3 °C)   Resp 16   Ht 5' 10" (1 778 m)   Wt 101 kg (222 lb 6 4 oz)   SpO2 97%   BMI 31 91 kg/m²          Physical Exam   Constitutional: He is oriented to person, place, and time  Vital signs are normal  He appears well-developed and well-nourished  He is cooperative  Cardiovascular: Normal rate, regular rhythm, normal heart sounds and intact distal pulses  No murmur heard  Pulmonary/Chest: Effort normal and breath sounds normal    Abdominal: Soft  Normal appearance and bowel sounds are normal  There is no hepatosplenomegaly  There is tenderness in the left lower quadrant  There is guarding  There is no rebound, no tenderness at McBurney's point and negative Orlando's sign  Genitourinary:   Genitourinary Comments: Pt refused exam of the genitals   Musculoskeletal:   Pain with repositioning  Low back pain when laying supine   Neurological: He is alert and oriented to person, place, and time  He has normal strength  Skin: Skin is warm, dry and intact  Psychiatric: He has a normal mood and affect  His speech is normal and behavior is normal  Judgment and thought content normal  Cognition and memory are normal    Vitals reviewed

## 2020-07-29 NOTE — ASSESSMENT & PLAN NOTE
Hx of BPH, previously on tansulosin but no longer taking  Pt has been experiencing testicular pain that radiates into the lower abdomen in addition to unintentional weight loss  No change in LUTS, would like to check PSA

## 2020-07-29 NOTE — ASSESSMENT & PLAN NOTE
Pt has lost about 24 pounds in the past 6 months with appetite loss  He is extremely physically active at work, he is unsure if caloric intake is meeting his demands  In addition to weight loss, he is experiencing testicular and abdominal pain  Will get labs to check psa, cbc, he also has additional labs ordered by cardiology including a metabolic panel and an O8A

## 2020-08-06 ENCOUNTER — TRANSCRIBE ORDERS (OUTPATIENT)
Dept: RADIOLOGY | Facility: HOSPITAL | Age: 60
End: 2020-08-06

## 2020-08-07 DIAGNOSIS — I10 ESSENTIAL HYPERTENSION: Primary | ICD-10-CM

## 2020-08-10 ENCOUNTER — HOSPITAL ENCOUNTER (OUTPATIENT)
Dept: RADIOLOGY | Facility: HOSPITAL | Age: 60
Discharge: HOME | End: 2020-08-10
Payer: COMMERCIAL

## 2020-08-10 ENCOUNTER — APPOINTMENT (OUTPATIENT)
Dept: LAB | Facility: HOSPITAL | Age: 60
End: 2020-08-10
Payer: COMMERCIAL

## 2020-08-10 DIAGNOSIS — N50.819 TESTICULAR PAIN, UNSPECIFIED: ICD-10-CM

## 2020-08-10 DIAGNOSIS — R10.32 LEFT LOWER QUADRANT ABDOMINAL PAIN: ICD-10-CM

## 2020-08-10 DIAGNOSIS — I10 ESSENTIAL HYPERTENSION: ICD-10-CM

## 2020-08-10 DIAGNOSIS — R63.4 WEIGHT LOSS, UNINTENTIONAL: ICD-10-CM

## 2020-08-10 LAB
ANION GAP SERPL CALCULATED.3IONS-SCNC: 6 MMOL/L (ref 4–13)
BACTERIA UR QL AUTO: ABNORMAL /HPF
BASOPHILS # BLD AUTO: 0.05 THOUSANDS/ΜL (ref 0–0.1)
BASOPHILS NFR BLD AUTO: 1 % (ref 0–1)
BILIRUB UR QL STRIP: ABNORMAL
BUN SERPL-MCNC: 15 MG/DL (ref 5–25)
CALCIUM SERPL-MCNC: 9 MG/DL (ref 8.3–10.1)
CHLORIDE SERPL-SCNC: 108 MMOL/L (ref 100–108)
CLARITY UR: CLEAR
CO2 SERPL-SCNC: 27 MMOL/L (ref 21–32)
COLOR UR: ABNORMAL
CREAT SERPL-MCNC: 1 MG/DL (ref 0.6–1.3)
EOSINOPHIL # BLD AUTO: 0.15 THOUSAND/ΜL (ref 0–0.61)
EOSINOPHIL NFR BLD AUTO: 2 % (ref 0–6)
ERYTHROCYTE [DISTWIDTH] IN BLOOD BY AUTOMATED COUNT: 11.9 % (ref 11.6–15.1)
GFR SERPL CREATININE-BSD FRML MDRD: 81 ML/MIN/1.73SQ M
GLUCOSE SERPL-MCNC: 104 MG/DL (ref 65–140)
GLUCOSE UR STRIP-MCNC: NEGATIVE MG/DL
HCT VFR BLD AUTO: 45.8 % (ref 36.5–49.3)
HGB BLD-MCNC: 15.6 G/DL (ref 12–17)
HGB UR QL STRIP.AUTO: NEGATIVE
HYALINE CASTS #/AREA URNS LPF: ABNORMAL /LPF
IMM GRANULOCYTES # BLD AUTO: 0.02 THOUSAND/UL (ref 0–0.2)
IMM GRANULOCYTES NFR BLD AUTO: 0 % (ref 0–2)
KETONES UR STRIP-MCNC: NEGATIVE MG/DL
LEUKOCYTE ESTERASE UR QL STRIP: NEGATIVE
LYMPHOCYTES # BLD AUTO: 1.62 THOUSANDS/ΜL (ref 0.6–4.47)
LYMPHOCYTES NFR BLD AUTO: 22 % (ref 14–44)
MCH RBC QN AUTO: 31.9 PG (ref 26.8–34.3)
MCHC RBC AUTO-ENTMCNC: 34.1 G/DL (ref 31.4–37.4)
MCV RBC AUTO: 94 FL (ref 82–98)
MONOCYTES # BLD AUTO: 0.49 THOUSAND/ΜL (ref 0.17–1.22)
MONOCYTES NFR BLD AUTO: 7 % (ref 4–12)
NEUTROPHILS # BLD AUTO: 4.89 THOUSANDS/ΜL (ref 1.85–7.62)
NEUTS SEG NFR BLD AUTO: 68 % (ref 43–75)
NITRITE UR QL STRIP: NEGATIVE
NON-SQ EPI CELLS URNS QL MICRO: ABNORMAL /HPF
NRBC BLD AUTO-RTO: 0 /100 WBCS
PH UR STRIP.AUTO: 5 [PH]
PLATELET # BLD AUTO: 216 THOUSANDS/UL (ref 149–390)
PMV BLD AUTO: 10.8 FL (ref 8.9–12.7)
POTASSIUM SERPL-SCNC: 4 MMOL/L (ref 3.5–5.3)
PROT UR STRIP-MCNC: ABNORMAL MG/DL
PSA SERPL-MCNC: 2.4 NG/ML (ref 0–4)
RBC # BLD AUTO: 4.89 MILLION/UL (ref 3.88–5.62)
RBC #/AREA URNS AUTO: ABNORMAL /HPF
SODIUM SERPL-SCNC: 141 MMOL/L (ref 136–145)
SP GR UR STRIP.AUTO: 1.02 (ref 1–1.03)
TSH SERPL DL<=0.05 MIU/L-ACNC: 1.23 UIU/ML (ref 0.36–3.74)
UROBILINOGEN UR QL STRIP.AUTO: 1 E.U./DL
WBC # BLD AUTO: 7.22 THOUSAND/UL (ref 4.31–10.16)
WBC #/AREA URNS AUTO: ABNORMAL /HPF

## 2020-08-10 PROCEDURE — 36415 COLL VENOUS BLD VENIPUNCTURE: CPT

## 2020-08-10 PROCEDURE — G0103 PSA SCREENING: HCPCS

## 2020-08-10 PROCEDURE — 84443 ASSAY THYROID STIM HORMONE: CPT

## 2020-08-10 PROCEDURE — 81001 URINALYSIS AUTO W/SCOPE: CPT

## 2020-08-10 PROCEDURE — 85025 COMPLETE CBC W/AUTO DIFF WBC: CPT

## 2020-08-10 PROCEDURE — 80048 BASIC METABOLIC PNL TOTAL CA: CPT

## 2020-08-10 PROCEDURE — 74177 CT ABD & PELVIS W/CONTRAST: CPT

## 2020-08-10 RX ADMIN — IOHEXOL 100 ML: 350 INJECTION, SOLUTION INTRAVENOUS at 17:48

## 2020-08-11 ENCOUNTER — TELEPHONE (OUTPATIENT)
Dept: INTERNAL MEDICINE CLINIC | Facility: CLINIC | Age: 60
End: 2020-08-11

## 2020-09-01 ENCOUNTER — OFFICE VISIT (OUTPATIENT)
Dept: INTERNAL MEDICINE CLINIC | Facility: CLINIC | Age: 60
End: 2020-09-01
Payer: COMMERCIAL

## 2020-09-01 VITALS
OXYGEN SATURATION: 93 % | RESPIRATION RATE: 18 BRPM | SYSTOLIC BLOOD PRESSURE: 132 MMHG | WEIGHT: 219 LBS | HEIGHT: 70 IN | BODY MASS INDEX: 31.35 KG/M2 | DIASTOLIC BLOOD PRESSURE: 86 MMHG | HEART RATE: 77 BPM | TEMPERATURE: 98.4 F

## 2020-09-01 DIAGNOSIS — M79.2 NERVE PAIN: ICD-10-CM

## 2020-09-01 DIAGNOSIS — R63.4 WEIGHT LOSS, UNINTENTIONAL: ICD-10-CM

## 2020-09-01 DIAGNOSIS — K21.9 GASTROESOPHAGEAL REFLUX DISEASE, ESOPHAGITIS PRESENCE NOT SPECIFIED: ICD-10-CM

## 2020-09-01 DIAGNOSIS — G89.29 CHRONIC BILATERAL BACK PAIN, UNSPECIFIED BACK LOCATION: ICD-10-CM

## 2020-09-01 DIAGNOSIS — M54.9 CHRONIC BILATERAL BACK PAIN, UNSPECIFIED BACK LOCATION: ICD-10-CM

## 2020-09-01 DIAGNOSIS — E66.09 CLASS 1 OBESITY DUE TO EXCESS CALORIES WITH SERIOUS COMORBIDITY AND BODY MASS INDEX (BMI) OF 31.0 TO 31.9 IN ADULT: ICD-10-CM

## 2020-09-01 DIAGNOSIS — F41.1 GAD (GENERALIZED ANXIETY DISORDER): ICD-10-CM

## 2020-09-01 DIAGNOSIS — N50.819 TESTICULAR PAIN, UNSPECIFIED: ICD-10-CM

## 2020-09-01 DIAGNOSIS — Z00.00 ANNUAL PHYSICAL EXAM: Primary | ICD-10-CM

## 2020-09-01 DIAGNOSIS — Z12.11 SCREENING FOR MALIGNANT NEOPLASM OF COLON: ICD-10-CM

## 2020-09-01 DIAGNOSIS — M25.511 ACUTE PAIN OF RIGHT SHOULDER: ICD-10-CM

## 2020-09-01 DIAGNOSIS — N40.0 BENIGN PROSTATIC HYPERPLASIA, UNSPECIFIED WHETHER LOWER URINARY TRACT SYMPTOMS PRESENT: ICD-10-CM

## 2020-09-01 DIAGNOSIS — I25.9 CHRONIC ISCHEMIC HEART DISEASE, UNSPECIFIED: ICD-10-CM

## 2020-09-01 DIAGNOSIS — I10 ESSENTIAL HYPERTENSION: ICD-10-CM

## 2020-09-01 DIAGNOSIS — R20.2 PARESTHESIA OF BOTH HANDS: ICD-10-CM

## 2020-09-01 PROBLEM — J01.90 ACUTE NON-RECURRENT SINUSITIS: Status: RESOLVED | Noted: 2019-10-17 | Resolved: 2020-09-01

## 2020-09-01 PROCEDURE — 99396 PREV VISIT EST AGE 40-64: CPT | Performed by: NURSE PRACTITIONER

## 2020-09-01 RX ORDER — CYCLOBENZAPRINE HCL 10 MG
10 TABLET ORAL
Qty: 90 TABLET | Refills: 0 | Status: SHIPPED | OUTPATIENT
Start: 2020-09-01 | End: 2020-12-08 | Stop reason: SDUPTHER

## 2020-09-01 RX ORDER — GABAPENTIN 300 MG/1
300 CAPSULE ORAL DAILY
Qty: 90 CAPSULE | Refills: 1 | Status: SHIPPED | OUTPATIENT
Start: 2020-09-01 | End: 2020-12-08 | Stop reason: SDUPTHER

## 2020-09-01 NOTE — ASSESSMENT & PLAN NOTE
Asymptomatic for dyspepsia or heartburn but he does have hoarseness, continue with pantoprazole  He would like to see ENT regarding the hoarseness but if they do not find an issue, may suggest f/u with GI for possible egd

## 2020-09-01 NOTE — ASSESSMENT & PLAN NOTE
Normal exam of adult male  UTD with screening labs and monitoring  Chronic conditions are stable  He is due for repeat colon cancer screening and will get cologuard this time

## 2020-09-01 NOTE — ASSESSMENT & PLAN NOTE
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

## 2020-09-01 NOTE — PATIENT INSTRUCTIONS

## 2020-09-01 NOTE — ASSESSMENT & PLAN NOTE
Work up has been unremarkable  Weight loss most likely attributed to walking 8-9 per day at work and having limited time for meal breaks  He does say he is trying to gain weight by drinking soda, I reviewed healthier ways to help him stay nourished including healthy snacks  He should avoid empty calories

## 2020-09-01 NOTE — ASSESSMENT & PLAN NOTE
Again pt reports numbness intermittently in the B 1-3 fingers, worse overnight  Again advised to get splints to wear for sleep  He declines referral to ortho at this time, will call if sx worsen

## 2020-09-01 NOTE — ASSESSMENT & PLAN NOTE
Sx and exam suggest rotator cuff inflammation  Pt can not take nsaids due to hx of bariatric surgery  Will try voltaren gel prn for relief  Avoid overuse, call if sx worsen

## 2020-09-01 NOTE — ASSESSMENT & PLAN NOTE
Pt is losing weight and BMI has decreased  Encouraged pt to work on healthier snacks and nutrient dense meals  He gets a lot of cardiovascular exercise, can also do strength training

## 2020-09-01 NOTE — ASSESSMENT & PLAN NOTE
No c/o chest pain  Pt has regular f/u with cardiology, due for a repeat lipid panel which is already ordered  BP well controlled  Continue with atorvastatin and aspirin

## 2020-09-01 NOTE — ASSESSMENT & PLAN NOTE
Blood pressure remains well controlled  Continue with lisinopril, hctz, metoprolol  Continue to work on [employer]  He also follows with cardiology  Renal function is reviewed, no concerns  Continue to monitor

## 2020-09-22 DIAGNOSIS — T78.01XA PEANUT-INDUCED ANAPHYLAXIS, INITIAL ENCOUNTER: Primary | ICD-10-CM

## 2020-09-22 RX ORDER — EPINEPHRINE 0.3 MG/.3ML
0.3 INJECTION, SOLUTION INTRAMUSCULAR ONCE
Qty: 0.6 ML | Refills: 0 | Status: SHIPPED | OUTPATIENT
Start: 2020-09-22 | End: 2020-12-04 | Stop reason: HOSPADM

## 2020-10-08 ENCOUNTER — LAB (OUTPATIENT)
Dept: LAB | Age: 60
End: 2020-10-08
Payer: COMMERCIAL

## 2020-10-08 ENCOUNTER — TRANSCRIBE ORDERS (OUTPATIENT)
Dept: ADMINISTRATIVE | Age: 60
End: 2020-10-08

## 2020-10-08 DIAGNOSIS — J34.2 DEVIATED NASAL SEPTUM: ICD-10-CM

## 2020-10-08 DIAGNOSIS — J34.3 HYPERTROPHY OF INFERIOR NASAL TURBINATE: ICD-10-CM

## 2020-10-08 DIAGNOSIS — J32.3 CHRONIC SPHENOIDAL SINUSITIS: ICD-10-CM

## 2020-10-08 DIAGNOSIS — J34.89 NASAL VALVE COLLAPSE: ICD-10-CM

## 2020-10-08 DIAGNOSIS — Z95.5 H/O HEART ARTERY STENT: ICD-10-CM

## 2020-10-08 DIAGNOSIS — I25.10 CORONARY ARTERIOSCLEROSIS: ICD-10-CM

## 2020-10-08 DIAGNOSIS — E78.00 PURE HYPERCHOLESTEROLEMIA: ICD-10-CM

## 2020-10-08 DIAGNOSIS — I10 ESSENTIAL (PRIMARY) HYPERTENSION: ICD-10-CM

## 2020-10-08 LAB
ALBUMIN SERPL BCP-MCNC: 4.8 G/DL (ref 3.5–5)
ALP SERPL-CCNC: 135 U/L (ref 46–116)
ALT SERPL W P-5'-P-CCNC: 34 U/L (ref 12–78)
ANION GAP SERPL CALCULATED.3IONS-SCNC: 5 MMOL/L (ref 4–13)
AST SERPL W P-5'-P-CCNC: 24 U/L (ref 5–45)
BASOPHILS # BLD AUTO: 0.05 THOUSANDS/ΜL (ref 0–0.1)
BASOPHILS NFR BLD AUTO: 1 % (ref 0–1)
BILIRUB SERPL-MCNC: 1.17 MG/DL (ref 0.2–1)
BUN SERPL-MCNC: 21 MG/DL (ref 5–25)
CALCIUM SERPL-MCNC: 10.2 MG/DL (ref 8.3–10.1)
CHLORIDE SERPL-SCNC: 106 MMOL/L (ref 100–108)
CHOLEST SERPL-MCNC: 154 MG/DL (ref 50–200)
CO2 SERPL-SCNC: 29 MMOL/L (ref 21–32)
CREAT SERPL-MCNC: 1.14 MG/DL (ref 0.6–1.3)
EOSINOPHIL # BLD AUTO: 0.15 THOUSAND/ΜL (ref 0–0.61)
EOSINOPHIL NFR BLD AUTO: 2 % (ref 0–6)
ERYTHROCYTE [DISTWIDTH] IN BLOOD BY AUTOMATED COUNT: 12.1 % (ref 11.6–15.1)
GFR SERPL CREATININE-BSD FRML MDRD: 70 ML/MIN/1.73SQ M
GLUCOSE P FAST SERPL-MCNC: 80 MG/DL (ref 65–99)
HCT VFR BLD AUTO: 46.9 % (ref 36.5–49.3)
HDLC SERPL-MCNC: 79 MG/DL
HGB BLD-MCNC: 16.1 G/DL (ref 12–17)
IMM GRANULOCYTES # BLD AUTO: 0.01 THOUSAND/UL (ref 0–0.2)
IMM GRANULOCYTES NFR BLD AUTO: 0 % (ref 0–2)
LDLC SERPL CALC-MCNC: 62 MG/DL (ref 0–100)
LDLC SERPL DIRECT ASSAY-MCNC: 75 MG/DL (ref 0–100)
LYMPHOCYTES # BLD AUTO: 1.66 THOUSANDS/ΜL (ref 0.6–4.47)
LYMPHOCYTES NFR BLD AUTO: 25 % (ref 14–44)
MCH RBC QN AUTO: 32.2 PG (ref 26.8–34.3)
MCHC RBC AUTO-ENTMCNC: 34.3 G/DL (ref 31.4–37.4)
MCV RBC AUTO: 94 FL (ref 82–98)
MONOCYTES # BLD AUTO: 0.54 THOUSAND/ΜL (ref 0.17–1.22)
MONOCYTES NFR BLD AUTO: 8 % (ref 4–12)
NEUTROPHILS # BLD AUTO: 4.14 THOUSANDS/ΜL (ref 1.85–7.62)
NEUTS SEG NFR BLD AUTO: 64 % (ref 43–75)
NONHDLC SERPL-MCNC: 75 MG/DL
NRBC BLD AUTO-RTO: 0 /100 WBCS
PLATELET # BLD AUTO: 206 THOUSANDS/UL (ref 149–390)
PMV BLD AUTO: 10.9 FL (ref 8.9–12.7)
POTASSIUM SERPL-SCNC: 4.1 MMOL/L (ref 3.5–5.3)
PROT SERPL-MCNC: 8.1 G/DL (ref 6.4–8.2)
RBC # BLD AUTO: 5 MILLION/UL (ref 3.88–5.62)
SODIUM SERPL-SCNC: 140 MMOL/L (ref 136–145)
TRIGL SERPL-MCNC: 64 MG/DL
WBC # BLD AUTO: 6.55 THOUSAND/UL (ref 4.31–10.16)

## 2020-10-08 PROCEDURE — 93005 ELECTROCARDIOGRAM TRACING: CPT

## 2020-10-08 PROCEDURE — 36415 COLL VENOUS BLD VENIPUNCTURE: CPT

## 2020-10-08 PROCEDURE — 83036 HEMOGLOBIN GLYCOSYLATED A1C: CPT

## 2020-10-08 PROCEDURE — 83721 ASSAY OF BLOOD LIPOPROTEIN: CPT

## 2020-10-08 PROCEDURE — 85025 COMPLETE CBC W/AUTO DIFF WBC: CPT

## 2020-10-08 PROCEDURE — 80061 LIPID PANEL: CPT

## 2020-10-08 PROCEDURE — 80053 COMPREHEN METABOLIC PANEL: CPT

## 2020-10-09 LAB
ATRIAL RATE: 83 BPM
EST. AVERAGE GLUCOSE BLD GHB EST-MCNC: 97 MG/DL
HBA1C MFR BLD: 5 %
P AXIS: 64 DEGREES
PR INTERVAL: 158 MS
QRS AXIS: 37 DEGREES
QRSD INTERVAL: 96 MS
QT INTERVAL: 362 MS
QTC INTERVAL: 425 MS
T WAVE AXIS: 31 DEGREES
VENTRICULAR RATE: 83 BPM

## 2020-10-09 PROCEDURE — 93010 ELECTROCARDIOGRAM REPORT: CPT | Performed by: INTERNAL MEDICINE

## 2020-10-23 ENCOUNTER — HOSPITAL ENCOUNTER (OUTPATIENT)
Facility: HOSPITAL | Age: 60
Setting detail: OUTPATIENT SURGERY
Discharge: HOME | End: 2020-10-23
Attending: OTOLARYNGOLOGY | Admitting: OTOLARYNGOLOGY
Payer: COMMERCIAL

## 2020-10-23 ENCOUNTER — APPOINTMENT (OUTPATIENT)
Dept: RADIOLOGY | Facility: HOSPITAL | Age: 60
End: 2020-10-23
Payer: COMMERCIAL

## 2020-10-23 ENCOUNTER — ANESTHESIA (OUTPATIENT)
Dept: PERIOP | Facility: HOSPITAL | Age: 60
End: 2020-10-23
Payer: COMMERCIAL

## 2020-10-23 ENCOUNTER — ANESTHESIA EVENT (OUTPATIENT)
Dept: PERIOP | Facility: HOSPITAL | Age: 60
End: 2020-10-23
Payer: COMMERCIAL

## 2020-10-23 VITALS — HEART RATE: 118 BPM

## 2020-10-23 VITALS
BODY MASS INDEX: 30.78 KG/M2 | WEIGHT: 215 LBS | SYSTOLIC BLOOD PRESSURE: 126 MMHG | OXYGEN SATURATION: 95 % | HEIGHT: 70 IN | RESPIRATION RATE: 18 BRPM | HEART RATE: 90 BPM | TEMPERATURE: 98.4 F | DIASTOLIC BLOOD PRESSURE: 77 MMHG

## 2020-10-23 LAB
ATRIAL RATE: 98 BPM
P AXIS: 59 DEGREES
PR INTERVAL: 183 MS
QRS AXIS: 31 DEGREES
QRSD INTERVAL: 96 MS
QT INTERVAL: 338 MS
QTC INTERVAL: 432 MS
T WAVE AXIS: 21 DEGREES
TROPONIN I SERPL-MCNC: <0.02 NG/ML
VENTRICULAR RATE: 98 BPM

## 2020-10-23 PROCEDURE — 93010 ELECTROCARDIOGRAM REPORT: CPT | Performed by: INTERNAL MEDICINE

## 2020-10-23 PROCEDURE — 93005 ELECTROCARDIOGRAM TRACING: CPT

## 2020-10-23 PROCEDURE — 70486 CT MAXILLOFACIAL W/O DYE: CPT

## 2020-10-23 PROCEDURE — 99999 PR OFFICE/OUTPT VISIT,PROCEDURE ONLY: CPT | Performed by: OTOLARYNGOLOGY

## 2020-10-23 PROCEDURE — 84484 ASSAY OF TROPONIN QUANT: CPT | Performed by: ANESTHESIOLOGY

## 2020-10-23 PROCEDURE — 99214 OFFICE O/P EST MOD 30 MIN: CPT | Performed by: ANESTHESIOLOGY

## 2020-10-23 RX ORDER — GLYCOPYRROLATE 0.2 MG/ML
INJECTION INTRAMUSCULAR; INTRAVENOUS AS NEEDED
Status: DISCONTINUED | OUTPATIENT
Start: 2020-10-23 | End: 2020-10-23

## 2020-10-23 RX ORDER — SCOLOPAMINE TRANSDERMAL SYSTEM 1 MG/1
1 PATCH, EXTENDED RELEASE TRANSDERMAL ONCE
Status: DISCONTINUED | OUTPATIENT
Start: 2020-10-23 | End: 2020-10-23 | Stop reason: HOSPADM

## 2020-10-23 RX ORDER — DEXAMETHASONE SODIUM PHOSPHATE 10 MG/ML
INJECTION, SOLUTION INTRAMUSCULAR; INTRAVENOUS AS NEEDED
Status: DISCONTINUED | OUTPATIENT
Start: 2020-10-23 | End: 2020-10-23

## 2020-10-23 RX ORDER — HYDROMORPHONE HCL/PF 1 MG/ML
0.5 SYRINGE (ML) INJECTION
Status: DISCONTINUED | OUTPATIENT
Start: 2020-10-23 | End: 2020-10-23 | Stop reason: HOSPADM

## 2020-10-23 RX ORDER — DIPHENHYDRAMINE HYDROCHLORIDE 50 MG/ML
INJECTION INTRAMUSCULAR; INTRAVENOUS AS NEEDED
Status: DISCONTINUED | OUTPATIENT
Start: 2020-10-23 | End: 2020-10-23

## 2020-10-23 RX ORDER — SODIUM CHLORIDE, SODIUM LACTATE, POTASSIUM CHLORIDE, CALCIUM CHLORIDE 600; 310; 30; 20 MG/100ML; MG/100ML; MG/100ML; MG/100ML
125 INJECTION, SOLUTION INTRAVENOUS CONTINUOUS
Status: DISCONTINUED | OUTPATIENT
Start: 2020-10-23 | End: 2020-10-23 | Stop reason: HOSPADM

## 2020-10-23 RX ORDER — EPHEDRINE SULFATE 50 MG/ML
INJECTION INTRAVENOUS AS NEEDED
Status: DISCONTINUED | OUTPATIENT
Start: 2020-10-23 | End: 2020-10-23

## 2020-10-23 RX ORDER — ONDANSETRON 2 MG/ML
4 INJECTION INTRAMUSCULAR; INTRAVENOUS ONCE AS NEEDED
Status: DISCONTINUED | OUTPATIENT
Start: 2020-10-23 | End: 2020-10-23 | Stop reason: HOSPADM

## 2020-10-23 RX ORDER — LIDOCAINE HYDROCHLORIDE 10 MG/ML
INJECTION, SOLUTION EPIDURAL; INFILTRATION; INTRACAUDAL; PERINEURAL AS NEEDED
Status: DISCONTINUED | OUTPATIENT
Start: 2020-10-23 | End: 2020-10-23

## 2020-10-23 RX ORDER — MIDAZOLAM HYDROCHLORIDE 2 MG/2ML
INJECTION, SOLUTION INTRAMUSCULAR; INTRAVENOUS AS NEEDED
Status: DISCONTINUED | OUTPATIENT
Start: 2020-10-23 | End: 2020-10-23

## 2020-10-23 RX ORDER — FENTANYL CITRATE/PF 50 MCG/ML
50 SYRINGE (ML) INJECTION
Status: DISCONTINUED | OUTPATIENT
Start: 2020-10-23 | End: 2020-10-23 | Stop reason: HOSPADM

## 2020-10-23 RX ORDER — PROPOFOL 10 MG/ML
INJECTION, EMULSION INTRAVENOUS AS NEEDED
Status: DISCONTINUED | OUTPATIENT
Start: 2020-10-23 | End: 2020-10-23

## 2020-10-23 RX ORDER — FENTANYL CITRATE 50 UG/ML
INJECTION, SOLUTION INTRAMUSCULAR; INTRAVENOUS AS NEEDED
Status: DISCONTINUED | OUTPATIENT
Start: 2020-10-23 | End: 2020-10-23

## 2020-10-23 RX ORDER — LIDOCAINE HYDROCHLORIDE 10 MG/ML
0.5 INJECTION, SOLUTION EPIDURAL; INFILTRATION; INTRACAUDAL; PERINEURAL ONCE AS NEEDED
Status: COMPLETED | OUTPATIENT
Start: 2020-10-23 | End: 2020-10-23

## 2020-10-23 RX ORDER — PROPOFOL 10 MG/ML
INJECTION, EMULSION INTRAVENOUS CONTINUOUS PRN
Status: DISCONTINUED | OUTPATIENT
Start: 2020-10-23 | End: 2020-10-23

## 2020-10-23 RX ORDER — ONDANSETRON 2 MG/ML
INJECTION INTRAMUSCULAR; INTRAVENOUS AS NEEDED
Status: DISCONTINUED | OUTPATIENT
Start: 2020-10-23 | End: 2020-10-23

## 2020-10-23 RX ORDER — SODIUM CHLORIDE, SODIUM LACTATE, POTASSIUM CHLORIDE, CALCIUM CHLORIDE 600; 310; 30; 20 MG/100ML; MG/100ML; MG/100ML; MG/100ML
125 INJECTION, SOLUTION INTRAVENOUS CONTINUOUS
Status: CANCELLED | OUTPATIENT
Start: 2020-10-23

## 2020-10-23 RX ADMIN — FENTANYL CITRATE 100 MCG: 50 INJECTION, SOLUTION INTRAMUSCULAR; INTRAVENOUS at 10:05

## 2020-10-23 RX ADMIN — LIDOCAINE HYDROCHLORIDE 0.2 ML: 10 INJECTION, SOLUTION EPIDURAL; INFILTRATION; INTRACAUDAL; PERINEURAL at 09:20

## 2020-10-23 RX ADMIN — SODIUM CHLORIDE, SODIUM LACTATE, POTASSIUM CHLORIDE, AND CALCIUM CHLORIDE 125 ML/HR: .6; .31; .03; .02 INJECTION, SOLUTION INTRAVENOUS at 09:20

## 2020-10-23 RX ADMIN — DEXAMETHASONE SODIUM PHOSPHATE 10 MG: 10 INJECTION, SOLUTION INTRAMUSCULAR; INTRAVENOUS at 10:05

## 2020-10-23 RX ADMIN — PROPOFOL 150 MCG/KG/MIN: 10 INJECTION, EMULSION INTRAVENOUS at 10:08

## 2020-10-23 RX ADMIN — ONDANSETRON 4 MG: 2 INJECTION INTRAMUSCULAR; INTRAVENOUS at 10:05

## 2020-10-23 RX ADMIN — LIDOCAINE HYDROCHLORIDE 50 MG: 10 INJECTION, SOLUTION EPIDURAL; INFILTRATION; INTRACAUDAL; PERINEURAL at 10:05

## 2020-10-23 RX ADMIN — DIPHENHYDRAMINE HYDROCHLORIDE 12.5 MG: 50 INJECTION INTRAMUSCULAR; INTRAVENOUS at 09:56

## 2020-10-23 RX ADMIN — REMIFENTANIL HYDROCHLORIDE 0.23 MCG/KG/MIN: 1 INJECTION, POWDER, LYOPHILIZED, FOR SOLUTION INTRAVENOUS at 10:08

## 2020-10-23 RX ADMIN — SCOPALAMINE 1 PATCH: 1 PATCH, EXTENDED RELEASE TRANSDERMAL at 09:33

## 2020-10-23 RX ADMIN — MIDAZOLAM 2 MG: 1 INJECTION INTRAMUSCULAR; INTRAVENOUS at 09:55

## 2020-10-23 RX ADMIN — PHENYLEPHRINE HYDROCHLORIDE 200 MCG: 10 INJECTION INTRAVENOUS at 10:22

## 2020-10-23 RX ADMIN — PROPOFOL 200 MG: 10 INJECTION, EMULSION INTRAVENOUS at 10:05

## 2020-10-23 RX ADMIN — GLYCOPYRROLATE 0.2 MG: 0.2 INJECTION, SOLUTION INTRAMUSCULAR; INTRAVENOUS at 09:55

## 2020-10-23 RX ADMIN — EPHEDRINE SULFATE 10 MG: 50 INJECTION, SOLUTION INTRAVENOUS at 10:20

## 2020-10-23 RX ADMIN — Medication 2000 MG: at 09:55

## 2020-10-23 RX ADMIN — EPHEDRINE SULFATE 10 MG: 50 INJECTION, SOLUTION INTRAVENOUS at 10:16

## 2020-10-28 ENCOUNTER — TELEPHONE (OUTPATIENT)
Dept: INTERNAL MEDICINE CLINIC | Facility: CLINIC | Age: 60
End: 2020-10-28

## 2020-11-05 ENCOUNTER — OFFICE VISIT (OUTPATIENT)
Dept: INTERNAL MEDICINE CLINIC | Facility: CLINIC | Age: 60
End: 2020-11-05
Payer: COMMERCIAL

## 2020-11-05 VITALS
WEIGHT: 221 LBS | DIASTOLIC BLOOD PRESSURE: 80 MMHG | OXYGEN SATURATION: 99 % | BODY MASS INDEX: 31.64 KG/M2 | TEMPERATURE: 98.1 F | HEIGHT: 70 IN | HEART RATE: 75 BPM | RESPIRATION RATE: 16 BRPM | SYSTOLIC BLOOD PRESSURE: 132 MMHG

## 2020-11-05 DIAGNOSIS — E78.5 HYPERLIPIDEMIA, UNSPECIFIED HYPERLIPIDEMIA TYPE: ICD-10-CM

## 2020-11-05 DIAGNOSIS — Z23 NEED FOR IMMUNIZATION AGAINST INFLUENZA: ICD-10-CM

## 2020-11-05 DIAGNOSIS — F41.8 DEPRESSION WITH ANXIETY: ICD-10-CM

## 2020-11-05 DIAGNOSIS — M25.511 ACUTE PAIN OF RIGHT SHOULDER: ICD-10-CM

## 2020-11-05 DIAGNOSIS — K21.9 GASTROESOPHAGEAL REFLUX DISEASE WITHOUT ESOPHAGITIS: ICD-10-CM

## 2020-11-05 DIAGNOSIS — I10 ESSENTIAL HYPERTENSION: Primary | ICD-10-CM

## 2020-11-05 DIAGNOSIS — G47.00 INSOMNIA, UNSPECIFIED TYPE: ICD-10-CM

## 2020-11-05 DIAGNOSIS — I25.9 CHRONIC ISCHEMIC HEART DISEASE, UNSPECIFIED: ICD-10-CM

## 2020-11-05 PROCEDURE — 90682 RIV4 VACC RECOMBINANT DNA IM: CPT

## 2020-11-05 PROCEDURE — 90471 IMMUNIZATION ADMIN: CPT

## 2020-11-05 PROCEDURE — 99213 OFFICE O/P EST LOW 20 MIN: CPT | Performed by: NURSE PRACTITIONER

## 2020-11-05 RX ORDER — PANTOPRAZOLE SODIUM 40 MG/1
40 TABLET, DELAYED RELEASE ORAL DAILY
Qty: 90 TABLET | Refills: 1 | Status: SHIPPED | OUTPATIENT
Start: 2020-11-05 | End: 2020-12-08 | Stop reason: SDUPTHER

## 2020-11-05 RX ORDER — TRAZODONE HYDROCHLORIDE 100 MG/1
100 TABLET ORAL
Qty: 30 TABLET | Refills: 0 | Status: SHIPPED | OUTPATIENT
Start: 2020-11-05 | End: 2020-12-08 | Stop reason: SDUPTHER

## 2020-11-05 RX ORDER — LISINOPRIL AND HYDROCHLOROTHIAZIDE 12.5; 1 MG/1; MG/1
1 TABLET ORAL DAILY
Qty: 90 TABLET | Refills: 1 | Status: SHIPPED | OUTPATIENT
Start: 2020-11-05 | End: 2020-12-08 | Stop reason: SDUPTHER

## 2020-11-05 RX ORDER — ATORVASTATIN CALCIUM 40 MG/1
40 TABLET, FILM COATED ORAL DAILY
Qty: 90 TABLET | Refills: 1 | Status: SHIPPED | OUTPATIENT
Start: 2020-11-05 | End: 2020-12-08 | Stop reason: SDUPTHER

## 2020-11-05 RX ORDER — VENLAFAXINE 37.5 MG/1
37.5 TABLET ORAL DAILY
Qty: 90 TABLET | Refills: 1 | Status: SHIPPED | OUTPATIENT
Start: 2020-11-05 | End: 2020-12-08 | Stop reason: SDUPTHER

## 2020-11-18 NOTE — PROGRESS NOTES
Progress Note -Surgery PA  Steven Parikhmarifer 62 y o  male MRN: [id number]  Unit/Bed#: -01 Encounter: [id number]      Assessment:  62year old male s/p Procedure(s):  DEBRIDEMENT WOUND ABDOMINAL ([address]) AND WOUND VAC APPLICATION  Plan:  -continue vac/wash out this weekend  -pain control  -diet as tolerated  -ambulate  -follow up cultures        Subjective/Objective     Subjective: Has some soreness but overall feels well  Vac holding suction  Objective:     /57 (BP Location: Right arm)   Pulse 76   Temp 98 2 °F (36 8 °C) (Oral)   Resp 18   Ht 5' 10" (1 778 m)   Wt 108 kg (239 lb)   SpO2 96%   BMI 34 29 kg/m²   I/O last 24 hours: In: 1826 7 [P O :480; I V :1346 7]  Out: 2150 [Urine:2000; Drains:100; Blood:50]        Intake/Output Summary (Last 24 hours) at 3/8/2019 0808  Last data filed at 3/8/2019 0727  Gross per 24 hour   Intake 1826 67 ml   Output 2150 ml   Net -323 33 ml       Invasive Devices     Peripheral Intravenous Line            Peripheral IV 03/08/19 Right Wrist less than 1 day          Drain            Closed/Suction Drain Right Abdomen 14 5 Fr  59 days    Negative Pressure Wound Therapy (V A C ) Abdomen Mid less than 1 day                Physical Exam:  /57 (BP Location: Right arm)   Pulse 76   Temp 98 2 °F (36 8 °C) (Oral)   Resp 18   Ht 5' 10" (1 778 m)   Wt 108 kg (239 lb)   SpO2 96%   BMI 34 29 kg/m²   General appearance: alert and oriented, in no acute distress and alert  Lungs: clear to auscultation bilaterally  Heart: regular rate and rhythm, S1, S2 normal, no murmur, click, rub or gallop  Abdomen: excoriation from tape   Vac holding suction      Current Facility-Administered Medications:     acetaminophen (TYLENOL) tablet 650 mg, 650 mg, Oral, Q4H PRN, Luigi Watkins MD    acetaminophen (TYLENOL) tablet 650 mg, 650 mg, Oral, Q6H PRN, Luigi Watkins MD    aspirin (ECOTRIN LOW STRENGTH) EC tablet 162 mg, 162 mg, Oral, Daily, Luigi Watkins MD, 470 RN to bedside to encourage pt to urinate. Pt given water bottle and warm blanket. IVFs started on pt and infusing well. Pt unable to provide specimen at this time. VSS. Pt Verbal but sleepy. Will continue to monitor.    mg at 03/07/19 0943    atorvastatin (LIPITOR) tablet 40 mg, 40 mg, Oral, Daily, Felix Mccoy MD, 40 mg at 03/07/19 0943    ceFAZolin (ANCEF) IVPB (premix) 2,000 mg, 2,000 mg, Intravenous, Q8H, Felix Mccoy MD, Last Rate: 100 mL/hr at 03/08/19 0104, 2,000 mg at 03/08/19 0104    cyclobenzaprine (FLEXERIL) tablet 10 mg, 10 mg, Oral, Q12H PRN, Felix Mccoy MD, 10 mg at 03/07/19 2252    diphenhydrAMINE (BENADRYL) injection 25 mg, 25 mg, Intravenous, Q6H PRN, Rosa Elena Archibald PA-C    enoxaparin (LOVENOX) subcutaneous injection 40 mg, 40 mg, Subcutaneous, Daily, Felix Mccoy MD, 40 mg at 03/07/19 0944    gabapentin (NEURONTIN) capsule 300 mg, 300 mg, Oral, Q12H PRN, Felix Mccoy MD, 300 mg at 03/07/19 2252    HYDROmorphone (DILAUDID) injection 0 5 mg, 0 5 mg, Intravenous, Q1H PRN, Felix Mccoy MD, 0 5 mg at 03/08/19 0504    lactated ringers infusion, 100 mL/hr, Intravenous, Continuous, Felix Mccoy MD, Last Rate: 100 mL/hr at 03/08/19 0103, 100 mL/hr at 03/08/19 0103    lisinopril-hydrochlorothiazide (PRINZIDE 10/12  5) combo dose, , Oral, Daily, Felix Mccoy MD    metoprolol tartrate (LOPRESSOR) tablet 25 mg, 25 mg, Oral, HS, Felix Mccoy MD, 25 mg at 03/07/19 2128    nitroglycerin (NITROSTAT) SL tablet 0 4 mg, 0 4 mg, Sublingual, Q5 Min PRN, Felix Mccoy MD    ondansetron [facility]) injection 4 mg, 4 mg, Intravenous, Q4H PRN, Felix Mccoy MD    ondansetron [facility]) injection 4 mg, 4 mg, Intravenous, Q4H PRN, Felix Mccoy MD    oxyCODONE (ROXICODONE) immediate release tablet 10 mg, 10 mg, Oral, Q4H PRN, Felix Mccoy MD, 10 mg at 03/08/19 0108    oxyCODONE (ROXICODONE) IR tablet 5 mg, 5 mg, Oral, Q4H PRN, Felix Mccoy MD    pantoprazole (PROTONIX) EC tablet 40 mg, 40 mg, Oral, Daily Before Breakfast, Felix Mccoy MD, 40 mg at 03/08/19 0636    traMADol (ULTRAM) tablet 50 mg, 50 mg, Oral, Q4H PRN, Felix Mccoy MD, 50 mg at 03/07/19 2131    venlafaxine (EFFEXOR) tablet 37 5 mg, 37 5 mg, Oral, Daily, Zulay Guerrero MD, 37 5 mg at 03/07/19 0943           Lab, Imaging and other studies:CBC: No results found for: WBC, HGB, HCT, MCV, PLT, ADJUSTEDWBC, MCH, MCHC, RDW, MPV, NRBC, CMP: No results found for: SODIUM, K, CL, CO2, ANIONGAP, BUN, CREATININE, GLUCOSE, CALCIUM, AST, ALT, ALKPHOS, PROT, BILITOT, EGFR      VTE Pharmacologic Prophylaxis: Sequential compression device (Venodyne)  and Enoxaparin (Lovenox)  VTE Mechanical Prophylaxis: sequential compression device    Rounds performed with nursing

## 2020-11-28 ENCOUNTER — TELEPHONE (OUTPATIENT)
Dept: OTHER | Facility: OTHER | Age: 60
End: 2020-11-28

## 2020-11-28 ENCOUNTER — OFFICE VISIT (OUTPATIENT)
Dept: URGENT CARE | Facility: CLINIC | Age: 60
End: 2020-11-28
Payer: COMMERCIAL

## 2020-11-28 VITALS
RESPIRATION RATE: 18 BRPM | HEIGHT: 68 IN | TEMPERATURE: 100.3 F | WEIGHT: 220 LBS | HEART RATE: 128 BPM | BODY MASS INDEX: 33.34 KG/M2 | OXYGEN SATURATION: 98 %

## 2020-11-28 DIAGNOSIS — Z20.822 ENCOUNTER FOR SCREENING LABORATORY TESTING FOR COVID-19 VIRUS: Primary | ICD-10-CM

## 2020-11-28 DIAGNOSIS — U07.1 COVID-19: Primary | ICD-10-CM

## 2020-11-28 PROCEDURE — G0382 LEV 3 HOSP TYPE B ED VISIT: HCPCS | Performed by: NURSE PRACTITIONER

## 2020-11-28 PROCEDURE — U0003 INFECTIOUS AGENT DETECTION BY NUCLEIC ACID (DNA OR RNA); SEVERE ACUTE RESPIRATORY SYNDROME CORONAVIRUS 2 (SARS-COV-2) (CORONAVIRUS DISEASE [COVID-19]), AMPLIFIED PROBE TECHNIQUE, MAKING USE OF HIGH THROUGHPUT TECHNOLOGIES AS DESCRIBED BY CMS-2020-01-R: HCPCS | Performed by: NURSE PRACTITIONER

## 2020-11-30 ENCOUNTER — HOSPITAL ENCOUNTER (INPATIENT)
Facility: HOSPITAL | Age: 60
LOS: 4 days | Discharge: HOME | DRG: 871 | End: 2020-12-04
Attending: EMERGENCY MEDICINE | Admitting: INTERNAL MEDICINE
Payer: COMMERCIAL

## 2020-11-30 ENCOUNTER — APPOINTMENT (EMERGENCY)
Dept: RADIOLOGY | Facility: HOSPITAL | Age: 60
DRG: 871 | End: 2020-11-30
Payer: COMMERCIAL

## 2020-11-30 DIAGNOSIS — R65.20 SEVERE SEPSIS (HCC): ICD-10-CM

## 2020-11-30 DIAGNOSIS — N40.0 BENIGN PROSTATIC HYPERPLASIA, UNSPECIFIED WHETHER LOWER URINARY TRACT SYMPTOMS PRESENT: ICD-10-CM

## 2020-11-30 DIAGNOSIS — R65.21 SEPTIC SHOCK (HCC): ICD-10-CM

## 2020-11-30 DIAGNOSIS — A41.9 SEVERE SEPSIS (HCC): ICD-10-CM

## 2020-11-30 DIAGNOSIS — N30.00 ACUTE CYSTITIS WITHOUT HEMATURIA: ICD-10-CM

## 2020-11-30 DIAGNOSIS — A41.9 SEPSIS DUE TO URINARY TRACT INFECTION (HCC): Primary | ICD-10-CM

## 2020-11-30 DIAGNOSIS — N39.0 SEPSIS DUE TO URINARY TRACT INFECTION (HCC): Primary | ICD-10-CM

## 2020-11-30 DIAGNOSIS — A41.9 SEPTIC SHOCK (HCC): ICD-10-CM

## 2020-11-30 PROBLEM — R79.89 ABNORMAL LFTS: Status: ACTIVE | Noted: 2020-11-30

## 2020-11-30 PROBLEM — E87.6 HYPOKALEMIA: Status: ACTIVE | Noted: 2020-11-30

## 2020-11-30 PROBLEM — N17.9 ARF (ACUTE RENAL FAILURE) (HCC): Status: ACTIVE | Noted: 2020-11-30

## 2020-11-30 PROBLEM — E87.1 HYPONATREMIA: Status: ACTIVE | Noted: 2020-11-30

## 2020-11-30 LAB
ALBUMIN SERPL BCP-MCNC: 3.6 G/DL (ref 3.5–5)
ALP SERPL-CCNC: 277 U/L (ref 46–116)
ALT SERPL W P-5'-P-CCNC: 50 U/L (ref 12–78)
ANION GAP SERPL CALCULATED.3IONS-SCNC: 9 MMOL/L (ref 4–13)
APTT PPP: 32 SECONDS (ref 23–37)
AST SERPL W P-5'-P-CCNC: 44 U/L (ref 5–45)
ATRIAL RATE: 103 BPM
BACTERIA UR QL AUTO: ABNORMAL /HPF
BILIRUB SERPL-MCNC: 1.69 MG/DL (ref 0.2–1)
BILIRUB UR QL STRIP: ABNORMAL
BUN SERPL-MCNC: 24 MG/DL (ref 5–25)
CALCIUM SERPL-MCNC: 9.9 MG/DL (ref 8.3–10.1)
CHLORIDE SERPL-SCNC: 101 MMOL/L (ref 100–108)
CLARITY UR: ABNORMAL
CO2 SERPL-SCNC: 23 MMOL/L (ref 21–32)
COLOR UR: ABNORMAL
CREAT SERPL-MCNC: 1.53 MG/DL (ref 0.6–1.3)
ERYTHROCYTE [DISTWIDTH] IN BLOOD BY AUTOMATED COUNT: 12.4 % (ref 11.6–15.1)
FINE GRAN CASTS URNS QL MICRO: ABNORMAL /LPF
FLUAV RNA RESP QL NAA+PROBE: NEGATIVE
FLUBV RNA RESP QL NAA+PROBE: NEGATIVE
GFR SERPL CREATININE-BSD FRML MDRD: 49 ML/MIN/1.73SQ M
GLUCOSE SERPL-MCNC: 146 MG/DL (ref 65–140)
GLUCOSE UR STRIP-MCNC: NEGATIVE MG/DL
HCT VFR BLD AUTO: 46.9 % (ref 36.5–49.3)
HGB BLD-MCNC: 15.8 G/DL (ref 12–17)
HGB UR QL STRIP.AUTO: ABNORMAL
INR PPP: 1.18 (ref 0.84–1.19)
KETONES UR STRIP-MCNC: ABNORMAL MG/DL
LACTATE SERPL-SCNC: 1.2 MMOL/L (ref 0.5–2)
LACTATE SERPL-SCNC: 3.3 MMOL/L (ref 0.5–2)
LEUKOCYTE ESTERASE UR QL STRIP: ABNORMAL
MCH RBC QN AUTO: 31.2 PG (ref 26.8–34.3)
MCHC RBC AUTO-ENTMCNC: 33.7 G/DL (ref 31.4–37.4)
MCV RBC AUTO: 93 FL (ref 82–98)
NITRITE UR QL STRIP: POSITIVE
NON-SQ EPI CELLS URNS QL MICRO: ABNORMAL /HPF
NRBC BLD AUTO-RTO: 0 /100 WBCS
P AXIS: 52 DEGREES
PH UR STRIP.AUTO: 5.5 [PH]
PLATELET # BLD AUTO: 129 THOUSANDS/UL (ref 149–390)
PMV BLD AUTO: 11 FL (ref 8.9–12.7)
POTASSIUM SERPL-SCNC: 3.3 MMOL/L (ref 3.5–5.3)
PR INTERVAL: 154 MS
PROCALCITONIN SERPL-MCNC: 13.94 NG/ML
PROT SERPL-MCNC: 7.7 G/DL (ref 6.4–8.2)
PROT UR STRIP-MCNC: ABNORMAL MG/DL
PROTHROMBIN TIME: 15 SECONDS (ref 11.6–14.5)
QRS AXIS: 36 DEGREES
QRSD INTERVAL: 96 MS
QT INTERVAL: 320 MS
QTC INTERVAL: 419 MS
RBC # BLD AUTO: 5.06 MILLION/UL (ref 3.88–5.62)
RBC #/AREA URNS AUTO: ABNORMAL /HPF
RSV RNA RESP QL NAA+PROBE: NEGATIVE
SARS-COV-2 RNA RESP QL NAA+PROBE: NEGATIVE
SARS-COV-2 RNA SPEC QL NAA+PROBE: NOT DETECTED
SODIUM SERPL-SCNC: 133 MMOL/L (ref 136–145)
SP GR UR STRIP.AUTO: 1.02 (ref 1–1.03)
T WAVE AXIS: 34 DEGREES
UROBILINOGEN UR QL STRIP.AUTO: 4 E.U./DL
VENTRICULAR RATE: 103 BPM
WBC # BLD AUTO: 7.31 THOUSAND/UL (ref 4.31–10.16)
WBC #/AREA URNS AUTO: ABNORMAL /HPF
WBC CASTS URNS QL MICRO: ABNORMAL /LPF

## 2020-11-30 PROCEDURE — 81001 URINALYSIS AUTO W/SCOPE: CPT | Performed by: EMERGENCY MEDICINE

## 2020-11-30 PROCEDURE — 99285 EMERGENCY DEPT VISIT HI MDM: CPT

## 2020-11-30 PROCEDURE — 87040 BLOOD CULTURE FOR BACTERIA: CPT | Performed by: EMERGENCY MEDICINE

## 2020-11-30 PROCEDURE — 96375 TX/PRO/DX INJ NEW DRUG ADDON: CPT

## 2020-11-30 PROCEDURE — 83605 ASSAY OF LACTIC ACID: CPT | Performed by: EMERGENCY MEDICINE

## 2020-11-30 PROCEDURE — 84145 PROCALCITONIN (PCT): CPT | Performed by: EMERGENCY MEDICINE

## 2020-11-30 PROCEDURE — 87086 URINE CULTURE/COLONY COUNT: CPT | Performed by: EMERGENCY MEDICINE

## 2020-11-30 PROCEDURE — 0241U HB NFCT DS VIR RESP RNA 4 TRGT: CPT | Performed by: EMERGENCY MEDICINE

## 2020-11-30 PROCEDURE — 80053 COMPREHEN METABOLIC PANEL: CPT | Performed by: EMERGENCY MEDICINE

## 2020-11-30 PROCEDURE — 85025 COMPLETE CBC W/AUTO DIFF WBC: CPT | Performed by: EMERGENCY MEDICINE

## 2020-11-30 PROCEDURE — 96366 THER/PROPH/DIAG IV INF ADDON: CPT

## 2020-11-30 PROCEDURE — 99283 EMERGENCY DEPT VISIT LOW MDM: CPT | Performed by: EMERGENCY MEDICINE

## 2020-11-30 PROCEDURE — 85610 PROTHROMBIN TIME: CPT | Performed by: EMERGENCY MEDICINE

## 2020-11-30 PROCEDURE — 36415 COLL VENOUS BLD VENIPUNCTURE: CPT | Performed by: EMERGENCY MEDICINE

## 2020-11-30 PROCEDURE — 87077 CULTURE AEROBIC IDENTIFY: CPT | Performed by: EMERGENCY MEDICINE

## 2020-11-30 PROCEDURE — 96361 HYDRATE IV INFUSION ADD-ON: CPT

## 2020-11-30 PROCEDURE — 71045 X-RAY EXAM CHEST 1 VIEW: CPT

## 2020-11-30 PROCEDURE — 93010 ELECTROCARDIOGRAM REPORT: CPT | Performed by: INTERNAL MEDICINE

## 2020-11-30 PROCEDURE — 85730 THROMBOPLASTIN TIME PARTIAL: CPT | Performed by: EMERGENCY MEDICINE

## 2020-11-30 PROCEDURE — 99291 CRITICAL CARE FIRST HOUR: CPT | Performed by: EMERGENCY MEDICINE

## 2020-11-30 PROCEDURE — 93005 ELECTROCARDIOGRAM TRACING: CPT

## 2020-11-30 PROCEDURE — 99223 1ST HOSP IP/OBS HIGH 75: CPT | Performed by: INTERNAL MEDICINE

## 2020-11-30 PROCEDURE — 96365 THER/PROPH/DIAG IV INF INIT: CPT

## 2020-11-30 PROCEDURE — 87186 SC STD MICRODIL/AGAR DIL: CPT | Performed by: EMERGENCY MEDICINE

## 2020-11-30 RX ORDER — ASPIRIN 81 MG/1
162 TABLET ORAL DAILY
Status: DISCONTINUED | OUTPATIENT
Start: 2020-12-01 | End: 2020-12-04 | Stop reason: HOSPADM

## 2020-11-30 RX ORDER — SENNOSIDES 8.6 MG
2 TABLET ORAL DAILY PRN
Status: DISCONTINUED | OUTPATIENT
Start: 2020-11-30 | End: 2020-12-04 | Stop reason: HOSPADM

## 2020-11-30 RX ORDER — HEPARIN SODIUM 5000 [USP'U]/ML
5000 INJECTION, SOLUTION INTRAVENOUS; SUBCUTANEOUS EVERY 8 HOURS SCHEDULED
Status: DISCONTINUED | OUTPATIENT
Start: 2020-11-30 | End: 2020-12-04 | Stop reason: HOSPADM

## 2020-11-30 RX ORDER — ALBUMIN, HUMAN INJ 5% 5 %
12.5 SOLUTION INTRAVENOUS ONCE
Status: COMPLETED | OUTPATIENT
Start: 2020-11-30 | End: 2020-11-30

## 2020-11-30 RX ORDER — PANTOPRAZOLE SODIUM 40 MG/1
40 TABLET, DELAYED RELEASE ORAL
Status: DISCONTINUED | OUTPATIENT
Start: 2020-12-01 | End: 2020-12-04 | Stop reason: HOSPADM

## 2020-11-30 RX ORDER — POTASSIUM CHLORIDE 20 MEQ/1
20 TABLET, EXTENDED RELEASE ORAL ONCE
Status: COMPLETED | OUTPATIENT
Start: 2020-11-30 | End: 2020-11-30

## 2020-11-30 RX ORDER — SODIUM CHLORIDE, SODIUM GLUCONATE, SODIUM ACETATE, POTASSIUM CHLORIDE, MAGNESIUM CHLORIDE, SODIUM PHOSPHATE, DIBASIC, AND POTASSIUM PHOSPHATE .53; .5; .37; .037; .03; .012; .00082 G/100ML; G/100ML; G/100ML; G/100ML; G/100ML; G/100ML; G/100ML
100 INJECTION, SOLUTION INTRAVENOUS CONTINUOUS
Status: DISCONTINUED | OUTPATIENT
Start: 2020-11-30 | End: 2020-11-30 | Stop reason: SDUPTHER

## 2020-11-30 RX ORDER — GABAPENTIN 300 MG/1
300 CAPSULE ORAL DAILY
Status: DISCONTINUED | OUTPATIENT
Start: 2020-12-01 | End: 2020-12-04 | Stop reason: HOSPADM

## 2020-11-30 RX ORDER — TRAZODONE HYDROCHLORIDE 100 MG/1
100 TABLET ORAL
Status: DISCONTINUED | OUTPATIENT
Start: 2020-11-30 | End: 2020-12-04 | Stop reason: HOSPADM

## 2020-11-30 RX ORDER — VENLAFAXINE 37.5 MG/1
37.5 TABLET ORAL DAILY
Status: DISCONTINUED | OUTPATIENT
Start: 2020-12-01 | End: 2020-12-04 | Stop reason: HOSPADM

## 2020-11-30 RX ORDER — DOCUSATE SODIUM 100 MG/1
100 CAPSULE, LIQUID FILLED ORAL 2 TIMES DAILY
Status: DISCONTINUED | OUTPATIENT
Start: 2020-12-01 | End: 2020-12-04 | Stop reason: HOSPADM

## 2020-11-30 RX ORDER — ACETAMINOPHEN 325 MG/1
650 TABLET ORAL EVERY 6 HOURS PRN
Status: DISCONTINUED | OUTPATIENT
Start: 2020-11-30 | End: 2020-12-01

## 2020-11-30 RX ORDER — ATORVASTATIN CALCIUM 40 MG/1
40 TABLET, FILM COATED ORAL
Status: DISCONTINUED | OUTPATIENT
Start: 2020-12-01 | End: 2020-12-04 | Stop reason: HOSPADM

## 2020-11-30 RX ORDER — ONDANSETRON 2 MG/ML
4 INJECTION INTRAMUSCULAR; INTRAVENOUS EVERY 6 HOURS PRN
Status: DISCONTINUED | OUTPATIENT
Start: 2020-11-30 | End: 2020-12-04 | Stop reason: HOSPADM

## 2020-11-30 RX ORDER — NITROGLYCERIN 0.4 MG/1
0.4 TABLET SUBLINGUAL
Status: DISCONTINUED | OUTPATIENT
Start: 2020-11-30 | End: 2020-12-04 | Stop reason: HOSPADM

## 2020-11-30 RX ORDER — CYCLOBENZAPRINE HCL 10 MG
10 TABLET ORAL
Status: DISCONTINUED | OUTPATIENT
Start: 2020-11-30 | End: 2020-12-04 | Stop reason: HOSPADM

## 2020-11-30 RX ORDER — SODIUM CHLORIDE, SODIUM GLUCONATE, SODIUM ACETATE, POTASSIUM CHLORIDE, MAGNESIUM CHLORIDE, SODIUM PHOSPHATE, DIBASIC, AND POTASSIUM PHOSPHATE .53; .5; .37; .037; .03; .012; .00082 G/100ML; G/100ML; G/100ML; G/100ML; G/100ML; G/100ML; G/100ML
100 INJECTION, SOLUTION INTRAVENOUS CONTINUOUS
Status: DISCONTINUED | OUTPATIENT
Start: 2020-11-30 | End: 2020-12-01

## 2020-11-30 RX ORDER — CALCIUM CARBONATE 200(500)MG
1000 TABLET,CHEWABLE ORAL DAILY PRN
Status: DISCONTINUED | OUTPATIENT
Start: 2020-11-30 | End: 2020-12-04 | Stop reason: HOSPADM

## 2020-11-30 RX ORDER — ACETAMINOPHEN 325 MG/1
975 TABLET ORAL ONCE
Status: COMPLETED | OUTPATIENT
Start: 2020-11-30 | End: 2020-11-30

## 2020-11-30 RX ADMIN — SODIUM CHLORIDE 1000 ML: 0.9 INJECTION, SOLUTION INTRAVENOUS at 11:37

## 2020-11-30 RX ADMIN — MEROPENEM 1000 MG: 1 INJECTION, POWDER, FOR SOLUTION INTRAVENOUS at 12:33

## 2020-11-30 RX ADMIN — ALBUMIN (HUMAN) 12.5 G: 12.5 INJECTION, SOLUTION INTRAVENOUS at 16:23

## 2020-11-30 RX ADMIN — ACETAMINOPHEN 975 MG: 325 TABLET, FILM COATED ORAL at 12:32

## 2020-11-30 RX ADMIN — SODIUM CHLORIDE 2925 ML: 0.9 INJECTION, SOLUTION INTRAVENOUS at 11:37

## 2020-11-30 RX ADMIN — POTASSIUM CHLORIDE 20 MEQ: 1500 TABLET, EXTENDED RELEASE ORAL at 18:26

## 2020-11-30 RX ADMIN — SODIUM CHLORIDE, SODIUM GLUCONATE, SODIUM ACETATE, POTASSIUM CHLORIDE, MAGNESIUM CHLORIDE, SODIUM PHOSPHATE, DIBASIC, AND POTASSIUM PHOSPHATE 100 ML/HR: .53; .5; .37; .037; .03; .012; .00082 INJECTION, SOLUTION INTRAVENOUS at 14:54

## 2020-11-30 RX ADMIN — SODIUM CHLORIDE 1000 ML: 0.9 INJECTION, SOLUTION INTRAVENOUS at 13:55

## 2020-12-01 PROBLEM — R78.81 POSITIVE BLOOD CULTURE: Status: ACTIVE | Noted: 2020-12-01

## 2020-12-01 LAB
ALBUMIN SERPL BCP-MCNC: 2.9 G/DL (ref 3.5–5)
ALP SERPL-CCNC: 167 U/L (ref 46–116)
ALT SERPL W P-5'-P-CCNC: 43 U/L (ref 12–78)
ANION GAP SERPL CALCULATED.3IONS-SCNC: 5 MMOL/L (ref 4–13)
AST SERPL W P-5'-P-CCNC: 32 U/L (ref 5–45)
BASOPHILS # BLD AUTO: 0.03 THOUSANDS/ΜL (ref 0–0.1)
BASOPHILS NFR BLD AUTO: 0 % (ref 0–1)
BILIRUB DIRECT SERPL-MCNC: 0.28 MG/DL (ref 0–0.2)
BILIRUB SERPL-MCNC: 0.66 MG/DL (ref 0.2–1)
BUN SERPL-MCNC: 17 MG/DL (ref 5–25)
CALCIUM SERPL-MCNC: 8.5 MG/DL (ref 8.3–10.1)
CHLORIDE SERPL-SCNC: 107 MMOL/L (ref 100–108)
CO2 SERPL-SCNC: 26 MMOL/L (ref 21–32)
CREAT SERPL-MCNC: 0.89 MG/DL (ref 0.6–1.3)
EOSINOPHIL # BLD AUTO: 0.24 THOUSAND/ΜL (ref 0–0.61)
EOSINOPHIL NFR BLD AUTO: 3 % (ref 0–6)
EOSINOPHIL NFR URNS MANUAL: 0 %
ERYTHROCYTE [DISTWIDTH] IN BLOOD BY AUTOMATED COUNT: 12.8 % (ref 11.6–15.1)
GFR SERPL CREATININE-BSD FRML MDRD: 93 ML/MIN/1.73SQ M
GLUCOSE SERPL-MCNC: 86 MG/DL (ref 65–140)
HCT VFR BLD AUTO: 40 % (ref 36.5–49.3)
HGB BLD-MCNC: 13.3 G/DL (ref 12–17)
IMM GRANULOCYTES # BLD AUTO: 0.03 THOUSAND/UL (ref 0–0.2)
IMM GRANULOCYTES NFR BLD AUTO: 0 % (ref 0–2)
LYMPHOCYTES # BLD AUTO: 0.56 THOUSANDS/ΜL (ref 0.6–4.47)
LYMPHOCYTES NFR BLD AUTO: 8 % (ref 14–44)
MAGNESIUM SERPL-MCNC: 2.3 MG/DL (ref 1.6–2.6)
MCH RBC QN AUTO: 31.3 PG (ref 26.8–34.3)
MCHC RBC AUTO-ENTMCNC: 33.3 G/DL (ref 31.4–37.4)
MCV RBC AUTO: 94 FL (ref 82–98)
MONOCYTES # BLD AUTO: 0.49 THOUSAND/ΜL (ref 0.17–1.22)
MONOCYTES NFR BLD AUTO: 7 % (ref 4–12)
NEUTROPHILS # BLD AUTO: 5.83 THOUSANDS/ΜL (ref 1.85–7.62)
NEUTS SEG NFR BLD AUTO: 82 % (ref 43–75)
NRBC BLD AUTO-RTO: 0 /100 WBCS
PHOSPHATE SERPL-MCNC: 1 MG/DL (ref 2.3–4.1)
PLATELET # BLD AUTO: 94 THOUSANDS/UL (ref 149–390)
PMV BLD AUTO: 11 FL (ref 8.9–12.7)
POTASSIUM SERPL-SCNC: 3.6 MMOL/L (ref 3.5–5.3)
PROCALCITONIN SERPL-MCNC: 39.01 NG/ML
PROT SERPL-MCNC: 6.5 G/DL (ref 6.4–8.2)
RBC # BLD AUTO: 4.25 MILLION/UL (ref 3.88–5.62)
SODIUM SERPL-SCNC: 138 MMOL/L (ref 136–145)
WBC # BLD AUTO: 7.18 THOUSAND/UL (ref 4.31–10.16)

## 2020-12-01 PROCEDURE — 87205 SMEAR GRAM STAIN: CPT | Performed by: INTERNAL MEDICINE

## 2020-12-01 PROCEDURE — 84145 PROCALCITONIN (PCT): CPT | Performed by: EMERGENCY MEDICINE

## 2020-12-01 PROCEDURE — 99255 IP/OBS CONSLTJ NEW/EST HI 80: CPT | Performed by: INTERNAL MEDICINE

## 2020-12-01 PROCEDURE — 99232 SBSQ HOSP IP/OBS MODERATE 35: CPT | Performed by: GENERAL PRACTICE

## 2020-12-01 PROCEDURE — 85025 COMPLETE CBC W/AUTO DIFF WBC: CPT | Performed by: INTERNAL MEDICINE

## 2020-12-01 PROCEDURE — 83735 ASSAY OF MAGNESIUM: CPT | Performed by: INTERNAL MEDICINE

## 2020-12-01 PROCEDURE — 84100 ASSAY OF PHOSPHORUS: CPT | Performed by: INTERNAL MEDICINE

## 2020-12-01 PROCEDURE — 80076 HEPATIC FUNCTION PANEL: CPT | Performed by: INTERNAL MEDICINE

## 2020-12-01 PROCEDURE — 36415 COLL VENOUS BLD VENIPUNCTURE: CPT | Performed by: EMERGENCY MEDICINE

## 2020-12-01 PROCEDURE — 80048 BASIC METABOLIC PNL TOTAL CA: CPT | Performed by: INTERNAL MEDICINE

## 2020-12-01 RX ORDER — ACETAMINOPHEN 325 MG/1
650 TABLET ORAL EVERY 6 HOURS PRN
Status: DISCONTINUED | OUTPATIENT
Start: 2020-12-01 | End: 2020-12-04 | Stop reason: HOSPADM

## 2020-12-01 RX ADMIN — ACETAMINOPHEN 650 MG: 325 TABLET, FILM COATED ORAL at 16:41

## 2020-12-01 RX ADMIN — GABAPENTIN 300 MG: 300 CAPSULE ORAL at 08:09

## 2020-12-01 RX ADMIN — VENLAFAXINE 37.5 MG: 37.5 TABLET ORAL at 08:09

## 2020-12-01 RX ADMIN — ACETAMINOPHEN 650 MG: 325 TABLET, FILM COATED ORAL at 04:18

## 2020-12-01 RX ADMIN — POTASSIUM PHOSPHATE, MONOBASIC POTASSIUM PHOSPHATE, DIBASIC 30 MMOL: 224; 236 INJECTION, SOLUTION, CONCENTRATE INTRAVENOUS at 14:13

## 2020-12-01 RX ADMIN — ERTAPENEM SODIUM 1000 MG: 1 INJECTION, POWDER, LYOPHILIZED, FOR SOLUTION INTRAMUSCULAR; INTRAVENOUS at 16:44

## 2020-12-01 RX ADMIN — TRAZODONE HYDROCHLORIDE 100 MG: 100 TABLET ORAL at 01:12

## 2020-12-01 RX ADMIN — PANTOPRAZOLE SODIUM 40 MG: 40 TABLET, DELAYED RELEASE ORAL at 07:05

## 2020-12-01 RX ADMIN — HEPARIN SODIUM 5000 UNITS: 5000 INJECTION INTRAVENOUS; SUBCUTANEOUS at 21:16

## 2020-12-01 RX ADMIN — MEROPENEM 1000 MG: 1 INJECTION, POWDER, FOR SOLUTION INTRAVENOUS at 01:17

## 2020-12-01 RX ADMIN — CYCLOBENZAPRINE HYDROCHLORIDE 10 MG: 10 TABLET, FILM COATED ORAL at 21:16

## 2020-12-01 RX ADMIN — MEROPENEM 1000 MG: 1 INJECTION, POWDER, FOR SOLUTION INTRAVENOUS at 07:05

## 2020-12-01 RX ADMIN — METOPROLOL TARTRATE 25 MG: 25 TABLET, FILM COATED ORAL at 08:11

## 2020-12-01 RX ADMIN — ACETAMINOPHEN 650 MG: 325 TABLET, FILM COATED ORAL at 11:47

## 2020-12-01 RX ADMIN — CYCLOBENZAPRINE HYDROCHLORIDE 10 MG: 10 TABLET, FILM COATED ORAL at 01:13

## 2020-12-01 RX ADMIN — TRAZODONE HYDROCHLORIDE 100 MG: 100 TABLET ORAL at 21:16

## 2020-12-01 RX ADMIN — ACETAMINOPHEN 650 MG: 325 TABLET, FILM COATED ORAL at 22:36

## 2020-12-01 RX ADMIN — ASPIRIN 162 MG: 81 TABLET ORAL at 08:08

## 2020-12-01 RX ADMIN — ATORVASTATIN CALCIUM 40 MG: 40 TABLET, FILM COATED ORAL at 18:12

## 2020-12-02 PROBLEM — D69.6 THROMBOCYTOPENIA (HCC): Status: ACTIVE | Noted: 2020-12-02

## 2020-12-02 LAB
ANION GAP SERPL CALCULATED.3IONS-SCNC: 4 MMOL/L (ref 4–13)
BACTERIA UR CULT: ABNORMAL
BUN SERPL-MCNC: 15 MG/DL (ref 5–25)
CALCIUM SERPL-MCNC: 8.6 MG/DL (ref 8.3–10.1)
CHLORIDE SERPL-SCNC: 104 MMOL/L (ref 100–108)
CO2 SERPL-SCNC: 26 MMOL/L (ref 21–32)
CREAT SERPL-MCNC: 0.91 MG/DL (ref 0.6–1.3)
ERYTHROCYTE [DISTWIDTH] IN BLOOD BY AUTOMATED COUNT: 12.8 % (ref 11.6–15.1)
GFR SERPL CREATININE-BSD FRML MDRD: 91 ML/MIN/1.73SQ M
GLUCOSE SERPL-MCNC: 170 MG/DL (ref 65–140)
HCT VFR BLD AUTO: 41.4 % (ref 36.5–49.3)
HGB BLD-MCNC: 13.5 G/DL (ref 12–17)
MAGNESIUM SERPL-MCNC: 2 MG/DL (ref 1.6–2.6)
MCH RBC QN AUTO: 30.8 PG (ref 26.8–34.3)
MCHC RBC AUTO-ENTMCNC: 32.6 G/DL (ref 31.4–37.4)
MCV RBC AUTO: 94 FL (ref 82–98)
PHOSPHATE SERPL-MCNC: 1.5 MG/DL (ref 2.3–4.1)
PLATELET # BLD AUTO: 115 THOUSANDS/UL (ref 149–390)
PMV BLD AUTO: 10.9 FL (ref 8.9–12.7)
POTASSIUM SERPL-SCNC: 3.5 MMOL/L (ref 3.5–5.3)
RBC # BLD AUTO: 4.39 MILLION/UL (ref 3.88–5.62)
SODIUM SERPL-SCNC: 134 MMOL/L (ref 136–145)
WBC # BLD AUTO: 6.81 THOUSAND/UL (ref 4.31–10.16)

## 2020-12-02 PROCEDURE — 99232 SBSQ HOSP IP/OBS MODERATE 35: CPT | Performed by: INTERNAL MEDICINE

## 2020-12-02 PROCEDURE — 83735 ASSAY OF MAGNESIUM: CPT | Performed by: GENERAL PRACTICE

## 2020-12-02 PROCEDURE — 80048 BASIC METABOLIC PNL TOTAL CA: CPT | Performed by: GENERAL PRACTICE

## 2020-12-02 PROCEDURE — 84100 ASSAY OF PHOSPHORUS: CPT | Performed by: GENERAL PRACTICE

## 2020-12-02 PROCEDURE — 85027 COMPLETE CBC AUTOMATED: CPT | Performed by: GENERAL PRACTICE

## 2020-12-02 PROCEDURE — 99232 SBSQ HOSP IP/OBS MODERATE 35: CPT | Performed by: PHYSICIAN ASSISTANT

## 2020-12-02 RX ORDER — TAMSULOSIN HYDROCHLORIDE 0.4 MG/1
0.4 CAPSULE ORAL
Status: DISCONTINUED | OUTPATIENT
Start: 2020-12-02 | End: 2020-12-04 | Stop reason: HOSPADM

## 2020-12-02 RX ADMIN — VENLAFAXINE 37.5 MG: 37.5 TABLET ORAL at 08:02

## 2020-12-02 RX ADMIN — HEPARIN SODIUM 5000 UNITS: 5000 INJECTION INTRAVENOUS; SUBCUTANEOUS at 22:15

## 2020-12-02 RX ADMIN — ASPIRIN 162 MG: 81 TABLET ORAL at 08:00

## 2020-12-02 RX ADMIN — AZTREONAM 2000 MG: 2 INJECTION, POWDER, LYOPHILIZED, FOR SOLUTION INTRAMUSCULAR; INTRAVENOUS at 22:12

## 2020-12-02 RX ADMIN — ATORVASTATIN CALCIUM 40 MG: 40 TABLET, FILM COATED ORAL at 17:06

## 2020-12-02 RX ADMIN — ACETAMINOPHEN 650 MG: 325 TABLET, FILM COATED ORAL at 22:09

## 2020-12-02 RX ADMIN — HEPARIN SODIUM 5000 UNITS: 5000 INJECTION INTRAVENOUS; SUBCUTANEOUS at 05:14

## 2020-12-02 RX ADMIN — TRAZODONE HYDROCHLORIDE 100 MG: 100 TABLET ORAL at 22:09

## 2020-12-02 RX ADMIN — ACETAMINOPHEN 650 MG: 325 TABLET, FILM COATED ORAL at 14:19

## 2020-12-02 RX ADMIN — DOCUSATE SODIUM 100 MG: 100 CAPSULE ORAL at 08:00

## 2020-12-02 RX ADMIN — GABAPENTIN 300 MG: 300 CAPSULE ORAL at 08:00

## 2020-12-02 RX ADMIN — PANTOPRAZOLE SODIUM 40 MG: 40 TABLET, DELAYED RELEASE ORAL at 05:14

## 2020-12-02 RX ADMIN — AZTREONAM 2000 MG: 2 INJECTION, POWDER, LYOPHILIZED, FOR SOLUTION INTRAMUSCULAR; INTRAVENOUS at 16:05

## 2020-12-02 RX ADMIN — CALCIUM CARBONATE (ANTACID) CHEW TAB 500 MG 1000 MG: 500 CHEW TAB at 22:11

## 2020-12-02 RX ADMIN — HEPARIN SODIUM 5000 UNITS: 5000 INJECTION INTRAVENOUS; SUBCUTANEOUS at 14:18

## 2020-12-02 RX ADMIN — CYCLOBENZAPRINE HYDROCHLORIDE 10 MG: 10 TABLET, FILM COATED ORAL at 22:09

## 2020-12-02 RX ADMIN — ACETAMINOPHEN 650 MG: 325 TABLET, FILM COATED ORAL at 08:00

## 2020-12-02 RX ADMIN — TAMSULOSIN HYDROCHLORIDE 0.4 MG: 0.4 CAPSULE ORAL at 18:48

## 2020-12-02 RX ADMIN — METOPROLOL TARTRATE 25 MG: 25 TABLET, FILM COATED ORAL at 08:00

## 2020-12-03 LAB
ALBUMIN SERPL BCP-MCNC: 3.4 G/DL (ref 3.5–5)
ALP SERPL-CCNC: 456 U/L (ref 46–116)
ALT SERPL W P-5'-P-CCNC: 159 U/L (ref 12–78)
ANION GAP SERPL CALCULATED.3IONS-SCNC: 6 MMOL/L (ref 4–13)
AST SERPL W P-5'-P-CCNC: 120 U/L (ref 5–45)
BACTERIA BLD CULT: ABNORMAL
BACTERIA BLD CULT: ABNORMAL
BASOPHILS # BLD AUTO: 0.05 THOUSANDS/ΜL (ref 0–0.1)
BASOPHILS NFR BLD AUTO: 1 % (ref 0–1)
BILIRUB SERPL-MCNC: 0.7 MG/DL (ref 0.2–1)
BUN SERPL-MCNC: 14 MG/DL (ref 5–25)
CALCIUM ALBUM COR SERPL-MCNC: 9.9 MG/DL (ref 8.3–10.1)
CALCIUM SERPL-MCNC: 9.4 MG/DL (ref 8.3–10.1)
CHLORIDE SERPL-SCNC: 102 MMOL/L (ref 100–108)
CO2 SERPL-SCNC: 30 MMOL/L (ref 21–32)
CREAT SERPL-MCNC: 0.87 MG/DL (ref 0.6–1.3)
EOSINOPHIL # BLD AUTO: 0.21 THOUSAND/ΜL (ref 0–0.61)
EOSINOPHIL NFR BLD AUTO: 3 % (ref 0–6)
ERYTHROCYTE [DISTWIDTH] IN BLOOD BY AUTOMATED COUNT: 12.9 % (ref 11.6–15.1)
GFR SERPL CREATININE-BSD FRML MDRD: 94 ML/MIN/1.73SQ M
GLUCOSE SERPL-MCNC: 86 MG/DL (ref 65–140)
GRAM STN SPEC: ABNORMAL
GRAM STN SPEC: ABNORMAL
HCT VFR BLD AUTO: 44.9 % (ref 36.5–49.3)
HGB BLD-MCNC: 14.8 G/DL (ref 12–17)
IMM GRANULOCYTES # BLD AUTO: 0.05 THOUSAND/UL (ref 0–0.2)
IMM GRANULOCYTES NFR BLD AUTO: 1 % (ref 0–2)
LYMPHOCYTES # BLD AUTO: 0.82 THOUSANDS/ΜL (ref 0.6–4.47)
LYMPHOCYTES NFR BLD AUTO: 13 % (ref 14–44)
MCH RBC QN AUTO: 30.9 PG (ref 26.8–34.3)
MCHC RBC AUTO-ENTMCNC: 33 G/DL (ref 31.4–37.4)
MCV RBC AUTO: 94 FL (ref 82–98)
MONOCYTES # BLD AUTO: 0.79 THOUSAND/ΜL (ref 0.17–1.22)
MONOCYTES NFR BLD AUTO: 13 % (ref 4–12)
NEUTROPHILS # BLD AUTO: 4.35 THOUSANDS/ΜL (ref 1.85–7.62)
NEUTS SEG NFR BLD AUTO: 69 % (ref 43–75)
NRBC BLD AUTO-RTO: 0 /100 WBCS
PLATELET # BLD AUTO: 139 THOUSANDS/UL (ref 149–390)
PMV BLD AUTO: 10.8 FL (ref 8.9–12.7)
POTASSIUM SERPL-SCNC: 3.9 MMOL/L (ref 3.5–5.3)
PROCALCITONIN SERPL-MCNC: 12.83 NG/ML
PROT SERPL-MCNC: 7.7 G/DL (ref 6.4–8.2)
RBC # BLD AUTO: 4.79 MILLION/UL (ref 3.88–5.62)
SODIUM SERPL-SCNC: 138 MMOL/L (ref 136–145)
WBC # BLD AUTO: 6.27 THOUSAND/UL (ref 4.31–10.16)

## 2020-12-03 PROCEDURE — 80053 COMPREHEN METABOLIC PANEL: CPT | Performed by: PHYSICIAN ASSISTANT

## 2020-12-03 PROCEDURE — 99232 SBSQ HOSP IP/OBS MODERATE 35: CPT | Performed by: PHYSICIAN ASSISTANT

## 2020-12-03 PROCEDURE — 84145 PROCALCITONIN (PCT): CPT | Performed by: PHYSICIAN ASSISTANT

## 2020-12-03 PROCEDURE — 99233 SBSQ HOSP IP/OBS HIGH 50: CPT | Performed by: INTERNAL MEDICINE

## 2020-12-03 PROCEDURE — 85025 COMPLETE CBC W/AUTO DIFF WBC: CPT | Performed by: PHYSICIAN ASSISTANT

## 2020-12-03 RX ORDER — LISINOPRIL 10 MG/1
10 TABLET ORAL DAILY
Status: DISCONTINUED | OUTPATIENT
Start: 2020-12-03 | End: 2020-12-04 | Stop reason: HOSPADM

## 2020-12-03 RX ORDER — LEVOFLOXACIN 750 MG/1
750 TABLET ORAL EVERY 24 HOURS
Status: DISCONTINUED | OUTPATIENT
Start: 2020-12-04 | End: 2020-12-04 | Stop reason: HOSPADM

## 2020-12-03 RX ADMIN — HEPARIN SODIUM 5000 UNITS: 5000 INJECTION INTRAVENOUS; SUBCUTANEOUS at 14:09

## 2020-12-03 RX ADMIN — AZTREONAM 2000 MG: 2 INJECTION, POWDER, LYOPHILIZED, FOR SOLUTION INTRAMUSCULAR; INTRAVENOUS at 16:14

## 2020-12-03 RX ADMIN — TRAZODONE HYDROCHLORIDE 100 MG: 100 TABLET ORAL at 23:10

## 2020-12-03 RX ADMIN — VENLAFAXINE 37.5 MG: 37.5 TABLET ORAL at 09:17

## 2020-12-03 RX ADMIN — GABAPENTIN 300 MG: 300 CAPSULE ORAL at 09:17

## 2020-12-03 RX ADMIN — PANTOPRAZOLE SODIUM 40 MG: 40 TABLET, DELAYED RELEASE ORAL at 06:40

## 2020-12-03 RX ADMIN — DOCUSATE SODIUM 100 MG: 100 CAPSULE ORAL at 09:17

## 2020-12-03 RX ADMIN — TAMSULOSIN HYDROCHLORIDE 0.4 MG: 0.4 CAPSULE ORAL at 16:14

## 2020-12-03 RX ADMIN — ASPIRIN 162 MG: 81 TABLET ORAL at 09:17

## 2020-12-03 RX ADMIN — LISINOPRIL 10 MG: 10 TABLET ORAL at 16:14

## 2020-12-03 RX ADMIN — ATORVASTATIN CALCIUM 40 MG: 40 TABLET, FILM COATED ORAL at 17:55

## 2020-12-03 RX ADMIN — CYCLOBENZAPRINE HYDROCHLORIDE 10 MG: 10 TABLET, FILM COATED ORAL at 23:10

## 2020-12-03 RX ADMIN — HEPARIN SODIUM 5000 UNITS: 5000 INJECTION INTRAVENOUS; SUBCUTANEOUS at 23:11

## 2020-12-03 RX ADMIN — METOPROLOL TARTRATE 25 MG: 25 TABLET, FILM COATED ORAL at 09:17

## 2020-12-03 RX ADMIN — AZTREONAM 2000 MG: 2 INJECTION, POWDER, LYOPHILIZED, FOR SOLUTION INTRAMUSCULAR; INTRAVENOUS at 23:10

## 2020-12-03 RX ADMIN — AZTREONAM 2000 MG: 2 INJECTION, POWDER, LYOPHILIZED, FOR SOLUTION INTRAMUSCULAR; INTRAVENOUS at 08:08

## 2020-12-04 VITALS
BODY MASS INDEX: 31.5 KG/M2 | HEIGHT: 70 IN | RESPIRATION RATE: 16 BRPM | HEART RATE: 72 BPM | SYSTOLIC BLOOD PRESSURE: 139 MMHG | OXYGEN SATURATION: 97 % | WEIGHT: 220.02 LBS | DIASTOLIC BLOOD PRESSURE: 73 MMHG | TEMPERATURE: 98.4 F

## 2020-12-04 LAB
ALBUMIN SERPL BCP-MCNC: 3.2 G/DL (ref 3.5–5)
ALP SERPL-CCNC: 484 U/L (ref 46–116)
ALT SERPL W P-5'-P-CCNC: 153 U/L (ref 12–78)
ANION GAP SERPL CALCULATED.3IONS-SCNC: 4 MMOL/L (ref 4–13)
AST SERPL W P-5'-P-CCNC: 100 U/L (ref 5–45)
BASOPHILS # BLD AUTO: 0.06 THOUSANDS/ΜL (ref 0–0.1)
BASOPHILS NFR BLD AUTO: 1 % (ref 0–1)
BILIRUB SERPL-MCNC: 0.69 MG/DL (ref 0.2–1)
BUN SERPL-MCNC: 15 MG/DL (ref 5–25)
CALCIUM ALBUM COR SERPL-MCNC: 10 MG/DL (ref 8.3–10.1)
CALCIUM SERPL-MCNC: 9.4 MG/DL (ref 8.3–10.1)
CHLORIDE SERPL-SCNC: 105 MMOL/L (ref 100–108)
CO2 SERPL-SCNC: 29 MMOL/L (ref 21–32)
CREAT SERPL-MCNC: 0.92 MG/DL (ref 0.6–1.3)
EOSINOPHIL # BLD AUTO: 0.34 THOUSAND/ΜL (ref 0–0.61)
EOSINOPHIL NFR BLD AUTO: 5 % (ref 0–6)
ERYTHROCYTE [DISTWIDTH] IN BLOOD BY AUTOMATED COUNT: 13 % (ref 11.6–15.1)
GFR SERPL CREATININE-BSD FRML MDRD: 90 ML/MIN/1.73SQ M
GLUCOSE SERPL-MCNC: 85 MG/DL (ref 65–140)
HCT VFR BLD AUTO: 44.5 % (ref 36.5–49.3)
HGB BLD-MCNC: 14.9 G/DL (ref 12–17)
IMM GRANULOCYTES # BLD AUTO: 0.09 THOUSAND/UL (ref 0–0.2)
IMM GRANULOCYTES NFR BLD AUTO: 1 % (ref 0–2)
LYMPHOCYTES # BLD AUTO: 1.55 THOUSANDS/ΜL (ref 0.6–4.47)
LYMPHOCYTES NFR BLD AUTO: 22 % (ref 14–44)
MCH RBC QN AUTO: 31 PG (ref 26.8–34.3)
MCHC RBC AUTO-ENTMCNC: 33.5 G/DL (ref 31.4–37.4)
MCV RBC AUTO: 93 FL (ref 82–98)
MONOCYTES # BLD AUTO: 0.83 THOUSAND/ΜL (ref 0.17–1.22)
MONOCYTES NFR BLD AUTO: 12 % (ref 4–12)
NEUTROPHILS # BLD AUTO: 4.14 THOUSANDS/ΜL (ref 1.85–7.62)
NEUTS SEG NFR BLD AUTO: 59 % (ref 43–75)
NRBC BLD AUTO-RTO: 0 /100 WBCS
PLATELET # BLD AUTO: 176 THOUSANDS/UL (ref 149–390)
PMV BLD AUTO: 10.9 FL (ref 8.9–12.7)
POTASSIUM SERPL-SCNC: 4.4 MMOL/L (ref 3.5–5.3)
PROCALCITONIN SERPL-MCNC: 6.1 NG/ML
PROT SERPL-MCNC: 7.6 G/DL (ref 6.4–8.2)
RBC # BLD AUTO: 4.8 MILLION/UL (ref 3.88–5.62)
SODIUM SERPL-SCNC: 138 MMOL/L (ref 136–145)
WBC # BLD AUTO: 7.01 THOUSAND/UL (ref 4.31–10.16)

## 2020-12-04 PROCEDURE — 99239 HOSP IP/OBS DSCHRG MGMT >30: CPT | Performed by: HOSPITALIST

## 2020-12-04 PROCEDURE — 84145 PROCALCITONIN (PCT): CPT | Performed by: PHYSICIAN ASSISTANT

## 2020-12-04 PROCEDURE — 85025 COMPLETE CBC W/AUTO DIFF WBC: CPT | Performed by: PHYSICIAN ASSISTANT

## 2020-12-04 PROCEDURE — 80053 COMPREHEN METABOLIC PANEL: CPT | Performed by: PHYSICIAN ASSISTANT

## 2020-12-04 RX ORDER — CALCIUM CARBONATE 200(500)MG
1000 TABLET,CHEWABLE ORAL DAILY PRN
Refills: 0
Start: 2020-12-04 | End: 2021-06-16

## 2020-12-04 RX ORDER — LEVOFLOXACIN 750 MG/1
750 TABLET ORAL EVERY 24 HOURS
Qty: 3 TABLET | Refills: 0 | Status: SHIPPED | OUTPATIENT
Start: 2020-12-05 | End: 2020-12-08 | Stop reason: ALTCHOICE

## 2020-12-04 RX ORDER — TAMSULOSIN HYDROCHLORIDE 0.4 MG/1
0.4 CAPSULE ORAL
Qty: 30 CAPSULE | Refills: 0 | Status: SHIPPED | OUTPATIENT
Start: 2020-12-04 | End: 2020-12-08 | Stop reason: SDUPTHER

## 2020-12-04 RX ADMIN — METOPROLOL TARTRATE 25 MG: 25 TABLET, FILM COATED ORAL at 08:01

## 2020-12-04 RX ADMIN — LISINOPRIL 10 MG: 10 TABLET ORAL at 08:01

## 2020-12-04 RX ADMIN — ASPIRIN 162 MG: 81 TABLET ORAL at 08:01

## 2020-12-04 RX ADMIN — HEPARIN SODIUM 5000 UNITS: 5000 INJECTION INTRAVENOUS; SUBCUTANEOUS at 06:40

## 2020-12-04 RX ADMIN — LEVOFLOXACIN 750 MG: 750 TABLET, FILM COATED ORAL at 10:07

## 2020-12-04 RX ADMIN — VENLAFAXINE 37.5 MG: 37.5 TABLET ORAL at 08:01

## 2020-12-04 RX ADMIN — GABAPENTIN 300 MG: 300 CAPSULE ORAL at 08:01

## 2020-12-04 RX ADMIN — PANTOPRAZOLE SODIUM 40 MG: 40 TABLET, DELAYED RELEASE ORAL at 06:40

## 2020-12-07 ENCOUNTER — TRANSITIONAL CARE MANAGEMENT (OUTPATIENT)
Dept: INTERNAL MEDICINE CLINIC | Facility: CLINIC | Age: 60
End: 2020-12-07

## 2020-12-08 ENCOUNTER — HOSPITAL ENCOUNTER (INPATIENT)
Facility: HOSPITAL | Age: 60
LOS: 2 days | Discharge: HOME | DRG: 684 | End: 2020-12-10
Attending: EMERGENCY MEDICINE | Admitting: INTERNAL MEDICINE
Payer: COMMERCIAL

## 2020-12-08 ENCOUNTER — OFFICE VISIT (OUTPATIENT)
Dept: INTERNAL MEDICINE CLINIC | Facility: CLINIC | Age: 60
End: 2020-12-08
Payer: COMMERCIAL

## 2020-12-08 ENCOUNTER — LAB (OUTPATIENT)
Dept: LAB | Facility: CLINIC | Age: 60
End: 2020-12-08
Payer: COMMERCIAL

## 2020-12-08 ENCOUNTER — TELEPHONE (OUTPATIENT)
Dept: OTHER | Facility: OTHER | Age: 60
End: 2020-12-08

## 2020-12-08 ENCOUNTER — TELEPHONE (OUTPATIENT)
Dept: UROLOGY | Facility: MEDICAL CENTER | Age: 60
End: 2020-12-08

## 2020-12-08 VITALS
SYSTOLIC BLOOD PRESSURE: 118 MMHG | OXYGEN SATURATION: 95 % | HEIGHT: 70 IN | TEMPERATURE: 97 F | HEART RATE: 94 BPM | RESPIRATION RATE: 16 BRPM | BODY MASS INDEX: 31.32 KG/M2 | DIASTOLIC BLOOD PRESSURE: 74 MMHG | WEIGHT: 218.8 LBS

## 2020-12-08 DIAGNOSIS — N17.9 AKI (ACUTE KIDNEY INJURY) (HCC): ICD-10-CM

## 2020-12-08 DIAGNOSIS — M25.512 LEFT SHOULDER PAIN, UNSPECIFIED CHRONICITY: ICD-10-CM

## 2020-12-08 DIAGNOSIS — E78.5 HYPERLIPIDEMIA, UNSPECIFIED HYPERLIPIDEMIA TYPE: ICD-10-CM

## 2020-12-08 DIAGNOSIS — M79.2 NERVE PAIN: ICD-10-CM

## 2020-12-08 DIAGNOSIS — N17.9 ACUTE RENAL FAILURE, UNSPECIFIED ACUTE RENAL FAILURE TYPE (HCC): ICD-10-CM

## 2020-12-08 DIAGNOSIS — I10 ESSENTIAL HYPERTENSION: ICD-10-CM

## 2020-12-08 DIAGNOSIS — G47.00 INSOMNIA, UNSPECIFIED TYPE: ICD-10-CM

## 2020-12-08 DIAGNOSIS — K21.9 GASTROESOPHAGEAL REFLUX DISEASE WITHOUT ESOPHAGITIS: ICD-10-CM

## 2020-12-08 DIAGNOSIS — M25.511 ACUTE PAIN OF RIGHT SHOULDER: ICD-10-CM

## 2020-12-08 DIAGNOSIS — R53.83 FATIGUE, UNSPECIFIED TYPE: ICD-10-CM

## 2020-12-08 DIAGNOSIS — G89.29 CHRONIC BILATERAL BACK PAIN, UNSPECIFIED BACK LOCATION: ICD-10-CM

## 2020-12-08 DIAGNOSIS — R79.89 ABNORMAL LFTS: ICD-10-CM

## 2020-12-08 DIAGNOSIS — N17.9 AKI (ACUTE KIDNEY INJURY) (HCC): Primary | ICD-10-CM

## 2020-12-08 DIAGNOSIS — M54.9 CHRONIC BILATERAL BACK PAIN, UNSPECIFIED BACK LOCATION: ICD-10-CM

## 2020-12-08 DIAGNOSIS — N30.00 ACUTE CYSTITIS WITHOUT HEMATURIA: ICD-10-CM

## 2020-12-08 DIAGNOSIS — N40.0 BENIGN PROSTATIC HYPERPLASIA, UNSPECIFIED WHETHER LOWER URINARY TRACT SYMPTOMS PRESENT: ICD-10-CM

## 2020-12-08 DIAGNOSIS — N17.9 ACUTE RENAL FAILURE, UNSPECIFIED ACUTE RENAL FAILURE TYPE (HCC): Primary | ICD-10-CM

## 2020-12-08 DIAGNOSIS — F41.8 DEPRESSION WITH ANXIETY: ICD-10-CM

## 2020-12-08 PROBLEM — Z87.440 HISTORY OF UTI: Status: ACTIVE | Noted: 2020-12-08

## 2020-12-08 LAB
ALBUMIN SERPL BCP-MCNC: 3.6 G/DL (ref 3.5–5)
ALBUMIN SERPL BCP-MCNC: 4 G/DL (ref 3.5–5)
ALP SERPL-CCNC: 339 U/L (ref 46–116)
ALP SERPL-CCNC: 352 U/L (ref 46–116)
ALT SERPL W P-5'-P-CCNC: 106 U/L (ref 12–78)
ALT SERPL W P-5'-P-CCNC: 124 U/L (ref 12–78)
ANION GAP SERPL CALCULATED.3IONS-SCNC: 4 MMOL/L (ref 4–13)
ANION GAP SERPL CALCULATED.3IONS-SCNC: 7 MMOL/L (ref 4–13)
AST SERPL W P-5'-P-CCNC: 38 U/L (ref 5–45)
AST SERPL W P-5'-P-CCNC: 42 U/L (ref 5–45)
BACTERIA UR QL AUTO: ABNORMAL /HPF
BASOPHILS # BLD AUTO: 0.05 THOUSANDS/ΜL (ref 0–0.1)
BASOPHILS NFR BLD AUTO: 0 % (ref 0–1)
BILIRUB SERPL-MCNC: 0.42 MG/DL (ref 0.2–1)
BILIRUB SERPL-MCNC: 0.53 MG/DL (ref 0.2–1)
BILIRUB UR QL STRIP: ABNORMAL
BILIRUB UR QL STRIP: NEGATIVE
BUN SERPL-MCNC: 39 MG/DL (ref 5–25)
BUN SERPL-MCNC: 41 MG/DL (ref 5–25)
CALCIUM SERPL-MCNC: 9.4 MG/DL (ref 8.3–10.1)
CALCIUM SERPL-MCNC: 9.8 MG/DL (ref 8.3–10.1)
CAOX CRY URNS QL MICRO: ABNORMAL /HPF
CHLORIDE SERPL-SCNC: 104 MMOL/L (ref 100–108)
CHLORIDE SERPL-SCNC: 105 MMOL/L (ref 100–108)
CK SERPL-CCNC: 74 U/L (ref 39–308)
CLARITY UR: ABNORMAL
CLARITY UR: CLEAR
CO2 SERPL-SCNC: 26 MMOL/L (ref 21–32)
CO2 SERPL-SCNC: 30 MMOL/L (ref 21–32)
COLOR UR: ABNORMAL
COLOR UR: YELLOW
COLOR, POC: NORMAL
CREAT SERPL-MCNC: 2.23 MG/DL (ref 0.6–1.3)
CREAT SERPL-MCNC: 2.31 MG/DL (ref 0.6–1.3)
EOSINOPHIL # BLD AUTO: 0.31 THOUSAND/ΜL (ref 0–0.61)
EOSINOPHIL NFR BLD AUTO: 3 % (ref 0–6)
ERYTHROCYTE [DISTWIDTH] IN BLOOD BY AUTOMATED COUNT: 13.2 % (ref 11.6–15.1)
FINE GRAN CASTS URNS QL MICRO: ABNORMAL /LPF
GFR SERPL CREATININE-BSD FRML MDRD: 30 ML/MIN/1.73SQ M
GFR SERPL CREATININE-BSD FRML MDRD: 31 ML/MIN/1.73SQ M
GLUCOSE P FAST SERPL-MCNC: 74 MG/DL (ref 65–99)
GLUCOSE SERPL-MCNC: 110 MG/DL (ref 65–140)
GLUCOSE UR STRIP-MCNC: NEGATIVE MG/DL
GLUCOSE UR STRIP-MCNC: NEGATIVE MG/DL
HCT VFR BLD AUTO: 46.3 % (ref 36.5–49.3)
HGB BLD-MCNC: 15.1 G/DL (ref 12–17)
HGB UR QL STRIP.AUTO: NEGATIVE
HGB UR QL STRIP.AUTO: NEGATIVE
IMM GRANULOCYTES # BLD AUTO: 0.13 THOUSAND/UL (ref 0–0.2)
IMM GRANULOCYTES NFR BLD AUTO: 1 % (ref 0–2)
KETONES UR STRIP-MCNC: ABNORMAL MG/DL
KETONES UR STRIP-MCNC: NEGATIVE MG/DL
LEUKOCYTE ESTERASE UR QL STRIP: ABNORMAL
LEUKOCYTE ESTERASE UR QL STRIP: NEGATIVE
LYMPHOCYTES # BLD AUTO: 2.77 THOUSANDS/ΜL (ref 0.6–4.47)
LYMPHOCYTES NFR BLD AUTO: 24 % (ref 14–44)
MCH RBC QN AUTO: 31.3 PG (ref 26.8–34.3)
MCHC RBC AUTO-ENTMCNC: 32.6 G/DL (ref 31.4–37.4)
MCV RBC AUTO: 96 FL (ref 82–98)
MONOCYTES # BLD AUTO: 0.58 THOUSAND/ΜL (ref 0.17–1.22)
MONOCYTES NFR BLD AUTO: 5 % (ref 4–12)
MUCOUS THREADS UR QL AUTO: ABNORMAL
NEUTROPHILS # BLD AUTO: 7.71 THOUSANDS/ΜL (ref 1.85–7.62)
NEUTS SEG NFR BLD AUTO: 67 % (ref 43–75)
NITRITE UR QL STRIP: NEGATIVE
NITRITE UR QL STRIP: NEGATIVE
NON-SQ EPI CELLS URNS QL MICRO: ABNORMAL /HPF
NRBC BLD AUTO-RTO: 0 /100 WBCS
PH UR STRIP.AUTO: 5 [PH] (ref 4.5–8)
PH UR STRIP.AUTO: 5.5 [PH]
PLATELET # BLD AUTO: 372 THOUSANDS/UL (ref 149–390)
PMV BLD AUTO: 9.7 FL (ref 8.9–12.7)
POTASSIUM SERPL-SCNC: 3.8 MMOL/L (ref 3.5–5.3)
POTASSIUM SERPL-SCNC: 4.3 MMOL/L (ref 3.5–5.3)
PROT SERPL-MCNC: 7.9 G/DL (ref 6.4–8.2)
PROT SERPL-MCNC: 8.2 G/DL (ref 6.4–8.2)
PROT UR STRIP-MCNC: ABNORMAL MG/DL
PROT UR STRIP-MCNC: NEGATIVE MG/DL
RBC # BLD AUTO: 4.83 MILLION/UL (ref 3.88–5.62)
RBC #/AREA URNS AUTO: ABNORMAL /HPF
SODIUM SERPL-SCNC: 138 MMOL/L (ref 136–145)
SODIUM SERPL-SCNC: 138 MMOL/L (ref 136–145)
SP GR UR STRIP.AUTO: 1.02 (ref 1–1.03)
SP GR UR STRIP.AUTO: 1.02 (ref 1–1.03)
UROBILINOGEN UR QL STRIP.AUTO: 0.2 E.U./DL
UROBILINOGEN UR QL STRIP.AUTO: 0.2 E.U./DL
WBC # BLD AUTO: 11.55 THOUSAND/UL (ref 4.31–10.16)
WBC #/AREA URNS AUTO: ABNORMAL /HPF

## 2020-12-08 PROCEDURE — 99285 EMERGENCY DEPT VISIT HI MDM: CPT | Performed by: EMERGENCY MEDICINE

## 2020-12-08 PROCEDURE — 81001 URINALYSIS AUTO W/SCOPE: CPT | Performed by: NURSE PRACTITIONER

## 2020-12-08 PROCEDURE — 80053 COMPREHEN METABOLIC PANEL: CPT | Performed by: EMERGENCY MEDICINE

## 2020-12-08 PROCEDURE — 81003 URINALYSIS AUTO W/O SCOPE: CPT

## 2020-12-08 PROCEDURE — 99284 EMERGENCY DEPT VISIT MOD MDM: CPT

## 2020-12-08 PROCEDURE — 36415 COLL VENOUS BLD VENIPUNCTURE: CPT

## 2020-12-08 PROCEDURE — 85025 COMPLETE CBC W/AUTO DIFF WBC: CPT | Performed by: EMERGENCY MEDICINE

## 2020-12-08 PROCEDURE — 80053 COMPREHEN METABOLIC PANEL: CPT

## 2020-12-08 PROCEDURE — 82550 ASSAY OF CK (CPK): CPT | Performed by: INTERNAL MEDICINE

## 2020-12-08 PROCEDURE — 99496 TRANSJ CARE MGMT HIGH F2F 7D: CPT | Performed by: NURSE PRACTITIONER

## 2020-12-08 PROCEDURE — 99223 1ST HOSP IP/OBS HIGH 75: CPT | Performed by: INTERNAL MEDICINE

## 2020-12-08 RX ORDER — NITROGLYCERIN 0.4 MG/1
0.4 TABLET SUBLINGUAL
Status: DISCONTINUED | OUTPATIENT
Start: 2020-12-08 | End: 2020-12-10 | Stop reason: HOSPADM

## 2020-12-08 RX ORDER — ASPIRIN 81 MG/1
162 TABLET ORAL DAILY
Status: DISCONTINUED | OUTPATIENT
Start: 2020-12-09 | End: 2020-12-10 | Stop reason: HOSPADM

## 2020-12-08 RX ORDER — TAMSULOSIN HYDROCHLORIDE 0.4 MG/1
0.4 CAPSULE ORAL
Qty: 90 CAPSULE | Refills: 0 | Status: SHIPPED | OUTPATIENT
Start: 2020-12-08 | End: 2021-01-19 | Stop reason: SDUPTHER

## 2020-12-08 RX ORDER — PANTOPRAZOLE SODIUM 40 MG/1
40 TABLET, DELAYED RELEASE ORAL DAILY
Status: DISCONTINUED | OUTPATIENT
Start: 2020-12-09 | End: 2020-12-10 | Stop reason: HOSPADM

## 2020-12-08 RX ORDER — SODIUM CHLORIDE 9 MG/ML
125 INJECTION, SOLUTION INTRAVENOUS CONTINUOUS
Status: DISCONTINUED | OUTPATIENT
Start: 2020-12-08 | End: 2020-12-09

## 2020-12-08 RX ORDER — VENLAFAXINE 37.5 MG/1
37.5 TABLET ORAL DAILY
Status: DISCONTINUED | OUTPATIENT
Start: 2020-12-09 | End: 2020-12-10 | Stop reason: HOSPADM

## 2020-12-08 RX ORDER — VENLAFAXINE 37.5 MG/1
37.5 TABLET ORAL DAILY
Qty: 90 TABLET | Refills: 1 | Status: SHIPPED | OUTPATIENT
Start: 2020-12-08 | End: 2021-08-12 | Stop reason: SDUPTHER

## 2020-12-08 RX ORDER — TRAZODONE HYDROCHLORIDE 100 MG/1
100 TABLET ORAL
Status: DISCONTINUED | OUTPATIENT
Start: 2020-12-08 | End: 2020-12-10 | Stop reason: HOSPADM

## 2020-12-08 RX ORDER — CYCLOBENZAPRINE HCL 10 MG
10 TABLET ORAL
Status: DISCONTINUED | OUTPATIENT
Start: 2020-12-08 | End: 2020-12-10 | Stop reason: HOSPADM

## 2020-12-08 RX ORDER — TRAZODONE HYDROCHLORIDE 100 MG/1
100 TABLET ORAL
Qty: 90 TABLET | Refills: 0 | Status: SHIPPED | OUTPATIENT
Start: 2020-12-08 | End: 2021-04-23 | Stop reason: SDUPTHER

## 2020-12-08 RX ORDER — TAMSULOSIN HYDROCHLORIDE 0.4 MG/1
0.4 CAPSULE ORAL
Status: DISCONTINUED | OUTPATIENT
Start: 2020-12-09 | End: 2020-12-10 | Stop reason: HOSPADM

## 2020-12-08 RX ORDER — CYCLOBENZAPRINE HCL 10 MG
10 TABLET ORAL
Qty: 90 TABLET | Refills: 0 | Status: SHIPPED | OUTPATIENT
Start: 2020-12-08 | End: 2021-03-19 | Stop reason: SDUPTHER

## 2020-12-08 RX ORDER — GABAPENTIN 300 MG/1
300 CAPSULE ORAL DAILY
Status: DISCONTINUED | OUTPATIENT
Start: 2020-12-09 | End: 2020-12-10 | Stop reason: HOSPADM

## 2020-12-08 RX ORDER — ATORVASTATIN CALCIUM 40 MG/1
40 TABLET, FILM COATED ORAL DAILY
Status: DISCONTINUED | OUTPATIENT
Start: 2020-12-09 | End: 2020-12-10 | Stop reason: HOSPADM

## 2020-12-08 RX ORDER — CALCIUM CARBONATE 200(500)MG
1000 TABLET,CHEWABLE ORAL DAILY PRN
Status: DISCONTINUED | OUTPATIENT
Start: 2020-12-08 | End: 2020-12-10 | Stop reason: HOSPADM

## 2020-12-08 RX ORDER — PANTOPRAZOLE SODIUM 40 MG/1
40 TABLET, DELAYED RELEASE ORAL DAILY
Qty: 90 TABLET | Refills: 1 | Status: SHIPPED | OUTPATIENT
Start: 2020-12-08 | End: 2021-08-12 | Stop reason: SDUPTHER

## 2020-12-08 RX ORDER — GABAPENTIN 300 MG/1
300 CAPSULE ORAL DAILY
Qty: 90 CAPSULE | Refills: 1 | Status: SHIPPED | OUTPATIENT
Start: 2020-12-08 | End: 2021-03-19 | Stop reason: SDUPTHER

## 2020-12-08 RX ORDER — LISINOPRIL AND HYDROCHLOROTHIAZIDE 12.5; 1 MG/1; MG/1
1 TABLET ORAL DAILY
Qty: 90 TABLET | Refills: 1 | Status: SHIPPED | OUTPATIENT
Start: 2020-12-08 | End: 2020-12-10 | Stop reason: HOSPADM

## 2020-12-08 RX ORDER — ATORVASTATIN CALCIUM 40 MG/1
40 TABLET, FILM COATED ORAL DAILY
Qty: 90 TABLET | Refills: 1 | Status: SHIPPED | OUTPATIENT
Start: 2020-12-08 | End: 2021-08-12 | Stop reason: SDUPTHER

## 2020-12-08 RX ADMIN — SODIUM CHLORIDE 125 ML/HR: 0.9 INJECTION, SOLUTION INTRAVENOUS at 22:30

## 2020-12-08 RX ADMIN — CYCLOBENZAPRINE HYDROCHLORIDE 10 MG: 10 TABLET, FILM COATED ORAL at 22:28

## 2020-12-08 RX ADMIN — TRAZODONE HYDROCHLORIDE 100 MG: 100 TABLET ORAL at 22:28

## 2020-12-08 RX ADMIN — SODIUM CHLORIDE 1000 ML: 0.9 INJECTION, SOLUTION INTRAVENOUS at 16:28

## 2020-12-09 ENCOUNTER — APPOINTMENT (INPATIENT)
Dept: RADIOLOGY | Facility: HOSPITAL | Age: 60
DRG: 684 | End: 2020-12-09
Payer: COMMERCIAL

## 2020-12-09 ENCOUNTER — TELEPHONE (OUTPATIENT)
Dept: UROLOGY | Facility: AMBULATORY SURGERY CENTER | Age: 60
End: 2020-12-09

## 2020-12-09 PROBLEM — Z87.898 HISTORY OF BACTEREMIA: Status: ACTIVE | Noted: 2020-12-09

## 2020-12-09 PROBLEM — Z72.0 TOBACCO ABUSE: Status: ACTIVE | Noted: 2020-12-09

## 2020-12-09 LAB
ANION GAP SERPL CALCULATED.3IONS-SCNC: 2 MMOL/L (ref 4–13)
BUN SERPL-MCNC: 30 MG/DL (ref 5–25)
CALCIUM SERPL-MCNC: 8.9 MG/DL (ref 8.3–10.1)
CHLORIDE SERPL-SCNC: 113 MMOL/L (ref 100–108)
CO2 SERPL-SCNC: 26 MMOL/L (ref 21–32)
CREAT SERPL-MCNC: 1.02 MG/DL (ref 0.6–1.3)
CREAT UR-MCNC: 64.6 MG/DL
ERYTHROCYTE [DISTWIDTH] IN BLOOD BY AUTOMATED COUNT: 13.1 % (ref 11.6–15.1)
GFR SERPL CREATININE-BSD FRML MDRD: 80 ML/MIN/1.73SQ M
GLUCOSE SERPL-MCNC: 80 MG/DL (ref 65–140)
HCT VFR BLD AUTO: 41.4 % (ref 36.5–49.3)
HGB BLD-MCNC: 13.3 G/DL (ref 12–17)
MCH RBC QN AUTO: 31.1 PG (ref 26.8–34.3)
MCHC RBC AUTO-ENTMCNC: 32.1 G/DL (ref 31.4–37.4)
MCV RBC AUTO: 97 FL (ref 82–98)
PLATELET # BLD AUTO: 337 THOUSANDS/UL (ref 149–390)
PMV BLD AUTO: 9.9 FL (ref 8.9–12.7)
POTASSIUM SERPL-SCNC: 4.6 MMOL/L (ref 3.5–5.3)
RBC # BLD AUTO: 4.27 MILLION/UL (ref 3.88–5.62)
SODIUM 24H UR-SCNC: 113 MOL/L
SODIUM SERPL-SCNC: 141 MMOL/L (ref 136–145)
WBC # BLD AUTO: 9.46 THOUSAND/UL (ref 4.31–10.16)

## 2020-12-09 PROCEDURE — 80048 BASIC METABOLIC PNL TOTAL CA: CPT | Performed by: NURSE PRACTITIONER

## 2020-12-09 PROCEDURE — 85027 COMPLETE CBC AUTOMATED: CPT | Performed by: NURSE PRACTITIONER

## 2020-12-09 PROCEDURE — 99253 IP/OBS CNSLTJ NEW/EST LOW 45: CPT | Performed by: INTERNAL MEDICINE

## 2020-12-09 PROCEDURE — 82570 ASSAY OF URINE CREATININE: CPT | Performed by: INTERNAL MEDICINE

## 2020-12-09 PROCEDURE — 99232 SBSQ HOSP IP/OBS MODERATE 35: CPT | Performed by: INTERNAL MEDICINE

## 2020-12-09 PROCEDURE — 76770 US EXAM ABDO BACK WALL COMP: CPT

## 2020-12-09 PROCEDURE — NC001 PR NO CHARGE: Performed by: INTERNAL MEDICINE

## 2020-12-09 PROCEDURE — 84300 ASSAY OF URINE SODIUM: CPT | Performed by: INTERNAL MEDICINE

## 2020-12-09 RX ORDER — HEPARIN SODIUM 5000 [USP'U]/ML
5000 INJECTION, SOLUTION INTRAVENOUS; SUBCUTANEOUS EVERY 8 HOURS SCHEDULED
Status: DISCONTINUED | OUTPATIENT
Start: 2020-12-09 | End: 2020-12-10 | Stop reason: HOSPADM

## 2020-12-09 RX ORDER — SODIUM CHLORIDE 9 MG/ML
75 INJECTION, SOLUTION INTRAVENOUS CONTINUOUS
Status: DISCONTINUED | OUTPATIENT
Start: 2020-12-09 | End: 2020-12-10 | Stop reason: HOSPADM

## 2020-12-09 RX ADMIN — TRAZODONE HYDROCHLORIDE 100 MG: 100 TABLET ORAL at 21:44

## 2020-12-09 RX ADMIN — HEPARIN SODIUM 5000 UNITS: 5000 INJECTION INTRAVENOUS; SUBCUTANEOUS at 21:44

## 2020-12-09 RX ADMIN — SODIUM CHLORIDE 75 ML/HR: 0.9 INJECTION, SOLUTION INTRAVENOUS at 15:48

## 2020-12-09 RX ADMIN — VENLAFAXINE 37.5 MG: 37.5 TABLET ORAL at 09:30

## 2020-12-09 RX ADMIN — ASPIRIN 162 MG: 81 TABLET ORAL at 09:29

## 2020-12-09 RX ADMIN — CYCLOBENZAPRINE HYDROCHLORIDE 10 MG: 10 TABLET, FILM COATED ORAL at 21:44

## 2020-12-09 RX ADMIN — HEPARIN SODIUM 5000 UNITS: 5000 INJECTION INTRAVENOUS; SUBCUTANEOUS at 15:49

## 2020-12-09 RX ADMIN — SODIUM CHLORIDE 125 ML/HR: 0.9 INJECTION, SOLUTION INTRAVENOUS at 06:13

## 2020-12-09 RX ADMIN — PANTOPRAZOLE SODIUM 40 MG: 40 TABLET, DELAYED RELEASE ORAL at 09:30

## 2020-12-09 RX ADMIN — TAMSULOSIN HYDROCHLORIDE 0.4 MG: 0.4 CAPSULE ORAL at 17:37

## 2020-12-09 RX ADMIN — ATORVASTATIN CALCIUM 40 MG: 40 TABLET, FILM COATED ORAL at 09:30

## 2020-12-09 RX ADMIN — METOPROLOL TARTRATE 25 MG: 25 TABLET, FILM COATED ORAL at 09:30

## 2020-12-09 RX ADMIN — GABAPENTIN 300 MG: 300 CAPSULE ORAL at 09:30

## 2020-12-10 VITALS
WEIGHT: 225.09 LBS | RESPIRATION RATE: 18 BRPM | DIASTOLIC BLOOD PRESSURE: 81 MMHG | HEART RATE: 102 BPM | TEMPERATURE: 98.5 F | HEIGHT: 70 IN | OXYGEN SATURATION: 98 % | BODY MASS INDEX: 32.22 KG/M2 | SYSTOLIC BLOOD PRESSURE: 161 MMHG

## 2020-12-10 LAB
ANION GAP SERPL CALCULATED.3IONS-SCNC: 2 MMOL/L (ref 4–13)
BASOPHILS # BLD AUTO: 0.04 THOUSANDS/ΜL (ref 0–0.1)
BASOPHILS NFR BLD AUTO: 1 % (ref 0–1)
BUN SERPL-MCNC: 23 MG/DL (ref 5–25)
CALCIUM SERPL-MCNC: 9 MG/DL (ref 8.3–10.1)
CHLORIDE SERPL-SCNC: 111 MMOL/L (ref 100–108)
CO2 SERPL-SCNC: 28 MMOL/L (ref 21–32)
CREAT SERPL-MCNC: 0.99 MG/DL (ref 0.6–1.3)
EOSINOPHIL # BLD AUTO: 0.29 THOUSAND/ΜL (ref 0–0.61)
EOSINOPHIL NFR BLD AUTO: 4 % (ref 0–6)
ERYTHROCYTE [DISTWIDTH] IN BLOOD BY AUTOMATED COUNT: 12.8 % (ref 11.6–15.1)
GFR SERPL CREATININE-BSD FRML MDRD: 82 ML/MIN/1.73SQ M
GLUCOSE SERPL-MCNC: 77 MG/DL (ref 65–140)
HCT VFR BLD AUTO: 39.8 % (ref 36.5–49.3)
HGB BLD-MCNC: 13 G/DL (ref 12–17)
IMM GRANULOCYTES # BLD AUTO: 0.06 THOUSAND/UL (ref 0–0.2)
IMM GRANULOCYTES NFR BLD AUTO: 1 % (ref 0–2)
LYMPHOCYTES # BLD AUTO: 1.93 THOUSANDS/ΜL (ref 0.6–4.47)
LYMPHOCYTES NFR BLD AUTO: 29 % (ref 14–44)
MCH RBC QN AUTO: 31.7 PG (ref 26.8–34.3)
MCHC RBC AUTO-ENTMCNC: 32.7 G/DL (ref 31.4–37.4)
MCV RBC AUTO: 97 FL (ref 82–98)
MONOCYTES # BLD AUTO: 0.56 THOUSAND/ΜL (ref 0.17–1.22)
MONOCYTES NFR BLD AUTO: 8 % (ref 4–12)
NEUTROPHILS # BLD AUTO: 3.86 THOUSANDS/ΜL (ref 1.85–7.62)
NEUTS SEG NFR BLD AUTO: 57 % (ref 43–75)
NRBC BLD AUTO-RTO: 0 /100 WBCS
PLATELET # BLD AUTO: 304 THOUSANDS/UL (ref 149–390)
PMV BLD AUTO: 10.2 FL (ref 8.9–12.7)
POTASSIUM SERPL-SCNC: 4.8 MMOL/L (ref 3.5–5.3)
RBC # BLD AUTO: 4.1 MILLION/UL (ref 3.88–5.62)
SODIUM SERPL-SCNC: 141 MMOL/L (ref 136–145)
WBC # BLD AUTO: 6.74 THOUSAND/UL (ref 4.31–10.16)

## 2020-12-10 PROCEDURE — 80048 BASIC METABOLIC PNL TOTAL CA: CPT | Performed by: INTERNAL MEDICINE

## 2020-12-10 PROCEDURE — 85025 COMPLETE CBC W/AUTO DIFF WBC: CPT | Performed by: INTERNAL MEDICINE

## 2020-12-10 PROCEDURE — 99239 HOSP IP/OBS DSCHRG MGMT >30: CPT | Performed by: INTERNAL MEDICINE

## 2020-12-10 PROCEDURE — 99232 SBSQ HOSP IP/OBS MODERATE 35: CPT | Performed by: INTERNAL MEDICINE

## 2020-12-10 RX ADMIN — GABAPENTIN 300 MG: 300 CAPSULE ORAL at 08:59

## 2020-12-10 RX ADMIN — SODIUM CHLORIDE 75 ML/HR: 0.9 INJECTION, SOLUTION INTRAVENOUS at 04:58

## 2020-12-10 RX ADMIN — ASPIRIN 162 MG: 81 TABLET ORAL at 08:59

## 2020-12-10 RX ADMIN — METOPROLOL TARTRATE 25 MG: 25 TABLET, FILM COATED ORAL at 08:59

## 2020-12-10 RX ADMIN — PANTOPRAZOLE SODIUM 40 MG: 40 TABLET, DELAYED RELEASE ORAL at 05:01

## 2020-12-10 RX ADMIN — HEPARIN SODIUM 5000 UNITS: 5000 INJECTION INTRAVENOUS; SUBCUTANEOUS at 05:01

## 2020-12-10 RX ADMIN — VENLAFAXINE 37.5 MG: 37.5 TABLET ORAL at 09:00

## 2020-12-10 RX ADMIN — ATORVASTATIN CALCIUM 40 MG: 40 TABLET, FILM COATED ORAL at 08:59

## 2020-12-11 ENCOUNTER — TRANSITIONAL CARE MANAGEMENT (OUTPATIENT)
Dept: INTERNAL MEDICINE CLINIC | Facility: CLINIC | Age: 60
End: 2020-12-11

## 2020-12-14 ENCOUNTER — OFFICE VISIT (OUTPATIENT)
Dept: INTERNAL MEDICINE CLINIC | Facility: CLINIC | Age: 60
End: 2020-12-14
Payer: COMMERCIAL

## 2020-12-14 VITALS
BODY MASS INDEX: 31.92 KG/M2 | TEMPERATURE: 97.8 F | WEIGHT: 223 LBS | HEIGHT: 70 IN | SYSTOLIC BLOOD PRESSURE: 142 MMHG | DIASTOLIC BLOOD PRESSURE: 86 MMHG | OXYGEN SATURATION: 98 % | RESPIRATION RATE: 16 BRPM | HEART RATE: 77 BPM

## 2020-12-14 DIAGNOSIS — A41.9 SEVERE SEPSIS (HCC): ICD-10-CM

## 2020-12-14 DIAGNOSIS — N17.9 ACUTE RENAL FAILURE, UNSPECIFIED ACUTE RENAL FAILURE TYPE (HCC): ICD-10-CM

## 2020-12-14 DIAGNOSIS — I10 ESSENTIAL HYPERTENSION: Primary | ICD-10-CM

## 2020-12-14 DIAGNOSIS — M25.512 LEFT SHOULDER PAIN, UNSPECIFIED CHRONICITY: ICD-10-CM

## 2020-12-14 DIAGNOSIS — R65.20 SEVERE SEPSIS (HCC): ICD-10-CM

## 2020-12-14 PROBLEM — H61.22 CERUMEN DEBRIS ON TYMPANIC MEMBRANE OF LEFT EAR: Status: RESOLVED | Noted: 2019-03-18 | Resolved: 2020-12-14

## 2020-12-14 PROBLEM — Z72.0 TOBACCO ABUSE: Status: RESOLVED | Noted: 2020-12-09 | Resolved: 2020-12-14

## 2020-12-14 PROCEDURE — 99215 OFFICE O/P EST HI 40 MIN: CPT | Performed by: NURSE PRACTITIONER

## 2020-12-15 ENCOUNTER — OFFICE VISIT (OUTPATIENT)
Dept: UROLOGY | Facility: AMBULATORY SURGERY CENTER | Age: 60
End: 2020-12-15
Payer: COMMERCIAL

## 2020-12-15 ENCOUNTER — TELEPHONE (OUTPATIENT)
Dept: UROLOGY | Facility: AMBULATORY SURGERY CENTER | Age: 60
End: 2020-12-15

## 2020-12-15 VITALS
HEART RATE: 82 BPM | BODY MASS INDEX: 31.92 KG/M2 | WEIGHT: 223 LBS | DIASTOLIC BLOOD PRESSURE: 82 MMHG | SYSTOLIC BLOOD PRESSURE: 150 MMHG | HEIGHT: 70 IN

## 2020-12-15 DIAGNOSIS — N39.0 URINARY TRACT INFECTION WITHOUT HEMATURIA, SITE UNSPECIFIED: Primary | ICD-10-CM

## 2020-12-15 DIAGNOSIS — N20.0 NEPHROLITHIASIS: ICD-10-CM

## 2020-12-15 DIAGNOSIS — R33.8 BENIGN PROSTATIC HYPERPLASIA WITH URINARY RETENTION: ICD-10-CM

## 2020-12-15 DIAGNOSIS — N40.1 BENIGN PROSTATIC HYPERPLASIA WITH URINARY RETENTION: ICD-10-CM

## 2020-12-15 LAB
BACTERIA UR QL AUTO: ABNORMAL /HPF
BILIRUB UR QL STRIP: NEGATIVE
CLARITY UR: CLEAR
COLOR UR: YELLOW
GLUCOSE UR STRIP-MCNC: NEGATIVE MG/DL
HGB UR QL STRIP.AUTO: NEGATIVE
HYALINE CASTS #/AREA URNS LPF: ABNORMAL /LPF
KETONES UR STRIP-MCNC: NEGATIVE MG/DL
LEUKOCYTE ESTERASE UR QL STRIP: NEGATIVE
NITRITE UR QL STRIP: NEGATIVE
NON-SQ EPI CELLS URNS QL MICRO: ABNORMAL /HPF
PH UR STRIP.AUTO: 6 [PH]
POST-VOID RESIDUAL VOLUME, ML POC: 0 ML
PROT UR STRIP-MCNC: ABNORMAL MG/DL
RBC #/AREA URNS AUTO: ABNORMAL /HPF
SL AMB  POCT GLUCOSE, UA: NORMAL
SL AMB LEUKOCYTE ESTERASE,UA: NORMAL
SL AMB POCT BILIRUBIN,UA: NORMAL
SL AMB POCT BLOOD,UA: NORMAL
SL AMB POCT CLARITY,UA: CLEAR
SL AMB POCT COLOR,UA: YELLOW
SL AMB POCT KETONES,UA: NORMAL
SL AMB POCT NITRITE,UA: NORMAL
SL AMB POCT PH,UA: 5
SL AMB POCT SPECIFIC GRAVITY,UA: 1
SL AMB POCT URINE PROTEIN: NORMAL
SL AMB POCT UROBILINOGEN: 0.2
SP GR UR STRIP.AUTO: 1.02 (ref 1–1.03)
UROBILINOGEN UR QL STRIP.AUTO: 0.2 E.U./DL
WBC #/AREA URNS AUTO: ABNORMAL /HPF

## 2020-12-15 PROCEDURE — 99244 OFF/OP CNSLTJ NEW/EST MOD 40: CPT | Performed by: NURSE PRACTITIONER

## 2020-12-15 PROCEDURE — 51798 US URINE CAPACITY MEASURE: CPT | Performed by: NURSE PRACTITIONER

## 2020-12-15 PROCEDURE — 87086 URINE CULTURE/COLONY COUNT: CPT | Performed by: NURSE PRACTITIONER

## 2020-12-15 PROCEDURE — 81002 URINALYSIS NONAUTO W/O SCOPE: CPT | Performed by: NURSE PRACTITIONER

## 2020-12-15 PROCEDURE — 81001 URINALYSIS AUTO W/SCOPE: CPT | Performed by: NURSE PRACTITIONER

## 2020-12-16 LAB — BACTERIA UR CULT: NORMAL

## 2020-12-29 ENCOUNTER — TELEPHONE (OUTPATIENT)
Dept: INTERNAL MEDICINE CLINIC | Facility: CLINIC | Age: 60
End: 2020-12-29

## 2021-01-03 ENCOUNTER — IMMUNIZATIONS (OUTPATIENT)
Dept: FAMILY MEDICINE CLINIC | Facility: HOSPITAL | Age: 61
End: 2021-01-03

## 2021-01-03 DIAGNOSIS — Z23 ENCOUNTER FOR IMMUNIZATION: ICD-10-CM

## 2021-01-03 PROCEDURE — 0011A SARS-COV-2 / COVID-19 MRNA VACCINE (MODERNA) 100 MCG: CPT

## 2021-01-03 PROCEDURE — 91301 SARS-COV-2 / COVID-19 MRNA VACCINE (MODERNA) 100 MCG: CPT

## 2021-01-19 DIAGNOSIS — N40.0 BENIGN PROSTATIC HYPERPLASIA, UNSPECIFIED WHETHER LOWER URINARY TRACT SYMPTOMS PRESENT: ICD-10-CM

## 2021-01-19 DIAGNOSIS — N30.00 ACUTE CYSTITIS WITHOUT HEMATURIA: ICD-10-CM

## 2021-01-19 RX ORDER — TAMSULOSIN HYDROCHLORIDE 0.4 MG/1
0.4 CAPSULE ORAL
Qty: 90 CAPSULE | Refills: 0 | Status: SHIPPED | OUTPATIENT
Start: 2021-01-19 | End: 2021-01-25 | Stop reason: SDUPTHER

## 2021-01-22 NOTE — PROGRESS NOTES
Cardiology Follow Up    Reinaldo Mock [facility]  1960  [id number]  [address] [place] [address] Dr Oneil Landrum [facility] ROSALIND Viramontese Toño Santos [phone] [facility]  [phone]    1  Essential (primary) hypertension     2  Pure hypercholesterolemia     3  Coronary arteriosclerosis     4  H/O heart artery stent         Interval History:  Patient is here for a follow-up visit  He has a history of hypertension and hyperlipidemia   He has a prior history of coronary artery disease with two vessel stenting performed 12/27/2010    His intervention was a drug-eluting stent in the LAD of 3 0 x 18 size and a drug-eluting stent in the diagonal branch of 2 5 x 12 size  Promus stents were utilized   His most recent nuclear stress test was done year 2015 and showed no evidence of prognostically important ischemia   He exercised 8 min on a treadmill   His most recent echocardiogram in January 2019 demonstrated preserved LV systolic function with mild LVH and mild left atrial cavity enlargement   There was no significant valvular heart disease   Patient had a lipid profile done May 2019 which demonstrated total cholesterol of 148 with an HDL of 56 and a calculated LDL of 70   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[employer]       Patient Active Problem List   Diagnosis    Acute right-sided low back pain without sciatica    Essential hypertension    History of MI (myocardial infarction)    Class 1 obesity due to excess calories with serious comorbidity and body mass index (BMI) of 31 0 to 31 9 in adult    Multiple food allergies    H/O heart artery stent    Fatigue    Need for immunization against influenza    Skin irritation    Benign prostatic hyperplasia    Scleral icterus    Abdominal wall seroma    Abnormal CT of liver    Irritant contact dermatitis    Insomnia    Urinary frequency    Hyperlipidemia    Abnormal exam of left ear    Allergy to iodine    Anaphylactic reaction    Anxiety    AV malformation, peripheral, congenital    Chronic ischemic heart disease, unspecified    Coronary atherosclerosis    PEYTON (generalized anxiety disorder)    Gastroesophageal reflux disease    Nerve pain    Muscle cramping    Spasm of muscle, back    Status post percutaneous transluminal coronary angioplasty    Trigger finger of right hand    Anaphylactic shock due to peanuts    Allergy to tree nuts or seeds    Peanut allergy    Soy allergy    Rash    Paresthesia of both hands    Pain in both knees    Left lower quadrant abdominal pain    S/P bariatric surgery    Weight loss, unintentional    Testicular pain, unspecified    Annual physical exam    Acute pain of right shoulder    COVID-19    Severe sepsis (HCC)    UTI (urinary tract infection)    ARF (acute renal failure) (HCC)    Abnormal LFTs    Gram-negative bacteremia    Thrombocytopenia (HCC)    Left shoulder pain    Acute kidney injury    History of UTI    Recent bacteremia     Past Medical History:   Diagnosis Date    Abdominal lump 4/20/2018    Anxiety     Arthritis     Cardiac disease     Ear pain, left 6/8/2018    Episode of dizziness 11/26/2018    Fever 4/22/2019    Flu-like symptoms 12/21/2018    Herpes zoster with complication 6/3/6995    Hyperlipidemia     Hypertension     Kidney stone     Myocardial infarction [facility])     cardiac stent x2    Obesity     Overweight 1/5/2018    Pain of right great toe 7/22/2019    PONV (postoperative nausea and vomiting)     in the past     Social History     Socioeconomic History    Marital status: /Civil Union     Spouse name: Alexpacheco Frazier Number of children: 4    Years of education: Not on file    Highest education level: Not on file   Occupational History    Not on file   Social Needs    Financial resource strain: Not on file    Food insecurity     Worry: Not on file     Inability: Not on file   Paperlit needs Medical: Not on file     Non-medical: Not on file   Tobacco Use    Smoking status: Light Tobacco Smoker     Types: Cigars    Smokeless tobacco: Never Used    Tobacco comment: advised not to smoke prior to procedure   Substance and Sexual Activity    Alcohol use: Yes     Binge frequency: Less than monthly     Comment: occasional last drink 2/2020    Drug use: No    Sexual activity: Not Currently   Lifestyle    Physical activity     Days per week: 0 days     Minutes per session: 0 min    Stress: Not on file   Relationships    Social connections     Talks on phone: Not on file     Gets together: Not on file     Attends Advent service: Not on file     Active member of club or organization: Not on file     Attends meetings of clubs or organizations: Not on file     Relationship status: Not on file    Intimate partner violence     Fear of current or ex partner: Not on file     Emotionally abused: Not on file     Physically abused: Not on file     Forced sexual activity: Not on file   Other Topics Concern    Not on file   Social History Narrative    Patient lives in a home that was built in [address] [place]     [address] in the bedroom     [place] air    Window air conditioning bedroom     Finished basement-dry-no mold or musty smell    Dehumidifier     Air  attached to [employer] in the winter months     Home is smoke free     Does not live near wooded are or open fields         Dog x5 ([place], [place], [place], and [place]) and there allowed in the bedroom         Caffeine: soda occasional                     Coffee is decaf 1 cup daily     Chocolate consumed rarely         Patient lives with wife and two children       Family History   Problem Relation Age of Onset    Heart disease Mother         passed during open heart surgery     Heart disease Father     Diabetes Father         caused blindness     Lung cancer Sister     No Known Problems Brother     No Known Problems Son     No Known Problems Son     No Known Problems Son     No Known Problems Daughter      Past Surgical History:   Procedure Laterality Date    ABDOMINAL SURGERY      tumor on stomach    BACK SURGERY      CARDIAC SURGERY      angio with stents    CIRCUMCISION      COLONOSCOPY      EGD AND COLONOSCOPY      GASTRIC BYPASS      IR ABSCESS/SEROMA DRAINAGE  1/4/2019    NECK SURGERY      herniated disc C4/5    OTHER SURGICAL HISTORY      removal of vocal cord lesion     OH EXC TUMOR SOFT TISSUE ABDOMINAL WALL SUBQ <3CM N/A 2/27/2019    Procedure: ABDOMINAL SEROMA EXCISION;  Surgeon: Charles Tariq MD;  [facility];  Service: General    ROTATOR CUFF REPAIR Right     SHOULDER SURGERY Bilateral     TONSILLECTOMY      WISDOM TOOTH EXTRACTION      WOUND DEBRIDEMENT N/A 3/7/2019    Procedure: DEBRIDEMENT WOUND ABDOMINAL ([address]) AND WOUND VAC APPLICATION;  Surgeon: Charles Tariq MD;  [facility];  Service: General    WOUND DEBRIDEMENT N/A 3/9/2019    Procedure: DEBRIDEMENT WOUND ABDOMINAL ([address]), wound vac dressing change;  Surgeon: Esther Cordova DO;  [facility];  Service: General       Current Outpatient Medications:     aspirin (ECOTRIN LOW STRENGTH) 81 mg EC tablet, Take 162 mg by mouth daily , Disp: , Rfl:     atorvastatin (LIPITOR) 40 mg tablet, Take 1 tablet (40 mg total) by mouth daily, Disp: 90 tablet, Rfl: 1    B COMPLEX VITAMINS ER PO, Take by mouth, Disp: , Rfl:     calcium carbonate (TUMS) 500 mg chewable tablet, Chew 2 tablets (1,000 mg total) daily as needed for indigestion or heartburn, Disp:  , Rfl: 0    cyclobenzaprine (FLEXERIL) 10 mg tablet, Take 1 tablet (10 mg total) by mouth daily at bedtime TAKE 1/2 TO 1 TABLET AT BEDTIME AS NEEDED, Disp: 90 tablet, Rfl: 0    Diclofenac Sodium (VOLTAREN) 1 %, Apply 2 g topically 4 (four) times a day, Disp: 100 g, Rfl: 0    EPINEPHrine (EPIPEN) 0 3 mg/0 3 mL SOAJ, Inject 0 3 mL (0 3 mg total) into a muscle once for 1 dose, Disp: 1 each, Rfl: 3    gabapentin (NEURONTIN) 300 mg capsule, Take 1 capsule (300 mg total) by mouth daily, Disp: 90 capsule, Rfl: 1    metoprolol tartrate (LOPRESSOR) 25 mg tablet, Take 1 tablet (25 mg total) by mouth daily, Disp: 90 tablet, Rfl: 3    nitroglycerin (NITROSTAT) 0 4 mg SL tablet, Place 1 tablet (0 4 mg total) under the tongue every 5 (five) minutes as needed for chest pain, Disp: 30 tablet, Rfl: 0    pantoprazole (PROTONIX) 40 mg tablet, Take 1 tablet (40 mg total) by mouth daily, Disp: 90 tablet, Rfl: 1    tamsulosin (FLOMAX) 0 4 mg, Take 1 capsule (0 4 mg total) by mouth daily with dinner, Disp: 90 capsule, Rfl: 0    traZODone (DESYREL) 100 mg tablet, Take 1 tablet (100 mg total) by mouth daily at bedtime, Disp: 90 tablet, Rfl: 0    venlafaxine (EFFEXOR) 37 5 mg tablet, Take 1 tablet (37 5 mg total) by mouth daily, Disp: 90 tablet, Rfl: 1  Allergies   Allergen Reactions    Adhesive [Medical Tape] Swelling     Tape on eye caused eye to swell could not open! Itching large hives from on arms     CAN USE PAPER TAPE    Cephalosporins Anaphylaxis    Nuts Anaphylaxis    Other Abdominal Pain     Soy  Patient can not have an NGT due to bariatric surgery    Peanut Oil Anaphylaxis    Soy Isoflavones      anaphylazis    Penicillins Hives     Itching and hives    Shellfish Allergy      Betadine/ cat scan dye is Okay  Cannot eat shrimp, crab shellfish avoids all fish  Labs:not applicable  Imaging: No results found  Review of Systems:  Review of Systems   All other systems reviewed and are negative  Physical Exam:  There were no vitals taken for this visit  Physical Exam  Vitals signs reviewed  Constitutional:       Appearance: He is well-developed  HENT:      Head: Normocephalic and atraumatic  Eyes:      Conjunctiva/sclera: Conjunctivae normal       Pupils: Pupils are equal, round, and reactive to light     Neck:      Musculoskeletal: Normal range of motion and neck supple  Cardiovascular:      Rate and Rhythm: Normal rate  Heart sounds: Normal heart sounds  Pulmonary:      Effort: Pulmonary effort is normal       Breath sounds: Normal breath sounds  Skin:     General: Skin is warm and dry  Neurological:      Mental Status: He is alert and oriented to person, place, and time           Discussion/Summary:***

## 2021-01-25 DIAGNOSIS — N30.00 ACUTE CYSTITIS WITHOUT HEMATURIA: ICD-10-CM

## 2021-01-25 DIAGNOSIS — N40.0 BENIGN PROSTATIC HYPERPLASIA, UNSPECIFIED WHETHER LOWER URINARY TRACT SYMPTOMS PRESENT: ICD-10-CM

## 2021-01-25 RX ORDER — TAMSULOSIN HYDROCHLORIDE 0.4 MG/1
0.4 CAPSULE ORAL
Qty: 90 CAPSULE | Refills: 1 | Status: SHIPPED | OUTPATIENT
Start: 2021-01-25 | End: 2021-11-22 | Stop reason: SDUPTHER

## 2021-01-30 ENCOUNTER — IMMUNIZATIONS (OUTPATIENT)
Dept: FAMILY MEDICINE CLINIC | Facility: HOSPITAL | Age: 61
End: 2021-01-30

## 2021-01-30 DIAGNOSIS — Z23 ENCOUNTER FOR IMMUNIZATION: Primary | ICD-10-CM

## 2021-01-30 PROCEDURE — 0012A SARS-COV-2 / COVID-19 MRNA VACCINE (MODERNA) 100 MCG: CPT

## 2021-01-30 PROCEDURE — 91301 SARS-COV-2 / COVID-19 MRNA VACCINE (MODERNA) 100 MCG: CPT

## 2021-01-31 NOTE — PROGRESS NOTES
Virtual Brief Visit    Assessment/Plan:    Problem List Items Addressed This Visit        Other    H/O heart artery stent    Hyperlipidemia      Other Visit Diagnoses     Essential (primary) hypertension    -  Primary    Coronary arteriosclerosis                    Reason for visit is   Chief Complaint   Patient presents with    Virtual Brief Visit        Encounter provider Marcos Farmer MD    Provider located at [address] [place] [address]    Recent Visits  No visits were found meeting these conditions  Showing recent visits within past 7 days and meeting all other requirements     Today's Visits  Date Type Provider Dept   02/01/21 Telemedicine Marcos Farmer, [facility] today's visits and meeting all other requirements     Future Appointments  No visits were found meeting these conditions  Showing future appointments within next 150 days and meeting all other requirements        After connecting through telephone, the patient was identified by name and date of birth  Evan Be was informed that this is a telemedicine visit and that the visit is being conducted through telephone  My office door was closed  No one else was in the room  He acknowledged consent and understanding of privacy and security of the platform  The patient has agreed to participate and understands he can discontinue the visit at any time  Patient is aware this is a billable service  Subjective    Evan Be is a 61 y o  male Patient is for a follow-up visit  He has a history of hypertension and hyperlipidemia   He has a prior history of CAD with 2 vessel stenting performed 12/27/2010    His intervention was a VELASQUEZ in the LAD of 3 0 x 18 size and a VELASQUEZ in the diagonal branch of 2 5 x 12 size  Promus stents were utilized   His most recent nuclear stress test was done year 2015 and showed no evidence of prognostically important ischemia   He exercised 8 min on a treadmill   His most recent echocardiogram in January 2019 demonstrated preserved LV systolic function with mild LVH and mild LAE   There was no significant valvular heart disease   Patient had a lipid profile done October 2020 which demonstrated total cholesterol 154 with an HDL of 79 and a calculated LDL of 62  Patient was recently hospitalized December 2020 in reference to acute kidney injury and urinary tract infection  His drains were adjusted by Nephrology with discontinuation of ACE-inhibitor and hydrochlorothiazide  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[employer]   The patient has had no chest pain or significant dyspnea  Anticipates having nasal surgery in June  There should be no problem with preoperative clearance  He will follow was blood pressure at home and call me if there is an issue  I have asked him to call in general if there is a problem in the interim otherwise I will see him in six months time  Theresa Cruzole   YENI     Past Medical History:   Diagnosis Date    Abdominal lump 4/20/2018    Anxiety     Arthritis     Cardiac disease     Ear pain, left 6/8/2018    Episode of dizziness 11/26/2018    Fever 4/22/2019    Flu-like symptoms 12/21/2018    Herpes zoster with complication 7/7/2977    Hyperlipidemia     Hypertension     Kidney stone     [facility]ocardial infarction [facility])     cardiac stent x2    Obesity     Overweight 1/5/2018    Pain of right great toe 7/22/2019    PONV (postoperative nausea and vomiting)     in the past       Past Surgical History:   Procedure Laterality Date    ABDOMINAL SURGERY      tumor on stomach    BACK SURGERY      CARDIAC SURGERY      angio with stents    CIRCUMCISION      COLONOSCOPY      EGD AND COLONOSCOPY      GASTRIC BYPASS      IR ABSCESS/SEROMA DRAINAGE  1/4/2019    NECK SURGERY      herniated disc C4/5    OTHER SURGICAL HISTORY      removal of vocal cord lesion     IA EXC TUMOR SOFT TISSUE ABDOMINAL WALL SUBQ <3CM N/A 2/27/2019    Procedure: ABDOMINAL SEROMA EXCISION;  Surgeon: Charles Tariq MD;  [facility];  Service: General    ROTATOR CUFF REPAIR Right     SHOULDER SURGERY Bilateral     TONSILLECTOMY      WISDOM TOOTH EXTRACTION      WOUND DEBRIDEMENT N/A 3/7/2019    Procedure: DEBRIDEMENT WOUND ABDOMINAL ([facility] OUT) AND WOUND VAC APPLICATION;  Surgeon: Charles Tariq MD;  [facility];  Service: General    WOUND DEBRIDEMENT N/A 3/9/2019    Procedure: DEBRIDEMENT WOUND ABDOMINAL ([facility] OUT), wound vac dressing change;  Surgeon: Esther Cordova DO;  [facility];  Service: General       Current Outpatient Medications   Medication Sig Dispense Refill    aspirin (ECOTRIN LOW STRENGTH) 81 mg EC tablet Take 162 mg by mouth daily       atorvastatin (LIPITOR) 40 mg tablet Take 1 tablet (40 mg total) by mouth daily 90 tablet 1    B COMPLEX VITAMINS ER PO Take by mouth      calcium carbonate (TUMS) 500 mg chewable tablet Chew 2 tablets (1,000 mg total) daily as needed for indigestion or heartburn  0    cyclobenzaprine (FLEXERIL) 10 mg tablet Take 1 tablet (10 mg total) by mouth daily at bedtime TAKE 1/2 TO 1 TABLET AT BEDTIME AS NEEDED 90 tablet 0    Diclofenac Sodium (VOLTAREN) 1 % Apply 2 g topically 4 (four) times a day 100 g 0    EPINEPHrine (EPIPEN) 0 3 mg/0 3 mL SOAJ Inject 0 3 mL (0 3 mg total) into a muscle once for 1 dose 1 each 3    gabapentin (NEURONTIN) 300 mg capsule Take 1 capsule (300 mg total) by mouth daily 90 capsule 1    metoprolol tartrate (LOPRESSOR) 25 mg tablet Take 1 tablet (25 mg total) by mouth daily 90 tablet 3    nitroglycerin (NITROSTAT) 0 4 mg SL tablet Place 1 tablet (0 4 mg total) under the tongue every 5 (five) minutes as needed for chest pain 30 tablet 0    pantoprazole (PROTONIX) 40 mg tablet Take 1 tablet (40 mg total) by mouth daily 90 tablet 1    tamsulosin (FLOMAX) 0 4 mg Take 1 capsule (0 4 mg total) by mouth daily with dinner 90 capsule 1    traZODone (DESYREL) 100 mg tablet Take 1 tablet (100 mg total) by mouth daily at bedtime 90 tablet 0    venlafaxine (EFFEXOR) 37 5 mg tablet Take 1 tablet (37 5 mg total) by mouth daily 90 tablet 1     No current facility-administered medications for this visit  Allergies   Allergen Reactions    Adhesive [Medical Tape] Swelling     Tape on eye caused eye to swell could not open! Itching large hives from on arms     CAN USE PAPER TAPE    Cephalosporins Anaphylaxis    Nuts Anaphylaxis    Other Abdominal Pain     Soy  Patient can not have an NGT due to bariatric surgery    Peanut Oil Anaphylaxis    Soy Isoflavones      anaphylazis    Penicillins Hives     Itching and hives    Shellfish Allergy      Betadine/ cat scan dye is Okay  Cannot eat shrimp, crab shellfish avoids all fish  Review of Systems   All other systems reviewed and are negative  There were no vitals filed for this visit  I spent 20 minutes directly with the patient during this visit    VIRTUAL VISIT [address] acknowledges that he has consented to an online visit or consultation  He understands that the online visit is based solely on information provided by him, and that, in the absence of a face-to-face physical evaluation by the physician, the diagnosis he receives is both limited and provisional in terms of accuracy and completeness  This is not intended to replace a full medical face-to-face evaluation by the physician  Ashley Bone understands and accepts these terms

## 2021-02-01 ENCOUNTER — TELEMEDICINE (OUTPATIENT)
Dept: CARDIOLOGY CLINIC | Facility: CLINIC | Age: 61
End: 2021-02-01
Payer: COMMERCIAL

## 2021-02-01 DIAGNOSIS — I25.10 CORONARY ARTERIOSCLEROSIS: ICD-10-CM

## 2021-02-01 DIAGNOSIS — I10 ESSENTIAL (PRIMARY) HYPERTENSION: Primary | ICD-10-CM

## 2021-02-01 DIAGNOSIS — E78.00 PURE HYPERCHOLESTEROLEMIA: ICD-10-CM

## 2021-02-01 DIAGNOSIS — Z95.5 H/O HEART ARTERY STENT: ICD-10-CM

## 2021-02-01 PROCEDURE — 99442 PR PHYS/QHP TELEPHONE EVALUATION 11-20 MIN: CPT | Performed by: INTERNAL MEDICINE

## 2021-03-19 ENCOUNTER — OFFICE VISIT (OUTPATIENT)
Dept: INTERNAL MEDICINE CLINIC | Facility: CLINIC | Age: 61
End: 2021-03-19
Payer: COMMERCIAL

## 2021-03-19 VITALS
TEMPERATURE: 97.6 F | BODY MASS INDEX: 31.21 KG/M2 | OXYGEN SATURATION: 97 % | DIASTOLIC BLOOD PRESSURE: 84 MMHG | HEIGHT: 70 IN | SYSTOLIC BLOOD PRESSURE: 124 MMHG | HEART RATE: 70 BPM | WEIGHT: 218 LBS

## 2021-03-19 DIAGNOSIS — M54.9 CHRONIC BILATERAL BACK PAIN, UNSPECIFIED BACK LOCATION: ICD-10-CM

## 2021-03-19 DIAGNOSIS — F41.9 ANXIETY: ICD-10-CM

## 2021-03-19 DIAGNOSIS — I10 ESSENTIAL HYPERTENSION: Primary | ICD-10-CM

## 2021-03-19 DIAGNOSIS — Z13.1 SCREENING FOR DIABETES MELLITUS: ICD-10-CM

## 2021-03-19 DIAGNOSIS — E78.5 HYPERLIPIDEMIA, UNSPECIFIED HYPERLIPIDEMIA TYPE: ICD-10-CM

## 2021-03-19 DIAGNOSIS — G89.29 CHRONIC BILATERAL BACK PAIN, UNSPECIFIED BACK LOCATION: ICD-10-CM

## 2021-03-19 DIAGNOSIS — Z13.29 SCREENING FOR THYROID DISORDER: ICD-10-CM

## 2021-03-19 DIAGNOSIS — M25.512 LEFT SHOULDER PAIN, UNSPECIFIED CHRONICITY: ICD-10-CM

## 2021-03-19 DIAGNOSIS — M25.562 CHRONIC PAIN OF LEFT KNEE: ICD-10-CM

## 2021-03-19 DIAGNOSIS — M79.2 NERVE PAIN: ICD-10-CM

## 2021-03-19 DIAGNOSIS — G89.29 CHRONIC PAIN OF LEFT KNEE: ICD-10-CM

## 2021-03-19 DIAGNOSIS — I25.10 ATHEROSCLEROSIS OF CORONARY ARTERY OF NATIVE HEART WITHOUT ANGINA PECTORIS, UNSPECIFIED VESSEL OR LESION TYPE: ICD-10-CM

## 2021-03-19 DIAGNOSIS — R42 POSTURAL DIZZINESS: ICD-10-CM

## 2021-03-19 DIAGNOSIS — E66.09 CLASS 1 OBESITY DUE TO EXCESS CALORIES WITH SERIOUS COMORBIDITY AND BODY MASS INDEX (BMI) OF 31.0 TO 31.9 IN ADULT: ICD-10-CM

## 2021-03-19 DIAGNOSIS — G47.00 INSOMNIA, UNSPECIFIED TYPE: ICD-10-CM

## 2021-03-19 PROBLEM — Z87.440 HISTORY OF UTI: Status: RESOLVED | Noted: 2020-12-08 | Resolved: 2021-03-19

## 2021-03-19 PROCEDURE — 99214 OFFICE O/P EST MOD 30 MIN: CPT | Performed by: NURSE PRACTITIONER

## 2021-03-19 RX ORDER — CYCLOBENZAPRINE HCL 10 MG
10 TABLET ORAL
Qty: 90 TABLET | Refills: 0 | Status: SHIPPED | OUTPATIENT
Start: 2021-03-19 | End: 2021-06-24 | Stop reason: SDUPTHER

## 2021-03-19 RX ORDER — GABAPENTIN 300 MG/1
300 CAPSULE ORAL DAILY
Qty: 90 CAPSULE | Refills: 1 | Status: SHIPPED | OUTPATIENT
Start: 2021-03-19 | End: 2021-06-24 | Stop reason: SDUPTHER

## 2021-03-19 NOTE — PROGRESS NOTES
Assessment/Plan:    Essential hypertension  BP well controlled, continue with lopressor  Repeat labs and continue to monitor  Coronary atherosclerosis  Denies cp, CROSS  Continue with atorvastatin, aspirin, metoprolol  Will repeat lipids prior to next visit  Continue with healthy diet, exercise  Class 1 obesity due to excess calories with serious comorbidity and body mass index (BMI) of 31 0 to 31 9 in adult  Weight decreasing with pt getting a lot of physical activity at work  Encouraged he continue to work on eating a healthy diet including fruits, vegetables regularly as well as whole grains  Insomnia  Well controlled on trazodone 100 mg  Continue with current tx  Hyperlipidemia  Stable, continue atorvastatin, repeat lipid panel  Anxiety  Stable on venlafaxine  Continue with current tx  Nerve pain  Related to low back pain  Symptoms stable, on current tx  Continue with gabapentin  Chronic pain of left knee  Severe pain in left knee, has been worsening over time  Pt describes bending knee like a grinding sensation  Uses tylenol with some relief and voltaren gel also with some relief  He avoids oral nsaids due to hx of xiomara  Referral to ortho to discuss other tx options  Postural dizziness  Spinning sensation with laying down or standing, resolves within seconds  No syncope, blurry vision, cp, sob  Sounds suggestive of bppv; referral to PT for evaluation and treatment  Diagnoses and all orders for this visit:    Essential hypertension  -     Comprehensive metabolic panel; Future  -     UA w Reflex to Microscopic w Reflex to Culture    Chronic pain of left knee  -     Ambulatory referral to Orthopedic Surgery; Future    Left shoulder pain, unspecified chronicity  -     Diclofenac Sodium (VOLTAREN) 1 %; Apply 2 g topically 4 (four) times a day    Chronic bilateral back pain, unspecified back location  -     cyclobenzaprine (FLEXERIL) 10 mg tablet;  Take 1 tablet (10 mg total) by mouth daily at bedtime TAKE 1/2 TO 1 TABLET AT BEDTIME AS NEEDED    Nerve pain  -     gabapentin (NEURONTIN) 300 mg capsule; Take 1 capsule (300 mg total) by mouth daily    Class 1 obesity due to excess calories with serious comorbidity and body mass index (BMI) of 31 0 to 31 9 in adult    Hyperlipidemia, unspecified hyperlipidemia type  -     CBC and differential; Future  -     Lipid panel; Future    Screening for diabetes mellitus  -     Comprehensive metabolic panel; Future  -     Hemoglobin A1C; Future    Screening for thyroid disorder  -     TSH, 3rd generation with Free T4 reflex; Future    Postural dizziness  -     Ambulatory referral to Physical Therapy; Future    Atherosclerosis of coronary artery of native heart without angina pectoris, unspecified vessel or lesion type    Insomnia, unspecified type    Anxiety          Subjective:      Patient ID: Elizabeth Funez is a 61 y o  male  Pt is a 62 y/o male here for routine f/u  PMH of HTN, CAD, HLD, BPH, anxiety, obesity  Pt has been compliant with medications  His job is extremely physically active and he has been losing weight as a consequence  He is sleeping on a split schedule currently, sleeping some hours in the morning after work and then 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  He has been experiencing joint pains in the elbows that he attributes to the thousands of boxes he has to move at work  He has been treating with voltaren gel  He has chronic L knee pain for over 2 years and pain has been continually worsening over time  He has trouble going up steps, says he feels like there is no cushion in the knee at all  He also reports some dizziness/spinning sensation when he lays down or gets up  Resolves spontaneously within seconds, not associated with syncope, blurred vision, nausea/vomiting    He says he is trying to stay hydrated, thinks he drinks about 32 oz water during his shift at least   Denies cp, palpitations, sob  The following portions of the patient's history were reviewed and updated as appropriate: allergies, current medications, past family history, past medical history, past social history, past surgical history and problem list     Review of Systems   Constitutional: Negative for activity change, appetite change, chills, fatigue, fever and unexpected weight change  HENT: Negative for hearing loss  Eyes: Positive for visual disturbance (needs eye exam updated)  Respiratory: Negative for cough, chest tightness and shortness of breath  Cardiovascular: Negative for chest pain, palpitations and leg swelling  Gastrointestinal: Negative for abdominal pain, constipation, diarrhea, nausea and vomiting  Genitourinary: Negative for difficulty urinating, dysuria and frequency  Musculoskeletal: Positive for arthralgias (L knee), back pain and myalgias (elbows)  Allergic/Immunologic: Negative for environmental allergies  Neurological: Positive for dizziness  Negative for weakness, numbness and headaches  Psychiatric/Behavioral: Positive for sleep disturbance (sleep schedule interrupted due to leisure activity schedule)  Negative for dysphoric mood  The patient is not nervous/anxious  Objective:      /84   Pulse 70   Temp 97 6 °F (36 4 °C) (Tympanic)   Ht 5' 10" (1 778 m)   Wt 98 9 kg (218 lb)   SpO2 97%   BMI 31 28 kg/m²          Physical Exam  Vitals signs reviewed  Constitutional:       General: He is awake  He is not in acute distress  Appearance: Normal appearance  He is well-developed and well-groomed  He is not ill-appearing or toxic-appearing  HENT:      Head: Normocephalic  Right Ear: Hearing and external ear normal       Left Ear: Hearing and external ear normal    Eyes:      General: Lids are normal       Conjunctiva/sclera: Conjunctivae normal       Pupils: Pupils are equal, round, and reactive to light     Neck:      Musculoskeletal: Normal range of motion  Thyroid: No thyromegaly  Vascular: Normal carotid pulses  No carotid bruit or JVD  Cardiovascular:      Rate and Rhythm: Normal rate and regular rhythm  Pulses: Normal pulses  Heart sounds: Normal heart sounds, S1 normal and S2 normal  No murmur  Pulmonary:      Effort: Pulmonary effort is normal       Breath sounds: Normal breath sounds  Abdominal:      General: Abdomen is flat  Bowel sounds are normal       Palpations: Abdomen is soft  Tenderness: There is no abdominal tenderness  Musculoskeletal: Normal range of motion  Left knee: He exhibits bony tenderness  He exhibits no swelling and normal alignment  Right lower leg: No edema  Left lower leg: No edema  Comments: Left knee pain with extension limits exam, strength is normal   Skin:     General: Skin is warm and dry  Comments: Baseline red complexion of face   Neurological:      Mental Status: He is alert and oriented to person, place, and time  Motor: Motor function is intact  Psychiatric:         Attention and Perception: Attention normal          Mood and Affect: Mood normal          Speech: Speech normal          Behavior: Behavior normal  Behavior is cooperative  Thought Content:  Thought content normal          Cognition and Memory: Cognition normal          Judgment: Judgment normal

## 2021-03-29 ENCOUNTER — APPOINTMENT (OUTPATIENT)
Dept: LAB | Facility: CLINIC | Age: 61
End: 2021-03-29
Payer: COMMERCIAL

## 2021-03-29 DIAGNOSIS — E78.5 HYPERLIPIDEMIA, UNSPECIFIED HYPERLIPIDEMIA TYPE: ICD-10-CM

## 2021-03-29 DIAGNOSIS — Z13.1 SCREENING FOR DIABETES MELLITUS: ICD-10-CM

## 2021-03-29 DIAGNOSIS — N17.9 AKI (ACUTE KIDNEY INJURY) (HCC): ICD-10-CM

## 2021-03-29 DIAGNOSIS — Z13.29 SCREENING FOR THYROID DISORDER: ICD-10-CM

## 2021-03-29 DIAGNOSIS — I10 ESSENTIAL HYPERTENSION: ICD-10-CM

## 2021-03-29 LAB
ALBUMIN SERPL BCP-MCNC: 4.2 G/DL (ref 3.5–5)
ALP SERPL-CCNC: 146 U/L (ref 46–116)
ALT SERPL W P-5'-P-CCNC: 45 U/L (ref 12–78)
ANION GAP SERPL CALCULATED.3IONS-SCNC: 2 MMOL/L (ref 4–13)
AST SERPL W P-5'-P-CCNC: 25 U/L (ref 5–45)
BACTERIA UR QL AUTO: NORMAL /HPF
BASOPHILS # BLD AUTO: 0.05 THOUSANDS/ΜL (ref 0–0.1)
BASOPHILS NFR BLD AUTO: 1 % (ref 0–1)
BILIRUB SERPL-MCNC: 0.63 MG/DL (ref 0.2–1)
BILIRUB UR QL STRIP: NEGATIVE
BUN SERPL-MCNC: 14 MG/DL (ref 5–25)
CALCIUM SERPL-MCNC: 9.7 MG/DL (ref 8.3–10.1)
CHLORIDE SERPL-SCNC: 107 MMOL/L (ref 100–108)
CHOLEST SERPL-MCNC: 175 MG/DL (ref 50–200)
CLARITY UR: CLEAR
CO2 SERPL-SCNC: 31 MMOL/L (ref 21–32)
COLOR UR: YELLOW
CREAT SERPL-MCNC: 1.04 MG/DL (ref 0.6–1.3)
EOSINOPHIL # BLD AUTO: 0.39 THOUSAND/ΜL (ref 0–0.61)
EOSINOPHIL NFR BLD AUTO: 6 % (ref 0–6)
ERYTHROCYTE [DISTWIDTH] IN BLOOD BY AUTOMATED COUNT: 11.9 % (ref 11.6–15.1)
EST. AVERAGE GLUCOSE BLD GHB EST-MCNC: 94 MG/DL
GFR SERPL CREATININE-BSD FRML MDRD: 78 ML/MIN/1.73SQ M
GLUCOSE P FAST SERPL-MCNC: 90 MG/DL (ref 65–99)
GLUCOSE UR STRIP-MCNC: NEGATIVE MG/DL
HBA1C MFR BLD: 4.9 %
HCT VFR BLD AUTO: 49.9 % (ref 36.5–49.3)
HDLC SERPL-MCNC: 78 MG/DL
HGB BLD-MCNC: 16.5 G/DL (ref 12–17)
HGB UR QL STRIP.AUTO: NEGATIVE
HYALINE CASTS #/AREA URNS LPF: NORMAL /LPF
IMM GRANULOCYTES # BLD AUTO: 0.02 THOUSAND/UL (ref 0–0.2)
IMM GRANULOCYTES NFR BLD AUTO: 0 % (ref 0–2)
KETONES UR STRIP-MCNC: NEGATIVE MG/DL
LDLC SERPL CALC-MCNC: 80 MG/DL (ref 0–100)
LEUKOCYTE ESTERASE UR QL STRIP: NEGATIVE
LYMPHOCYTES # BLD AUTO: 2.06 THOUSANDS/ΜL (ref 0.6–4.47)
LYMPHOCYTES NFR BLD AUTO: 30 % (ref 14–44)
MCH RBC QN AUTO: 31 PG (ref 26.8–34.3)
MCHC RBC AUTO-ENTMCNC: 33.1 G/DL (ref 31.4–37.4)
MCV RBC AUTO: 94 FL (ref 82–98)
MONOCYTES # BLD AUTO: 0.5 THOUSAND/ΜL (ref 0.17–1.22)
MONOCYTES NFR BLD AUTO: 7 % (ref 4–12)
NEUTROPHILS # BLD AUTO: 3.91 THOUSANDS/ΜL (ref 1.85–7.62)
NEUTS SEG NFR BLD AUTO: 56 % (ref 43–75)
NITRITE UR QL STRIP: NEGATIVE
NON-SQ EPI CELLS URNS QL MICRO: NORMAL /HPF
NONHDLC SERPL-MCNC: 97 MG/DL
NRBC BLD AUTO-RTO: 0 /100 WBCS
PH UR STRIP.AUTO: 6.5 [PH]
PLATELET # BLD AUTO: 207 THOUSANDS/UL (ref 149–390)
PMV BLD AUTO: 10.4 FL (ref 8.9–12.7)
POTASSIUM SERPL-SCNC: 4.6 MMOL/L (ref 3.5–5.3)
PROT SERPL-MCNC: 8.2 G/DL (ref 6.4–8.2)
PROT UR STRIP-MCNC: ABNORMAL MG/DL
RBC # BLD AUTO: 5.32 MILLION/UL (ref 3.88–5.62)
RBC #/AREA URNS AUTO: NORMAL /HPF
SODIUM SERPL-SCNC: 140 MMOL/L (ref 136–145)
SP GR UR STRIP.AUTO: 1.02 (ref 1–1.03)
TRIGL SERPL-MCNC: 87 MG/DL
TSH SERPL DL<=0.05 MIU/L-ACNC: 2.96 UIU/ML (ref 0.36–3.74)
UROBILINOGEN UR QL STRIP.AUTO: 0.2 E.U./DL
WBC # BLD AUTO: 6.93 THOUSAND/UL (ref 4.31–10.16)
WBC #/AREA URNS AUTO: NORMAL /HPF

## 2021-03-29 PROCEDURE — 36415 COLL VENOUS BLD VENIPUNCTURE: CPT

## 2021-03-29 PROCEDURE — 85025 COMPLETE CBC W/AUTO DIFF WBC: CPT

## 2021-03-29 PROCEDURE — 84443 ASSAY THYROID STIM HORMONE: CPT

## 2021-03-29 PROCEDURE — 80053 COMPREHEN METABOLIC PANEL: CPT

## 2021-03-29 PROCEDURE — 83036 HEMOGLOBIN GLYCOSYLATED A1C: CPT

## 2021-03-29 PROCEDURE — 81001 URINALYSIS AUTO W/SCOPE: CPT | Performed by: NURSE PRACTITIONER

## 2021-03-29 PROCEDURE — 80061 LIPID PANEL: CPT

## 2021-03-31 ENCOUNTER — EVALUATION (OUTPATIENT)
Dept: PHYSICAL THERAPY | Age: 61
End: 2021-03-31
Payer: COMMERCIAL

## 2021-03-31 VITALS — HEART RATE: 64 BPM | DIASTOLIC BLOOD PRESSURE: 96 MMHG | SYSTOLIC BLOOD PRESSURE: 152 MMHG

## 2021-03-31 DIAGNOSIS — R42 POSTURAL DIZZINESS: ICD-10-CM

## 2021-03-31 DIAGNOSIS — R42 VERTIGO: Primary | ICD-10-CM

## 2021-03-31 PROCEDURE — 97162 PT EVAL MOD COMPLEX 30 MIN: CPT

## 2021-03-31 PROCEDURE — 97110 THERAPEUTIC EXERCISES: CPT

## 2021-04-01 NOTE — PROGRESS NOTES
PT Evaluation     Today's date: 3/31/2021  Patient name: Natacha Rodriguez  : 1960  MRN: [id number]  Referring provider: NU Sheffield  Dx:   Encounter Diagnosis     ICD-10-CM    1  Vertigo  R42    2   Postural dizziness  R42 Ambulatory referral to Physical Therapy                  Assessment/Plan    Subjective    Objective           Precautions: see allergies, Precautions cardiac,       Manuals 3/31//21             I eval            Retest carolina hallpike             Monitor vitals                           Neuro Re-Ed/ there exer              Chin tucks  5 reps x 5 sec hold            Cervical isometrics  5 reps 5 sec hold             Ankle sway referencing flat and foam eo, ec             Squats              Ball pass activities             Head diagonals             Tandem gait  1 min x 1            Ther Ex             Tandem stance 30 se c x 3                                                                                                       Ther Activity                                       Gait Training                                       Modalities

## 2021-04-07 ENCOUNTER — OFFICE VISIT (OUTPATIENT)
Dept: PHYSICAL THERAPY | Age: 61
End: 2021-04-07
Payer: COMMERCIAL

## 2021-04-07 DIAGNOSIS — R42 POSTURAL DIZZINESS: Primary | ICD-10-CM

## 2021-04-07 DIAGNOSIS — R42 VERTIGO: ICD-10-CM

## 2021-04-07 PROCEDURE — 97750 PHYSICAL PERFORMANCE TEST: CPT

## 2021-04-07 PROCEDURE — 97140 MANUAL THERAPY 1/> REGIONS: CPT

## 2021-04-07 PROCEDURE — 97110 THERAPEUTIC EXERCISES: CPT

## 2021-04-07 NOTE — PROGRESS NOTES
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 for which he will be having surgery in the near future ( attempted surgery x 2 pt with allergic reaction surgery stopped both times)      Objective: See treatment diary below      Assessment: Tolerated treatment well  Patient would benefit from continued PT      Plan: Continue per plan of care        Precautions: see allergies, Precautions cardiac,       Manuals 3/31//21 4/7            I eval            Retest carolina hallpike  + right epley           Monitor vitals   perf                        Neuro Re-Ed/ there exer              Chin tucks  5 reps x 5 sec hold 5 x 5           Cervical isometrics  5 reps 5 sec hold  5 reps           Ankle sway referencing flat and foam eo, ec             Squats   3 x 10           Ball pass activities  10 reps each            Head diagonals             Tandem gait  1 min x 1 1 min x 1           Ther Ex             Tandem stance 30 se c x 3 30 sec x 3                                                                                                      Ther Activity                                       Gait Training                                       Modalities

## 2021-04-09 ENCOUNTER — APPOINTMENT (OUTPATIENT)
Dept: PHYSICAL THERAPY | Age: 61
End: 2021-04-09
Payer: COMMERCIAL

## 2021-04-13 ENCOUNTER — CONSULT (OUTPATIENT)
Dept: OBGYN CLINIC | Facility: HOSPITAL | Age: 61
End: 2021-04-13
Payer: COMMERCIAL

## 2021-04-13 ENCOUNTER — HOSPITAL ENCOUNTER (OUTPATIENT)
Dept: RADIOLOGY | Facility: HOSPITAL | Age: 61
Discharge: HOME | End: 2021-04-13
Attending: ORTHOPAEDIC SURGERY
Payer: COMMERCIAL

## 2021-04-13 VITALS
HEART RATE: 69 BPM | WEIGHT: 220 LBS | SYSTOLIC BLOOD PRESSURE: 167 MMHG | BODY MASS INDEX: 31.5 KG/M2 | HEIGHT: 70 IN | DIASTOLIC BLOOD PRESSURE: 100 MMHG

## 2021-04-13 DIAGNOSIS — M17.12 PRIMARY OSTEOARTHRITIS OF LEFT KNEE: Primary | ICD-10-CM

## 2021-04-13 DIAGNOSIS — M25.562 LEFT KNEE PAIN, UNSPECIFIED CHRONICITY: ICD-10-CM

## 2021-04-13 DIAGNOSIS — G89.29 CHRONIC PAIN OF LEFT KNEE: ICD-10-CM

## 2021-04-13 DIAGNOSIS — M25.562 CHRONIC PAIN OF LEFT KNEE: ICD-10-CM

## 2021-04-13 PROCEDURE — 99203 OFFICE O/P NEW LOW 30 MIN: CPT | Performed by: PHYSICIAN ASSISTANT

## 2021-04-13 PROCEDURE — 20610 DRAIN/INJ JOINT/BURSA W/O US: CPT | Performed by: PHYSICIAN ASSISTANT

## 2021-04-13 PROCEDURE — 73562 X-RAY EXAM OF KNEE 3: CPT

## 2021-04-13 RX ORDER — METHYLPREDNISOLONE ACETATE 40 MG/ML
2 INJECTION, SUSPENSION INTRA-ARTICULAR; INTRALESIONAL; INTRAMUSCULAR; SOFT TISSUE
Status: COMPLETED | OUTPATIENT
Start: 2021-04-13 | End: 2021-04-13

## 2021-04-13 RX ORDER — BUPIVACAINE HYDROCHLORIDE 2.5 MG/ML
2 INJECTION, SOLUTION INFILTRATION; PERINEURAL
Status: COMPLETED | OUTPATIENT
Start: 2021-04-13 | End: 2021-04-13

## 2021-04-13 RX ADMIN — METHYLPREDNISOLONE ACETATE 2 ML: 40 INJECTION, SUSPENSION INTRA-ARTICULAR; INTRALESIONAL; INTRAMUSCULAR; SOFT TISSUE at 08:57

## 2021-04-13 RX ADMIN — BUPIVACAINE HYDROCHLORIDE 2 ML: 2.5 INJECTION, SOLUTION INFILTRATION; PERINEURAL at 08:57

## 2021-04-13 NOTE — PROGRESS NOTES
Orthopedics          Maury Garza 61 y o  male MRN: [id number]      Chief Complaint:   left knee pain    HPI:   61 y o male complaining of left knee pain  Patient presents office today regarding left knee pain  Patient states he has been having left knee pain for several years  Patient states the pain is aching nature localizes left knee worse with going up and down stairs worse after standing for long periods time  Patient states he has been diagnosed with arthritis in the past has received injection 5 years ago significant pain relief  Patient also been using more recently Voltaren gel pain relief  Patient states he is only able to work utilizing a knee brace  Denies any radiation of pain  Denies any numbness and tingling                  Review Of Systems:   · Skin: Normal  · Neuro: See HPI  · Musculoskeletal: See HPI  · All other systems reviewed and are negative    Past Medical History:   Past Medical History:   Diagnosis Date    Abdominal lump 4/20/2018    Anxiety     Arthritis     Cardiac disease     Ear pain, left 6/8/2018    Episode of dizziness 11/26/2018    Fever 4/22/2019    Flu-like symptoms 12/21/2018    Herpes zoster with complication 4/7/5742    Hyperlipidemia     Hypertension     Kidney stone     Myocardial infarction [facility])     cardiac stent x2    Obesity     Overweight 1/5/2018    Pain of right great toe 7/22/2019    PONV (postoperative nausea and vomiting)     in the past       Past Surgical History:   Past Surgical History:   Procedure Laterality Date    ABDOMINAL SURGERY      tumor on stomach    BACK SURGERY      CARDIAC SURGERY      angio with stents    CIRCUMCISION      COLONOSCOPY      EGD AND COLONOSCOPY      GASTRIC BYPASS      IR ABSCESS/SEROMA DRAINAGE  1/4/2019    NECK SURGERY      herniated disc C4/5    OTHER SURGICAL HISTORY      removal of vocal cord lesion     HI EXC TUMOR SOFT TISSUE ABDOMINAL WALL SUBQ <3CM N/A 2/27/2019    Procedure: ABDOMINAL SEROMA EXCISION;  Surgeon: Anali Banks MD;  [facility];  Service: General    ROTATOR CUFF REPAIR Right     SHOULDER SURGERY Bilateral     TONSILLECTOMY      WISDOM TOOTH EXTRACTION      WOUND DEBRIDEMENT N/A 3/7/2019    Procedure: DEBRIDEMENT WOUND ABDOMINAL ([facility]) AND WOUND VAC APPLICATION;  Surgeon: Anali Banks MD;  [facility];  Service: General    WOUND DEBRIDEMENT N/A 3/9/2019    Procedure: DEBRIDEMENT WOUND ABDOMINAL ([facility]), wound vac dressing change;  Surgeon: Jessie Kuhn DO;  [facility];  Service: General       Family History:  Family history reviewed and non-contributory  Family History   Problem Relation Age of Onset    Heart disease Mother         passed during open heart surgery     Heart disease Father     Diabetes Father         caused blindness     Lung cancer Sister     No Known Problems Brother     No Known Problems Son     No Known Problems Son     No Known Problems Son     No Known Problems Daughter          Social History:  Social History     Socioeconomic History    Marital status: /Civil Union     Spouse name: Peng Theodore Number of children: 3    Years of education: None    Highest education level: None   Occupational History    None   Social Needs    Financial resource strain: None    Food insecurity     Worry: None     Inability: None    Transportation needs     Medical: None     Non-medical: None   Tobacco Use    Smoking status: Light Tobacco Smoker     Types: Cigars    Smokeless tobacco: Never Used    Tobacco comment: advised not to smoke prior to procedure   Substance and Sexual Activity    Alcohol use: Yes     Binge frequency: Less than monthly     Comment: occasional last drink 2/2020    Drug use: No    Sexual activity: Not Currently   Lifestyle    Physical activity     Days per week: 0 days     Minutes per session: 0 min    Stress: None   Relationships    Social connections     Talks on phone: None     Gets together: None     Attends Faith service: None     Active member of club or organization: None     Attends meetings of clubs or organizations: None     Relationship status: None    Intimate partner violence     Fear of current or ex partner: None     Emotionally abused: None     Physically abused: None     Forced sexual activity: None   Other Topics Concern    None   Social History Narrative    Patient lives in a home that was built in [address] [place] [address] junior in the bedroom     Radha air    Window air conditioning bedroom     Finished basement-dry-no mold or musty smell    Dehumidifier     Air  attached to [employer] in the winter months     Home is smoke free     Does not live near wooded are or open fields         Dog x5 ([place], and [place]) and there allowed in the bedroom         Caffeine: soda occasional                     Coffee is decaf 1 cup daily     Chocolate consumed rarely         Patient lives with wife and two children        Allergies: Allergies   Allergen Reactions    Adhesive [Medical Tape] Swelling     Tape on eye caused eye to swell could not open! Itching large hives from on arms     CAN USE PAPER TAPE    Cephalosporins Anaphylaxis    Nuts - Food Allergy Anaphylaxis    Other Abdominal Pain     Soy  Patient can not have an NGT due to bariatric surgery    Peanut Oil - Food Allergy Anaphylaxis    Soy Isoflavones      anaphylazis    Penicillins Hives     Itching and hives    Shellfish Allergy - Food Allergy      Betadine/ cat scan dye is Okay  Cannot eat shrimp, crab shellfish avoids all fish         Labs:  0   Lab Value Date/Time    HCT 49 9 (H) 03/29/2021 0710    HCT 39 8 12/10/2020 0452    HCT 41 4 12/09/2020 1058    HGB 16 5 03/29/2021 0710    HGB 13 0 12/10/2020 0452    HGB 13 3 12/09/2020 1058    INR 1 18 11/30/2020 1130    WBC 6 93 03/29/2021 0710    WBC 6 74 12/10/2020 0452    WBC 9 46 12/09/2020 1058    CRP <3 0 11/30/2019 1041       Meds:    Current Outpatient Medications:     aspirin (ECOTRIN LOW STRENGTH) 81 mg EC tablet, Take 162 mg by mouth daily , Disp: , Rfl:     atorvastatin (LIPITOR) 40 mg tablet, Take 1 tablet (40 mg total) by mouth daily, Disp: 90 tablet, Rfl: 1    cyclobenzaprine (FLEXERIL) 10 mg tablet, Take 1 tablet (10 mg total) by mouth daily at bedtime TAKE 1/2 TO 1 TABLET AT BEDTIME AS NEEDED, Disp: 90 tablet, Rfl: 0    Diclofenac Sodium (VOLTAREN) 1 %, Apply 2 g topically 4 (four) times a day, Disp: 350 g, Rfl: 1    gabapentin (NEURONTIN) 300 mg capsule, Take 1 capsule (300 mg total) by mouth daily, Disp: 90 capsule, Rfl: 1    metoprolol tartrate (LOPRESSOR) 25 mg tablet, Take 1 tablet (25 mg total) by mouth daily, Disp: 90 tablet, Rfl: 3    nitroglycerin (NITROSTAT) 0 4 mg SL tablet, Place 1 tablet (0 4 mg total) under the tongue every 5 (five) minutes as needed for chest pain, Disp: 30 tablet, Rfl: 0    pantoprazole (PROTONIX) 40 mg tablet, Take 1 tablet (40 mg total) by mouth daily, Disp: 90 tablet, Rfl: 1    traZODone (DESYREL) 100 mg tablet, Take 1 tablet (100 mg total) by mouth daily at bedtime, Disp: 90 tablet, Rfl: 0    venlafaxine (EFFEXOR) 37 5 mg tablet, Take 1 tablet (37 5 mg total) by mouth daily, Disp: 90 tablet, Rfl: 1    B COMPLEX VITAMINS ER PO, Take by mouth, Disp: , Rfl:     calcium carbonate (TUMS) 500 mg chewable tablet, Chew 2 tablets (1,000 mg total) daily as needed for indigestion or heartburn (Patient not taking: Reported on 4/13/2021), Disp:  , Rfl: 0    EPINEPHrine (EPIPEN) 0 3 mg/0 3 mL SOAJ, Inject 0 3 mL (0 3 mg total) into a muscle once for 1 dose, Disp: 1 each, Rfl: 3    tamsulosin (FLOMAX) 0 4 mg, Take 1 capsule (0 4 mg total) by mouth daily with dinner, Disp: 90 capsule, Rfl: 1      Physical Exam:     General Appearance:    Alert, cooperative, no distress, appears stated age   Head:    Normocephalic, without obvious abnormality, atraumatic Eyes:    conjunctiva/corneas clear, both eyes        Nose:   Nares normal, septum midline, no drainage    Throat:   Lips normal; teeth and gums normal   Neck:    symmetrical, trachea midline, ;     thyroid:  no enlargement/   Back:     Symmetric, no curvature, ROM normal   Lungs:   No audible wheezing or labored breathing   Chest Wall:    No tenderness or deformity    Heart:    Regular rate and rhythm               Pulses:   2+ and symmetric all extremities   Skin:   Skin color, texture, turgor normal, no rashes or lesions   Neurologic:   normal strength, sensation and reflexes     throughout       Musculoskeletal: bilateral lower extremity    On examination of the right knee there is no effusion no erythema no laceration no abrasion no ecchymosis  Range of motion full active extension flexion greater than 120°  There is no pain on palpation medial and lateral joint lines, pes anserine bursa region, distal iliotibial band  No pain or laxity with varus or valgus stress  Quadriceps, hamstring strength 5/5  Sensation intact, distal pulses present  On examination of the left knee there is no effusion, no erythema  Range of motion to full active extension and flexion to greater than 105°  Pain on palpation medial and lateral joint lines  There is crepitus with range of motion, no warmth to palpation, bony enlargement noted  No pain on palpation pes anserine bursa region or distal iliotibial band  Stable to varus and valgus stress without pain or gapping  Negative anterior and posterior drawer testing  Sensation intact distal pulses present  Radiology:   I personally reviewed the films  X-rays left knee shows   Significant medial joint space narrowing subchondral sclerosis osteophyte formation revealing severe osteoarthritis of the left knee with deformity      Large joint arthrocentesis: L knee  Universal Protocol:  Consent given by: patient  Patient understanding: patient states understanding of the procedure being performed  Site marked: the operative site was marked  Patient identity confirmed: verbally with patient    Supporting Documentation  Indications: pain   Procedure Details  Location: knee - L knee  Needle size: 22 G  Approach: anterolateral  Medications administered: 2 mL bupivacaine 0 25 %; 2 mL methylPREDNISolone acetate 40 mg/mL    Patient tolerance: patient tolerated the procedure well with no immediate complications          _*_*_*_*_*_*_*_*_*_*_*_*_*_*_*_*_*_*_*_*_*_*_*_*_*_*_*_*_*_*_*_*_*_*_*_*_*_*_*_*_*    Assessment:  61 y o male with left knee   Chronic pain due to osteoarthritis    Plan:   · Weight bearing as tolerated  left lower extremity  ·  patient interviewed and examined by Dr Lara Schulz and myself  ·  through patient regarding left knee pain due to osteoarthritis  · Patient given home VMO strengthening and stretching exercises   · Left knee intra-articular steroid injection given as noted above   · Patient advised should they develop any increasing pain, redness, drainage, numbness, tingling or swelling surrounding the injection sight, they are to contact our office or present to the emergency department  ·  treatment options discussed with patient great length  · Reviewed with patient may be a candidate for knee arthroplasty in the near future should he fail conservative management  · Follow up in 3 months or sooner if needed      Rebecca Pagan PA-C     patient seen and examined  I do agree with assessment and plan  44-year-old male with bilateral knee DJD but presenting at this time secondary to his left knee pathology  on physical exam, patient with patellofemoral syndrome as well as mostly medial-sided osteoarthritic symptoms although radiographs do show tricompartmental osteoarthritic changes  Plan is as   Stated above  Will follow up with patient in 3 months  Discussed plan with patient and he is in agreement treatment plan    Thank you

## 2021-04-14 ENCOUNTER — APPOINTMENT (OUTPATIENT)
Dept: PHYSICAL THERAPY | Age: 61
End: 2021-04-14
Payer: COMMERCIAL

## 2021-04-23 DIAGNOSIS — G47.00 INSOMNIA, UNSPECIFIED TYPE: ICD-10-CM

## 2021-04-23 RX ORDER — TRAZODONE HYDROCHLORIDE 100 MG/1
100 TABLET ORAL
Qty: 90 TABLET | Refills: 0 | Status: SHIPPED | OUTPATIENT
Start: 2021-04-23 | End: 2021-06-24 | Stop reason: SDUPTHER

## 2021-04-29 ENCOUNTER — APPOINTMENT (OUTPATIENT)
Dept: RADIOLOGY | Age: 61
End: 2021-04-29
Payer: OTHER MISCELLANEOUS

## 2021-04-29 ENCOUNTER — TRANSCRIBE ORDERS (OUTPATIENT)
Dept: URGENT CARE | Age: 61
End: 2021-04-29

## 2021-04-29 ENCOUNTER — APPOINTMENT (OUTPATIENT)
Dept: URGENT CARE | Age: 61
End: 2021-04-29
Payer: OTHER MISCELLANEOUS

## 2021-04-29 ENCOUNTER — TRANSCRIBE ORDERS (OUTPATIENT)
Dept: ADMINISTRATIVE | Facility: HOSPITAL | Age: 61
End: 2021-04-29

## 2021-04-29 DIAGNOSIS — S46.212A STRAIN OF MUSCLE, FASCIA AND TENDON OF OTHER PARTS OF BICEPS, LEFT ARM, INITIAL ENCOUNTER: Primary | ICD-10-CM

## 2021-04-29 DIAGNOSIS — S46.212A BICEPS TENDON RUPTURE, LEFT, INITIAL ENCOUNTER: ICD-10-CM

## 2021-04-29 DIAGNOSIS — S46.292A OTHER INJURY OF MUSCLE, FASCIA AND TENDON OF OTHER PARTS OF BICEPS, LEFT ARM, INITIAL ENCOUNTER: ICD-10-CM

## 2021-04-29 DIAGNOSIS — S49.92XA INJURY OF LEFT UPPER EXTREMITY, INITIAL ENCOUNTER: Primary | ICD-10-CM

## 2021-04-29 DIAGNOSIS — S49.92XA INJURY OF LEFT UPPER EXTREMITY, INITIAL ENCOUNTER: ICD-10-CM

## 2021-04-29 PROCEDURE — 99284 EMERGENCY DEPT VISIT MOD MDM: CPT | Performed by: PREVENTIVE MEDICINE

## 2021-04-29 PROCEDURE — 73080 X-RAY EXAM OF ELBOW: CPT

## 2021-04-29 PROCEDURE — G0383 LEV 4 HOSP TYPE B ED VISIT: HCPCS | Performed by: PREVENTIVE MEDICINE

## 2021-04-29 PROCEDURE — 73060 X-RAY EXAM OF HUMERUS: CPT

## 2021-05-04 ENCOUNTER — HOSPITAL ENCOUNTER (OUTPATIENT)
Dept: RADIOLOGY | Facility: HOSPITAL | Age: 61
Discharge: HOME | End: 2021-05-04
Attending: PREVENTIVE MEDICINE

## 2021-05-04 ENCOUNTER — TELEPHONE (OUTPATIENT)
Dept: OBGYN CLINIC | Facility: HOSPITAL | Age: 61
End: 2021-05-04

## 2021-05-04 ENCOUNTER — TELEPHONE (OUTPATIENT)
Dept: CARDIOLOGY CLINIC | Facility: CLINIC | Age: 61
End: 2021-05-04

## 2021-05-04 DIAGNOSIS — I10 ESSENTIAL (PRIMARY) HYPERTENSION: Primary | ICD-10-CM

## 2021-05-04 DIAGNOSIS — S46.292A OTHER INJURY OF MUSCLE, FASCIA AND TENDON OF OTHER PARTS OF BICEPS, LEFT ARM, INITIAL ENCOUNTER: ICD-10-CM

## 2021-05-04 DIAGNOSIS — S46.212A STRAIN OF MUSCLE, FASCIA AND TENDON OF OTHER PARTS OF BICEPS, LEFT ARM, INITIAL ENCOUNTER: ICD-10-CM

## 2021-05-04 RX ORDER — LISINOPRIL 10 MG/1
10 TABLET ORAL DAILY
Qty: 30 TABLET | Refills: 11 | Status: SHIPPED | OUTPATIENT
Start: 2021-05-04 | End: 2021-08-12 | Stop reason: SDUPTHER

## 2021-05-04 NOTE — TELEPHONE ENCOUNTER
Let's start lisinopril hydrochlorothiazide 10/12 5 mg per day  Check a BMP in a week or two  He should call with follow-up blood pressures in 2-3 weeks  Thank you

## 2021-05-04 NOTE — TELEPHONE ENCOUNTER
Shelly called to schedule patient for his elbow but stated that the patient was having an mri today  I let her know that we should wait until the mri is resulted so we know better which doctor the patient should see

## 2021-05-04 NOTE — TELEPHONE ENCOUNTER
Pt had a telemedicine visit in Feb   Due for follow up in Aug     Pt called today, reports that for the last few months, his BP has been elevated  Typically 160s, 170s / 90s,low 100s  Only med is Metoprolol tartrate 25 mg BID  Med list states lopressor once daily, but Wong said he increased it to BID on his own with no improvement  He asked about starting Lisinopril  Please advise

## 2021-05-04 NOTE — TELEPHONE ENCOUNTER
He did mention he was leery of going back on Lisinopril hctz as he was on it before and creatinine was elevated / dehydrated  I know you gave the BMP order to monitor, but he asked about plain Lisinopril without the HCTZ  Please advise

## 2021-05-04 NOTE — TELEPHONE ENCOUNTER
If he prefers let's try lisinopril 10 mg per day  Would still check a BMP in about a week or two  Thank you

## 2021-05-06 ENCOUNTER — APPOINTMENT (OUTPATIENT)
Dept: URGENT CARE | Age: 61
End: 2021-05-06
Payer: OTHER MISCELLANEOUS

## 2021-05-06 PROCEDURE — 99213 OFFICE O/P EST LOW 20 MIN: CPT | Performed by: PHYSICIAN ASSISTANT

## 2021-06-16 RX ORDER — ACETAMINOPHEN 500 MG
1000 TABLET ORAL EVERY 6 HOURS PRN
COMMUNITY

## 2021-06-16 NOTE — PRE-PROCEDURE INSTRUCTIONS
Pre-Surgery Instructions:   Medication Instructions    acetaminophen (TYLENOL) 500 mg tablet Instructed patient per Anesthesia Guidelines   aspirin (ECOTRIN LOW STRENGTH) 81 mg EC tablet Instructed patient per Anesthesia Guidelines   atorvastatin (LIPITOR) 40 mg tablet Instructed patient per Anesthesia Guidelines   cyclobenzaprine (FLEXERIL) 10 mg tablet Instructed patient per Anesthesia Guidelines   Diclofenac Sodium (VOLTAREN) 1 % Instructed patient per Anesthesia Guidelines   EPINEPHrine (EPIPEN) 0 3 mg/0 3 mL SOAJ Instructed patient per Anesthesia Guidelines   gabapentin (NEURONTIN) 300 mg capsule Instructed patient per Anesthesia Guidelines   lisinopril (ZESTRIL) 10 mg tablet Instructed patient per Anesthesia Guidelines   metoprolol tartrate (LOPRESSOR) 25 mg tablet Pt to take the am of the MRI    nitroglycerin (NITROSTAT) 0 4 mg SL tablet Pt may use if needed    pantoprazole (PROTONIX) 40 mg tablet Instructed patient per Anesthesia Guidelines   tamsulosin (FLOMAX) 0 4 mg Instructed patient per Anesthesia Guidelines   traZODone (DESYREL) 100 mg tablet Instructed patient per Anesthesia Guidelines   venlafaxine (EFFEXOR) 37 5 mg tablet Instructed patient per Anesthesia Guidelines  Pre MRI instructions given  Pt to have the covid screening on 6/217/21 at the [facility] unknown at time of interview

## 2021-06-17 PROCEDURE — U0003 INFECTIOUS AGENT DETECTION BY NUCLEIC ACID (DNA OR RNA); SEVERE ACUTE RESPIRATORY SYNDROME CORONAVIRUS 2 (SARS-COV-2) (CORONAVIRUS DISEASE [COVID-19]), AMPLIFIED PROBE TECHNIQUE, MAKING USE OF HIGH THROUGHPUT TECHNOLOGIES AS DESCRIBED BY CMS-2020-01-R: HCPCS | Performed by: PHYSICIAN ASSISTANT

## 2021-06-17 PROCEDURE — U0005 INFEC AGEN DETEC AMPLI PROBE: HCPCS | Performed by: PHYSICIAN ASSISTANT

## 2021-06-22 ENCOUNTER — ANESTHESIA EVENT (OUTPATIENT)
Dept: RADIOLOGY | Facility: HOSPITAL | Age: 61
End: 2021-06-22

## 2021-06-22 PROBLEM — I21.9 MI (MYOCARDIAL INFARCTION) (HCC): Status: ACTIVE | Noted: 2021-06-22

## 2021-06-22 PROBLEM — R11.2 PONV (POSTOPERATIVE NAUSEA AND VOMITING): Status: ACTIVE | Noted: 2021-06-22

## 2021-06-22 PROBLEM — Z98.890 PONV (POSTOPERATIVE NAUSEA AND VOMITING): Status: ACTIVE | Noted: 2021-06-22

## 2021-06-22 PROBLEM — F17.200 SMOKING: Status: ACTIVE | Noted: 2020-12-09

## 2021-06-22 NOTE — ANESTHESIA PREPROCEDURE EVALUATION
Procedure:  MRI HUMERUS LEFT WO CONTRAST    Relevant Problems   ANESTHESIA   (+) PONV (postoperative nausea and vomiting)      CARDIO   (+) Coronary atherosclerosis   (+) Essential hypertension   (+) History of PTCA   (+) Hyperlipidemia   (+) MI (myocardial infarction) (HCC)      GI/HEPATIC   (+) Gastroesophageal reflux disease   (+) S/P bariatric surgery      /RENAL   (+) ARF (acute renal failure) (HCC)   (+) Acute kidney injury   (+) Benign prostatic hyperplasia      HEMATOLOGY   (+) Thrombocytopenia (HCC)      MUSCULOSKELETAL   (+) Acute right-sided low back pain without sciatica      NEURO/PSYCH   (+) Anxiety   (+) PEYTON (generalized anxiety disorder)   (+) History of MI (myocardial infarction)   (+) Paresthesia of both hands   (+) Recent bacteremia      PULMONARY   (+) Smoking (Resolved)        Physical Exam    Airway    Mallampati score: II  TM Distance: >3 FB  Neck ROM: full     Dental       Cardiovascular  Rhythm: regular, Rate: normal,     Pulmonary  Breath sounds clear to auscultation,     Other Findings        Anesthesia Plan  ASA Score- 3     Anesthesia Type- IV sedation with anesthesia with ASA Monitors  Additional Monitors:   Airway Plan:           Plan Factors-    Chart reviewed  Patient is a current smoker  Induction- intravenous  Postoperative Plan-     Informed Consent- Anesthetic plan and risks discussed with patient  I personally reviewed this patient with the CRNA  Discussed and agreed on the Anesthesia Plan with the CRNA  Gerhardt Sep

## 2021-06-23 ENCOUNTER — ANESTHESIA (OUTPATIENT)
Dept: RADIOLOGY | Facility: HOSPITAL | Age: 61
End: 2021-06-23

## 2021-06-23 ENCOUNTER — HOSPITAL ENCOUNTER (OUTPATIENT)
Dept: RADIOLOGY | Facility: HOSPITAL | Age: 61
Discharge: HOME | End: 2021-06-23
Attending: PREVENTIVE MEDICINE
Payer: OTHER MISCELLANEOUS

## 2021-06-23 VITALS
DIASTOLIC BLOOD PRESSURE: 85 MMHG | RESPIRATION RATE: 16 BRPM | HEIGHT: 70 IN | BODY MASS INDEX: 31.5 KG/M2 | TEMPERATURE: 98.9 F | OXYGEN SATURATION: 95 % | WEIGHT: 220 LBS | SYSTOLIC BLOOD PRESSURE: 165 MMHG | HEART RATE: 55 BPM

## 2021-06-23 DIAGNOSIS — Z20.822 COVID-19 RULED OUT BY LABORATORY TESTING: Primary | ICD-10-CM

## 2021-06-23 DIAGNOSIS — S46.292A OTHER INJURY OF MUSCLE, FASCIA AND TENDON OF OTHER PARTS OF BICEPS, LEFT ARM, INITIAL ENCOUNTER: ICD-10-CM

## 2021-06-23 DIAGNOSIS — S46.212A STRAIN OF MUSCLE, FASCIA AND TENDON OF OTHER PARTS OF BICEPS, LEFT ARM, INITIAL ENCOUNTER: ICD-10-CM

## 2021-06-23 DIAGNOSIS — S46.212A BICEPS TENDON RUPTURE, LEFT, INITIAL ENCOUNTER: ICD-10-CM

## 2021-06-23 PROBLEM — F17.200 SMOKING: Status: RESOLVED | Noted: 2020-12-09 | Resolved: 2021-06-23

## 2021-06-23 PROCEDURE — G1004 CDSM NDSC: HCPCS

## 2021-06-23 PROCEDURE — 73221 MRI JOINT UPR EXTREM W/O DYE: CPT

## 2021-06-23 RX ORDER — PROPOFOL 10 MG/ML
INJECTION, EMULSION INTRAVENOUS CONTINUOUS PRN
Status: DISCONTINUED | OUTPATIENT
Start: 2021-06-23 | End: 2021-06-23

## 2021-06-23 RX ORDER — LIDOCAINE HYDROCHLORIDE 10 MG/ML
INJECTION, SOLUTION EPIDURAL; INFILTRATION; INTRACAUDAL; PERINEURAL AS NEEDED
Status: DISCONTINUED | OUTPATIENT
Start: 2021-06-23 | End: 2021-06-23

## 2021-06-23 RX ORDER — MIDAZOLAM HYDROCHLORIDE 2 MG/2ML
INJECTION, SOLUTION INTRAMUSCULAR; INTRAVENOUS AS NEEDED
Status: DISCONTINUED | OUTPATIENT
Start: 2021-06-23 | End: 2021-06-23

## 2021-06-23 RX ORDER — PROPOFOL 10 MG/ML
INJECTION, EMULSION INTRAVENOUS AS NEEDED
Status: DISCONTINUED | OUTPATIENT
Start: 2021-06-23 | End: 2021-06-23

## 2021-06-23 RX ORDER — SODIUM CHLORIDE, SODIUM LACTATE, POTASSIUM CHLORIDE, CALCIUM CHLORIDE 600; 310; 30; 20 MG/100ML; MG/100ML; MG/100ML; MG/100ML
75 INJECTION, SOLUTION INTRAVENOUS CONTINUOUS
Status: DISCONTINUED | OUTPATIENT
Start: 2021-06-23 | End: 2021-06-24 | Stop reason: HOSPADM

## 2021-06-23 RX ADMIN — MIDAZOLAM 2 MG: 1 INJECTION INTRAMUSCULAR; INTRAVENOUS at 13:50

## 2021-06-23 RX ADMIN — PROPOFOL 80 MCG/KG/MIN: 10 INJECTION, EMULSION INTRAVENOUS at 13:52

## 2021-06-23 RX ADMIN — SODIUM CHLORIDE, SODIUM LACTATE, POTASSIUM CHLORIDE, AND CALCIUM CHLORIDE 75 ML/HR: .6; .31; .03; .02 INJECTION, SOLUTION INTRAVENOUS at 13:24

## 2021-06-23 RX ADMIN — LIDOCAINE HYDROCHLORIDE 50 MG: 10 INJECTION, SOLUTION EPIDURAL; INFILTRATION; INTRACAUDAL; PERINEURAL at 13:52

## 2021-06-23 RX ADMIN — PROPOFOL 50 MG: 10 INJECTION, EMULSION INTRAVENOUS at 13:52

## 2021-06-23 NOTE — PERIOPERATIVE NURSING NOTE
Discharge criteria has been met, at this point patient is free to go we are awaiting his ride home, which should be coming around 1830

## 2021-06-23 NOTE — PERIOPERATIVE NURSING NOTE
Patient received from MRI in 0/10 pain VSS  Will continue to monitor til discharge criteria is met  Post-Op processes and procedures were explained and all related patient and family questions were answered  Discharge instructions were given and all related patient and family questions were answered

## 2021-06-24 DIAGNOSIS — G47.00 INSOMNIA, UNSPECIFIED TYPE: ICD-10-CM

## 2021-06-24 DIAGNOSIS — G89.29 CHRONIC BILATERAL BACK PAIN, UNSPECIFIED BACK LOCATION: ICD-10-CM

## 2021-06-24 DIAGNOSIS — M79.2 NERVE PAIN: ICD-10-CM

## 2021-06-24 DIAGNOSIS — M54.9 CHRONIC BILATERAL BACK PAIN, UNSPECIFIED BACK LOCATION: ICD-10-CM

## 2021-06-24 RX ORDER — TRAZODONE HYDROCHLORIDE 100 MG/1
100 TABLET ORAL
Qty: 90 TABLET | Refills: 0 | Status: SHIPPED | OUTPATIENT
Start: 2021-06-24 | End: 2021-09-21 | Stop reason: SDUPTHER

## 2021-06-24 RX ORDER — GABAPENTIN 300 MG/1
300 CAPSULE ORAL DAILY
Qty: 90 CAPSULE | Refills: 0 | Status: SHIPPED | OUTPATIENT
Start: 2021-06-24 | End: 2021-09-21 | Stop reason: SDUPTHER

## 2021-06-24 RX ORDER — CYCLOBENZAPRINE HCL 10 MG
10 TABLET ORAL
Qty: 90 TABLET | Refills: 0 | Status: SHIPPED | OUTPATIENT
Start: 2021-06-24 | End: 2021-09-21 | Stop reason: SDUPTHER

## 2021-06-28 DIAGNOSIS — G47.00 INSOMNIA, UNSPECIFIED TYPE: ICD-10-CM

## 2021-06-28 RX ORDER — TRAZODONE HYDROCHLORIDE 100 MG/1
100 TABLET ORAL
Qty: 90 TABLET | Refills: 0 | OUTPATIENT
Start: 2021-06-28

## 2021-06-29 ENCOUNTER — APPOINTMENT (OUTPATIENT)
Dept: URGENT CARE | Age: 61
End: 2021-06-29
Payer: OTHER MISCELLANEOUS

## 2021-06-29 PROCEDURE — 99213 OFFICE O/P EST LOW 20 MIN: CPT | Performed by: PREVENTIVE MEDICINE

## 2021-07-08 ENCOUNTER — OFFICE VISIT (OUTPATIENT)
Dept: OBGYN CLINIC | Facility: HOSPITAL | Age: 61
End: 2021-07-08
Payer: OTHER MISCELLANEOUS

## 2021-07-08 VITALS
SYSTOLIC BLOOD PRESSURE: 147 MMHG | HEART RATE: 65 BPM | DIASTOLIC BLOOD PRESSURE: 83 MMHG | WEIGHT: 223.2 LBS | HEIGHT: 69 IN | BODY MASS INDEX: 33.06 KG/M2

## 2021-07-08 DIAGNOSIS — S46.212A RUPTURE OF LEFT PROXIMAL BICEPS TENDON, INITIAL ENCOUNTER: Primary | ICD-10-CM

## 2021-07-08 PROCEDURE — 99213 OFFICE O/P EST LOW 20 MIN: CPT | Performed by: PHYSICIAN ASSISTANT

## 2021-07-08 NOTE — PROGRESS NOTES
Assessment/Plan   Diagnoses and all orders for this visit:    Rupture of left proximal biceps tendon, initial encounter    - Asymptomatic now  - Good strength  - Does not want to consider surgery  - Follow up prn    Partial tear elbow flexor mass  - Asymptomatic now  - Does not want to consider surgery  - Follow up prn      Subjective   Patient ID: Isidro Ascencio  is a 64 y o  male  Vitals:    07/08/21 1011   BP: 147/83   Pulse: 72     60yo male comes in for an evaluation of his left elbow  He was injured at work in April when he was moving a pallet and felt a pop in his anterior shoulder  Following this, he noticed the bicep muscle bulging down by the elbow  He had an elbow MRI done that showed a partial flexor tendon tear, but he never had elbow symptoms - it was shoulder pain  Since then, his pain has resolved, and he wants to return to work  He is here today for a return to work release  He has a history of multiple rotator cuff surgeries on each shoulder, first by Dr Chivo Mcginnis and then later by Evelio Baird  He denies weakness in flexion, rotation, or supination  He denies elbow pain  He is adamant that he does not want more surgery  The following portions of the patient's history were reviewed and updated as appropriate: left      Review of Systems  Ortho Exam  Past Medical History:   Diagnosis Date    Abdominal lump 4/20/2018    Anxiety     Arm pain     left-may have torn the bicep tendon    Arthritis     Cardiac disease     Contact lens/glasses fitting     Coronary artery disease     COVID-19 vaccine series completed     Moderna x2    Episode of dizziness 11/26/2018    Herpes zoster with complication 2/2/6785    Hyperlipidemia     Hypertension     Kidney stone     Myocardial infarction [facility]) 2010    cardiac stent x2    Obesity     Pain of right great toe 7/22/2019    PONV (postoperative nausea and vomiting)     in the past-1980's    Sepsis ([facility] [address] ) 12/2020    urinary issue-back up     Past Surgical History:   Procedure Laterality Date    ABDOMINAL SURGERY      tumor on stomach    ANGIOPLASTY      2010-x2 stents left side    BACK SURGERY      lower back-dissectomy    CARDIAC SURGERY      angio with stents    CIRCUMCISION      COLONOSCOPY      CYSTOSCOPY      EGD AND COLONOSCOPY      GASTRIC BYPASS  2005    IR ABSCESS/SEROMA DRAINAGE  1/4/2019    LITHOTRIPSY      x2    NECK SURGERY      herniated disc C4/5    OTHER SURGICAL HISTORY      removal of vocal cord lesion     [place] EXC TUMOR SOFT TISSUE ABDOMINAL WALL SUBQ <3CM N/A 2/27/2019    Procedure: ABDOMINAL SEROMA EXCISION;  Surgeon: Ayaka Martinez MD;  [facility];  Service: General    ROTATOR CUFF REPAIR Bilateral     SHOULDER SURGERY Bilateral     TONSILLECTOMY      VASCULAR SURGERY      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    WISDOM TOOTH EXTRACTION      WOUND DEBRIDEMENT N/A 3/7/2019    Procedure: DEBRIDEMENT WOUND ABDOMINAL ([address]) AND WOUND VAC APPLICATION;  Surgeon: Ayaka Martinez MD;  [facility];  Service: General    WOUND DEBRIDEMENT N/A 3/9/2019    Procedure: DEBRIDEMENT WOUND ABDOMINAL ([address]), wound vac dressing change;  Surgeon: Kaiden Rodrigues DO;  [facility];  Service: General     Family History   Problem Relation Age of Onset    Heart disease Mother         passed during open heart surgery     Heart disease Father     Diabetes Father         caused blindness     Lung cancer Sister     Cancer Sister     Heart disease Brother         MI    No Known Problems Son     No Known Problems Son     No Known Problems Son     No Known Problems Daughter      Social History     Occupational History    Not on file   Tobacco Use    Smoking status: Light Tobacco Smoker     Types: Cigars    Smokeless tobacco: Never Used    Tobacco comment: advised not to smoke prior to procedure   Vaping Use    Vaping Use: Never used   Substance and Sexual Activity    Alcohol use: Not Currently     Comment: occasional last drink 2/2020    Drug use: No    Sexual activity: Not Currently       Review of Systems   Constitutional: Negative  HENT: Negative  Eyes: Negative  Respiratory: Negative  Cardiovascular: Negative  Gastrointestinal: Negative  Endocrine: Negative  Genitourinary: Negative  Musculoskeletal: As below      Allergic/Immunologic: Negative  Neurological: Negative  Hematological: Negative  Psychiatric/Behavioral: Negative  Objective   Physical Exam      I have personally reviewed pertinent films in PACS and my interpretation is No fracture  MRI with partial flexor tendon tear       · Constitutional: Awake, Alert, Oriented  · Eyes: EOMI  · Psych: Mood and affect appropriate  · Heart: regular rate and rhythm  · Lungs: No audible wheezing  · Abdomen: soft  · Lymph: no lymphedema       left Shoulder:  - Appearance   No rash, erythema, or ecchymosis  + claudia sign  - Palpation   No tenderness to palpation of the clavicle, AC joint, biceps tendon, scapula, or proximal humerus    The balled bicep head is palpable in the anterior inferior upper arm  - ROM   Full and pain-free AROM   - Motor   5/5 motor in all planes  - Special tests   Negative impingement sign, empty can test, speeds test, or apprehension test  - NVI distally

## 2021-07-12 ENCOUNTER — APPOINTMENT (OUTPATIENT)
Dept: URGENT CARE | Age: 61
End: 2021-07-12
Payer: OTHER MISCELLANEOUS

## 2021-07-12 PROCEDURE — 99213 OFFICE O/P EST LOW 20 MIN: CPT | Performed by: PREVENTIVE MEDICINE

## 2021-07-13 ENCOUNTER — OFFICE VISIT (OUTPATIENT)
Dept: OBGYN CLINIC | Facility: HOSPITAL | Age: 61
End: 2021-07-13
Payer: COMMERCIAL

## 2021-07-13 ENCOUNTER — HOSPITAL ENCOUNTER (OUTPATIENT)
Dept: RADIOLOGY | Facility: HOSPITAL | Age: 61
Discharge: HOME | End: 2021-07-13
Attending: ORTHOPAEDIC SURGERY
Payer: COMMERCIAL

## 2021-07-13 VITALS
SYSTOLIC BLOOD PRESSURE: 132 MMHG | WEIGHT: 224.4 LBS | HEIGHT: 69 IN | BODY MASS INDEX: 33.24 KG/M2 | HEART RATE: 69 BPM | DIASTOLIC BLOOD PRESSURE: 80 MMHG

## 2021-07-13 DIAGNOSIS — M25.561 RIGHT KNEE PAIN, UNSPECIFIED CHRONICITY: ICD-10-CM

## 2021-07-13 DIAGNOSIS — M17.11 PRIMARY OSTEOARTHRITIS OF RIGHT KNEE: ICD-10-CM

## 2021-07-13 DIAGNOSIS — M17.12 PRIMARY OSTEOARTHRITIS OF LEFT KNEE: Primary | ICD-10-CM

## 2021-07-13 PROCEDURE — 20610 DRAIN/INJ JOINT/BURSA W/O US: CPT | Performed by: ORTHOPAEDIC SURGERY

## 2021-07-13 PROCEDURE — 73562 X-RAY EXAM OF KNEE 3: CPT

## 2021-07-13 PROCEDURE — 99214 OFFICE O/P EST MOD 30 MIN: CPT | Performed by: ORTHOPAEDIC SURGERY

## 2021-07-13 RX ORDER — METHYLPREDNISOLONE ACETATE 40 MG/ML
2 INJECTION, SUSPENSION INTRA-ARTICULAR; INTRALESIONAL; INTRAMUSCULAR; SOFT TISSUE
Status: COMPLETED | OUTPATIENT
Start: 2021-07-13 | End: 2021-07-13

## 2021-07-13 RX ORDER — BUPIVACAINE HYDROCHLORIDE 2.5 MG/ML
2 INJECTION, SOLUTION INFILTRATION; PERINEURAL
Status: COMPLETED | OUTPATIENT
Start: 2021-07-13 | End: 2021-07-13

## 2021-07-13 RX ADMIN — METHYLPREDNISOLONE ACETATE 2 ML: 40 INJECTION, SUSPENSION INTRA-ARTICULAR; INTRALESIONAL; INTRAMUSCULAR; SOFT TISSUE at 08:06

## 2021-07-13 RX ADMIN — BUPIVACAINE HYDROCHLORIDE 2 ML: 2.5 INJECTION, SOLUTION INFILTRATION; PERINEURAL at 08:06

## 2021-07-13 NOTE — PROGRESS NOTES
Assessment:    Bilateral knee DJD  Chronic bilateral knee pain    Plan:       · Patient has bilateral knee pain due to osteoarthritis   · WBAT Bilateral LE  · Received bilateral knee  steroid injection today  · Continue with current pain regimen   · Continue with  VMO strengthening exercises  · Follow up in 3 months       The above stated was discussed in layman's terms and the patient expressed understanding  All questions were answered to the patient's satisfaction  Subjective:   Lucrecia Brown  is a 64 y o  male who presents today follow up for left knee pain due to osteoarthritis  He had a left knee steroid injection on 4/13/21 and complete relief for 21/2 months  He states he is also having right knee pain  He is having achy and stiffness generalized bilateral knee, worse on the left  Denies mechanical symptoms  Pain is wore with stairs and increase activities  He is taking Tylenol, Flexeril, Gabapentin and Naprosyn 500 mg as needed for pain  He has been doing VMO strengthening exercises daily         Review of systems negative unless otherwise specified in HPI    Past Medical History:   Diagnosis Date    Abdominal lump 4/20/2018    Anxiety     Arm pain     left-may have torn the bicep tendon    Arthritis     Cardiac disease     Contact lens/glasses fitting     Coronary artery disease     COVID-19 vaccine series completed     Moderna x2    Episode of dizziness 11/26/2018    Herpes zoster with complication 6/1/6351    Hyperlipidemia     Hypertension     Kidney stone     Myocardial infarction [facility]) 2010    cardiac stent x2    Obesity     Pain of right great toe 7/22/2019    PONV (postoperative nausea and vomiting)     in the past-1980's    Sepsis ([facility] [address] ) 12/2020    urinary issue-back up       Past Surgical History:   Procedure Laterality Date    ABDOMINAL SURGERY      tumor on stomach    ANGIOPLASTY      2010-x2 stents left side    BACK SURGERY      lower back-dissectomy    CARDIAC SURGERY      angio with stents    CIRCUMCISION      COLONOSCOPY      CYSTOSCOPY      EGD AND COLONOSCOPY      GASTRIC BYPASS  2005    IR ABSCESS/SEROMA DRAINAGE  1/4/2019    LITHOTRIPSY      x2    NECK SURGERY      herniated disc C4/5    OTHER SURGICAL HISTORY      removal of vocal cord lesion     [place] EXC TUMOR SOFT TISSUE ABDOMINAL WALL SUBQ <3CM N/A 2/27/2019    Procedure: ABDOMINAL SEROMA EXCISION;  Surgeon: Pao Vee MD;  [facility];  Service: General    ROTATOR CUFF REPAIR Bilateral     SHOULDER SURGERY Bilateral     TONSILLECTOMY      VASCULAR SURGERY      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    WISDOM TOOTH EXTRACTION      WOUND DEBRIDEMENT N/A 3/7/2019    Procedure: DEBRIDEMENT WOUND ABDOMINAL ([address]) AND WOUND VAC APPLICATION;  Surgeon: Pao Vee MD;  [facility];  Service: General    WOUND DEBRIDEMENT N/A 3/9/2019    Procedure: DEBRIDEMENT WOUND ABDOMINAL ([address]), wound vac dressing change;  Surgeon: Gaurav Ch DO;  [facility];  Service: General       Family History   Problem Relation Age of Onset    Heart disease Mother         passed during open heart surgery     Heart disease Father     Diabetes Father         caused blindness     Lung cancer Sister     Cancer Sister     Heart disease Brother         MI    No Known Problems Son     No Known Problems Son     No Known Problems Son     No Known Problems Daughter        Social History     Occupational History    Not on file   Tobacco Use    Smoking status: Light Tobacco Smoker     Types: Cigars    Smokeless tobacco: Never Used    Tobacco comment: advised not to smoke prior to procedure   Vaping Use    Vaping Use: Never used   Substance and Sexual Activity    Alcohol use: Not Currently     Comment: occasional last drink 2/2020    Drug use: No    Sexual activity: Not Currently         Current Outpatient Medications:     acetaminophen (TYLENOL) 500 mg tablet, Take 1,000 mg by mouth every 6 (six) hours as needed for mild pain, Disp: , Rfl:     aspirin (ECOTRIN LOW STRENGTH) 81 mg EC tablet, Take 162 mg by mouth daily , Disp: , Rfl:     atorvastatin (LIPITOR) 40 mg tablet, Take 1 tablet (40 mg total) by mouth daily, Disp: 90 tablet, Rfl: 1    cyclobenzaprine (FLEXERIL) 10 mg tablet, Take 1 tablet (10 mg total) by mouth daily at bedtime TAKE 1/2 TO 1 TABLET AT BEDTIME AS NEEDED, Disp: 90 tablet, Rfl: 0    Diclofenac Sodium (VOLTAREN) 1 %, Apply 2 g topically 4 (four) times a day, Disp: 350 g, Rfl: 1    gabapentin (NEURONTIN) 300 mg capsule, Take 1 capsule (300 mg total) by mouth daily, Disp: 90 capsule, Rfl: 0    lisinopril (ZESTRIL) 10 mg tablet, Take 1 tablet (10 mg total) by mouth daily, Disp: 30 tablet, Rfl: 11    metoprolol tartrate (LOPRESSOR) 25 mg tablet, Take 1 tablet (25 mg total) by mouth daily, Disp: 90 tablet, Rfl: 3    nitroglycerin (NITROSTAT) 0 4 mg SL tablet, Place 1 tablet (0 4 mg total) under the tongue every 5 (five) minutes as needed for chest pain, Disp: 30 tablet, Rfl: 0    pantoprazole (PROTONIX) 40 mg tablet, Take 1 tablet (40 mg total) by mouth daily, Disp: 90 tablet, Rfl: 1    traZODone (DESYREL) 100 mg tablet, Take 1 tablet (100 mg total) by mouth daily at bedtime, Disp: 90 tablet, Rfl: 0    venlafaxine (EFFEXOR) 37 5 mg tablet, Take 1 tablet (37 5 mg total) by mouth daily, Disp: 90 tablet, Rfl: 1    EPINEPHrine (EPIPEN) 0 3 mg/0 3 mL SOAJ, Inject 0 3 mL (0 3 mg total) into a muscle once for 1 dose, Disp: 1 each, Rfl: 3    tamsulosin (FLOMAX) 0 4 mg, Take 1 capsule (0 4 mg total) by mouth daily with dinner, Disp: 90 capsule, Rfl: 1    Allergies   Allergen Reactions    Adhesive [Medical Tape] Swelling     Tape on eye caused eye to swell could not open!   Itching large hives from on arms     CAN USE PAPER TAPE    Cephalosporins Anaphylaxis    Nuts - Food Allergy Anaphylaxis    Other Abdominal Pain     Soy  Patient can not have an NGT due to bariatric surgery    Peanut Oil - Food Allergy Anaphylaxis    Penicillins Anaphylaxis     Itching and hives    Soy Isoflavones Other (See Comments)     anaphylaxis    Shellfish Allergy - Food Allergy      Betadine/ cat scan dye is Okay  Cannot eat shrimp, crab shellfish avoids all fish   Shellfish-Derived Products - Food Allergy Other (See Comments)     Positive test            Vitals:    07/13/21 0733   BP: 132/80   Pulse: 69       Objective:            Physical Exam  · General: Awake, Alert, Oriented  · Eyes: Pupils equal, round and reactive to light  · Heart: regular rate and rhythm  · Lungs: No audible wheezing  · Abdomen: soft                    Ortho Exam  Right knee  Skin intact  No erythema  TTP over medial joint line   Minimal TTP over lateral joint line   AROM : 0-110   Stable to Coronal and Sagittal plane stress  Neurovascularly Intact Distally       Left knee  Skin intact  No erythema  TTP over medial joint line   Minimal TTP over lateral joint line   AROM : 0-110   Stable to Coronal and Sagittal plane stress  Neurovascularly Intact Distally      Diagnostics, reviewed and taken today if performed as documented: The attending physician has personally reviewed the pertinent films in PACS and interpretation is as follows:x-ray left knee demonstrates medial compartment joint space narrowing, osteophyte formation on the bone       Procedures, if performed today:    Large joint arthrocentesis: R knee  Universal Protocol:  Consent: Verbal consent obtained  Risks and benefits: risks, benefits and alternatives were discussed  Consent given by: patient  Time out: Immediately prior to procedure a "time out" was called to verify the correct patient, procedure, equipment, support staff and site/side marked as required    Timeout called at: 7/13/2021 8:00 AM   Patient understanding: patient states understanding of the procedure being performed  Site marked: the operative site was marked  Supporting Documentation  Indications: pain   Procedure Details  Location: knee - R knee  Preparation: Patient was prepped and draped in the usual sterile fashion  Needle size: 22 G  Ultrasound guidance: no  Approach: anterolateral  Medications administered: 2 mL methylPREDNISolone acetate 40 mg/mL; 2 mL bupivacaine 0 25 %    Patient tolerance: patient tolerated the procedure well with no immediate complications  Dressing:  Sterile dressing applied    Large joint arthrocentesis  Universal Protocol:  Consent: Verbal consent obtained  Risks and benefits: risks, benefits and alternatives were discussed  Consent given by: patient  Time out: Immediately prior to procedure a "time out" was called to verify the correct patient, procedure, equipment, support staff and site/side marked as required  Timeout called at: 7/13/2021 8:01 AM   Patient understanding: patient states understanding of the procedure being performed  Site marked: the operative site was marked  Supporting Documentation  Indications: pain   Procedure Details  Location: knee -   Preparation: Patient was prepped and draped in the usual sterile fashion  Needle size: 22 G  Ultrasound guidance: no  Approach: anterolateral  Medications administered: 2 mL methylPREDNISolone acetate 40 mg/mL; 2 mL bupivacaine 0 25 %    Patient tolerance: patient tolerated the procedure well with no immediate complications  Dressing:  Sterile dressing applied          None performed      Scribe Attestation    I,:  Frederic Peguero am acting as a scribe while in the presence of the attending physician :       I,:  Esau Curling, MD personally performed the services described in this documentation    as scribed in my presence :             Portions of the record may have been created with voice recognition software  Occasional wrong word or "sound a like" substitutions may have occurred due to the inherent limitations of voice recognition software    Read the chart carefully and recognize, using context, where substitutions have occurred

## 2021-07-29 ENCOUNTER — OFFICE VISIT (OUTPATIENT)
Dept: INTERNAL MEDICINE CLINIC | Facility: CLINIC | Age: 61
End: 2021-07-29
Payer: COMMERCIAL

## 2021-07-29 VITALS
BODY MASS INDEX: 32.73 KG/M2 | WEIGHT: 221 LBS | SYSTOLIC BLOOD PRESSURE: 143 MMHG | TEMPERATURE: 97.3 F | RESPIRATION RATE: 16 BRPM | DIASTOLIC BLOOD PRESSURE: 70 MMHG | OXYGEN SATURATION: 96 % | HEIGHT: 69 IN | HEART RATE: 72 BPM

## 2021-07-29 DIAGNOSIS — M25.512 LEFT SHOULDER PAIN, UNSPECIFIED CHRONICITY: ICD-10-CM

## 2021-07-29 DIAGNOSIS — I21.9 MYOCARDIAL INFARCTION, UNSPECIFIED MI TYPE, UNSPECIFIED ARTERY (HCC): ICD-10-CM

## 2021-07-29 DIAGNOSIS — E78.5 HYPERLIPIDEMIA, UNSPECIFIED HYPERLIPIDEMIA TYPE: Primary | ICD-10-CM

## 2021-07-29 DIAGNOSIS — N40.0 BENIGN PROSTATIC HYPERPLASIA, UNSPECIFIED WHETHER LOWER URINARY TRACT SYMPTOMS PRESENT: ICD-10-CM

## 2021-07-29 DIAGNOSIS — N17.9 ACUTE RENAL FAILURE, UNSPECIFIED ACUTE RENAL FAILURE TYPE (HCC): ICD-10-CM

## 2021-07-29 DIAGNOSIS — F41.1 GAD (GENERALIZED ANXIETY DISORDER): ICD-10-CM

## 2021-07-29 DIAGNOSIS — K21.9 GASTROESOPHAGEAL REFLUX DISEASE, UNSPECIFIED WHETHER ESOPHAGITIS PRESENT: ICD-10-CM

## 2021-07-29 DIAGNOSIS — E66.09 CLASS 1 OBESITY DUE TO EXCESS CALORIES WITH SERIOUS COMORBIDITY AND BODY MASS INDEX (BMI) OF 32.0 TO 32.9 IN ADULT: ICD-10-CM

## 2021-07-29 DIAGNOSIS — I10 ESSENTIAL HYPERTENSION: ICD-10-CM

## 2021-07-29 PROBLEM — S30.1XXA ABDOMINAL WALL SEROMA: Status: RESOLVED | Noted: 2019-01-22 | Resolved: 2021-07-29

## 2021-07-29 PROBLEM — R65.20 SEVERE SEPSIS (HCC): Status: RESOLVED | Noted: 2020-11-30 | Resolved: 2021-07-29

## 2021-07-29 PROBLEM — A41.9 SEVERE SEPSIS (HCC): Status: RESOLVED | Noted: 2020-11-30 | Resolved: 2021-07-29

## 2021-07-29 PROBLEM — U07.1 COVID-19: Status: RESOLVED | Noted: 2020-11-28 | Resolved: 2021-07-29

## 2021-07-29 PROCEDURE — 99214 OFFICE O/P EST MOD 30 MIN: CPT | Performed by: NURSE PRACTITIONER

## 2021-07-29 NOTE — PATIENT INSTRUCTIONS

## 2021-07-29 NOTE — PROGRESS NOTES
NAME: Monroe Buckley  AGE: 64 y o  SEX: male  : 1960     DATE: 2021     Assessment and Plan:     Problem List Items Addressed This Visit        Digestive    Gastroesophageal reflux disease     Symptoms controlled on pantoprazole  No change in treatment  Cardiovascular and Mediastinum    Essential hypertension     BP is mildly elevated today  Medication increased by cardiology in February with addition of lisinopril, also on lopressor  BP was good at his last documented visit with ortho  He is asked to monitor his bp at home several days per week and call if bp is consistently running >140/90 and would likely add on amlodipine  Pt verbalizes understanding  He sees cards again in about a month with order to repeat bmp prior and will f/u here in 3 months  MI (myocardial infarction) ([facility] [address] )     Denies chest pain, follows with cardiology  He is on aspirin, metoprolol, atorvastatin and lisinopril  We are closely watching his bp over the next few weeks to see if bp control needs to be improved, see discussion above  Repeat lipids with upcoming labs  Genitourinary    [facility]     Had resolved after hospitalization last December and lisinopril hctz had been stopped  Lisinopril was restarted in February by his cardiologist to improve bp control  He has orders from them to repeat bmp prior to his visit with them next month  Benign prostatic hyperplasia     Stable with use of flomax  Continue current tx  Other    Class 1 obesity due to excess calories with serious comorbidity and body mass index (BMI) of 32 0 to 32 9 in adult     Weight is fairly stable  Reinforced healthy diet and regular exercise  PEYTON (generalized anxiety disorder)     Stable, continue with current dosing of venlafaxine         Hyperlipidemia - Primary     Continue atorvastatin, aspirin  Repeat lipid panel with upcoming labs  Relevant Orders    Lipid panel    Left shoulder pain    Relevant Medications    Diclofenac Sodium (VOLTAREN) 1 %          Immunizations and preventive care screenings were discussed with patient today  Appropriate education was printed on patient's after visit summary  Counseling:  Dental Health: discussed importance of regular tooth brushing, flossing, and dental visits  Injury prevention: discussed safety/seat belts, safety helmets, smoke detectors, carbon dioxide detectors, and smoking near bedding or upholstery  · Exercise: the importance of regular exercise/physical activity was discussed  Recommend exercise 3-5 times per week for at least 30 minutes  BMI Counseling: Body mass index is 32 64 kg/m²  The BMI is above normal  Nutrition recommendations include decreasing portion sizes, encouraging healthy choices of fruits and vegetables, consuming healthier snacks, moderation in carbohydrate intake, increasing intake of lean protein, reducing intake of saturated and trans fat and reducing intake of cholesterol  Exercise recommendations include moderate physical activity 150 minutes/week, exercising 3-5 times per week and strength training exercises  No pharmacotherapy was ordered  Patient referred to PCP due to patient being overweight  Return in about 3 months (around 10/29/2021) for Recheck  Chief Complaint:     No chief complaint on file  History of Present Illness:   Pt is a 63 y/o male here today for routine f/u  PMH of HTN, HLD, MI, OA, anxiety, GERD, BPH  He has no acute concerns today  Notes that he recently tore his bicep; was evaluated by ortho and cleared for normal activity  He is not in pain and has not noticed any residual effects from the injury  Recently seen a second time by ortho for routine steroid knee injections  Pt denies chest pain, palpitations, shortness of breath, headache, dizziness, numbness, tingling  Appetite is normal, no N/V/D      ·      Review of Systems:     Review of Systems   Constitutional: Negative for activity change, appetite change, chills, diaphoresis, fatigue, fever and unexpected weight change  HENT: Negative for hearing loss  Eyes: Negative for visual disturbance  Respiratory: Negative for cough, chest tightness and shortness of breath  Cardiovascular: Negative for chest pain, palpitations and leg swelling  Gastrointestinal: Negative for abdominal pain, constipation, diarrhea, nausea and vomiting  Genitourinary: Negative for difficulty urinating, dysuria and frequency  Musculoskeletal: Positive for arthralgias and back pain  Negative for myalgias  Skin: Negative for color change  Allergic/Immunologic: Negative for environmental allergies  Neurological: Negative for dizziness, weakness, numbness and headaches  Psychiatric/Behavioral: Negative for dysphoric mood, self-injury, sleep disturbance and suicidal ideas  The patient is not nervous/anxious         Past Medical History:     Past Medical History:   Diagnosis Date    Abdominal lump 4/20/2018    Anxiety     Arm pain     left-may have torn the bicep tendon    Arthritis     Cardiac disease     Contact lens/glasses fitting     Coronary artery disease     COVID-19 vaccine series completed     Moderna x2    Episode of dizziness 11/26/2018    Herpes zoster with complication 0/1/5893    Hyperlipidemia     Hypertension     Kidney stone     Myocardial infarction [facility]) 2010    cardiac stent x2    Obesity     Pain of right great toe 7/22/2019    PONV (postoperative nausea and vomiting)     in the past-1980's    Sepsis ([facility] [address] ) 12/2020    urinary issue-back up      Past Surgical History:     Past Surgical History:   Procedure Laterality Date    ABDOMINAL SURGERY      tumor on stomach    ANGIOPLASTY      2010-x2 stents left side    BACK SURGERY      lower back-dissectomy    CARDIAC SURGERY      angio with stents    CIRCUMCISION      COLONOSCOPY      CYSTOSCOPY  EGD AND COLONOSCOPY      GASTRIC BYPASS  2005    IR ABSCESS/SEROMA DRAINAGE  1/4/2019    LITHOTRIPSY      x2    NECK SURGERY      herniated disc C4/5    OTHER SURGICAL HISTORY      removal of vocal cord lesion     KS EXC TUMOR SOFT TISSUE ABDOMINAL WALL SUBQ <3CM N/A 2/27/2019    Procedure: ABDOMINAL SEROMA EXCISION;  Surgeon: Olga Davis MD;  [facility];  Service: General    ROTATOR CUFF REPAIR Bilateral     SHOULDER SURGERY Bilateral     TONSILLECTOMY      VASCULAR SURGERY      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    WISDOM TOOTH EXTRACTION      WOUND DEBRIDEMENT N/A 3/7/2019    Procedure: DEBRIDEMENT WOUND ABDOMINAL ([address]) AND WOUND VAC APPLICATION;  Surgeon: Olga Davis MD;  [facility];  Service: General    WOUND DEBRIDEMENT N/A 3/9/2019    Procedure: DEBRIDEMENT WOUND ABDOMINAL ([address]), wound vac dressing change;  Surgeon: Adriana Paz DO;  [facility];  Service: General      Family History:     Family History   Problem Relation Age of Onset    Heart disease Mother         passed during open heart surgery     Heart disease Father     Diabetes Father         caused blindness     Lung cancer Sister     Cancer Sister     Heart disease Brother         MI    No Known Problems Son     No Known Problems Son     No Known Problems Son     No Known Problems Daughter       Social History:     Social History     Socioeconomic History    Marital status: /Civil Union     Spouse name: Nish Ryan Number of children: 3    Years of education: None    Highest education level: None   Occupational History    None   Tobacco Use    Smoking status: Never Smoker    Smokeless tobacco: Never Used    Tobacco comment: rare cigar smoking   Vaping Use    Vaping Use: Never used   Substance and Sexual Activity    Alcohol use: Not Currently     Comment: occasional last drink 2/2020    Drug use: No    Sexual activity: Not Currently   Other Topics Concern    None   Social History Narrative    Patient lives in a home that was built in [address] [place] [address] junior in the bedroom     [place] air    Window air conditioning bedroom     Finished basement-dry-no mold or musty smell    Dehumidifier     Air  attached to [employer] in the winter months     Home is smoke free     Does not live near wooded are or open fields         Dog x5 ([place], [place], [place], [place], and [place]) and there allowed in the bedroom         Caffeine: soda occasional                     Coffee is decaf 1 cup daily     Chocolate consumed rarely         Patient lives with wife and two children      Social Determinants of Health     Financial Resource Strain:     Difficulty of Paying Living Expenses:    Food Insecurity:     Worried About [address] in the Last Year:     [address] [place] in the Last Year:    Transportation Needs:     Lack of Transportation (Medical):      Lack of Transportation (Non-Medical):    Physical Activity:     Days of Exercise per Week:     Minutes of Exercise per Session:    Stress:     Feeling of Stress :    Social Connections:     Frequency of Communication with Friends and Family:     Frequency of Social Gatherings with Friends and Family:     Attends Ca[employer] Services:     Active Member of Clubs or Organizations:     Attends Club or Organization Meetings:     Marital Status:    Intimate Partner Violence:     Fear of Current or Ex-Partner:     Emotionally Abused:     Physically Abused:     Sexually Abused:       Current Medications:     Current Outpatient Medications   Medication Sig Dispense Refill    acetaminophen (TYLENOL) 500 mg tablet Take 1,000 mg by mouth every 6 (six) hours as needed for mild pain      aspirin (ECOTRIN LOW STRENGTH) 81 mg EC tablet Take 162 mg by mouth daily       atorvastatin (LIPITOR) 40 mg tablet Take 1 tablet (40 mg total) by mouth daily 90 tablet 1    cyclobenzaprine (FLEXERIL) 10 mg tablet Take 1 tablet (10 mg total) by mouth daily at bedtime TAKE 1/2 TO 1 TABLET AT BEDTIME AS NEEDED 90 tablet 0    Diclofenac Sodium (VOLTAREN) 1 % Apply 2 g topically 4 (four) times a day 350 g 1    EPINEPHrine (EPIPEN) 0 3 mg/0 3 mL SOAJ Inject 0 3 mL (0 3 mg total) into a muscle once for 1 dose 1 each 3    gabapentin (NEURONTIN) 300 mg capsule Take 1 capsule (300 mg total) by mouth daily 90 capsule 0    lisinopril (ZESTRIL) 10 mg tablet Take 1 tablet (10 mg total) by mouth daily 30 tablet 11    metoprolol tartrate (LOPRESSOR) 25 mg tablet Take 1 tablet (25 mg total) by mouth daily 90 tablet 3    nitroglycerin (NITROSTAT) 0 4 mg SL tablet Place 1 tablet (0 4 mg total) under the tongue every 5 (five) minutes as needed for chest pain 30 tablet 0    pantoprazole (PROTONIX) 40 mg tablet Take 1 tablet (40 mg total) by mouth daily 90 tablet 1    tamsulosin (FLOMAX) 0 4 mg Take 1 capsule (0 4 mg total) by mouth daily with dinner 90 capsule 1    traZODone (DESYREL) 100 mg tablet Take 1 tablet (100 mg total) by mouth daily at bedtime 90 tablet 0    venlafaxine (EFFEXOR) 37 5 mg tablet Take 1 tablet (37 5 mg total) by mouth daily 90 tablet 1     No current facility-administered medications for this visit  Allergies: Allergies   Allergen Reactions    Adhesive [Medical Tape] Swelling     Tape on eye caused eye to swell could not open! Itching large hives from on arms     CAN USE PAPER TAPE    Cephalosporins Anaphylaxis    Nuts - Food Allergy Anaphylaxis    Other Abdominal Pain     Soy  Patient can not have an NGT due to bariatric surgery    Peanut Oil - Food Allergy Anaphylaxis    Penicillins Anaphylaxis     Itching and hives    Soy Isoflavones Other (See Comments)     anaphylaxis    Shellfish Allergy - Food Allergy      Betadine/ cat scan dye is Okay  Cannot eat shrimp, crab shellfish avoids all fish      Shellfish-Derived Products - Food Allergy Other (See Comments) Positive test      Physical Exam:     /70   Pulse 72   Temp (!) 97 3 °F (36 3 °C)   Resp 16   Ht 5' 9" (1 753 m)   Wt 100 kg (221 lb)   SpO2 96%   BMI 32 64 kg/m²     Physical Exam  Vitals reviewed  Constitutional:       General: He is awake  He is not in acute distress  Appearance: Normal appearance  He is well-developed and well-groomed  He is not ill-appearing or toxic-appearing  HENT:      Head: Normocephalic and atraumatic  Right Ear: Hearing, tympanic membrane, ear canal and external ear normal       Left Ear: Hearing, tympanic membrane, ear canal and external ear normal       Nose: Nose normal       Mouth/Throat:      Lips: Pink  Mouth: Mucous membranes are moist       Pharynx: Oropharynx is clear  Eyes:      General: Lids are normal       Conjunctiva/sclera: Conjunctivae normal       Pupils: Pupils are equal, round, and reactive to light  Neck:      Thyroid: No thyromegaly  Vascular: Normal carotid pulses  No carotid bruit or JVD  Cardiovascular:      Rate and Rhythm: Normal rate and regular rhythm  Pulses: Normal pulses  Heart sounds: Normal heart sounds  No murmur heard  Pulmonary:      Effort: Pulmonary effort is normal       Breath sounds: Normal breath sounds  Abdominal:      General: Abdomen is flat  Bowel sounds are normal       Palpations: Abdomen is soft  Tenderness: There is no abdominal tenderness  Musculoskeletal:         General: Normal range of motion  Cervical back: Normal range of motion  Right lower leg: No edema  Left lower leg: No edema  Lymphadenopathy:      Cervical: No cervical adenopathy  Skin:     General: Skin is warm and dry  Capillary Refill: Capillary refill takes less than 2 seconds  Neurological:      Mental Status: He is alert and oriented to person, place, and time  Motor: Motor function is intact        Deep Tendon Reflexes:      Reflex Scores:       Patellar reflexes are 0 on the right side and 0 on the left side  Psychiatric:         Attention and Perception: Attention normal          Mood and Affect: Mood normal          Speech: Speech normal          Behavior: Behavior normal  Behavior is cooperative  Thought Content:  Thought content normal          Cognition and Memory: Cognition normal          Judgment: Judgment normal           NU Bose  [facility]

## 2021-07-29 NOTE — ASSESSMENT & PLAN NOTE
Denies chest pain, follows with cardiology  He is on aspirin, metoprolol, atorvastatin and lisinopril  We are closely watching his bp over the next few weeks to see if bp control needs to be improved, see discussion above  Repeat lipids with upcoming labs

## 2021-07-29 NOTE — ASSESSMENT & PLAN NOTE
BP is mildly elevated today  Medication increased by cardiology in February with addition of lisinopril, also on lopressor  BP was good at his last documented visit with ortho  He is asked to monitor his bp at home several days per week and call if bp is consistently running >140/90 and would likely add on amlodipine  Pt verbalizes understanding  He sees cards again in about a month with order to repeat bmp prior and will f/u here in 3 months

## 2021-07-29 NOTE — ASSESSMENT & PLAN NOTE
Had resolved after hospitalization last December and lisinopril hctz had been stopped  Lisinopril was restarted in February by his cardiologist to improve bp control  He has orders from them to repeat bmp prior to his visit with them next month

## 2021-08-12 DIAGNOSIS — I10 ESSENTIAL HYPERTENSION: ICD-10-CM

## 2021-08-12 DIAGNOSIS — K21.9 GASTROESOPHAGEAL REFLUX DISEASE WITHOUT ESOPHAGITIS: ICD-10-CM

## 2021-08-12 DIAGNOSIS — E78.5 HYPERLIPIDEMIA, UNSPECIFIED HYPERLIPIDEMIA TYPE: ICD-10-CM

## 2021-08-12 DIAGNOSIS — I10 ESSENTIAL (PRIMARY) HYPERTENSION: ICD-10-CM

## 2021-08-12 DIAGNOSIS — F41.8 DEPRESSION WITH ANXIETY: ICD-10-CM

## 2021-08-12 NOTE — TELEPHONE ENCOUNTER
Requested medication(s) are due for refill today: No  Patient has already received a courtesy refill: No  Other reason request has been forwarded to provider:     Patient was okay waiting till Monday for med approval

## 2021-08-13 NOTE — PROGRESS NOTES
Cardiology Follow Up    Griselda Seal   1960  [id number]  [facility] [place]  [facility]  [address] [facility] [address]   [phone]    1  Essential (primary) hypertension     2  Pure hypercholesterolemia     3  Coronary arteriosclerosis     4  H/O heart artery stent         Interval History:  Patient is for a follow-up visit  He has a history of hypertension and hyperlipidemia  He has a prior history of CAD with 2 vessel stenting performed 12/27/2010   His intervention was a VELASQUEZ in the LAD of 3 0 x 18  and a VELASQUEZ in the diagonal branch of 2 5 x 12   Promus stents were utilized   His most recent nuclear stress test was done year 2015 and showed no evidence of prognostically important ischemia   He exercised 8 min on a treadmill   His most recent echocardiogram in January 2019 demonstrated preserved LV systolic function with mild LVH and mild LAE   There was no significant valvular heart disease   Patient had a lipid profile done August 2021 which demonstrated total cholesterol of 176 with an HDL of 86 and a calculated LDL of 74  Patient was  hospitalized December 2020 in reference to acute kidney injury and UTI  He was seen by Nephrology  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  He's had no CP or SOB      Patient Active Problem List   Diagnosis    Acute right-sided low back pain without sciatica    Essential hypertension    History of MI (myocardial infarction)    Class 1 obesity due to excess calories with serious comorbidity and body mass index (BMI) of 32 0 to 32 9 in adult    Multiple food allergies    H/O heart artery stent    Fatigue    Need for immunization against influenza    Skin irritation    Postural dizziness    Benign prostatic hyperplasia    Scleral icterus    Abnormal CT of liver    Irritant contact dermatitis    Insomnia    Urinary frequency    Hyperlipidemia    Abnormal exam of left ear    Allergy to iodine    Anaphylactic reaction    Anxiety    AV malformation, peripheral, congenital    Chronic ischemic heart disease, unspecified    Coronary atherosclerosis    PEYTON (generalized anxiety disorder)    Gastroesophageal reflux disease    Nerve pain    Muscle cramping    Spasm of muscle, back    History of PTCA    Trigger finger of right hand    Anaphylactic shock due to peanuts    Allergy to tree nuts or seeds    Peanut allergy    Soy allergy    Rash    Paresthesia of both hands    Pain in both knees    Left lower quadrant abdominal pain    S/P bariatric surgery    Weight loss, unintentional    Testicular pain, unspecified    Annual physical exam    Acute pain of right shoulder    UTI (urinary tract infection)    [facility]    Abnormal LFTs    Gram-negative bacteremia    Thrombocytopenia (HCC)    Left shoulder pain    Acute kidney injury    Recent bacteremia    Chronic pain of left knee    MI (myocardial infarction) ([address] )    PONV (postoperative nausea and vomiting)     Past Medical History:   Diagnosis Date    Abdominal lump 4/20/2018    Anxiety     Arm pain     left-may have torn the bicep tendon    Arthritis     Cardiac disease     Contact lens/glasses fitting     Coronary artery disease     COVID-19 vaccine series completed     Moderna x2    Episode of dizziness 11/26/2018    Herpes zoster with complication 6/2/3348    Hyperlipidemia     Hypertension     Kidney stone     Myocardial infarction [facility]) 2010    cardiac stent x2    Obesity     Pain of right great toe 7/22/2019    PONV (postoperative nausea and vomiting)     in the past-1980's    Sepsis ([address] ) 12/2020    urinary issue-back up     Social History     Socioeconomic History    Marital status: /Civil Union     Spouse name: Bharati Mondragon Number of children: 4    Years of education: Not on file   Ignyta education level: Not on file   Occupational History    Not on file   Tobacco Use    Smoking status: Never Smoker    Smokeless tobacco: Never Used    Tobacco comment: rare cigar smoking   Vaping Use    Vaping Use: Never used   Substance and Sexual Activity    Alcohol use: Not Currently     Comment: occasional last drink 2/2020    Drug use: No    Sexual activity: Not Currently   Other Topics Concern    Not on file   Social History Narrative    [facility] [place] [address] [facility] [address]     [employer] junior in the bedroom     [place] air    Window air conditioning bedroom     Finished basement-dry-no mold or musty smell    Dehumidifier     Air  attached to [employer] in the winter months     Home is smoke free     Does not live near wooded are or open fields         Dog x5 ([place], [place], [place]y [place], [place], and [place]) and there allowed in the bedroom         Caffeine: soda occasional                     Coffee is decaf 1 cup daily     Chocolate consumed rarely         Patient lives with wife and two children      Social Determinants of Health     Financial Resource Strain:     Difficulty of Paying Living Expenses:    Food Insecurity:     Worried About [address] [facility] in the Last Year:     [address] in the Last Year:    Transportation Needs:     Lack of Transportation (Medical):      Lack of Transportation (Non-Medical):    Physical Activity:     Days of Exercise per Week:     Minutes of Exercise per Session:    Stress:     Feeling of Stress :    Social Connections:     Frequency of Communication with Friends and Family:     Frequency of Social Gatherings with Friends and Family:     Attends Protestant Services:     Active Member of Clubs or Organizations:     Attends Club or Organization Meetings:     Marital Status:    Intimate Partner Violence:     Fear of Current or Ex-Partner:     Emotionally Abused:     Physically Abused:     Sexually Abused: Family History   Problem Relation Age of Onset    Heart disease Mother         passed during open heart surgery     Heart disease Father     Diabetes Father         caused blindness     Lung cancer Sister     Cancer Sister     Heart disease Brother         MI    No Known Problems Son     No Known Problems Son     No Known Problems Son     No Known Problems Daughter      Past Surgical History:   Procedure Laterality Date    ABDOMINAL SURGERY      tumor on stomach    ANGIOPLASTY      2010-x2 stents left side    BACK SURGERY      lower back-dissectomy    CARDIAC SURGERY      angio with stents    CIRCUMCISION      COLONOSCOPY      CYSTOSCOPY      EGD AND COLONOSCOPY      GASTRIC BYPASS  2005    IR ABSCESS/SEROMA DRAINAGE  1/4/2019    LITHOTRIPSY      x2    NECK SURGERY      herniated disc C4/5    OTHER SURGICAL HISTORY      removal of vocal cord lesion     IL EXC TUMOR SOFT TISSUE ABDOMINAL WALL SUBQ <3CM N/A 2/27/2019    Procedure: ABDOMINAL SEROMA EXCISION;  Surgeon: Olga Davis MD;  [facility];  Service: General    ROTATOR CUFF REPAIR Bilateral     SHOULDER SURGERY Bilateral     TONSILLECTOMY      VASCULAR SURGERY      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    WISDOM TOOTH EXTRACTION      WOUND DEBRIDEMENT N/A 3/7/2019    Procedure: DEBRIDEMENT WOUND ABDOMINAL ([address]) AND WOUND VAC APPLICATION;  Surgeon: Olga Davis MD;  [facility];  Service: General    WOUND DEBRIDEMENT N/A 3/9/2019    Procedure: DEBRIDEMENT WOUND ABDOMINAL ([address]), wound vac dressing change;  Surgeon: Adriana Paz DO;  [facility];  Service: General       Current Outpatient Medications:     acetaminophen (TYLENOL) 500 mg tablet, Take 1,000 mg by mouth every 6 (six) hours as needed for mild pain, Disp: , Rfl:     aspirin (ECOTRIN LOW STRENGTH) 81 mg EC tablet, Take 162 mg by mouth daily , Disp: , Rfl:     atorvastatin (LIPITOR) 40 mg tablet, Take 1 tablet (40 mg total) by mouth daily, Disp: 90 tablet, Rfl: 1    cyclobenzaprine (FLEXERIL) 10 mg tablet, Take 1 tablet (10 mg total) by mouth daily at bedtime TAKE 1/2 TO 1 TABLET AT BEDTIME AS NEEDED, Disp: 90 tablet, Rfl: 0    Diclofenac Sodium (VOLTAREN) 1 %, Apply 2 g topically 4 (four) times a day, Disp: 350 g, Rfl: 1    gabapentin (NEURONTIN) 300 mg capsule, Take 1 capsule (300 mg total) by mouth daily, Disp: 90 capsule, Rfl: 0    lisinopril (ZESTRIL) 10 mg tablet, Take 1 tablet (10 mg total) by mouth daily, Disp: 30 tablet, Rfl: 11    metoprolol tartrate (LOPRESSOR) 25 mg tablet, Take 1 tablet (25 mg total) by mouth daily, Disp: 90 tablet, Rfl: 3    nitroglycerin (NITROSTAT) 0 4 mg SL tablet, Place 1 tablet (0 4 mg total) under the tongue every 5 (five) minutes as needed for chest pain, Disp: 30 tablet, Rfl: 0    pantoprazole (PROTONIX) 40 mg tablet, Take 1 tablet (40 mg total) by mouth daily, Disp: 90 tablet, Rfl: 1    traZODone (DESYREL) 100 mg tablet, Take 1 tablet (100 mg total) by mouth daily at bedtime, Disp: 90 tablet, Rfl: 0    venlafaxine (EFFEXOR) 37 5 mg tablet, Take 1 tablet (37 5 mg total) by mouth daily, Disp: 90 tablet, Rfl: 1    EPINEPHrine (EPIPEN) 0 3 mg/0 3 mL SOAJ, Inject 0 3 mL (0 3 mg total) into a muscle once for 1 dose, Disp: 1 each, Rfl: 3    tamsulosin (FLOMAX) 0 4 mg, Take 1 capsule (0 4 mg total) by mouth daily with dinner, Disp: 90 capsule, Rfl: 1  Allergies   Allergen Reactions    Adhesive [Medical Tape] Swelling     Tape on eye caused eye to swell could not open!   Itching large hives from on arms     CAN USE PAPER TAPE    Cephalosporins Anaphylaxis    Nuts - Food Allergy Anaphylaxis    Other Abdominal Pain     Soy  Patient can not have an NGT due to bariatric surgery    Peanut Oil - Food Allergy Anaphylaxis    Penicillins Anaphylaxis     Itching and hives    Soy Isoflavones Other (See Comments)     anaphylaxis    Shellfish Allergy - Food Allergy      Betadine/ cat scan dye is Okay  Cannot eat shrimp, crab shellfish avoids all fish   Shellfish-Derived Products - Food Allergy Other (See Comments)     Positive test       Labs:not applicable  Imaging: No results found  Review of Systems:  Review of Systems   All other systems reviewed and are negative  Physical Exam:  /74 (BP Location: Right arm, Patient Position: Sitting, Cuff Size: Large)   Pulse 64   Ht 5' 9" (1 753 m)   Wt 101 kg (222 lb 4 8 oz)   BMI 32 83 kg/m²   Physical Exam  Vitals reviewed  Constitutional:       Appearance: He is well-developed  HENT:      Head: Normocephalic and atraumatic  Eyes:      Conjunctiva/sclera: Conjunctivae normal       Pupils: Pupils are equal, round, and reactive to light  Cardiovascular:      Rate and Rhythm: Normal rate  Heart sounds: Normal heart sounds  Pulmonary:      Effort: Pulmonary effort is normal       Breath sounds: Normal breath sounds  Musculoskeletal:      Cervical back: Normal range of motion and neck supple  Skin:     General: Skin is warm and dry  Neurological:      Mental Status: He is alert and oriented to person, place, and time  Discussion/Summary:I will continue the patient's present medical regimen  The patient appears well compensated  I have asked the patient to call if there is a problem in the interim otherwise I will see the patient in six months time

## 2021-08-16 ENCOUNTER — APPOINTMENT (OUTPATIENT)
Dept: LAB | Facility: CLINIC | Age: 61
End: 2021-08-16
Payer: COMMERCIAL

## 2021-08-16 DIAGNOSIS — E78.5 HYPERLIPIDEMIA, UNSPECIFIED HYPERLIPIDEMIA TYPE: ICD-10-CM

## 2021-08-16 LAB
ANION GAP SERPL CALCULATED.3IONS-SCNC: 1 MMOL/L (ref 4–13)
BUN SERPL-MCNC: 17 MG/DL (ref 5–25)
CALCIUM SERPL-MCNC: 9.4 MG/DL (ref 8.3–10.1)
CHLORIDE SERPL-SCNC: 106 MMOL/L (ref 100–108)
CHOLEST SERPL-MCNC: 176 MG/DL (ref 50–200)
CO2 SERPL-SCNC: 30 MMOL/L (ref 21–32)
CREAT SERPL-MCNC: 0.87 MG/DL (ref 0.6–1.3)
GFR SERPL CREATININE-BSD FRML MDRD: 93 ML/MIN/1.73SQ M
GLUCOSE P FAST SERPL-MCNC: 81 MG/DL (ref 65–99)
HDLC SERPL-MCNC: 86 MG/DL
LDLC SERPL CALC-MCNC: 74 MG/DL (ref 0–100)
NONHDLC SERPL-MCNC: 90 MG/DL
POTASSIUM SERPL-SCNC: 3.9 MMOL/L (ref 3.5–5.3)
SODIUM SERPL-SCNC: 137 MMOL/L (ref 136–145)
TRIGL SERPL-MCNC: 81 MG/DL

## 2021-08-16 PROCEDURE — 80048 BASIC METABOLIC PNL TOTAL CA: CPT

## 2021-08-16 PROCEDURE — 80061 LIPID PANEL: CPT

## 2021-08-16 PROCEDURE — 36415 COLL VENOUS BLD VENIPUNCTURE: CPT

## 2021-08-16 RX ORDER — PANTOPRAZOLE SODIUM 40 MG/1
40 TABLET, DELAYED RELEASE ORAL DAILY
Qty: 90 TABLET | Refills: 1 | Status: SHIPPED | OUTPATIENT
Start: 2021-08-16 | End: 2022-04-08 | Stop reason: SDUPTHER

## 2021-08-16 RX ORDER — VENLAFAXINE 37.5 MG/1
37.5 TABLET ORAL DAILY
Qty: 90 TABLET | Refills: 1 | Status: SHIPPED | OUTPATIENT
Start: 2021-08-16 | End: 2022-04-08 | Stop reason: SDUPTHER

## 2021-08-16 RX ORDER — ATORVASTATIN CALCIUM 40 MG/1
40 TABLET, FILM COATED ORAL DAILY
Qty: 90 TABLET | Refills: 1 | Status: SHIPPED | OUTPATIENT
Start: 2021-08-16 | End: 2022-04-08 | Stop reason: SDUPTHER

## 2021-08-16 RX ORDER — LISINOPRIL 10 MG/1
10 TABLET ORAL DAILY
Qty: 30 TABLET | Refills: 11 | Status: SHIPPED | OUTPATIENT
Start: 2021-08-16 | End: 2022-04-08 | Stop reason: SDUPTHER

## 2021-08-17 NOTE — ASSESSMENT & PLAN NOTE
Hospitalist Hospitalist I reinforced lifestyle modifications pt has been working on and commended him for the weight loss he has achieved in the past couple of months  He reports his clothes are all big on him and he feels more healthy and has more energy  He does aim to lose at least 25 more pounds and I encouraged him to continue with everything he has done  Pt will be doing f/u blood work prior to visit scheduled in July  Hospitalist Hospitalist Hospitalist Hospitalist

## 2021-08-23 ENCOUNTER — OFFICE VISIT (OUTPATIENT)
Dept: INTERNAL MEDICINE CLINIC | Facility: CLINIC | Age: 61
End: 2021-08-23
Payer: COMMERCIAL

## 2021-08-23 ENCOUNTER — OFFICE VISIT (OUTPATIENT)
Dept: CARDIOLOGY CLINIC | Facility: CLINIC | Age: 61
End: 2021-08-23
Payer: COMMERCIAL

## 2021-08-23 VITALS
SYSTOLIC BLOOD PRESSURE: 136 MMHG | HEART RATE: 64 BPM | WEIGHT: 222.3 LBS | HEIGHT: 69 IN | DIASTOLIC BLOOD PRESSURE: 74 MMHG | BODY MASS INDEX: 32.92 KG/M2

## 2021-08-23 VITALS
WEIGHT: 222 LBS | BODY MASS INDEX: 32.88 KG/M2 | HEIGHT: 69 IN | TEMPERATURE: 98.2 F | SYSTOLIC BLOOD PRESSURE: 144 MMHG | OXYGEN SATURATION: 98 % | HEART RATE: 68 BPM | DIASTOLIC BLOOD PRESSURE: 78 MMHG

## 2021-08-23 DIAGNOSIS — I10 ESSENTIAL HYPERTENSION: Primary | ICD-10-CM

## 2021-08-23 DIAGNOSIS — I25.9 CHRONIC ISCHEMIC HEART DISEASE, UNSPECIFIED: ICD-10-CM

## 2021-08-23 DIAGNOSIS — Z95.5 H/O HEART ARTERY STENT: ICD-10-CM

## 2021-08-23 DIAGNOSIS — T78.01XA PEANUT-INDUCED ANAPHYLAXIS, INITIAL ENCOUNTER: ICD-10-CM

## 2021-08-23 DIAGNOSIS — E78.00 PURE HYPERCHOLESTEROLEMIA: ICD-10-CM

## 2021-08-23 DIAGNOSIS — I25.2 HISTORY OF MI (MYOCARDIAL INFARCTION): ICD-10-CM

## 2021-08-23 DIAGNOSIS — I10 ESSENTIAL (PRIMARY) HYPERTENSION: Primary | ICD-10-CM

## 2021-08-23 DIAGNOSIS — I25.10 CORONARY ARTERIOSCLEROSIS: ICD-10-CM

## 2021-08-23 PROBLEM — I21.9 MI (MYOCARDIAL INFARCTION) (HCC): Status: RESOLVED | Noted: 2021-06-22 | Resolved: 2021-08-23

## 2021-08-23 PROCEDURE — 99213 OFFICE O/P EST LOW 20 MIN: CPT | Performed by: NURSE PRACTITIONER

## 2021-08-23 PROCEDURE — 99214 OFFICE O/P EST MOD 30 MIN: CPT | Performed by: INTERNAL MEDICINE

## 2021-08-23 RX ORDER — NITROGLYCERIN 0.4 MG/1
0.4 TABLET SUBLINGUAL
Qty: 30 TABLET | Refills: 0 | Status: SHIPPED | OUTPATIENT
Start: 2021-08-23 | End: 2021-10-18 | Stop reason: SDUPTHER

## 2021-08-23 RX ORDER — EPINEPHRINE 0.3 MG/.3ML
0.3 INJECTION SUBCUTANEOUS ONCE
Qty: 2 EACH | Refills: 0 | Status: SHIPPED | OUTPATIENT
Start: 2021-08-23 | End: 2021-10-18 | Stop reason: SDUPTHER

## 2021-08-23 NOTE — ASSESSMENT & PLAN NOTE
At this point, condition is stable  Denies cp, palpations, activity intolerance  He is requesting clearance to work overtime at his job  Letter provided giving permission for an additional hour per shift and a single additional shift per week  Cardiology also in agreement with temporary allowance of overtime  Pt reminded of the importance of taking his medications daily

## 2021-08-23 NOTE — ASSESSMENT & PLAN NOTE
BP is elevated today; per pt he has not taken his medications since Friday  He is encouraged to resume taking everything 7 days a week  He denies cp, sob  Has been having headaches and neck pain  Will continue to monitor his renal function and bp

## 2021-08-23 NOTE — LETTER
8/23/21    To Whom it May Concern:    Sampson López is a patient under my care  He has clearance from me to work 1 hour overtime per shift and one extra day per week  Due to chronic medical conditions, he is NOT cleared to work more than 9 hours in a single day or more than 6 days per week  This clearance is granted through 1/23/21, at which point he should be back to his normal contracted hours        Thank you,      Kun Perez

## 2021-08-23 NOTE — PROGRESS NOTES
Assessment/Plan:  I have spent 20 minutes with Patient  today in which greater than 50% of this time was spent in counseling/coordination of care regarding Risks and benefits of tx options, Intructions for management, Importance of tx compliance, Risk factor reductions and Impressions  Essential hypertension  BP is elevated today; per pt he has not taken his medications since Friday  He is encouraged to resume taking everything 7 days a week  He denies cp, sob  Has been having headaches and neck pain  Will continue to monitor his renal function and bp  Chronic ischemic heart disease, unspecified  At this point, condition is stable  Denies cp, palpations, activity intolerance  He is requesting clearance to work overtime at his job  Letter provided giving permission for an additional hour per shift and a single additional shift per week  Cardiology also in agreement with temporary allowance of overtime  Pt reminded of the importance of taking his medications daily  Diagnoses and all orders for this visit:    Essential hypertension    History of MI (myocardial infarction)  -     nitroglycerin (NITROSTAT) 0 4 mg SL tablet; Place 1 tablet (0 4 mg total) under the tongue every 5 (five) minutes as needed for chest pain    Peanut-induced anaphylaxis, initial encounter  -     EPINEPHrine (EPIPEN) 0 3 mg/0 3 mL SOAJ; Inject 0 3 mL (0 3 mg total) into a muscle once for 1 dose    Chronic ischemic heart disease, unspecified          Subjective:      Patient ID: Em Blood  is a 64 y o  male  Pt is a 63 yo male here to discuss his work schedule  PMH includes CAD, MI, HTN, GERD, obesity, anxiety, arthritis  Pt's employer has habitually required mandatory overtime of their employees and pt has suffered in the past from extreme exhaustion as a result    His work is very physically demanding and with his cardiac history, the long, strenuous hours without ample rest could be potentially detrimental to his health  In the past, I have given him letters to excuse him from overtime work as well as allowing for temporary permission for limited overtime  He is again requesting permission for participating in overtime  He discussed with cardiology about this; cardiologist feels that a temporary allowance would be acceptable  Pt denies chest pain, palpitations, shortness of breath, dizziness, numbness, tingling  Appetite is normal, no N/V/D  He does get headaches from time to time and stiff neck  He has not been taking his blood pressure medication for the last several day, states usually takes all medications before going to work, so on weekends he does not work and forgets to take them  The following portions of the patient's history were reviewed and updated as appropriate: allergies, current medications, past family history, past medical history, past social history, past surgical history and problem list     Review of Systems   Constitutional: Negative for activity change, appetite change, chills, fatigue, fever and unexpected weight change  HENT: Negative for hearing loss  Eyes: Negative for visual disturbance  Respiratory: Negative for cough, chest tightness and shortness of breath  Cardiovascular: Negative for chest pain, palpitations and leg swelling  Gastrointestinal: Negative for constipation, diarrhea, nausea and vomiting  Genitourinary: Negative for dysuria and frequency  Musculoskeletal: Positive for neck pain  Negative for arthralgias and myalgias  Allergic/Immunologic: Negative for environmental allergies  Neurological: Positive for headaches  Negative for dizziness, weakness and numbness  Objective:      /78   Pulse 68   Temp 98 2 °F (36 8 °C) (Skin)   Ht 5' 9" (1 753 m)   Wt 101 kg (222 lb)   SpO2 98%   BMI 32 78 kg/m²          Physical Exam  Vitals reviewed  Constitutional:       General: He is awake  He is not in acute distress  Appearance: Normal appearance  He is well-developed and well-groomed  He is not ill-appearing  HENT:      Right Ear: Hearing and external ear normal       Left Ear: Hearing and external ear normal    Eyes:      General: Lids are normal       Conjunctiva/sclera: Conjunctivae normal    Neck:      Thyroid: No thyromegaly  Vascular: No carotid bruit or JVD  Cardiovascular:      Rate and Rhythm: Normal rate and regular rhythm  Pulses: Normal pulses  Heart sounds: Normal heart sounds, S1 normal and S2 normal  No murmur heard  Pulmonary:      Effort: Pulmonary effort is normal       Breath sounds: Normal breath sounds  Abdominal:      General: Bowel sounds are normal       Palpations: Abdomen is soft  Tenderness: There is no abdominal tenderness  Musculoskeletal:         General: Normal range of motion  Cervical back: Normal range of motion  Right lower leg: No edema  Left lower leg: No edema  Skin:     General: Skin is warm and dry  Neurological:      Mental Status: He is alert and oriented to person, place, and time  Psychiatric:         Attention and Perception: Attention normal          Mood and Affect: Mood normal          Speech: Speech normal          Behavior: Behavior normal  Behavior is cooperative  Thought Content:  Thought content normal          Cognition and Memory: Cognition normal          Judgment: Judgment normal

## 2021-09-21 DIAGNOSIS — G47.00 INSOMNIA, UNSPECIFIED TYPE: ICD-10-CM

## 2021-09-21 DIAGNOSIS — M54.9 CHRONIC BILATERAL BACK PAIN, UNSPECIFIED BACK LOCATION: ICD-10-CM

## 2021-09-21 DIAGNOSIS — G89.29 CHRONIC BILATERAL BACK PAIN, UNSPECIFIED BACK LOCATION: ICD-10-CM

## 2021-09-21 DIAGNOSIS — M79.2 NERVE PAIN: ICD-10-CM

## 2021-09-21 RX ORDER — CYCLOBENZAPRINE HCL 10 MG
10 TABLET ORAL
Qty: 90 TABLET | Refills: 0 | Status: SHIPPED | OUTPATIENT
Start: 2021-09-21 | End: 2021-12-20

## 2021-09-21 RX ORDER — TRAZODONE HYDROCHLORIDE 100 MG/1
100 TABLET ORAL
Qty: 90 TABLET | Refills: 0 | Status: SHIPPED | OUTPATIENT
Start: 2021-09-21 | End: 2021-12-22 | Stop reason: SDUPTHER

## 2021-09-21 RX ORDER — GABAPENTIN 300 MG/1
300 CAPSULE ORAL DAILY
Qty: 90 CAPSULE | Refills: 0 | Status: SHIPPED | OUTPATIENT
Start: 2021-09-21 | End: 2022-04-08 | Stop reason: SDUPTHER

## 2021-10-18 DIAGNOSIS — I25.2 HISTORY OF MI (MYOCARDIAL INFARCTION): ICD-10-CM

## 2021-10-18 DIAGNOSIS — T78.01XA PEANUT-INDUCED ANAPHYLAXIS, INITIAL ENCOUNTER: ICD-10-CM

## 2021-10-18 RX ORDER — EPINEPHRINE 0.3 MG/.3ML
0.3 INJECTION SUBCUTANEOUS ONCE
Qty: 2 EACH | Refills: 0
Start: 2021-10-18 | End: 2021-10-20 | Stop reason: SDUPTHER

## 2021-10-18 RX ORDER — NITROGLYCERIN 0.4 MG/1
0.4 TABLET SUBLINGUAL
Qty: 30 TABLET | Refills: 0
Start: 2021-10-18 | End: 2021-10-20 | Stop reason: SDUPTHER

## 2021-10-19 ENCOUNTER — OFFICE VISIT (OUTPATIENT)
Dept: OBGYN CLINIC | Facility: HOSPITAL | Age: 61
End: 2021-10-19
Payer: COMMERCIAL

## 2021-10-19 VITALS
DIASTOLIC BLOOD PRESSURE: 90 MMHG | SYSTOLIC BLOOD PRESSURE: 171 MMHG | HEIGHT: 69 IN | HEART RATE: 71 BPM | WEIGHT: 223.2 LBS | BODY MASS INDEX: 33.06 KG/M2

## 2021-10-19 DIAGNOSIS — M17.12 PRIMARY OSTEOARTHRITIS OF LEFT KNEE: Primary | ICD-10-CM

## 2021-10-19 DIAGNOSIS — M17.11 PRIMARY OSTEOARTHRITIS OF RIGHT KNEE: ICD-10-CM

## 2021-10-19 PROCEDURE — 99214 OFFICE O/P EST MOD 30 MIN: CPT | Performed by: ORTHOPAEDIC SURGERY

## 2021-10-19 PROCEDURE — 20610 DRAIN/INJ JOINT/BURSA W/O US: CPT | Performed by: ORTHOPAEDIC SURGERY

## 2021-10-19 RX ORDER — BUPIVACAINE HYDROCHLORIDE 2.5 MG/ML
2 INJECTION, SOLUTION INFILTRATION; PERINEURAL
Status: COMPLETED | OUTPATIENT
Start: 2021-10-19 | End: 2021-10-19

## 2021-10-19 RX ORDER — METHYLPREDNISOLONE ACETATE 40 MG/ML
2 INJECTION, SUSPENSION INTRA-ARTICULAR; INTRALESIONAL; INTRAMUSCULAR; SOFT TISSUE
Status: COMPLETED | OUTPATIENT
Start: 2021-10-19 | End: 2021-10-19

## 2021-10-19 RX ADMIN — METHYLPREDNISOLONE ACETATE 2 ML: 40 INJECTION, SUSPENSION INTRA-ARTICULAR; INTRALESIONAL; INTRAMUSCULAR; SOFT TISSUE at 08:18

## 2021-10-19 RX ADMIN — BUPIVACAINE HYDROCHLORIDE 2 ML: 2.5 INJECTION, SOLUTION INFILTRATION; PERINEURAL at 08:18

## 2021-10-20 DIAGNOSIS — T78.01XA PEANUT-INDUCED ANAPHYLAXIS, INITIAL ENCOUNTER: ICD-10-CM

## 2021-10-20 DIAGNOSIS — I25.2 HISTORY OF MI (MYOCARDIAL INFARCTION): ICD-10-CM

## 2021-10-21 RX ORDER — NITROGLYCERIN 0.4 MG/1
0.4 TABLET SUBLINGUAL
Qty: 30 TABLET | Refills: 0
Start: 2021-10-21 | End: 2022-07-31 | Stop reason: SDUPTHER

## 2021-10-21 RX ORDER — EPINEPHRINE 0.3 MG/.3ML
0.3 INJECTION SUBCUTANEOUS ONCE
Qty: 2 EACH | Refills: 0
Start: 2021-10-21 | End: 2022-04-08 | Stop reason: SDUPTHER

## 2021-10-29 ENCOUNTER — OFFICE VISIT (OUTPATIENT)
Dept: INTERNAL MEDICINE CLINIC | Facility: CLINIC | Age: 61
End: 2021-10-29
Payer: COMMERCIAL

## 2021-10-29 VITALS
OXYGEN SATURATION: 97 % | SYSTOLIC BLOOD PRESSURE: 150 MMHG | HEIGHT: 69 IN | HEART RATE: 71 BPM | DIASTOLIC BLOOD PRESSURE: 90 MMHG | BODY MASS INDEX: 32.73 KG/M2 | WEIGHT: 221 LBS | TEMPERATURE: 97 F

## 2021-10-29 DIAGNOSIS — D69.6 THROMBOCYTOPENIA (HCC): ICD-10-CM

## 2021-10-29 DIAGNOSIS — I10 ESSENTIAL (PRIMARY) HYPERTENSION: Primary | ICD-10-CM

## 2021-10-29 DIAGNOSIS — I25.10 ATHEROSCLEROSIS OF CORONARY ARTERY OF NATIVE HEART WITHOUT ANGINA PECTORIS, UNSPECIFIED VESSEL OR LESION TYPE: ICD-10-CM

## 2021-10-29 DIAGNOSIS — I10 ESSENTIAL HYPERTENSION: ICD-10-CM

## 2021-10-29 DIAGNOSIS — I25.2 HISTORY OF MI (MYOCARDIAL INFARCTION): ICD-10-CM

## 2021-10-29 DIAGNOSIS — Z11.4 SCREENING FOR HIV (HUMAN IMMUNODEFICIENCY VIRUS): ICD-10-CM

## 2021-10-29 DIAGNOSIS — Z23 ENCOUNTER FOR IMMUNIZATION: ICD-10-CM

## 2021-10-29 DIAGNOSIS — Z13.1 SCREENING FOR DIABETES MELLITUS: ICD-10-CM

## 2021-10-29 PROBLEM — Z01.118 ABNORMAL EXAM OF LEFT EAR: Status: RESOLVED | Noted: 2019-05-30 | Resolved: 2021-10-29

## 2021-10-29 PROBLEM — Z98.890 PONV (POSTOPERATIVE NAUSEA AND VOMITING): Status: RESOLVED | Noted: 2021-06-22 | Resolved: 2021-10-29

## 2021-10-29 PROBLEM — N17.9 ARF (ACUTE RENAL FAILURE) (HCC): Status: RESOLVED | Noted: 2020-11-30 | Resolved: 2021-10-29

## 2021-10-29 PROBLEM — R11.2 PONV (POSTOPERATIVE NAUSEA AND VOMITING): Status: RESOLVED | Noted: 2021-06-22 | Resolved: 2021-10-29

## 2021-10-29 PROCEDURE — 99214 OFFICE O/P EST MOD 30 MIN: CPT | Performed by: NURSE PRACTITIONER

## 2021-10-29 PROCEDURE — 90682 RIV4 VACC RECOMBINANT DNA IM: CPT | Performed by: NURSE PRACTITIONER

## 2021-10-29 PROCEDURE — 90471 IMMUNIZATION ADMIN: CPT | Performed by: NURSE PRACTITIONER

## 2021-10-29 RX ORDER — AMLODIPINE BESYLATE 2.5 MG/1
2.5 TABLET ORAL DAILY
Qty: 30 TABLET | Refills: 5 | Status: SHIPPED | OUTPATIENT
Start: 2021-10-29 | End: 2022-02-24 | Stop reason: SDUPTHER

## 2021-11-07 ENCOUNTER — APPOINTMENT (EMERGENCY)
Dept: RADIOLOGY | Facility: HOSPITAL | Age: 61
End: 2021-11-07
Payer: COMMERCIAL

## 2021-11-07 ENCOUNTER — HOSPITAL ENCOUNTER (EMERGENCY)
Facility: HOSPITAL | Age: 61
Discharge: HOME | End: 2021-11-07
Attending: EMERGENCY MEDICINE | Admitting: EMERGENCY MEDICINE
Payer: COMMERCIAL

## 2021-11-07 VITALS
RESPIRATION RATE: 18 BRPM | SYSTOLIC BLOOD PRESSURE: 171 MMHG | TEMPERATURE: 99 F | DIASTOLIC BLOOD PRESSURE: 96 MMHG | OXYGEN SATURATION: 99 % | HEART RATE: 72 BPM

## 2021-11-07 DIAGNOSIS — R10.9 RIGHT FLANK PAIN: Primary | ICD-10-CM

## 2021-11-07 DIAGNOSIS — N20.1 CALCULUS OF URETEROVESICAL JUNCTION (UVJ): ICD-10-CM

## 2021-11-07 DIAGNOSIS — N13.30 HYDRONEPHROSIS: ICD-10-CM

## 2021-11-07 LAB
ANION GAP SERPL CALCULATED.3IONS-SCNC: 4 MMOL/L (ref 4–13)
BACTERIA UR QL AUTO: ABNORMAL /HPF
BILIRUB UR QL STRIP: NEGATIVE
BUN SERPL-MCNC: 24 MG/DL (ref 5–25)
CALCIUM SERPL-MCNC: 9.3 MG/DL (ref 8.3–10.1)
CHLORIDE SERPL-SCNC: 106 MMOL/L (ref 100–108)
CLARITY UR: CLEAR
CO2 SERPL-SCNC: 26 MMOL/L (ref 21–32)
COLOR UR: YELLOW
CREAT SERPL-MCNC: 0.96 MG/DL (ref 0.6–1.3)
GFR SERPL CREATININE-BSD FRML MDRD: 85 ML/MIN/1.73SQ M
GLUCOSE SERPL-MCNC: 108 MG/DL (ref 65–140)
GLUCOSE UR STRIP-MCNC: NEGATIVE MG/DL
HGB UR QL STRIP.AUTO: ABNORMAL
KETONES UR STRIP-MCNC: ABNORMAL MG/DL
LEUKOCYTE ESTERASE UR QL STRIP: NEGATIVE
MUCOUS THREADS UR QL AUTO: ABNORMAL
NITRITE UR QL STRIP: NEGATIVE
NON-SQ EPI CELLS URNS QL MICRO: ABNORMAL /HPF
PH UR STRIP.AUTO: 5.5 [PH] (ref 4.5–8)
POTASSIUM SERPL-SCNC: 4 MMOL/L (ref 3.5–5.3)
PROT UR STRIP-MCNC: ABNORMAL MG/DL
RBC #/AREA URNS AUTO: ABNORMAL /HPF
SODIUM SERPL-SCNC: 136 MMOL/L (ref 136–145)
SP GR UR STRIP.AUTO: 1.02 (ref 1–1.03)
UROBILINOGEN UR QL STRIP.AUTO: 0.2 E.U./DL
WBC #/AREA URNS AUTO: ABNORMAL /HPF

## 2021-11-07 PROCEDURE — 74176 CT ABD & PELVIS W/O CONTRAST: CPT

## 2021-11-07 PROCEDURE — 96374 THER/PROPH/DIAG INJ IV PUSH: CPT

## 2021-11-07 PROCEDURE — 80048 BASIC METABOLIC PNL TOTAL CA: CPT | Performed by: EMERGENCY MEDICINE

## 2021-11-07 PROCEDURE — 99284 EMERGENCY DEPT VISIT MOD MDM: CPT

## 2021-11-07 PROCEDURE — 36415 COLL VENOUS BLD VENIPUNCTURE: CPT | Performed by: EMERGENCY MEDICINE

## 2021-11-07 PROCEDURE — 81001 URINALYSIS AUTO W/SCOPE: CPT

## 2021-11-07 PROCEDURE — 99285 EMERGENCY DEPT VISIT HI MDM: CPT | Performed by: EMERGENCY MEDICINE

## 2021-11-07 PROCEDURE — 76775 US EXAM ABDO BACK WALL LIM: CPT | Performed by: EMERGENCY MEDICINE

## 2021-11-07 RX ORDER — MORPHINE SULFATE 15 MG/1
15 TABLET ORAL EVERY 4 HOURS PRN
Status: DISCONTINUED | OUTPATIENT
Start: 2021-11-07 | End: 2021-11-07 | Stop reason: HOSPADM

## 2021-11-07 RX ORDER — MORPHINE SULFATE 4 MG/ML
4 INJECTION, SOLUTION INTRAMUSCULAR; INTRAVENOUS ONCE
Status: COMPLETED | OUTPATIENT
Start: 2021-11-07 | End: 2021-11-07

## 2021-11-07 RX ORDER — OXYCODONE HYDROCHLORIDE 5 MG/1
5 TABLET ORAL EVERY 4 HOURS PRN
Qty: 20 TABLET | Refills: 0 | Status: SHIPPED | OUTPATIENT
Start: 2021-11-07

## 2021-11-07 RX ADMIN — MORPHINE SULFATE 4 MG: 4 INJECTION INTRAVENOUS at 15:14

## 2021-11-07 RX ADMIN — MORPHINE SULFATE 15 MG: 15 TABLET ORAL at 14:10

## 2021-11-08 ENCOUNTER — TELEPHONE (OUTPATIENT)
Dept: UROLOGY | Facility: MEDICAL CENTER | Age: 61
End: 2021-11-08

## 2021-11-12 ENCOUNTER — TELEPHONE (OUTPATIENT)
Dept: UROLOGY | Facility: AMBULATORY SURGERY CENTER | Age: 61
End: 2021-11-12

## 2021-11-12 ENCOUNTER — OFFICE VISIT (OUTPATIENT)
Dept: UROLOGY | Facility: AMBULATORY SURGERY CENTER | Age: 61
End: 2021-11-12
Payer: COMMERCIAL

## 2021-11-12 VITALS
HEART RATE: 76 BPM | HEIGHT: 69 IN | BODY MASS INDEX: 32.88 KG/M2 | WEIGHT: 222 LBS | SYSTOLIC BLOOD PRESSURE: 146 MMHG | DIASTOLIC BLOOD PRESSURE: 84 MMHG

## 2021-11-12 DIAGNOSIS — N20.0 NEPHROLITHIASIS: Primary | ICD-10-CM

## 2021-11-12 PROCEDURE — 99214 OFFICE O/P EST MOD 30 MIN: CPT | Performed by: UROLOGY

## 2021-11-15 ENCOUNTER — TELEPHONE (OUTPATIENT)
Dept: UROLOGY | Facility: AMBULATORY SURGERY CENTER | Age: 61
End: 2021-11-15

## 2021-11-16 ENCOUNTER — TELEPHONE (OUTPATIENT)
Dept: UROLOGY | Facility: AMBULATORY SURGERY CENTER | Age: 61
End: 2021-11-16

## 2021-11-16 PROCEDURE — 82360 CALCULUS ASSAY QUANT: CPT | Performed by: NURSE PRACTITIONER

## 2021-11-19 ENCOUNTER — HOSPITAL ENCOUNTER (OUTPATIENT)
Dept: RADIOLOGY | Facility: HOSPITAL | Age: 61
Discharge: HOME | End: 2021-11-19
Attending: UROLOGY
Payer: COMMERCIAL

## 2021-11-19 DIAGNOSIS — N20.0 NEPHROLITHIASIS: ICD-10-CM

## 2021-11-19 PROCEDURE — 76770 US EXAM ABDO BACK WALL COMP: CPT

## 2021-11-21 LAB
COLOR STONE: NORMAL
COM MFR STONE: 100 %
COMMENT-STONE3: NORMAL
COMPOSITION: NORMAL
LABORATORY COMMENT REPORT: NORMAL
PHOTO: NORMAL
SIZE STONE: NORMAL MM
SPEC SOURCE SUBJ: NORMAL
STONE ANALYSIS-IMP: NORMAL
WT STONE: 185 MG

## 2021-11-22 DIAGNOSIS — N40.0 BENIGN PROSTATIC HYPERPLASIA, UNSPECIFIED WHETHER LOWER URINARY TRACT SYMPTOMS PRESENT: ICD-10-CM

## 2021-11-22 DIAGNOSIS — N30.00 ACUTE CYSTITIS WITHOUT HEMATURIA: ICD-10-CM

## 2021-11-22 RX ORDER — TAMSULOSIN HYDROCHLORIDE 0.4 MG/1
0.4 CAPSULE ORAL
Qty: 90 CAPSULE | Refills: 1 | Status: SHIPPED | OUTPATIENT
Start: 2021-11-22 | End: 2022-04-08 | Stop reason: SDUPTHER

## 2021-12-19 DIAGNOSIS — M54.9 CHRONIC BILATERAL BACK PAIN, UNSPECIFIED BACK LOCATION: ICD-10-CM

## 2021-12-19 DIAGNOSIS — G89.29 CHRONIC BILATERAL BACK PAIN, UNSPECIFIED BACK LOCATION: ICD-10-CM

## 2021-12-20 RX ORDER — CYCLOBENZAPRINE HCL 10 MG
TABLET ORAL
Qty: 90 TABLET | Refills: 0 | Status: SHIPPED | OUTPATIENT
Start: 2021-12-20 | End: 2022-04-08 | Stop reason: SDUPTHER

## 2021-12-22 DIAGNOSIS — G47.00 INSOMNIA, UNSPECIFIED TYPE: ICD-10-CM

## 2021-12-22 RX ORDER — TRAZODONE HYDROCHLORIDE 100 MG/1
100 TABLET ORAL
Qty: 90 TABLET | Refills: 0 | Status: SHIPPED | OUTPATIENT
Start: 2021-12-22 | End: 2021-12-28

## 2021-12-28 DIAGNOSIS — G47.00 INSOMNIA, UNSPECIFIED TYPE: ICD-10-CM

## 2021-12-28 RX ORDER — TRAZODONE HYDROCHLORIDE 100 MG/1
TABLET ORAL
Qty: 90 TABLET | Refills: 0 | Status: SHIPPED | OUTPATIENT
Start: 2021-12-28 | End: 2022-04-08 | Stop reason: SDUPTHER

## 2022-01-21 ENCOUNTER — OFFICE VISIT (OUTPATIENT)
Dept: OBGYN CLINIC | Facility: CLINIC | Age: 62
End: 2022-01-21
Payer: COMMERCIAL

## 2022-01-21 VITALS
BODY MASS INDEX: 32.61 KG/M2 | HEIGHT: 69 IN | DIASTOLIC BLOOD PRESSURE: 82 MMHG | HEART RATE: 64 BPM | SYSTOLIC BLOOD PRESSURE: 149 MMHG | WEIGHT: 220.2 LBS

## 2022-01-21 DIAGNOSIS — M17.11 PRIMARY OSTEOARTHRITIS OF RIGHT KNEE: ICD-10-CM

## 2022-01-21 DIAGNOSIS — M17.12 PRIMARY OSTEOARTHRITIS OF LEFT KNEE: Primary | ICD-10-CM

## 2022-01-21 PROCEDURE — 99213 OFFICE O/P EST LOW 20 MIN: CPT | Performed by: PHYSICIAN ASSISTANT

## 2022-01-21 PROCEDURE — 20610 DRAIN/INJ JOINT/BURSA W/O US: CPT | Performed by: PHYSICIAN ASSISTANT

## 2022-01-21 RX ORDER — BUPIVACAINE HYDROCHLORIDE 2.5 MG/ML
2 INJECTION, SOLUTION INFILTRATION; PERINEURAL
Status: COMPLETED | OUTPATIENT
Start: 2022-01-21 | End: 2022-01-21

## 2022-01-21 RX ORDER — METHYLPREDNISOLONE ACETATE 40 MG/ML
2 INJECTION, SUSPENSION INTRA-ARTICULAR; INTRALESIONAL; INTRAMUSCULAR; SOFT TISSUE
Status: COMPLETED | OUTPATIENT
Start: 2022-01-21 | End: 2022-01-21

## 2022-01-21 RX ADMIN — METHYLPREDNISOLONE ACETATE 2 ML: 40 INJECTION, SUSPENSION INTRA-ARTICULAR; INTRALESIONAL; INTRAMUSCULAR; SOFT TISSUE at 08:02

## 2022-01-21 RX ADMIN — BUPIVACAINE HYDROCHLORIDE 2 ML: 2.5 INJECTION, SOLUTION INFILTRATION; PERINEURAL at 08:02

## 2022-01-21 NOTE — PROGRESS NOTES
[address] [facility], [address]  64 y o  male MRN: [id number]      Chief Complaint:   bilateral knee pain    HPI:   64 y o male complaining of bilateral knee pain  Presents office today regarding bilateral knee pain  Patient has been diagnosed with bilateral knee pain due to osteoarthritis  Patient has received injections in corticosteroid in his bilateral knees with significant pain relief for approximately 2 and half months however pain has since returned  Patient localized the pain is bilateral knees worse with weight-bearing mildly relieved with rest pain is aching in nature without radiation denies instability clicking catching bilateral knees                  Review Of Systems:   · Skin: Normal  · Neuro: See HPI  · Musculoskeletal: See HPI  · All other systems reviewed and are negative    Past Medical History:   Past Medical History:   Diagnosis Date    Abdominal lump 4/20/2018    Anxiety     Arm pain     left-may have torn the bicep tendon    Arthritis     Cardiac disease     Contact lens/glasses fitting     Coronary artery disease     COVID-19 vaccine series completed     Moderna x2    Episode of dizziness 11/26/2018    Herpes zoster with complication 4/4/5111    Hyperlipidemia     Hypertension     Kidney stone     Myocardial infarction [facility]) 2010    cardiac stent x2    Obesity     Pain of right great toe 7/22/2019    PONV (postoperative nausea and vomiting)     in the past-1980's    Sepsis ([facility] [address] ) 12/2020    urinary issue-back up       Past Surgical History:   Past Surgical History:   Procedure Laterality Date    ABDOMINAL SURGERY      tumor on stomach    ANGIOPLASTY      2010-x2 stents left side    BACK SURGERY      lower back-dissectomy    CARDIAC SURGERY      angio with stents    CIRCUMCISION      COLONOSCOPY      CYSTOSCOPY      EGD AND COLONOSCOPY      GASTRIC BYPASS  2005    IR ABSCESS/SEROMA DRAINAGE  1/4/2019    LITHOTRIPSY      x2    NECK SURGERY herniated disc C4/5    OTHER SURGICAL HISTORY      removal of vocal cord lesion     DE EXC TUMOR SOFT TISSUE ABDOMINAL WALL SUBQ <3CM N/A 2/27/2019    Procedure: ABDOMINAL SEROMA EXCISION;  Surgeon: Lavelle Edmonds MD;  [facility];  Service: General    ROTATOR CUFF REPAIR Bilateral     SHOULDER SURGERY Bilateral     TONSILLECTOMY      VASCULAR SURGERY      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    WISDOM TOOTH EXTRACTION      WOUND DEBRIDEMENT N/A 3/7/2019    Procedure: DEBRIDEMENT WOUND ABDOMINAL ([facility]) AND WOUND VAC APPLICATION;  Surgeon: Lavelle Edmonds MD;  [facility];  Service: General    WOUND DEBRIDEMENT N/A 3/9/2019    Procedure: DEBRIDEMENT WOUND ABDOMINAL ([facility] OUT), wound vac dressing change;  Surgeon: Terrell Falcon DO;  [facility];  Service: General       Family History:  Family history reviewed and non-contributory  Family History   Problem Relation Age of Onset    Heart disease Mother         passed during open heart surgery     Heart disease Father     Diabetes Father         caused blindness     Lung cancer Sister     Cancer Sister     Heart disease Brother         MI    No Known Problems Son     No Known Problems Son     No Known Problems Son     No Known Problems Daughter          Social History:  Social History     Socioeconomic History    Marital status: /Civil Union     Spouse name: Ge Marie Number of children: 3    Years of education: None    Highest education level: None   Occupational History    None   Tobacco Use    Smoking status: Never Smoker    Smokeless tobacco: Never Used    Tobacco comment: rare cigar smoking   Vaping Use    Vaping Use: Never used   Substance and Sexual Activity    Alcohol use: Not Currently     Comment: occasional last drink 2/2020    Drug use: No    Sexual activity: Not Currently   Other Topics Concern    None   Social History Narrative    Patient lives in a home that was built in [place] [employer]/forced hot air     [employer] junior in the bedroom     Central air    Window air conditioning bedroom     Finished basement-dry-no mold or musty smell    Dehumidifier     Air  attached to [employer] in the winter months     Home is smoke free     Does not live near wooded are or open fields         Dog x5 (Joyce bridge, Ja, Jocelynn moreno, Young Harris, and La hazel) and there allowed in the bedroom         Caffeine: soda occasional                     Coffee is decaf 1 cup daily     Chocolate consumed rarely         Patient lives with wife and two children      Social Determinants of Health     Financial Resource Strain: Not on file   Food Insecurity: Not on file   Transportation Needs: Not on file   Physical Activity: Not on file   Stress: Not on file   Social Connections: Not on file   Intimate Partner Violence: Not on file   Housing Stability: Not on file       Allergies: Allergies   Allergen Reactions    Adhesive [Medical Tape] Swelling     Tape on eye caused eye to swell could not open! Itching large hives from on arms     CAN USE PAPER TAPE    Cephalosporins Anaphylaxis    Nuts - Food Allergy Anaphylaxis    Other Abdominal Pain     Soy  Patient can not have an NGT due to bariatric surgery    Peanut Oil - Food Allergy Anaphylaxis    Penicillins Anaphylaxis     Itching and hives    Soy Isoflavones Other (See Comments)     anaphylaxis    Shellfish Allergy - Food Allergy      Betadine/ cat scan dye is Okay  Cannot eat shrimp, crab shellfish avoids all fish      Shellfish-Derived Products - Food Allergy Other (See Comments)     Positive test       Labs:  0   Lab Value Date/Time    HCT 49 9 (H) 03/29/2021 0710    HCT 39 8 12/10/2020 0452    HCT 41 4 12/09/2020 1058    HGB 16 5 03/29/2021 0710    HGB 13 0 12/10/2020 0452    HGB 13 3 12/09/2020 1058    INR 1 18 11/30/2020 1130    WBC 6 93 03/29/2021 0710    WBC 6 74 12/10/2020 0452    WBC 9 46 12/09/2020 1058    CRP <3 0 11/30/2019 1041 Meds:    Current Outpatient Medications:     acetaminophen (TYLENOL) 500 mg tablet, Take 1,000 mg by mouth every 6 (six) hours as needed for mild pain, Disp: , Rfl:     amLODIPine (NORVASC) 2 5 mg tablet, Take 1 tablet (2 5 mg total) by mouth daily, Disp: 30 tablet, Rfl: 5    aspirin (ECOTRIN LOW STRENGTH) 81 mg EC tablet, Take 162 mg by mouth daily , Disp: , Rfl:     atorvastatin (LIPITOR) 40 mg tablet, Take 1 tablet (40 mg total) by mouth daily, Disp: 90 tablet, Rfl: 1    cyclobenzaprine (FLEXERIL) 10 mg tablet, TAKE 1/2 TO 1 TABLET BY MOUTH AT BEDTIME AS NEEDED, Disp: 90 tablet, Rfl: 0    Diclofenac Sodium (VOLTAREN) 1 %, Apply 2 g topically 4 (four) times a day, Disp: 350 g, Rfl: 1    gabapentin (NEURONTIN) 300 mg capsule, Take 1 capsule (300 mg total) by mouth daily, Disp: 90 capsule, Rfl: 0    lisinopril (ZESTRIL) 10 mg tablet, Take 1 tablet (10 mg total) by mouth daily, Disp: 30 tablet, Rfl: 11    metoprolol tartrate (LOPRESSOR) 25 mg tablet, Take 1 tablet (25 mg total) by mouth daily, Disp: 90 tablet, Rfl: 3    nitroglycerin (NITROSTAT) 0 4 mg SL tablet, Place 1 tablet (0 4 mg total) under the tongue every 5 (five) minutes as needed for chest pain, Disp: 30 tablet, Rfl: 0    oxyCODONE (Roxicodone) 5 immediate release tablet, Take 1 tablet (5 mg total) by mouth every 4 (four) hours as needed for moderate pain Max Daily Amount: 30 mg, Disp: 20 tablet, Rfl: 0    pantoprazole (PROTONIX) 40 mg tablet, Take 1 tablet (40 mg total) by mouth daily, Disp: 90 tablet, Rfl: 1    traZODone (DESYREL) 100 mg tablet, TAKE 1 TABLET BY MOUTH DAILY AT BEDTIME, Disp: 90 tablet, Rfl: 0    venlafaxine (EFFEXOR) 37 5 mg tablet, Take 1 tablet (37 5 mg total) by mouth daily, Disp: 90 tablet, Rfl: 1    EPINEPHrine (EPIPEN) 0 3 mg/0 3 mL SOAJ, Inject 0 3 mL (0 3 mg total) into a muscle once for 1 dose, Disp: 2 each, Rfl: 0    tamsulosin (FLOMAX) 0 4 mg, Take 1 capsule (0 4 mg total) by mouth daily with dinner, Disp: 90 capsule, Rfl: 1      Physical Exam:     General Appearance:    Alert, cooperative, no distress, appears stated age   Head:    Normocephalic, without obvious abnormality, atraumatic   Eyes:    conjunctiva/corneas clear, both eyes        Nose:   Nares normal, septum midline, no drainage    Throat:   Lips normal; teeth and gums normal   Neck:    symmetrical, trachea midline, ;     thyroid:  no enlargement/   Back:     Symmetric, no curvature, ROM normal   Lungs:   No audible wheezing or labored breathing   Chest Wall:    No tenderness or deformity    Heart:    Regular rate and rhythm               Pulses:   2+ and symmetric all extremities   Skin:   Skin color, texture, turgor normal, no rashes or lesions   Neurologic:   normal strength, sensation and reflexes     throughout       Musculoskeletal: bilateral lower extremity  On examination of the right knee there is no effusion, no erythema  Range of motion to full active extension and flexion to greater than 120°  Pain on palpation medial and lateral joint lines  There is crepitus with range of motion, no warmth to palpation, bony enlargement noted  No pain on palpation pes anserine bursa region or distal iliotibial band  Stable to varus and valgus stress without pain or gapping  Negative anterior and posterior drawer testing  Sensation intact distal pulses present  On examination of the left knee there is no effusion, no erythema  Range of motion to full active extension and flexion to greater than 120°  Pain on palpation medial and lateral joint lines  There is crepitus with range of motion, no warmth to palpation, bony enlargement noted  No pain on palpation pes anserine bursa region or distal iliotibial band  Stable to varus and valgus stress without pain or gapping  Negative anterior and posterior drawer testing  Sensation intact distal pulses present      Large joint arthrocentesis: R knee  Universal Protocol:  Consent given by: patient  Patient understanding: patient states understanding of the procedure being performed  Site marked: the operative site was marked  Patient identity confirmed: verbally with patient    Supporting Documentation  Indications: pain   Procedure Details  Location: knee - R knee  Needle size: 22 G  Approach: anterolateral  Medications administered: 2 mL bupivacaine 0 25 %; 2 mL methylPREDNISolone acetate 40 mg/mL    Patient tolerance: patient tolerated the procedure well with no immediate complications    Large joint arthrocentesis: L knee  Universal Protocol:  Consent given by: patient  Patient understanding: patient states understanding of the procedure being performed  Site marked: the operative site was marked  Patient identity confirmed: verbally with patient    Supporting Documentation  Indications: pain   Procedure Details  Location: knee - L knee  Needle size: 22 G  Approach: anterolateral  Medications administered: 2 mL bupivacaine 0 25 %; 2 mL methylPREDNISolone acetate 40 mg/mL    Patient tolerance: patient tolerated the procedure well with no immediate complications          _*_*_*_*_*_*_*_*_*_*_*_*_*_*_*_*_*_*_*_*_*_*_*_*_*_*_*_*_*_*_*_*_*_*_*_*_*_*_*_*_*    Assessment:  61 y o male with bilateral knee chronic pain due to osteoarthritis    Plan:   · Weight bearing as tolerated  bilateral lower extremity  · Case discussed with Dr Rose Wick   · Bilateral intra-articular knee corticosteroid injections administered as noted above   · Patient advised should they develop any increasing pain, redness, drainage, numbness, tingling or swelling surrounding the injection sight, they are to contact our office or present to the emergency department    · Continue home range of motion stretching strengthening exercises  · Advised patient should fail conservative management he may be candidate for total knee arthroplasty in the future  · Follow up in 3 months or sooner if needed      Tay Abarca DAVID

## 2022-01-27 ENCOUNTER — TELEPHONE (OUTPATIENT)
Dept: INTERNAL MEDICINE CLINIC | Facility: CLINIC | Age: 62
End: 2022-01-27

## 2022-01-27 NOTE — TELEPHONE ENCOUNTER
Patient called and asked for a letter to increase his restrictions for work to 2 hours  The have had short staff issues and he has been occasionally working 2 hours but his boss says he is technically not supposed to without an update letter  He still wants to have it say if tolerable because he can't always tolerate 2 hours

## 2022-02-17 NOTE — PROGRESS NOTES
Cardiology Follow Up    Miguel Angel Medico   1960  [id number]  [facility]  [facility]  [facility] [address]   [phone]    1  Essential (primary) hypertension  POCT ECG    NM myocardial perfusion spect (stress and/or rest)    amLODIPine (NORVASC) 5 mg tablet   2  Pure hypercholesterolemia  POCT ECG    NM myocardial perfusion spect (stress and/or rest)   3  Coronary arteriosclerosis  POCT ECG    NM myocardial perfusion spect (stress and/or rest)   4  H/O heart artery stent  POCT ECG    NM myocardial perfusion spect (stress and/or rest)       Interval History:  Patient is here for a follow-up visit  He has HTN and HLD  He has CAD with 2 vessel stenting performed 12/27/2010   His intervention was a VELASQUEZ in the LAD  and a VELASQUEZ in the diagonal branch   Nuclear exercise test done October 2015 showed no evidence of prognostically important ischemia   He exercised 8 min   Echocardiogram in January 2019 demonstrated preserved LV systolic function with mild LVH and mild LAE   There was no significant valvular heart disease  Lipid profile done August 2021 looked adequate  Patient was  hospitalized December 2020 in reference to acute kidney injury and UTI  He was seen by Nephrology  Mauro Orozco works in distribution at [employer] He had issues with ureteral calculus in November 2021  About three weeks ago patient had chest tightness and throat burning  He had on two consecutive nights thereafter  He did not require nitroglycerin  He does admit to being under stress around that time in terms of his work schedule  He has had no issues since  Will check a nuclear ETT and compared to the prior study  Was placed on amlodipine recently for hypertension  I will titrate this to 5 mg per day as is systolic pressure is mildly elevated today  EKG demonstrates sinus bradycardia with early repolarization    EKG is similar to a tracing done December 14, 2018      Patient Active Problem List   Diagnosis    Acute right-sided low back pain without sciatica    Essential (primary) hypertension    History of MI (myocardial infarction)    Class 1 obesity due to excess calories with serious comorbidity and body mass index (BMI) of 32 0 to 32 9 in adult    Multiple food allergies    H/O heart artery stent    Fatigue    Need for immunization against influenza    Skin irritation    Postural dizziness    Benign prostatic hyperplasia    Scleral icterus    Abnormal CT of liver    Irritant contact dermatitis    Insomnia    Urinary frequency    Hyperlipidemia    Allergy to iodine    Anaphylactic reaction    Anxiety    AV malformation, peripheral, congenital    Chronic ischemic heart disease, unspecified    Coronary atherosclerosis    PEYTON (generalized anxiety disorder)    Gastroesophageal reflux disease    Nerve pain    Muscle cramping    Spasm of muscle, back    History of PTCA    Trigger finger of right hand    Anaphylactic shock due to peanuts    Allergy to tree nuts or seeds    Peanut allergy    Soy allergy    Rash    Paresthesia of both hands    Pain in both knees    Left lower quadrant abdominal pain    S/P bariatric surgery    Weight loss, unintentional    Testicular pain, unspecified    Annual physical exam    Acute pain of right shoulder    UTI (urinary tract infection)    Abnormal LFTs    Gram-negative bacteremia    Thrombocytopenia (HCC)    Left shoulder pain    Acute kidney injury    Recent bacteremia    Chronic pain of left knee     Past Medical History:   Diagnosis Date    Abdominal lump 4/20/2018    Anxiety     Arm pain     left-may have torn the bicep tendon    Arthritis     Cardiac disease     Contact lens/glasses fitting     Coronary artery disease     COVID-19 vaccine series completed     Moderna x2    Episode of dizziness 11/26/2018    Herpes zoster with complication 0/6/3434    Hyperlipidemia     Hypertension     Kidney stone     Myocardial infarction [facility]) 2010    cardiac stent x2    Obesity     Pain of right great toe 7/22/2019    PONV (postoperative nausea and vomiting)     in the past-1980's    Sepsis ([address] ) 12/2020    urinary issue-back up     Social History     Socioeconomic History    Marital status: /Civil Union     Spouse name: Bindu Rock Number of children: 3    Years of education: Not on file    Highest education level: Not on file   Occupational History    Not on file   Tobacco Use    Smoking status: Never Smoker    Smokeless tobacco: Never Used    Tobacco comment: rare cigar smoking   Vaping Use    Vaping Use: Never used   Substance and Sexual Activity    Alcohol use: Not Currently     Comment: occasional last drink 2/2020    Drug use: No    Sexual activity: Not Currently   Other Topics Concern    Not on file   Social History Narrative    Patient lives in a home that was built in [address]     [address] in the bedroom     [place] air    Window air conditioning bedroom     Finished basement-dry-no mold or musty smell    Dehumidifier     Air  attached to [employer] in the winter months     Home is smoke free     Does not live near wooded are or open fields         Dog x5 ([facility] [place], [place], and [facility]) and there allowed in the bedroom         Caffeine: soda occasional                     Coffee is decaf 1 cup daily     Chocolate consumed rarely         Patient lives with wife and two children      Social Determinants of Health     Financial Resource Strain: Not on file   Food Insecurity: Not on file   Transportation Needs: Not on file   Physical Activity: Not on file   Stress: Not on file   Social Connections: Not on file   Intimate Partner Violence: Not on file   Housing Stability: Not on file      Family History   Problem Relation Age of Onset    Heart disease Mother passed during open heart surgery     Heart disease Father     Diabetes Father         caused blindness     Lung cancer Sister     Cancer Sister     Heart disease Brother         MI    No Known Problems Son     No Known Problems Son     No Known Problems Son     No Known Problems Daughter      Past Surgical History:   Procedure Laterality Date    ABDOMINAL SURGERY      tumor on stomach    ANGIOPLASTY      2010-x2 stents left side    BACK SURGERY      lower back-dissectomy    CARDIAC SURGERY      angio with stents    CIRCUMCISION      COLONOSCOPY      CYSTOSCOPY      EGD AND COLONOSCOPY      GASTRIC BYPASS  2005    IR ABSCESS/SEROMA DRAINAGE  1/4/2019    LITHOTRIPSY      x2    NECK SURGERY      herniated disc C4/5    OTHER SURGICAL HISTORY      removal of vocal cord lesion     VA EXC TUMOR SOFT TISSUE ABDOMINAL WALL SUBQ <3CM N/A 2/27/2019    Procedure: ABDOMINAL SEROMA EXCISION;  Surgeon: Doris Wesley MD;  Location: [facility];  Service: General    ROTATOR CUFF REPAIR Bilateral     SHOULDER SURGERY Bilateral     TONSILLECTOMY      VASCULAR SURGERY      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    WISDOM TOOTH EXTRACTION      WOUND DEBRIDEMENT N/A 3/7/2019    Procedure: DEBRIDEMENT WOUND ABDOMINAL ([address]) AND WOUND VAC APPLICATION;  Surgeon: Doris Wesley MD;  [facility];  Service: General    WOUND DEBRIDEMENT N/A 3/9/2019    Procedure: DEBRIDEMENT WOUND ABDOMINAL ([address]), wound vac dressing change;  Surgeon: Corey Delgado DO;  [facility];  Service: General       Current Outpatient Medications:     acetaminophen (TYLENOL) 500 mg tablet, Take 1,000 mg by mouth every 6 (six) hours as needed for mild pain, Disp: , Rfl:     amLODIPine (NORVASC) 5 mg tablet, Take 1 tablet (5 mg total) by mouth daily, Disp: 90 tablet, Rfl: 3    aspirin (ECOTRIN LOW STRENGTH) 81 mg EC tablet, Take 162 mg by mouth daily , Disp: , Rfl:     atorvastatin (LIPITOR) 40 mg tablet, Take 1 tablet (40 mg total) by mouth daily, Disp: 90 tablet, Rfl: 1    cyclobenzaprine (FLEXERIL) 10 mg tablet, TAKE 1/2 TO 1 TABLET BY MOUTH AT BEDTIME AS NEEDED, Disp: 90 tablet, Rfl: 0    Diclofenac Sodium (VOLTAREN) 1 %, Apply 2 g topically 4 (four) times a day (Patient taking differently: Apply 2 g topically in the morning  ), Disp: 350 g, Rfl: 1    gabapentin (NEURONTIN) 300 mg capsule, Take 1 capsule (300 mg total) by mouth daily, Disp: 90 capsule, Rfl: 0    lisinopril (ZESTRIL) 10 mg tablet, Take 1 tablet (10 mg total) by mouth daily, Disp: 30 tablet, Rfl: 11    metoprolol tartrate (LOPRESSOR) 25 mg tablet, Take 1 tablet (25 mg total) by mouth daily, Disp: 90 tablet, Rfl: 3    pantoprazole (PROTONIX) 40 mg tablet, Take 1 tablet (40 mg total) by mouth daily, Disp: 90 tablet, Rfl: 1    tamsulosin (FLOMAX) 0 4 mg, Take 1 capsule (0 4 mg total) by mouth daily with dinner, Disp: 90 capsule, Rfl: 1    traZODone (DESYREL) 100 mg tablet, TAKE 1 TABLET BY MOUTH DAILY AT BEDTIME, Disp: 90 tablet, Rfl: 0    venlafaxine (EFFEXOR) 37 5 mg tablet, Take 1 tablet (37 5 mg total) by mouth daily, Disp: 90 tablet, Rfl: 1    EPINEPHrine (EPIPEN) 0 3 mg/0 3 mL SOAJ, Inject 0 3 mL (0 3 mg total) into a muscle once for 1 dose, Disp: 2 each, Rfl: 0    nitroglycerin (NITROSTAT) 0 4 mg SL tablet, Place 1 tablet (0 4 mg total) under the tongue every 5 (five) minutes as needed for chest pain (Patient not taking: Reported on 2/24/2022 ), Disp: 30 tablet, Rfl: 0    oxyCODONE (Roxicodone) 5 immediate release tablet, Take 1 tablet (5 mg total) by mouth every 4 (four) hours as needed for moderate pain Max Daily Amount: 30 mg (Patient not taking: Reported on 2/24/2022 ), Disp: 20 tablet, Rfl: 0  Allergies   Allergen Reactions    Adhesive [Medical Tape] Swelling     Tape on eye caused eye to swell could not open!   Itching large hives from on arms     CAN USE PAPER TAPE    Cephalosporins Anaphylaxis    Nuts - Food Allergy Anaphylaxis    Other Abdominal Pain     Soy  Patient can not have an NGT due to bariatric surgery    Peanut Oil - Food Allergy Anaphylaxis    Penicillins Anaphylaxis     Itching and hives    Soy Isoflavones Other (See Comments)     anaphylaxis    Shellfish Allergy - Food Allergy      Betadine/ cat scan dye is Okay  Cannot eat shrimp, crab shellfish avoids all fish   Shellfish-Derived Products - Food Allergy Other (See Comments)     Positive test       Labs:not applicable  Imaging: No results found  Review of Systems:  Review of Systems   All other systems reviewed and are negative  Physical Exam:  /80 (BP Location: Left arm, Patient Position: Sitting, Cuff Size: Large)   Pulse 57   Ht 5' 9" (1 753 m)   Wt 98 4 kg (217 lb)   BMI 32 05 kg/m²   Physical Exam  Vitals reviewed  Constitutional:       Appearance: He is well-developed  HENT:      Head: Normocephalic and atraumatic  Eyes:      Conjunctiva/sclera: Conjunctivae normal       Pupils: Pupils are equal, round, and reactive to light  Cardiovascular:      Rate and Rhythm: Normal rate  Heart sounds: Normal heart sounds  Pulmonary:      Effort: Pulmonary effort is normal       Breath sounds: Normal breath sounds  Musculoskeletal:      Cervical back: Normal range of motion and neck supple  Skin:     General: Skin is warm and dry  Neurological:      Mental Status: He is alert and oriented to person, place, and time  Discussion/Summary:  Will increase amlodipine to 5 mg per day  He will have his blood pressure taken in several weeks time and call me if there is an ongoing issue  Can take his blood pressure at home as well  Will schedule a nuclear ETT  I have asked him to call me if he has any further discomfort  I have asked him to call in general if there is a problem in the interim otherwise I will see him in 4-6 months time in sooner as is necessary

## 2022-02-24 ENCOUNTER — OFFICE VISIT (OUTPATIENT)
Dept: CARDIOLOGY CLINIC | Facility: CLINIC | Age: 62
End: 2022-02-24
Payer: COMMERCIAL

## 2022-02-24 VITALS
SYSTOLIC BLOOD PRESSURE: 144 MMHG | WEIGHT: 217 LBS | DIASTOLIC BLOOD PRESSURE: 80 MMHG | HEART RATE: 57 BPM | BODY MASS INDEX: 32.14 KG/M2 | HEIGHT: 69 IN

## 2022-02-24 DIAGNOSIS — I10 ESSENTIAL (PRIMARY) HYPERTENSION: Primary | ICD-10-CM

## 2022-02-24 DIAGNOSIS — E78.00 PURE HYPERCHOLESTEROLEMIA: ICD-10-CM

## 2022-02-24 DIAGNOSIS — I25.10 CORONARY ARTERIOSCLEROSIS: ICD-10-CM

## 2022-02-24 DIAGNOSIS — Z95.5 H/O HEART ARTERY STENT: ICD-10-CM

## 2022-02-24 PROCEDURE — 93000 ELECTROCARDIOGRAM COMPLETE: CPT | Performed by: INTERNAL MEDICINE

## 2022-02-24 PROCEDURE — 99214 OFFICE O/P EST MOD 30 MIN: CPT | Performed by: INTERNAL MEDICINE

## 2022-02-24 RX ORDER — AMLODIPINE BESYLATE 5 MG/1
5 TABLET ORAL DAILY
Qty: 90 TABLET | Refills: 3 | Status: SHIPPED | OUTPATIENT
Start: 2022-02-24 | End: 2022-04-08 | Stop reason: SDUPTHER

## 2022-02-24 NOTE — PATIENT INSTRUCTIONS
Please increase amlodipine to 5 mg per day  Will schedule a nuclear stress test   Please call if there is a problem  I will see you in follow-up

## 2022-03-09 ENCOUNTER — HOSPITAL ENCOUNTER (OUTPATIENT)
Dept: NON INVASIVE DIAGNOSTICS | Facility: CLINIC | Age: 62
Discharge: HOME | End: 2022-03-09
Payer: COMMERCIAL

## 2022-03-09 VITALS
HEART RATE: 61 BPM | DIASTOLIC BLOOD PRESSURE: 86 MMHG | WEIGHT: 217 LBS | BODY MASS INDEX: 32.14 KG/M2 | SYSTOLIC BLOOD PRESSURE: 136 MMHG | HEIGHT: 69 IN | OXYGEN SATURATION: 99 %

## 2022-03-09 DIAGNOSIS — E78.00 PURE HYPERCHOLESTEROLEMIA: ICD-10-CM

## 2022-03-09 DIAGNOSIS — I10 ESSENTIAL (PRIMARY) HYPERTENSION: ICD-10-CM

## 2022-03-09 DIAGNOSIS — I25.10 CORONARY ARTERIOSCLEROSIS: ICD-10-CM

## 2022-03-09 DIAGNOSIS — Z95.5 H/O HEART ARTERY STENT: ICD-10-CM

## 2022-03-09 LAB
BASELINE ST DEPRESSION: 0 MM
CHEST PAIN STATEMENT: NORMAL
MAX DIASTOLIC BP: 80 MMHG
MAX HEART RATE: 134 BPM
MAX HR PERCENT: 84 %
MAX HR: 135 BPM
MAX PREDICTED HEART RATE: 159 BPM
MAX. SYSTOLIC BP: 162 MMHG
NUC STRESS EJECTION FRACTION: 66 %
PROTOCOL NAME: NORMAL
RATE PRESSURE PRODUCT: NORMAL
REASON FOR TERMINATION: NORMAL
SL CV REST NUCLEAR ISOTOPE DOSE: 10.84 MCI
SL CV STRESS NUCLEAR ISOTOPE DOSE: 32.2 MCI
SL CV STRESS RECOVERY BP: NORMAL MMHG
SL CV STRESS RECOVERY HR: 87 BPM
SL CV STRESS RECOVERY O2 SAT: 97 %
SL CV STRESS STAGE REACHED: 4
STRESS ANGINA INDEX: 0
STRESS BASELINE BP: NORMAL MMHG
STRESS BASELINE HR: 61 BPM
STRESS DUKE TREADMILL SCORE: 10
STRESS O2 SAT REST: 99 %
STRESS PEAK HR: 134 BPM
STRESS PERCENT HR: 84 %
STRESS POST ESTIMATED WORKLOAD: 11.7 METS
STRESS POST EXERCISE DUR MIN: 9 MIN
STRESS POST EXERCISE DUR SEC: 30 SEC
STRESS POST O2 SAT PEAK: 98 %
STRESS POST PEAK BP: 158 MMHG
STRESS ST DEPRESSION: 0 MM
STRESS TARGET HR: 134 BPM
STRESS/REST PERFUSION RATIO: 1.05
TARGET HR FORMULA: NORMAL
TEST INDICATION: NORMAL
TIME IN EXERCISE PHASE: NORMAL

## 2022-03-09 PROCEDURE — A9502 TC99M TETROFOSMIN: HCPCS

## 2022-03-09 PROCEDURE — 93017 CV STRESS TEST TRACING ONLY: CPT

## 2022-03-09 PROCEDURE — G1004 CDSM NDSC: HCPCS

## 2022-03-09 PROCEDURE — 93018 CV STRESS TEST I&R ONLY: CPT | Performed by: INTERNAL MEDICINE

## 2022-03-09 PROCEDURE — 78452 HT MUSCLE IMAGE SPECT MULT: CPT

## 2022-03-09 PROCEDURE — 78452 HT MUSCLE IMAGE SPECT MULT: CPT | Performed by: INTERNAL MEDICINE

## 2022-03-09 PROCEDURE — 93016 CV STRESS TEST SUPVJ ONLY: CPT | Performed by: INTERNAL MEDICINE

## 2022-03-17 ENCOUNTER — DOCUMENTATION (OUTPATIENT)
Dept: CARDIOLOGY CLINIC | Facility: CLINIC | Age: 62
End: 2022-03-17

## 2022-04-08 DIAGNOSIS — I10 ESSENTIAL (PRIMARY) HYPERTENSION: ICD-10-CM

## 2022-04-08 DIAGNOSIS — I10 ESSENTIAL HYPERTENSION: ICD-10-CM

## 2022-04-08 DIAGNOSIS — T78.01XA PEANUT-INDUCED ANAPHYLAXIS, INITIAL ENCOUNTER: ICD-10-CM

## 2022-04-08 DIAGNOSIS — K21.9 GASTROESOPHAGEAL REFLUX DISEASE WITHOUT ESOPHAGITIS: ICD-10-CM

## 2022-04-08 DIAGNOSIS — N30.00 ACUTE CYSTITIS WITHOUT HEMATURIA: ICD-10-CM

## 2022-04-08 DIAGNOSIS — M79.2 NERVE PAIN: ICD-10-CM

## 2022-04-08 DIAGNOSIS — G47.00 INSOMNIA, UNSPECIFIED TYPE: ICD-10-CM

## 2022-04-08 DIAGNOSIS — E78.5 HYPERLIPIDEMIA, UNSPECIFIED HYPERLIPIDEMIA TYPE: ICD-10-CM

## 2022-04-08 DIAGNOSIS — G89.29 CHRONIC BILATERAL BACK PAIN, UNSPECIFIED BACK LOCATION: ICD-10-CM

## 2022-04-08 DIAGNOSIS — F41.8 DEPRESSION WITH ANXIETY: ICD-10-CM

## 2022-04-08 DIAGNOSIS — N40.0 BENIGN PROSTATIC HYPERPLASIA, UNSPECIFIED WHETHER LOWER URINARY TRACT SYMPTOMS PRESENT: ICD-10-CM

## 2022-04-08 DIAGNOSIS — M25.512 LEFT SHOULDER PAIN, UNSPECIFIED CHRONICITY: ICD-10-CM

## 2022-04-08 DIAGNOSIS — M54.9 CHRONIC BILATERAL BACK PAIN, UNSPECIFIED BACK LOCATION: ICD-10-CM

## 2022-04-08 RX ORDER — CYCLOBENZAPRINE HCL 10 MG
5-10 TABLET ORAL
Qty: 90 TABLET | Refills: 0 | Status: SHIPPED | OUTPATIENT
Start: 2022-04-08 | End: 2022-07-13 | Stop reason: SDUPTHER

## 2022-04-08 RX ORDER — AMLODIPINE BESYLATE 5 MG/1
5 TABLET ORAL DAILY
Qty: 90 TABLET | Refills: 3 | Status: SHIPPED | OUTPATIENT
Start: 2022-04-08 | End: 2022-04-08 | Stop reason: SDUPTHER

## 2022-04-08 RX ORDER — ATORVASTATIN CALCIUM 40 MG/1
40 TABLET, FILM COATED ORAL DAILY
Qty: 90 TABLET | Refills: 3 | Status: SHIPPED | OUTPATIENT
Start: 2022-04-08 | End: 2022-07-31 | Stop reason: SDUPTHER

## 2022-04-08 RX ORDER — TRAZODONE HYDROCHLORIDE 100 MG/1
100 TABLET ORAL
Qty: 90 TABLET | Refills: 0 | Status: SHIPPED | OUTPATIENT
Start: 2022-04-08

## 2022-04-08 RX ORDER — VENLAFAXINE 37.5 MG/1
37.5 TABLET ORAL DAILY
Qty: 90 TABLET | Refills: 3 | Status: SHIPPED | OUTPATIENT
Start: 2022-04-08 | End: 2022-07-31 | Stop reason: SDUPTHER

## 2022-04-08 RX ORDER — TAMSULOSIN HYDROCHLORIDE 0.4 MG/1
0.4 CAPSULE ORAL
Qty: 90 CAPSULE | Refills: 3 | Status: SHIPPED | OUTPATIENT
Start: 2022-04-08 | End: 2022-08-05

## 2022-04-08 RX ORDER — PANTOPRAZOLE SODIUM 40 MG/1
40 TABLET, DELAYED RELEASE ORAL DAILY
Qty: 90 TABLET | Refills: 3 | Status: SHIPPED | OUTPATIENT
Start: 2022-04-08 | End: 2022-07-31 | Stop reason: SDUPTHER

## 2022-04-08 RX ORDER — LISINOPRIL 10 MG/1
10 TABLET ORAL DAILY
Qty: 90 TABLET | Refills: 3 | Status: SHIPPED | OUTPATIENT
Start: 2022-04-08 | End: 2022-07-31 | Stop reason: SDUPTHER

## 2022-04-11 RX ORDER — GABAPENTIN 300 MG/1
300 CAPSULE ORAL DAILY
Qty: 90 CAPSULE | Refills: 0 | Status: SHIPPED | OUTPATIENT
Start: 2022-04-11 | End: 2022-04-12 | Stop reason: SDUPTHER

## 2022-04-11 RX ORDER — EPINEPHRINE 0.3 MG/.3ML
0.3 INJECTION SUBCUTANEOUS ONCE
Qty: 2 EACH | Refills: 0
Start: 2022-04-11 | End: 2022-07-31 | Stop reason: SDUPTHER

## 2022-04-11 NOTE — TELEPHONE ENCOUNTER
Requested medication(s) are due for refill today: Yes  Patient has already received a courtesy refill: No  Other reason request has been forwarded to provider: Please advise patient comment

## 2022-04-12 DIAGNOSIS — M79.2 NERVE PAIN: ICD-10-CM

## 2022-04-12 RX ORDER — GABAPENTIN 300 MG/1
300 CAPSULE ORAL DAILY
Qty: 90 CAPSULE | Refills: 0 | Status: SHIPPED | OUTPATIENT
Start: 2022-04-12 | End: 2022-07-11

## 2022-04-12 RX ORDER — AMLODIPINE BESYLATE 5 MG/1
5 TABLET ORAL DAILY
Qty: 90 TABLET | Refills: 0 | Status: SHIPPED | OUTPATIENT
Start: 2022-04-12 | End: 2022-07-27 | Stop reason: SDUPTHER

## 2022-04-22 ENCOUNTER — OFFICE VISIT (OUTPATIENT)
Dept: OBGYN CLINIC | Facility: CLINIC | Age: 62
End: 2022-04-22
Payer: COMMERCIAL

## 2022-04-22 VITALS
SYSTOLIC BLOOD PRESSURE: 144 MMHG | WEIGHT: 222.6 LBS | HEART RATE: 67 BPM | DIASTOLIC BLOOD PRESSURE: 83 MMHG | HEIGHT: 69 IN | BODY MASS INDEX: 32.97 KG/M2

## 2022-04-22 DIAGNOSIS — M17.12 PRIMARY OSTEOARTHRITIS OF LEFT KNEE: Primary | ICD-10-CM

## 2022-04-22 DIAGNOSIS — M17.11 PRIMARY OSTEOARTHRITIS OF RIGHT KNEE: ICD-10-CM

## 2022-04-22 PROCEDURE — 99213 OFFICE O/P EST LOW 20 MIN: CPT | Performed by: ORTHOPAEDIC SURGERY

## 2022-04-22 PROCEDURE — 20610 DRAIN/INJ JOINT/BURSA W/O US: CPT | Performed by: ORTHOPAEDIC SURGERY

## 2022-04-22 RX ORDER — METHYLPREDNISOLONE ACETATE 40 MG/ML
2 INJECTION, SUSPENSION INTRA-ARTICULAR; INTRALESIONAL; INTRAMUSCULAR; SOFT TISSUE
Status: COMPLETED | OUTPATIENT
Start: 2022-04-22 | End: 2022-04-22

## 2022-04-22 RX ORDER — BUPIVACAINE HYDROCHLORIDE 2.5 MG/ML
2 INJECTION, SOLUTION INFILTRATION; PERINEURAL
Status: COMPLETED | OUTPATIENT
Start: 2022-04-22 | End: 2022-04-22

## 2022-04-22 RX ADMIN — METHYLPREDNISOLONE ACETATE 2 ML: 40 INJECTION, SUSPENSION INTRA-ARTICULAR; INTRALESIONAL; INTRAMUSCULAR; SOFT TISSUE at 07:53

## 2022-04-22 RX ADMIN — BUPIVACAINE HYDROCHLORIDE 2 ML: 2.5 INJECTION, SOLUTION INFILTRATION; PERINEURAL at 07:53

## 2022-04-22 NOTE — PROGRESS NOTES
[address], [address]  58 y o  male MRN: [id number]      Chief Complaint:   bilateral knee pain    HPI:   58 y o male complaining of bilateral knee pain  Patient presents office today regarding bilateral knee pain  Patient complains of aching pain is bilateral knees pain is worse with weight-bearing worse with activity mildly relieved with rest   Denies instability clicking popping catching  States the pain is aching nature  Patient denies any changes numbness tingling bilateral lower extremities  Patient has had injections past significant pain relief however pain since returned                  Review Of Systems:   · Skin: Normal  · Neuro: See HPI  · Musculoskeletal: See HPI  · All other systems reviewed and are negative    Past Medical History:   Past Medical History:   Diagnosis Date    Abdominal lump 4/20/2018    Anxiety     Arm pain     left-may have torn the bicep tendon    Arthritis     Cardiac disease     Contact lens/glasses fitting     Coronary artery disease     COVID-19 vaccine series completed     Moderna x2    Episode of dizziness 11/26/2018    Herpes zoster with complication 3/8/0611    Hyperlipidemia     Hypertension     Kidney stone     [facility]ocardial infarction [facility]) 2010    cardiac stent x2    Obesity     Pain of right great toe 7/22/2019    PONV (postoperative nausea and vomiting)     in the past-1980's    Sepsis ([facility] [address] ) 12/2020    urinary issue-back up       Past Surgical History:   Past Surgical History:   Procedure Laterality Date    ABDOMINAL SURGERY      tumor on stomach    ANGIOPLASTY      2010-x2 stents left side    BACK SURGERY      lower back-dissectomy    CARDIAC SURGERY      angio with stents    CIRCUMCISION      COLONOSCOPY      CYSTOSCOPY      EGD AND COLONOSCOPY      GASTRIC BYPASS  2005    IR ABSCESS/SEROMA DRAINAGE  1/4/2019    LITHOTRIPSY      x2    NECK SURGERY      herniated disc C4/5    OTHER SURGICAL HISTORY      removal of vocal cord lesion     HI EXC TUMOR SOFT TISSUE ABDOMINAL WALL SUBQ <3CM N/A 2/27/2019    Procedure: ABDOMINAL SEROMA EXCISION;  Surgeon: Dorothea Rene MD;  [facility];  Service: General    ROTATOR CUFF REPAIR Bilateral     SHOULDER SURGERY Bilateral     TONSILLECTOMY      VASCULAR SURGERY      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    WISDOM TOOTH EXTRACTION      WOUND DEBRIDEMENT N/A 3/7/2019    Procedure: DEBRIDEMENT WOUND ABDOMINAL ([facility]) AND WOUND VAC APPLICATION;  Surgeon: Dorothea Rene MD;  [facility];  Service: General    WOUND DEBRIDEMENT N/A 3/9/2019    Procedure: DEBRIDEMENT WOUND ABDOMINAL ([facility]), wound vac dressing change;  Surgeon: Yojana Shah DO;  [facility];  Service: General       Family History:  Family history reviewed and non-contributory  Family History   Problem Relation Age of Onset    Heart disease Mother         passed during open heart surgery     Heart disease Father     Diabetes Father         caused blindness     Lung cancer Sister     Cancer Sister     Heart disease Brother         MI    No Known Problems Son     No Known Problems Son     No Known Problems Son     No Known Problems Daughter          Social History:  Social History     Socioeconomic History    Marital status: /Civil Union     Spouse name: Gomez Calix Number of children: 3    Years of education: None    Highest education level: None   Occupational History    None   Tobacco Use    Smoking status: Never Smoker    Smokeless tobacco: Never Used    Tobacco comment: rare cigar smoking   Vaping Use    Vaping Use: Never used   Substance and Sexual Activity    Alcohol use: Not Currently     Comment: occasional last drink 2/2020    Drug use: No    Sexual activity: Not Currently   Other Topics Concern    None   Social History Narrative    Patient lives in a home that was built in [address] [place]     Carpet junior in the bedroom Central air    Window air conditioning bedroom     Finished basement-dry-no mold or musty smell    Dehumidifier     Air  attached to [employer] in the winter months     Home is smoke free     Does not live near wooded are or open fields         Dog x5 ([facility], Jocelynn moreno, Dannielle Charlton, and Crista oliva) and there allowed in the bedroom         Caffeine: soda occasional                     Coffee is decaf 1 cup daily     Chocolate consumed rarely         Patient lives with wife and two children      Social Determinants of Health     Financial Resource Strain: Not on file   Food Insecurity: Not on file   Transportation Needs: Not on file   Physical Activity: Not on file   Stress: Not on file   Social Connections: Not on file   Intimate Partner Violence: Not on file   Housing Stability: Not on file       Allergies: Allergies   Allergen Reactions    Adhesive [Medical Tape] Swelling     Tape on eye caused eye to swell could not open! Itching large hives from on arms     CAN USE PAPER TAPE    Cephalosporins Anaphylaxis    Nuts - Food Allergy Anaphylaxis    Other Abdominal Pain     Soy  Patient can not have an NGT due to bariatric surgery    Peanut Oil - Food Allergy Anaphylaxis    Penicillins Anaphylaxis     Itching and hives    Soy Isoflavones Other (See Comments)     anaphylaxis    Shellfish Allergy - Food Allergy      Betadine/ cat scan dye is Okay  Cannot eat shrimp, crab shellfish avoids all fish      Shellfish-Derived Products - Food Allergy Other (See Comments)     Positive test       Labs:  0   Lab Value Date/Time    HCT 49 9 (H) 03/29/2021 0710    HCT 39 8 12/10/2020 0452    HCT 41 4 12/09/2020 1058    HGB 16 5 03/29/2021 0710    HGB 13 0 12/10/2020 0452    HGB 13 3 12/09/2020 1058    INR 1 18 11/30/2020 1130    WBC 6 93 03/29/2021 0710    WBC 6 74 12/10/2020 0452    WBC 9 46 12/09/2020 1058    CRP <3 0 11/30/2019 1041       Meds:    Current Outpatient Medications:     acetaminophen (TYLENOL) 500 mg tablet, Take 1,000 mg by mouth every 6 (six) hours as needed for mild pain, Disp: , Rfl:     amLODIPine (NORVASC) 5 mg tablet, Take 1 tablet (5 mg total) by mouth daily, Disp: 90 tablet, Rfl: 0    aspirin (ECOTRIN LOW STRENGTH) 81 mg EC tablet, Take 162 mg by mouth daily , Disp: , Rfl:     atorvastatin (LIPITOR) 40 mg tablet, Take 1 tablet (40 mg total) by mouth daily, Disp: 90 tablet, Rfl: 3    cyclobenzaprine (FLEXERIL) 10 mg tablet, Take 0 5-1 tablets (5-10 mg total) by mouth daily at bedtime as needed for muscle spasms, Disp: 90 tablet, Rfl: 0    Diclofenac Sodium (VOLTAREN) 1 %, Apply 2 g topically 4 (four) times a day, Disp: 350 g, Rfl: 0    gabapentin (NEURONTIN) 300 mg capsule, Take 1 capsule (300 mg total) by mouth daily, Disp: 90 capsule, Rfl: 0    lisinopril (ZESTRIL) 10 mg tablet, Take 1 tablet (10 mg total) by mouth daily, Disp: 90 tablet, Rfl: 3    metoprolol tartrate (LOPRESSOR) 25 mg tablet, Take 1 tablet (25 mg total) by mouth daily, Disp: 90 tablet, Rfl: 3    pantoprazole (PROTONIX) 40 mg tablet, Take 1 tablet (40 mg total) by mouth daily, Disp: 90 tablet, Rfl: 3    tamsulosin (FLOMAX) 0 4 mg, Take 1 capsule (0 4 mg total) by mouth daily with dinner, Disp: 90 capsule, Rfl: 3    traZODone (DESYREL) 100 mg tablet, Take 1 tablet (100 mg total) by mouth daily at bedtime, Disp: 90 tablet, Rfl: 0    venlafaxine (EFFEXOR) 37 5 mg tablet, Take 1 tablet (37 5 mg total) by mouth daily, Disp: 90 tablet, Rfl: 3    EPINEPHrine (EPIPEN) 0 3 mg/0 3 mL SOAJ, Inject 0 3 mL (0 3 mg total) into a muscle once for 1 dose, Disp: 2 each, Rfl: 0    nitroglycerin (NITROSTAT) 0 4 mg SL tablet, Place 1 tablet (0 4 mg total) under the tongue every 5 (five) minutes as needed for chest pain (Patient not taking: Reported on 2/24/2022 ), Disp: 30 tablet, Rfl: 0    oxyCODONE (Roxicodone) 5 immediate release tablet, Take 1 tablet (5 mg total) by mouth every 4 (four) hours as needed for moderate pain Max Daily Amount: 30 mg (Patient not taking: Reported on 2/24/2022 ), Disp: 20 tablet, Rfl: 0      Physical Exam:   Vitals:    04/22/22 0728   BP: 144/83   Pulse: 67       General Appearance:    Alert, cooperative, no distress, appears stated age   Head:    Normocephalic, without obvious abnormality, atraumatic   Eyes:    conjunctiva/corneas clear, both eyes        Nose:   Nares normal, septum midline, no drainage    Throat:   Lips normal; teeth and gums normal   Neck:    symmetrical, trachea midline, ;     thyroid:  no enlargement/   Back:     Symmetric, no curvature, ROM normal   Lungs:   No audible wheezing or labored breathing   Chest Wall:    No tenderness or deformity    Heart:    Regular rate and rhythm               Pulses:   2+ and symmetric all extremities   Skin:   Skin color, texture, turgor normal, no rashes or lesions   Neurologic:   normal strength, sensation and reflexes     throughout       Musculoskeletal: bilateral lower extremity  · On examination of the right knee there is no effusion, no erythema  Range of motion to full active extension and flexion to greater than 120°  Pain on palpation medial and lateral joint lines  There is crepitus with range of motion, no warmth to palpation, bony enlargement noted  No pain on palpation pes anserine bursa region or distal iliotibial band  Stable to varus and valgus stress without pain or gapping  Negative anterior and posterior drawer testing  Sensation intact distal pulses present  · On examination of the left knee there is no effusion, no erythema  Range of motion to full active extension and flexion to greater than 120°  Pain on palpation medial and lateral joint lines  There is crepitus with range of motion, no warmth to palpation, bony enlargement noted  No pain on palpation pes anserine bursa region or distal iliotibial band  Stable to varus and valgus stress without pain or gapping  Negative anterior and posterior drawer testing    Sensation intact distal pulses present  Large joint arthrocentesis: R knee  Universal Protocol:  Consent given by: patient  Patient understanding: patient states understanding of the procedure being performed  Site marked: the operative site was marked  Patient identity confirmed: verbally with patient    Supporting Documentation  Indications: pain   Procedure Details  Location: knee - R knee  Needle size: 22 G  Approach: anterolateral  Medications administered: 2 mL bupivacaine 0 25 %; 2 mL methylPREDNISolone acetate 40 mg/mL    Patient tolerance: patient tolerated the procedure well with no immediate complications    Large joint arthrocentesis: L knee  Universal Protocol:  Consent given by: patient  Patient understanding: patient states understanding of the procedure being performed  Site marked: the operative site was marked  Patient identity confirmed: verbally with patient    Supporting Documentation  Indications: pain   Procedure Details  Location: knee - L knee  Needle size: 22 G  Approach: anterolateral  Medications administered: 2 mL bupivacaine 0 25 %; 2 mL methylPREDNISolone acetate 40 mg/mL    Patient tolerance: patient tolerated the procedure well with no immediate complications          _*_*_*_*_*_*_*_*_*_*_*_*_*_*_*_*_*_*_*_*_*_*_*_*_*_*_*_*_*_*_*_*_*_*_*_*_*_*_*_*_*    Assessment:  62 y o male with bilateral knee chronic pain due to osteoarthritis    Plan:   · Weight bearing as tolerated  bilateral lower extremity  · Bilateral knee intra-articular corticosteroid injections administered as noted above  · Patient advised should they develop any increasing pain, redness, drainage, numbness, tingling or swelling surrounding the injection sight, they are to contact our office or present to the emergency department    · Continue home range of motion stretching strengthening exercise   · Over-the-counter medication as tolerated  · Follow up in 3 months or sooner if needed

## 2022-05-02 ENCOUNTER — TELEPHONE (OUTPATIENT)
Dept: FAMILY MEDICINE CLINIC | Facility: CLINIC | Age: 62
End: 2022-05-02

## 2022-05-02 NOTE — TELEPHONE ENCOUNTER
I have looked through pt's chart and do not see any forms scanned in regarding his kidney stones  Only forms I see are regarding his hypertension and cardic issues  Is it possible your old office still has the forms and did not scan them in ?     Please advise

## 2022-05-31 ENCOUNTER — APPOINTMENT (OUTPATIENT)
Dept: LAB | Facility: CLINIC | Age: 62
End: 2022-05-31
Payer: COMMERCIAL

## 2022-05-31 DIAGNOSIS — I10 ESSENTIAL (PRIMARY) HYPERTENSION: ICD-10-CM

## 2022-05-31 DIAGNOSIS — I25.10 ATHEROSCLEROSIS OF CORONARY ARTERY OF NATIVE HEART WITHOUT ANGINA PECTORIS, UNSPECIFIED VESSEL OR LESION TYPE: ICD-10-CM

## 2022-05-31 DIAGNOSIS — Z11.4 SCREENING FOR HIV (HUMAN IMMUNODEFICIENCY VIRUS): ICD-10-CM

## 2022-05-31 DIAGNOSIS — Z13.1 SCREENING FOR DIABETES MELLITUS: ICD-10-CM

## 2022-05-31 LAB
ALBUMIN SERPL BCP-MCNC: 4.4 G/DL (ref 3.5–5)
ALP SERPL-CCNC: 121 U/L (ref 46–116)
ALT SERPL W P-5'-P-CCNC: 46 U/L (ref 12–78)
ANION GAP SERPL CALCULATED.3IONS-SCNC: 5 MMOL/L (ref 4–13)
AST SERPL W P-5'-P-CCNC: 33 U/L (ref 5–45)
BACTERIA UR QL AUTO: NORMAL /HPF
BASOPHILS # BLD AUTO: 0.05 THOUSANDS/ΜL (ref 0–0.1)
BASOPHILS NFR BLD AUTO: 1 % (ref 0–1)
BILIRUB SERPL-MCNC: 0.84 MG/DL (ref 0.2–1)
BILIRUB UR QL STRIP: NEGATIVE
BUN SERPL-MCNC: 23 MG/DL (ref 5–25)
CALCIUM SERPL-MCNC: 9.8 MG/DL (ref 8.3–10.1)
CHLORIDE SERPL-SCNC: 104 MMOL/L (ref 100–108)
CHOLEST SERPL-MCNC: 206 MG/DL
CLARITY UR: CLEAR
CO2 SERPL-SCNC: 27 MMOL/L (ref 21–32)
COLOR UR: ABNORMAL
CREAT SERPL-MCNC: 0.99 MG/DL (ref 0.6–1.3)
EOSINOPHIL # BLD AUTO: 0.19 THOUSAND/ΜL (ref 0–0.61)
EOSINOPHIL NFR BLD AUTO: 3 % (ref 0–6)
ERYTHROCYTE [DISTWIDTH] IN BLOOD BY AUTOMATED COUNT: 12.2 % (ref 11.6–15.1)
EST. AVERAGE GLUCOSE BLD GHB EST-MCNC: 103 MG/DL
GFR SERPL CREATININE-BSD FRML MDRD: 81 ML/MIN/1.73SQ M
GLUCOSE P FAST SERPL-MCNC: 89 MG/DL (ref 65–99)
GLUCOSE UR STRIP-MCNC: NEGATIVE MG/DL
HBA1C MFR BLD: 5.2 %
HCT VFR BLD AUTO: 48 % (ref 36.5–49.3)
HDLC SERPL-MCNC: 85 MG/DL
HGB BLD-MCNC: 15.8 G/DL (ref 12–17)
HGB UR QL STRIP.AUTO: NEGATIVE
IMM GRANULOCYTES # BLD AUTO: 0.01 THOUSAND/UL (ref 0–0.2)
IMM GRANULOCYTES NFR BLD AUTO: 0 % (ref 0–2)
KETONES UR STRIP-MCNC: NEGATIVE MG/DL
LDLC SERPL CALC-MCNC: 106 MG/DL (ref 0–100)
LEUKOCYTE ESTERASE UR QL STRIP: NEGATIVE
LYMPHOCYTES # BLD AUTO: 1.77 THOUSANDS/ΜL (ref 0.6–4.47)
LYMPHOCYTES NFR BLD AUTO: 24 % (ref 14–44)
MCH RBC QN AUTO: 31.7 PG (ref 26.8–34.3)
MCHC RBC AUTO-ENTMCNC: 32.9 G/DL (ref 31.4–37.4)
MCV RBC AUTO: 96 FL (ref 82–98)
MONOCYTES # BLD AUTO: 0.51 THOUSAND/ΜL (ref 0.17–1.22)
MONOCYTES NFR BLD AUTO: 7 % (ref 4–12)
NEUTROPHILS # BLD AUTO: 4.79 THOUSANDS/ΜL (ref 1.85–7.62)
NEUTS SEG NFR BLD AUTO: 65 % (ref 43–75)
NITRITE UR QL STRIP: NEGATIVE
NON-SQ EPI CELLS URNS QL MICRO: NORMAL /HPF
NONHDLC SERPL-MCNC: 121 MG/DL
NRBC BLD AUTO-RTO: 0 /100 WBCS
PH UR STRIP.AUTO: 6 [PH]
PLATELET # BLD AUTO: 189 THOUSANDS/UL (ref 149–390)
PMV BLD AUTO: 10.4 FL (ref 8.9–12.7)
POTASSIUM SERPL-SCNC: 4.5 MMOL/L (ref 3.5–5.3)
PROT SERPL-MCNC: 8.1 G/DL (ref 6.4–8.2)
PROT UR STRIP-MCNC: ABNORMAL MG/DL
RBC # BLD AUTO: 4.99 MILLION/UL (ref 3.88–5.62)
RBC #/AREA URNS AUTO: NORMAL /HPF
SODIUM SERPL-SCNC: 136 MMOL/L (ref 136–145)
SP GR UR STRIP.AUTO: 1.02 (ref 1–1.03)
TRIGL SERPL-MCNC: 76 MG/DL
UROBILINOGEN UR STRIP-ACNC: <2 MG/DL
WBC # BLD AUTO: 7.32 THOUSAND/UL (ref 4.31–10.16)
WBC #/AREA URNS AUTO: NORMAL /HPF

## 2022-05-31 PROCEDURE — 81001 URINALYSIS AUTO W/SCOPE: CPT | Performed by: NURSE PRACTITIONER

## 2022-05-31 PROCEDURE — 85025 COMPLETE CBC W/AUTO DIFF WBC: CPT

## 2022-05-31 PROCEDURE — 80053 COMPREHEN METABOLIC PANEL: CPT

## 2022-05-31 PROCEDURE — 80061 LIPID PANEL: CPT

## 2022-05-31 PROCEDURE — 36415 COLL VENOUS BLD VENIPUNCTURE: CPT

## 2022-05-31 PROCEDURE — 83036 HEMOGLOBIN GLYCOSYLATED A1C: CPT

## 2022-05-31 PROCEDURE — 87389 HIV-1 AG W/HIV-1&-2 AB AG IA: CPT

## 2022-06-01 DIAGNOSIS — R80.9 PROTEINURIA, UNSPECIFIED TYPE: Primary | ICD-10-CM

## 2022-06-01 LAB — HIV 1+2 AB+HIV1 P24 AG SERPL QL IA: NORMAL

## 2022-06-13 ENCOUNTER — TELEPHONE (OUTPATIENT)
Dept: NEPHROLOGY | Facility: CLINIC | Age: 62
End: 2022-06-13

## 2022-06-13 NOTE — TELEPHONE ENCOUNTER
New Patient Intake Form   Patient Details   Shayne Saavedra     1960     [id number]     Appointment Information   Who is calling to schedule? If not patient, what is callers name? Patient    Referring Provider  [facility]   Referring Provider Number [phone]   Reason for Appt (Diagnosis) Proteinuria   Is patient aware of why they are being referred? yes   Does Patient have labs done at [place] [address]? If not, where do they go? yes    Has patient had labs / urine work done? List date of most recent lab / urine work yes 5/31   Has patient had a BMP & CBC done in the past 2 years? If so, list the date Yes 5/31    Has patient been hospitalized recently? If yes, list name and location of hospital they were in no    Has patient been seen by a Nephrologist before? If yes, list name, location and phone number  Yes Dr Hoover Smoker   Has patient been see by another Specialty before (ex  Neurology, urology, cardiology)? If yes, please list name, and specialty  Cardio- Figueroa Rahman   Has the patient had imaging done? If so, list the most recent date and type of imaging no    Does the patient has a stone analysis report if history of kidney stones? yes   Appointment Details   Is there a referral on file?  yes    Appointment Date  9/26   Location [facility]    Miscellaneous

## 2022-06-29 ENCOUNTER — TELEPHONE (OUTPATIENT)
Dept: URGENT CARE | Age: 62
End: 2022-06-29

## 2022-07-11 DIAGNOSIS — M79.2 NERVE PAIN: ICD-10-CM

## 2022-07-11 RX ORDER — GABAPENTIN 300 MG/1
CAPSULE ORAL
Qty: 90 CAPSULE | Refills: 0 | Status: SHIPPED | OUTPATIENT
Start: 2022-07-11 | End: 2022-09-19 | Stop reason: SDUPTHER

## 2022-07-13 DIAGNOSIS — M54.9 CHRONIC BILATERAL BACK PAIN, UNSPECIFIED BACK LOCATION: ICD-10-CM

## 2022-07-13 DIAGNOSIS — G89.29 CHRONIC BILATERAL BACK PAIN, UNSPECIFIED BACK LOCATION: ICD-10-CM

## 2022-07-13 RX ORDER — CYCLOBENZAPRINE HCL 10 MG
5-10 TABLET ORAL
Qty: 90 TABLET | Refills: 0 | Status: SHIPPED | OUTPATIENT
Start: 2022-07-13 | End: 2022-09-19 | Stop reason: SDUPTHER

## 2022-07-15 DIAGNOSIS — M25.512 LEFT SHOULDER PAIN, UNSPECIFIED CHRONICITY: ICD-10-CM

## 2022-07-20 NOTE — TELEPHONE ENCOUNTER
PROGRESS NOTE - [facility] INFECTIOUS DISEASE    SUBJECTIVE:  Interval chart, labs and notes reviewed. gabino rn, patient and staff  Pt remains stable on 2L NC, sats 95-99% no cough or sob. Feels good. Mentation improved. Intermittent bradycardia overnight. Denies any new complaints      ALLERGIES  ALLERGIES:  No Known Allergies          PHYSICAL EXAM  Visit Vitals  /85 (BP Location: RUE - Right upper extremity, Patient Position: Semi-Erickson's)   Pulse 65   Temp 97.4 °F (36.3 °C) (Oral)   Resp 16   Ht 5' 8\" (1.727 m)   Wt 90.7 kg (200 lb)   SpO2 96%   BMI 30.41 kg/m²      Physical Exam deferred, refer to exam per primary  Pt seen through doorway, resting in comfortably in bed  Alert and oriented. NAD. Watching tv. Responsive  NCAT  Non labored respirations. On 2L NC.  Alert, oriented, responsive. Moving extremities spontaneously  Appropriate mood and affect      LABORATORY  Recent Results (from the past 24 hour(s))   Basic Metabolic Panel    Collection Time: 07/20/22  5:03 AM   Result Value Ref Range    Fasting Status      Sodium 134 (L) 135 - 145 mmol/L    Potassium 3.8 3.4 - 5.1 mmol/L    Chloride 102 97 - 110 mmol/L    Carbon Dioxide 24 21 - 32 mmol/L    Anion Gap 12 7 - 19 mmol/L    Glucose 158 (H) 70 - 99 mg/dL    BUN 66 (H) 6 - 20 mg/dL    Creatinine 2.12 (H) 0.67 - 1.17 mg/dL    Glomerular Filtration Rate 34 (L) >=60    BUN/ Creatinine Ratio 31 (H) 7 - 25    Calcium 8.4 8.4 - 10.2 mg/dL   Tacrolimus    Collection Time: 07/20/22  5:04 AM   Result Value Ref Range    Tacrolimus 6.7 5.0 - 20.0 ng/mL   CBC No Differential    Collection Time: 07/20/22  5:04 AM   Result Value Ref Range    WBC 12.4 (H) 4.2 - 11.0 K/mcL    RBC 4.90 4.50 - 5.90 mil/mcL    HGB 10.7 (L) 13.0 - 17.0 g/dL    HCT 35.2 (L) 39.0 - 51.0 %    MCV 71.8 (L) 78.0 - 100.0 fl    MCH 21.8 (L) 26.0 - 34.0 pg    MCHC 30.4 (L) 32.0 - 36.5 g/dL     140 - 450 K/mcL    RDW-CV 20.4 (H) 11.0 - 15.0 %    RDW-SD 51.2 (H) 39.0 - 50.0 fL    NRBC 1 (H) <=0  Pt would like a order placed for ortho referal  /100 WBC         IMAGING  CT HEAD WO CONTRAST   Final Result      No acute intracranial abnormality.      Electronically Signed by: PATIENCE DRAPER MD    Signed on: 7/15/2022 4:05 PM          CT CHEST WO CONTRAST   Final Result   1.   Patchy and confluent groundglass opacities and interstitial   thickening, consistent with multifocal pneumonia and compatible with COVID   19 viral pneumonia.   2.   No discrete airspace consolidation to suggest superimposed   community-acquired pneumonia.   3.   Ascending thoracic aortic aneurysm measuring 4.6 cm.   4.   Enlargement main pulmonary artery which can be seen with pulmonary   artery hypertension.   5.   Mild cardiomegaly with small pericardial effusion.   6.   Lipomatous mass in the left subscapularis, most consistent with   intramuscular lipoma.  No complex features.      Electronically Signed by: HONEY NAM MD    Signed on: 7/15/2022 4:20 PM          XR CHEST PA AND LATERAL 2 VIEWS   Final Result   1.    Cardiomegaly   2.   Central pulmonary vasculature upper limits of normal in size without   evident interstitial/alveolar pulmonary edema pattern   3.    No focal opacity to suggest pneumonia         Electronically Signed by: KARLIE MESSINA MD    Signed on: 7/15/2022 1:54 PM                MICRO  7/15 covid positive, CT 36  7/15 blood cx: 2/2 NGTD      ASSESSMENT/PLAN    Antibiotics:  azithromycin 7/16-7/20  omncief 7/20-     Sp  Ceftriaxone 7/16-7/19    Adjunctive therapy:  decadron     Impression:    # covid 19 pneumonia             - diagnosed 7/5  # acute hypoxia  # COPD             - does not wear O2 all the time at home  # Bcell lymphoma on chemo  # afib  # HTN  # GARCÍA on CKD  # hx ESRD s/p LKDT 2008             - on immunosuppressives     Plan:    - pt out of window for remdesivir  - ct chest with multifocal pna and covid 19. Continue empiric omnicef d#5/7 and azithroymcin d#5/5  - on decadon  - monitor o2. Wean o2 as able. Stable on 2L NC   - trend covid  aprs  - iso per hospital protocol  - prior notes, imaging and labs reviewed  - dw patient, rn  - discussed case/plan of care with dr krueger  - will follow      Tessy Aguilar PA-C   7/20/2022     .Attending attestation :  I confirmed the findings listed above and  agree with the assessment and plan as documented.    Watson OCHOA

## 2022-07-27 DIAGNOSIS — I10 ESSENTIAL (PRIMARY) HYPERTENSION: ICD-10-CM

## 2022-07-27 RX ORDER — AMLODIPINE BESYLATE 5 MG/1
5 TABLET ORAL DAILY
Qty: 90 TABLET | Refills: 1 | Status: SHIPPED | OUTPATIENT
Start: 2022-07-27

## 2022-07-31 DIAGNOSIS — T78.01XA PEANUT-INDUCED ANAPHYLAXIS, INITIAL ENCOUNTER: ICD-10-CM

## 2022-07-31 DIAGNOSIS — K21.9 GASTROESOPHAGEAL REFLUX DISEASE WITHOUT ESOPHAGITIS: ICD-10-CM

## 2022-07-31 DIAGNOSIS — I25.2 HISTORY OF MI (MYOCARDIAL INFARCTION): ICD-10-CM

## 2022-07-31 DIAGNOSIS — M25.512 LEFT SHOULDER PAIN, UNSPECIFIED CHRONICITY: ICD-10-CM

## 2022-07-31 DIAGNOSIS — I10 ESSENTIAL (PRIMARY) HYPERTENSION: ICD-10-CM

## 2022-07-31 DIAGNOSIS — I10 ESSENTIAL HYPERTENSION: ICD-10-CM

## 2022-07-31 DIAGNOSIS — F41.8 DEPRESSION WITH ANXIETY: ICD-10-CM

## 2022-07-31 DIAGNOSIS — E78.5 HYPERLIPIDEMIA, UNSPECIFIED HYPERLIPIDEMIA TYPE: ICD-10-CM

## 2022-08-01 RX ORDER — NITROGLYCERIN 0.4 MG/1
0.4 TABLET SUBLINGUAL
Qty: 30 TABLET | Refills: 0 | Status: SHIPPED | OUTPATIENT
Start: 2022-08-01

## 2022-08-01 RX ORDER — PANTOPRAZOLE SODIUM 40 MG/1
40 TABLET, DELAYED RELEASE ORAL DAILY
Qty: 90 TABLET | Refills: 0 | Status: SHIPPED | OUTPATIENT
Start: 2022-08-01

## 2022-08-01 RX ORDER — ATORVASTATIN CALCIUM 40 MG/1
40 TABLET, FILM COATED ORAL DAILY
Qty: 90 TABLET | Refills: 0 | Status: SHIPPED | OUTPATIENT
Start: 2022-08-01

## 2022-08-01 RX ORDER — EPINEPHRINE 0.3 MG/.3ML
0.3 INJECTION SUBCUTANEOUS ONCE
Qty: 2 EACH | Refills: 0 | Status: SHIPPED | OUTPATIENT
Start: 2022-08-01 | End: 2022-08-01

## 2022-08-01 RX ORDER — LISINOPRIL 10 MG/1
10 TABLET ORAL DAILY
Qty: 90 TABLET | Refills: 0 | Status: SHIPPED | OUTPATIENT
Start: 2022-08-01

## 2022-08-01 RX ORDER — VENLAFAXINE 37.5 MG/1
37.5 TABLET ORAL DAILY
Qty: 90 TABLET | Refills: 0 | Status: SHIPPED | OUTPATIENT
Start: 2022-08-01

## 2022-08-05 ENCOUNTER — OFFICE VISIT (OUTPATIENT)
Dept: OBGYN CLINIC | Facility: CLINIC | Age: 62
End: 2022-08-05
Payer: COMMERCIAL

## 2022-08-05 ENCOUNTER — OFFICE VISIT (OUTPATIENT)
Dept: FAMILY MEDICINE CLINIC | Facility: CLINIC | Age: 62
End: 2022-08-05
Payer: COMMERCIAL

## 2022-08-05 VITALS
HEIGHT: 67 IN | DIASTOLIC BLOOD PRESSURE: 86 MMHG | SYSTOLIC BLOOD PRESSURE: 144 MMHG | BODY MASS INDEX: 35.35 KG/M2 | WEIGHT: 225.2 LBS | OXYGEN SATURATION: 97 % | HEART RATE: 72 BPM

## 2022-08-05 VITALS
BODY MASS INDEX: 32.88 KG/M2 | WEIGHT: 222 LBS | HEART RATE: 57 BPM | DIASTOLIC BLOOD PRESSURE: 90 MMHG | SYSTOLIC BLOOD PRESSURE: 157 MMHG | HEIGHT: 69 IN

## 2022-08-05 DIAGNOSIS — M17.12 PRIMARY OSTEOARTHRITIS OF LEFT KNEE: Primary | ICD-10-CM

## 2022-08-05 DIAGNOSIS — Z13.29 SCREENING FOR THYROID DISORDER: ICD-10-CM

## 2022-08-05 DIAGNOSIS — E66.01 CLASS 2 SEVERE OBESITY DUE TO EXCESS CALORIES WITH SERIOUS COMORBIDITY AND BODY MASS INDEX (BMI) OF 35.0 TO 35.9 IN ADULT (HCC): ICD-10-CM

## 2022-08-05 DIAGNOSIS — G47.00 INSOMNIA, UNSPECIFIED TYPE: ICD-10-CM

## 2022-08-05 DIAGNOSIS — M17.11 PRIMARY OSTEOARTHRITIS OF RIGHT KNEE: ICD-10-CM

## 2022-08-05 DIAGNOSIS — Z23 ENCOUNTER FOR IMMUNIZATION: ICD-10-CM

## 2022-08-05 DIAGNOSIS — F41.1 GAD (GENERALIZED ANXIETY DISORDER): ICD-10-CM

## 2022-08-05 DIAGNOSIS — Z00.00 ANNUAL PHYSICAL EXAM: Primary | ICD-10-CM

## 2022-08-05 DIAGNOSIS — E78.5 HYPERLIPIDEMIA, UNSPECIFIED HYPERLIPIDEMIA TYPE: ICD-10-CM

## 2022-08-05 DIAGNOSIS — Z12.5 SCREENING FOR MALIGNANT NEOPLASM OF PROSTATE: ICD-10-CM

## 2022-08-05 PROCEDURE — 99213 OFFICE O/P EST LOW 20 MIN: CPT | Performed by: PHYSICIAN ASSISTANT

## 2022-08-05 PROCEDURE — 90471 IMMUNIZATION ADMIN: CPT

## 2022-08-05 PROCEDURE — 90715 TDAP VACCINE 7 YRS/> IM: CPT

## 2022-08-05 PROCEDURE — 99396 PREV VISIT EST AGE 40-64: CPT | Performed by: NURSE PRACTITIONER

## 2022-08-05 PROCEDURE — 20610 DRAIN/INJ JOINT/BURSA W/O US: CPT | Performed by: PHYSICIAN ASSISTANT

## 2022-08-05 RX ORDER — BUPIVACAINE HYDROCHLORIDE 2.5 MG/ML
2 INJECTION, SOLUTION INFILTRATION; PERINEURAL
Status: COMPLETED | OUTPATIENT
Start: 2022-08-05 | End: 2022-08-05

## 2022-08-05 RX ORDER — METHYLPREDNISOLONE ACETATE 40 MG/ML
2 INJECTION, SUSPENSION INTRA-ARTICULAR; INTRALESIONAL; INTRAMUSCULAR; SOFT TISSUE
Status: COMPLETED | OUTPATIENT
Start: 2022-08-05 | End: 2022-08-05

## 2022-08-05 RX ADMIN — METHYLPREDNISOLONE ACETATE 2 ML: 40 INJECTION, SUSPENSION INTRA-ARTICULAR; INTRALESIONAL; INTRAMUSCULAR; SOFT TISSUE at 08:23

## 2022-08-05 RX ADMIN — BUPIVACAINE HYDROCHLORIDE 2 ML: 2.5 INJECTION, SOLUTION INFILTRATION; PERINEURAL at 08:23

## 2022-08-05 NOTE — PROGRESS NOTES
[facility], [address]  58 y o  male MRN: [id number]      Chief Complaint:   bilateral knee pain    HPI:   58 y o male complaining of bilateral knee pain  Patient states having increasing pain in bilateral knees left worse than right  Patient states having significant pain in his bilateral knees over the past 2 weeks time  Patient has had significant pain relief with injections in the past   Patient states the pain is worse with activity worse weight-bearing mildly relieved with rest   Patient states having difficulty going up and down stairs to his knee pain  Patient has been doing home exercises he denies instability clicking catching his bilateral knees                  Review Of Systems:   · Skin: Normal  · Neuro: See HPI  · Musculoskeletal: See HPI  · All other systems reviewed and are negative    Past Medical History:   Past Medical History:   Diagnosis Date    Abdominal lump 4/20/2018    Anxiety     Arm pain     left-may have torn the bicep tendon    Arthritis     Cardiac disease     Contact lens/glasses fitting     Coronary artery disease     COVID-19 vaccine series completed     Moderna x2    Episode of dizziness 11/26/2018    Herpes zoster with complication 7/7/9726    Hyperlipidemia     Hypertension     Kidney stone     Myocardial infarction [facility]) 2010    cardiac stent x2    Obesity     Pain of right great toe 7/22/2019    PONV (postoperative nausea and vomiting)     in the past-1980's    Sepsis ([address] ) 12/2020    urinary issue-back up       Past Surgical History:   Past Surgical History:   Procedure Laterality Date    ABDOMINAL SURGERY      tumor on stomach    ANGIOPLASTY      2010-x2 stents left side    BACK SURGERY      lower back-dissectomy    CARDIAC SURGERY      angio with stents    CIRCUMCISION      COLONOSCOPY      CYSTOSCOPY      EGD AND COLONOSCOPY      GASTRIC BYPASS  2005    IR ABSCESS/SEROMA DRAINAGE  1/4/2019    LITHOTRIPSY      x2    NECK SURGERY      herniated disc C4/5    OTHER SURGICAL HISTORY      removal of vocal cord lesion     AK EXC TUMOR SOFT TISSUE ABDOMINAL WALL SUBQ <3CM N/A 2/27/2019    Procedure: ABDOMINAL SEROMA EXCISION;  Surgeon: Rosanna Zapata MD;  [facility];  Service: General    ROTATOR CUFF REPAIR Bilateral     SHOULDER SURGERY Bilateral     TONSILLECTOMY      VASCULAR SURGERY      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    WISDOM TOOTH EXTRACTION      WOUND DEBRIDEMENT N/A 3/7/2019    Procedure: DEBRIDEMENT WOUND ABDOMINAL ([address]) AND WOUND VAC APPLICATION;  Surgeon: Rosanna Zapata MD;  [facility];  Service: General    WOUND DEBRIDEMENT N/A 3/9/2019    Procedure: DEBRIDEMENT WOUND ABDOMINAL ([address]), wound vac dressing change;  Surgeon: Stu Villeda DO;  [facility];  Service: General       Family History:  Family history reviewed and non-contributory  Family History   Problem Relation Age of Onset    Heart disease Mother         passed during open heart surgery     Heart disease Father     Diabetes Father         caused blindness     Lung cancer Sister     Cancer Sister     Heart disease Brother         MI    No Known Problems Son     No Known Problems Son     No Known Problems Son     No Known Problems Daughter          Social History:  Social History     Socioeconomic History    Marital status: /Civil Union     Spouse name: Charlette Tello Number of children: 4    Years of education: None    Highest education level: None   Occupational History    None   Tobacco Use    Smoking status: Never Smoker    Smokeless tobacco: Never Used    Tobacco comment: rare cigar smoking   Vaping Use    Vaping Use: Never used   Substance and Sexual Activity    Alcohol use: Not Currently     Comment: occasional last drink 2/2020    Drug use: No    Sexual activity: Not Currently   Other Topics Concern    None   Social History Narrative    Patient lives in a home that was built in [address] [place] [address] junior in the bedroom     [employer] air conditioning bedroom     Finished basement-dry-no mold or musty smell    Dehumidifier     Air  attached to [employer] in the winter months     Home is smoke free     Does not live near wooded are or open fields         Dog x5 (Joyce [place], Ja, Jocelynn moreno, [facility] island, and [place] hazel) and there allowed in the bedroom         Caffeine: soda occasional                     Coffee is decaf 1 cup daily     Chocolate consumed rarely         Patient lives with wife and two children      Social Determinants of Health     Financial Resource Strain: Not on file   Food Insecurity: Not on file   Transportation Needs: Not on file   Physical Activity: Not on file   Stress: Not on file   Social Connections: Not on file   Intimate Partner Violence: Not on file   Housing Stability: Not on file       Allergies: Allergies   Allergen Reactions    Adhesive [Medical Tape] Swelling     Tape on eye caused eye to swell could not open! Itching large hives from on arms     CAN USE PAPER TAPE    Cephalosporins Anaphylaxis    Nuts - Food Allergy Anaphylaxis    Other Abdominal Pain     Soy  Patient can not have an NGT due to bariatric surgery    Peanut Oil - Food Allergy Anaphylaxis    Penicillins Anaphylaxis     Itching and hives    Soy Isoflavones Other (See Comments)     anaphylaxis    Shellfish Allergy - Food Allergy      Betadine/ cat scan dye is Okay  Cannot eat shrimp, crab shellfish avoids all fish      Shellfish-Derived Products - Food Allergy Other (See Comments)     Positive test       Labs:  0   Lab Value Date/Time    HCT 48 0 05/31/2022 0813    HCT 49 9 (H) 03/29/2021 0710    HCT 39 8 12/10/2020 0452    HGB 15 8 05/31/2022 0813    HGB 16 5 03/29/2021 0710    HGB 13 0 12/10/2020 0452    INR 1 18 11/30/2020 1130    WBC 7 32 05/31/2022 0813    WBC 6 93 03/29/2021 0710    WBC 6 74 12/10/2020 0452    CRP <3 0 11/30/2019 1041 Meds:    Current Outpatient Medications:     acetaminophen (TYLENOL) 500 mg tablet, Take 1,000 mg by mouth every 6 (six) hours as needed for mild pain, Disp: , Rfl:     amLODIPine (NORVASC) 5 mg tablet, Take 1 tablet (5 mg total) by mouth daily, Disp: 90 tablet, Rfl: 1    aspirin (ECOTRIN LOW STRENGTH) 81 mg EC tablet, Take 162 mg by mouth daily , Disp: , Rfl:     atorvastatin (LIPITOR) 40 mg tablet, Take 1 tablet (40 mg total) by mouth daily, Disp: 90 tablet, Rfl: 0    cyclobenzaprine (FLEXERIL) 10 mg tablet, Take 0 5-1 tablets (5-10 mg total) by mouth daily at bedtime as needed for muscle spasms, Disp: 90 tablet, Rfl: 0    Diclofenac Sodium (VOLTAREN) 1 %, Apply 2 g topically 4 (four) times a day Apply, Disp: 400 g, Rfl: 0    EPINEPHrine (EPIPEN) 0 3 mg/0 3 mL SOAJ, Inject 0 3 mL (0 3 mg total) into a muscle once for 1 dose, Disp: 2 each, Rfl: 0    gabapentin (NEURONTIN) 300 mg capsule, TAKE ONE CAPSULE BY MOUTH DAILY, Disp: 90 capsule, Rfl: 0    lisinopril (ZESTRIL) 10 mg tablet, Take 1 tablet (10 mg total) by mouth daily, Disp: 90 tablet, Rfl: 0    metoprolol tartrate (LOPRESSOR) 25 mg tablet, Take 1 tablet (25 mg total) by mouth daily, Disp: 90 tablet, Rfl: 0    nitroglycerin (NITROSTAT) 0 4 mg SL tablet, Place 1 tablet (0 4 mg total) under the tongue every 5 (five) minutes as needed for chest pain, Disp: 30 tablet, Rfl: 0    oxyCODONE (Roxicodone) 5 immediate release tablet, Take 1 tablet (5 mg total) by mouth every 4 (four) hours as needed for moderate pain Max Daily Amount: 30 mg (Patient not taking: No sig reported), Disp: 20 tablet, Rfl: 0    pantoprazole (PROTONIX) 40 mg tablet, Take 1 tablet (40 mg total) by mouth daily, Disp: 90 tablet, Rfl: 0    tamsulosin (FLOMAX) 0 4 mg, Take 1 capsule (0 4 mg total) by mouth daily with dinner, Disp: 90 capsule, Rfl: 3    traZODone (DESYREL) 100 mg tablet, Take 1 tablet (100 mg total) by mouth daily at bedtime, Disp: 90 tablet, Rfl: 0   venlafaxine (EFFEXOR) 37 5 mg tablet, Take 1 tablet (37 5 mg total) by mouth daily, Disp: 90 tablet, Rfl: 0      Physical Exam:   Vitals:    08/05/22 0740   BP: 157/90   Pulse: 57       General Appearance:    Alert, cooperative, no distress, appears stated age   Head:    Normocephalic, without obvious abnormality, atraumatic   Eyes:    conjunctiva/corneas clear, both eyes        Nose:   Nares normal, septum midline, no drainage    Throat:   Lips normal; teeth and gums normal   Neck:    symmetrical, trachea midline, ;     thyroid:  no enlargement/   Back:     Symmetric, no curvature, ROM normal   Lungs:   No audible wheezing or labored breathing   Chest Wall:    No tenderness or deformity    Heart:    Regular rate and rhythm               Pulses:   2+ and symmetric all extremities   Skin:   Skin color, texture, turgor normal, no rashes or lesions   Neurologic:   normal strength, sensation and reflexes     throughout       Musculoskeletal: bilateral lower extremity  · On examination of the right knee there is no effusion, no erythema  Range of motion to full active extension and flexion to greater than 120°  Pain on palpation medial and lateral joint lines  There is crepitus with range of motion, no warmth to palpation, bony enlargement noted  No pain on palpation pes anserine bursa region or distal iliotibial band  Stable to varus and valgus stress without pain or gapping  Negative anterior and posterior drawer testing  Sensation intact distal pulses present  · On examination of the left knee there is no effusion, no erythema  Range of motion to full active extension and flexion to greater than 120°  Pain on palpation medial and lateral joint lines  There is crepitus with range of motion, no warmth to palpation, bony enlargement noted  No pain on palpation pes anserine bursa region or distal iliotibial band  Stable to varus and valgus stress without pain or gapping    Negative anterior and posterior drawer testing  Sensation intact distal pulses present  Large joint arthrocentesis: R knee  Universal Protocol:  Consent given by: patient  Patient understanding: patient states understanding of the procedure being performed  Site marked: the operative site was marked  Patient identity confirmed: verbally with patient    Supporting Documentation  Indications: pain   Procedure Details  Location: knee - R knee  Needle size: 22 G  Approach: anterolateral  Medications administered: 2 mL bupivacaine 0 25 %; 2 mL methylPREDNISolone acetate 40 mg/mL    Patient tolerance: patient tolerated the procedure well with no immediate complications    Large joint arthrocentesis: L knee  Universal Protocol:  Consent given by: patient  Patient understanding: patient states understanding of the procedure being performed  Site marked: the operative site was marked  Patient identity confirmed: verbally with patient    Supporting Documentation  Indications: pain   Procedure Details  Location: knee - L knee  Needle size: 22 G  Approach: anterolateral  Medications administered: 2 mL bupivacaine 0 25 %; 2 mL methylPREDNISolone acetate 40 mg/mL    Patient tolerance: patient tolerated the procedure well with no immediate complications          _*_*_*_*_*_*_*_*_*_*_*_*_*_*_*_*_*_*_*_*_*_*_*_*_*_*_*_*_*_*_*_*_*_*_*_*_*_*_*_*_*    Assessment:  62 y o male with bilateral knee pain due to osteoarthritis    Plan:   · Weight bearing as tolerated  bilateral lower extremity  · Bilateral intra-articular knee corticosteroid injections administered as noted above  · Patient advised should they develop any increasing pain, redness, drainage, numbness, tingling or swelling surrounding the injection sight, they are to contact our office or present to the emergency department    · Case discussed with Dr Kirstie Monahan  · Continue range of motion stretching strengthening exercises  · Patient is aware should he fail conservative management may be candidate for total knee arthroplasty near future  · Follow up in 3 months or sooner if needed      Cody Louise PA-C

## 2022-08-05 NOTE — ASSESSMENT & PLAN NOTE
Stable, currently under stress dealing with his wife's health condition  He is still on venlafaxine  Suggested counseling to help him cope with everything he is going through  He is going to consider this and will let me know

## 2022-08-05 NOTE — ASSESSMENT & PLAN NOTE
He has gained some weight, says he has been eating more ice cream   Reinforce healthy diet and regular physical activity

## 2022-08-05 NOTE — ASSESSMENT & PLAN NOTE
Noted increase of total and LDL cholesterol on labs  He says he is eating potato chips every night at work, will cut back  Will continue atorvastatin, asa and repeat lipid in 6 months  Will also check tsh

## 2022-08-05 NOTE — ASSESSMENT & PLAN NOTE
Unremarkable exam of adult male  Tdap administered today  Screenings up to date, will repeat psa with upcoming labs  Continue with dental and eye exams

## 2022-08-05 NOTE — PROGRESS NOTES
[address] [facility] [address]    NAME: Herman Duke  AGE: 58 y o  SEX: male  : 1960     DATE: 2022     Assessment and Plan:     Problem List Items Addressed This Visit        Other    Annual physical exam - Primary     Unremarkable exam of adult male  Tdap administered today  Screenings up to date, will repeat psa with upcoming labs  Continue with dental and eye exams  Class 2 severe obesity due to excess calories with serious comorbidity and body mass index (BMI) of 35 0 to 35 9 in adult [facility])     He has gained some weight, says he has been eating more ice cream   Reinforce healthy diet and regular physical activity  PEYTON (generalized anxiety disorder)     Stable, currently under stress dealing with his wife's health condition  He is still on venlafaxine  Suggested counseling to help him cope with everything he is going through  He is going to consider this and will let me know  Hyperlipidemia     Noted increase of total and LDL cholesterol on labs  He says he is eating potato chips every night at work, will cut back  Will continue atorvastatin, asa and repeat lipid in 6 months  Will also check tsh  Relevant Orders    Lipid panel    Insomnia     Stable, continue trazodone  Other Visit Diagnoses     Encounter for immunization        Relevant Orders    TDAP VACCINE GREATER THAN OR EQUAL TO 6YO IM    Screening for malignant neoplasm of prostate        Relevant Orders    PSA, Total Screen    Screening for thyroid disorder        Relevant Orders    TSH, 3rd generation with Free T4 reflex          Immunizations and preventive care screenings were discussed with patient today  Appropriate education was printed on patient's after visit summary  Counseling:  Dental Health: discussed importance of regular tooth brushing, flossing, and dental visits    Injury prevention: discussed safety/seat belts, safety helmets, smoke detectors, carbon dioxide detectors, and smoking near bedding or upholstery  Exercise: the importance of regular exercise/physical activity was discussed  Recommend exercise 3-5 times per week for at least 30 minutes  BMI Counseling: Body mass index is 35 02 kg/m²  The BMI is above normal  Nutrition recommendations include decreasing portion sizes, encouraging healthy choices of fruits and vegetables, consuming healthier snacks, moderation in carbohydrate intake, increasing intake of lean protein, reducing intake of saturated and trans fat and reducing intake of cholesterol  Exercise recommendations include moderate physical activity 150 minutes/week, exercising 3-5 times per week and strength training exercises  No pharmacotherapy was ordered  Rationale for BMI follow-up plan is due to patient being overweight or obese  Depression Screening and Follow-up Plan: Patient was screened for depression during today's encounter  They screened negative with a PHQ-2 score of 0  Return in about 6 months (around 2/5/2023) for Recheck  Chief Complaint:     Chief Complaint   Patient presents with    Physical Exam      History of Present Illness:     Adult Annual Physical   Patient here for a comprehensive physical exam  The patient reports problems - stress  Diet and Physical Activity  Diet/Nutrition: well balanced diet, limited junk food and consuming 3-5 servings of fruits/vegetables daily  Exercise: walking, 5-7 times a week on average and walking several miles at work  Depression Screening  PHQ-2/9 Depression Screening    Little interest or pleasure in doing things: 0 - not at all  Feeling down, depressed, or hopeless: 0 - not at all  PHQ-2 Score: 0  PHQ-2 Interpretation: Negative depression screen       General Health  Sleep: gets 4-6 hours of sleep on average     Hearing: normal - bilateral   Vision: goes for regular eye exams, most recent eye exam <1 year ago and wears contacts  Dental: brushes teeth twice daily   Health  Symptoms include: none     Review of Systems:     Review of Systems   Constitutional: Negative for activity change, appetite change, chills, fatigue, fever and unexpected weight change  HENT: Negative for hearing loss  Eyes: Negative for visual disturbance  Respiratory: Negative for cough, chest tightness and shortness of breath  Cardiovascular: Negative for chest pain, palpitations and leg swelling  Gastrointestinal: Negative for abdominal pain, constipation, diarrhea, nausea and vomiting  Genitourinary: Negative for dysuria and frequency  Musculoskeletal: Positive for arthralgias  Negative for myalgias  Skin: Negative  Allergic/Immunologic: Negative for environmental allergies  Neurological: Negative for dizziness, weakness, light-headedness, numbness and headaches  Psychiatric/Behavioral: Negative for sleep disturbance  The patient is nervous/anxious         Past Medical History:     Past Medical History:   Diagnosis Date    Abdominal lump 4/20/2018    Anxiety     Arm pain     left-may have torn the bicep tendon    Arthritis     Cardiac disease     Contact lens/glasses fitting     Coronary artery disease     COVID-19 vaccine series completed     Moderna x2    Episode of dizziness 11/26/2018    Herpes zoster with complication 0/8/1310    Hyperlipidemia     Hypertension     Kidney stone     Myocardial infarction [facility]) 2010    cardiac stent x2    Obesity     Pain of right great toe 7/22/2019    PONV (postoperative nausea and vomiting)     in the past-1980's    Sepsis ([facility] [address] ) 12/2020    urinary issue-back up      Past Surgical History:     Past Surgical History:   Procedure Laterality Date    ABDOMINAL SURGERY      tumor on stomach    ANGIOPLASTY      2010-x2 stents left side    BACK SURGERY      lower back-dissectomy    CARDIAC SURGERY      angio with stents    CIRCUMCISION      COLONOSCOPY      CYSTOSCOPY      EGD AND COLONOSCOPY      GASTRIC BYPASS  2005    IR ABSCESS/SEROMA DRAINAGE  1/4/2019    LITHOTRIPSY      x2    NECK SURGERY      herniated disc C4/5    OTHER SURGICAL HISTORY      removal of vocal cord lesion     SC EXC TUMOR SOFT TISSUE ABDOMINAL WALL SUBQ <3CM N/A 2/27/2019    Procedure: ABDOMINAL SEROMA EXCISION;  Surgeon: Keven Case MD;  [facility];  Service: General    ROTATOR CUFF REPAIR Bilateral     SHOULDER SURGERY Bilateral     TONSILLECTOMY      VASCULAR SURGERY      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    WISDOM TOOTH EXTRACTION      WOUND DEBRIDEMENT N/A 3/7/2019    Procedure: DEBRIDEMENT WOUND ABDOMINAL ([address]) AND WOUND VAC APPLICATION;  Surgeon: Keven Case MD;  [facility];  Service: General    WOUND DEBRIDEMENT N/A 3/9/2019    Procedure: DEBRIDEMENT WOUND ABDOMINAL ([address]), wound vac dressing change;  Surgeon: Vianca Eaton DO;  [facility];  Service: General      Family History:     Family History   Problem Relation Age of Onset    Heart disease Mother         passed during open heart surgery     Heart disease Father     Diabetes Father         caused blindness     Lung cancer Sister     Cancer Sister     Heart disease Brother         MI    No Known Problems Son     No Known Problems Son     No Known Problems Son     No Known Problems Daughter       Social History:     Social History     Socioeconomic History    Marital status: /Civil Union     Spouse name: Carlota Mcdowell Number of children: 3    Years of education: None    Highest education level: None   Occupational History    None   Tobacco Use    Smoking status: Never Smoker    Smokeless tobacco: Never Used    Tobacco comment: rare cigar smoking   Vaping Use    Vaping Use: Never used   Substance and Sexual Activity    Alcohol use: Not Currently     Comment: occasional last drink 2/2020    Drug use: No    Sexual activity: Not Currently   Other Topics Concern    None   Social History Narrative    Patient lives in a home that was built in [address] [place] [address] junior in the bedroom     Central air    Window air conditioning bedroom     Finished basement-dry-no mold or musty smell    [address]  [employer]  attached to [employer] in the winter months     Home is smoke free     Does not live near wooded are or open fields         Dog x5 ([place], [place], [place], and [place]) and there allowed in the bedroom         Caffeine: soda occasional                     Coffee is decaf 1 cup daily     Chocolate consumed rarely         Patient lives with wife and two children      Social Determinants of Health     Financial Resource Strain: Not on file   Food Insecurity: Not on file   Transportation Needs: Not on file   Physical Activity: Not on file   Stress: Not on file   Social Connections: Not on file   Intimate Partner Violence: Not on file   Housing Stability: Not on file      Current Medications:     Current Outpatient Medications   Medication Sig Dispense Refill    acetaminophen (TYLENOL) 500 mg tablet Take 1,000 mg by mouth every 6 (six) hours as needed for mild pain      amLODIPine (NORVASC) 5 mg tablet Take 1 tablet (5 mg total) by mouth daily 90 tablet 1    aspirin (ECOTRIN LOW STRENGTH) 81 mg EC tablet Take 162 mg by mouth daily       atorvastatin (LIPITOR) 40 mg tablet Take 1 tablet (40 mg total) by mouth daily 90 tablet 0    cyclobenzaprine (FLEXERIL) 10 mg tablet Take 0 5-1 tablets (5-10 mg total) by mouth daily at bedtime as needed for muscle spasms 90 tablet 0    Diclofenac Sodium (VOLTAREN) 1 % Apply 2 g topically 4 (four) times a day Apply 400 g 0    lisinopril (ZESTRIL) 10 mg tablet Take 1 tablet (10 mg total) by mouth daily 90 tablet 0    metoprolol tartrate (LOPRESSOR) 25 mg tablet Take 1 tablet (25 mg total) by mouth daily 90 tablet 0    pantoprazole (PROTONIX) 40 mg tablet Take 1 tablet (40 mg total) by mouth daily 90 tablet 0    tamsulosin (FLOMAX) 0 4 mg Take 1 capsule (0 4 mg total) by mouth daily with dinner 90 capsule 3    traZODone (DESYREL) 100 mg tablet Take 1 tablet (100 mg total) by mouth daily at bedtime 90 tablet 0    venlafaxine (EFFEXOR) 37 5 mg tablet Take 1 tablet (37 5 mg total) by mouth daily 90 tablet 0    EPINEPHrine (EPIPEN) 0 3 mg/0 3 mL SOAJ Inject 0 3 mL (0 3 mg total) into a muscle once for 1 dose 2 each 0    gabapentin (NEURONTIN) 300 mg capsule TAKE ONE CAPSULE BY MOUTH DAILY (Patient not taking: Reported on 8/5/2022) 90 capsule 0    nitroglycerin (NITROSTAT) 0 4 mg SL tablet Place 1 tablet (0 4 mg total) under the tongue every 5 (five) minutes as needed for chest pain (Patient not taking: Reported on 8/5/2022) 30 tablet 0    oxyCODONE (Roxicodone) 5 immediate release tablet Take 1 tablet (5 mg total) by mouth every 4 (four) hours as needed for moderate pain Max Daily Amount: 30 mg (Patient not taking: No sig reported) 20 tablet 0     No current facility-administered medications for this visit  Allergies: Allergies   Allergen Reactions    Adhesive [Medical Tape] Swelling     Tape on eye caused eye to swell could not open! Itching large hives from on arms     CAN USE PAPER TAPE    Cephalosporins Anaphylaxis    Nuts - Food Allergy Anaphylaxis    Other Abdominal Pain     Soy  Patient can not have an NGT due to bariatric surgery    Peanut Oil - Food Allergy Anaphylaxis    Penicillins Anaphylaxis     Itching and hives    Soy Isoflavones Other (See Comments)     anaphylaxis    Shellfish Allergy - Food Allergy      Betadine/ cat scan dye is Okay  Cannot eat shrimp, crab shellfish avoids all fish      Shellfish-Derived Products - Food Allergy Other (See Comments)     Positive test      Physical Exam:     /86   Pulse 72   Ht 5' 7 24" (1 708 m)   Wt 102 kg (225 lb 3 2 oz)   SpO2 97%   BMI 35 02 kg/m²     Physical Exam  Vitals reviewed  Constitutional:       General: He is awake  He is not in acute distress  Appearance: Normal appearance  He is well-developed and well-groomed  He is not ill-appearing  HENT:      Head: Normocephalic and atraumatic  Right Ear: Hearing, tympanic membrane, ear canal and external ear normal       Left Ear: Hearing, tympanic membrane, ear canal and external ear normal       Nose: Nose normal       Mouth/Throat:      Lips: Pink  Mouth: Mucous membranes are moist    Eyes:      General: Lids are normal       Conjunctiva/sclera: Conjunctivae normal       Pupils: Pupils are equal, round, and reactive to light  Neck:      Thyroid: No thyromegaly  Vascular: Normal carotid pulses  No carotid bruit or JVD  Cardiovascular:      Rate and Rhythm: Normal rate and regular rhythm  Pulses: Normal pulses  Heart sounds: Normal heart sounds  No murmur heard  Pulmonary:      Effort: Pulmonary effort is normal       Breath sounds: Normal breath sounds  Abdominal:      General: Bowel sounds are normal       Palpations: Abdomen is soft  Tenderness: There is no abdominal tenderness  Musculoskeletal:         General: Normal range of motion  Cervical back: Normal range of motion  Right lower leg: No edema  Left lower leg: No edema  Lymphadenopathy:      Cervical: No cervical adenopathy  Skin:     General: Skin is warm and dry  Capillary Refill: Capillary refill takes less than 2 seconds  Neurological:      Mental Status: He is alert and oriented to person, place, and time  Motor: Motor function is intact  Psychiatric:         Attention and Perception: Attention normal          Mood and Affect: Mood normal          Speech: Speech normal          Behavior: Behavior normal  Behavior is cooperative  Thought Content:  Thought content normal          Judgment: Judgment normal           Talisha Bartlett, [address] [facility] [address]

## 2022-08-05 NOTE — PATIENT INSTRUCTIONS

## 2022-09-19 DIAGNOSIS — M25.512 LEFT SHOULDER PAIN, UNSPECIFIED CHRONICITY: ICD-10-CM

## 2022-09-19 DIAGNOSIS — G47.00 INSOMNIA, UNSPECIFIED TYPE: ICD-10-CM

## 2022-09-19 DIAGNOSIS — N40.0 BENIGN PROSTATIC HYPERPLASIA, UNSPECIFIED WHETHER LOWER URINARY TRACT SYMPTOMS PRESENT: ICD-10-CM

## 2022-09-19 DIAGNOSIS — M54.9 CHRONIC BILATERAL BACK PAIN, UNSPECIFIED BACK LOCATION: ICD-10-CM

## 2022-09-19 DIAGNOSIS — N30.00 ACUTE CYSTITIS WITHOUT HEMATURIA: ICD-10-CM

## 2022-09-19 DIAGNOSIS — G89.29 CHRONIC BILATERAL BACK PAIN, UNSPECIFIED BACK LOCATION: ICD-10-CM

## 2022-09-19 DIAGNOSIS — M79.2 NERVE PAIN: ICD-10-CM

## 2022-09-19 RX ORDER — GABAPENTIN 300 MG/1
300 CAPSULE ORAL DAILY
Qty: 90 CAPSULE | Refills: 0 | Status: SHIPPED | OUTPATIENT
Start: 2022-09-19

## 2022-09-19 RX ORDER — TAMSULOSIN HYDROCHLORIDE 0.4 MG/1
0.4 CAPSULE ORAL
Qty: 90 CAPSULE | Refills: 0 | Status: SHIPPED | OUTPATIENT
Start: 2022-09-19 | End: 2022-10-19

## 2022-09-19 RX ORDER — TRAZODONE HYDROCHLORIDE 100 MG/1
100 TABLET ORAL
Qty: 90 TABLET | Refills: 0 | Status: SHIPPED | OUTPATIENT
Start: 2022-09-19

## 2022-09-19 RX ORDER — CYCLOBENZAPRINE HCL 10 MG
5-10 TABLET ORAL
Qty: 90 TABLET | Refills: 0 | Status: SHIPPED | OUTPATIENT
Start: 2022-09-19

## 2022-09-23 ENCOUNTER — TELEPHONE (OUTPATIENT)
Dept: NEPHROLOGY | Facility: CLINIC | Age: 62
End: 2022-09-23

## 2022-09-23 NOTE — TELEPHONE ENCOUNTER
Appointment Confirmation   Person confirmed appointment with  If not patient, name of the person Patient     Date and time of appointment 09/26   Patient acknowledged and will be at appointment? yes   Did you advise the patient that they will need a urine sample if they are a new patient?  yes   Did you advise the patient to bring their current medications for verification? (including any OTC) yes   Additional Information

## 2022-09-26 ENCOUNTER — CONSULT (OUTPATIENT)
Dept: NEPHROLOGY | Facility: CLINIC | Age: 62
End: 2022-09-26
Payer: COMMERCIAL

## 2022-09-26 VITALS
WEIGHT: 227 LBS | HEIGHT: 69 IN | BODY MASS INDEX: 33.62 KG/M2 | SYSTOLIC BLOOD PRESSURE: 140 MMHG | DIASTOLIC BLOOD PRESSURE: 82 MMHG

## 2022-09-26 DIAGNOSIS — I10 ESSENTIAL (PRIMARY) HYPERTENSION: ICD-10-CM

## 2022-09-26 DIAGNOSIS — N20.0 NEPHROLITHIASIS: ICD-10-CM

## 2022-09-26 DIAGNOSIS — R80.9 PROTEINURIA, UNSPECIFIED TYPE: Primary | ICD-10-CM

## 2022-09-26 LAB
SL AMB  POCT GLUCOSE, UA: ABNORMAL
SL AMB LEUKOCYTE ESTERASE,UA: ABNORMAL
SL AMB POCT BILIRUBIN,UA: ABNORMAL
SL AMB POCT BLOOD,UA: ABNORMAL
SL AMB POCT CLARITY,UA: CLEAR
SL AMB POCT COLOR,UA: YELLOW
SL AMB POCT KETONES,UA: ABNORMAL
SL AMB POCT NITRITE,UA: ABNORMAL
SL AMB POCT PH,UA: 6
SL AMB POCT SPECIFIC GRAVITY,UA: 1.02
SL AMB POCT URINE PROTEIN: 30
SL AMB POCT UROBILINOGEN: ABNORMAL

## 2022-09-26 PROCEDURE — 99244 OFF/OP CNSLTJ NEW/EST MOD 40: CPT | Performed by: INTERNAL MEDICINE

## 2022-09-26 PROCEDURE — 81002 URINALYSIS NONAUTO W/O SCOPE: CPT | Performed by: INTERNAL MEDICINE

## 2022-09-26 NOTE — PATIENT INSTRUCTIONS
1  Proteinuria - present on urinalyses in past, last seen May 2022  -in office urine sample shows trace protein, no blood  -repeat formal UA with microscopy along with Urine protein:cr ratio  -BP elevation, even transiently, can contribute   -low levels of proteinuria can also be seen in men with aging  -avoid NSAIDs including ibuprofen, aleve, advil, motrin, naproxen, celebrex, indomethacin, toradol, diclofenac(topical can also be systemically absorbed)    2  HTN - BP borderline in office, possibly due to pain  -avoid high salt/sodium diet  Avoid canned foods, packaged foods, [employer] food, frequent restaurant/fast food  -avoid caffeine   -continue amlodipine 5mg daily, lisinopril 10mg daily, metoprolol 25mg daily, flomax  -monitor BP twice daily at home for 1 week(try to obtain at least 10 readings)  Do not check BP immediately after waking as it tends to run higher in everyone then  BP log provided  Call office or send [employer] message with BP readings and heart rate in 1 week  3  Nephrolithiasis (kidney stones), calcium oxalate noted Nov 2021, with history of obstruction at that time  -stay well hydrated  Drink enough to urinate 2-2 5L/day    -Avoid high salt/sodium diet as above  -F/u stone analysis bloodwork as well as 24 hr urine collection(litholink) to determine other measures for stone prevention    RTC in 6 months  Obtain blood and urine testing ASAP  Obtain 24 hr urine collection (litholink) once you obtain bottle in mail

## 2022-09-26 NOTE — PROGRESS NOTES
NEPHROLOGY OUTPATIENT CONSULTATION   Klaus Jonas  58 y o  male MRN: [id number]  Date: 9/26/2022  Reason for consultation:   Chief Complaint   Patient presents with    Proteinuria    Consult       Patient instructions:  Patient Instructions   1  Proteinuria - present on urinalyses in past, last seen May 2022  -in office urine sample shows trace protein, no blood  -repeat formal UA with microscopy along with Urine protein:cr ratio  -BP elevation, even transiently, can contribute   -low levels of proteinuria can also be seen in men with aging  -avoid NSAIDs including ibuprofen, aleve, advil, motrin, naproxen, celebrex, indomethacin, toradol, diclofenac(topical can also be systemically absorbed)    2  HTN - BP borderline in office, possibly due to pain  -avoid high salt/sodium diet  Avoid canned foods, packaged foods, [place] food, frequent restaurant/fast food  -avoid caffeine   -continue amlodipine 5mg daily, lisinopril 10mg daily, metoprolol 25mg daily, flomax  -monitor BP twice daily at home for 1 week(try to obtain at least 10 readings)  Do not check BP immediately after waking as it tends to run higher in everyone then  BP log provided  Call office or send [employer] message with BP readings and heart rate in 1 week  3  Nephrolithiasis (kidney stones), calcium oxalate noted Nov 2021, with history of obstruction at that time  -stay well hydrated  Drink enough to urinate 2-2 5L/day    -Avoid high salt/sodium diet as above  -F/u stone analysis bloodwork as well as 24 hr urine collection(litholink) to determine other measures for stone prevention    RTC in 6 months  Obtain blood and urine testing ASAP  Obtain 24 hr urine collection (litholink) once you obtain bottle in mail  Megan Tobias was seen today for proteinuria and consult  Diagnoses and all orders for this visit:    Proteinuria, unspecified type  -     Ambulatory Referral to Nephrology  -     Urinalysis with microscopic;  Future  - Protein / creatinine ratio, urine; Future  -     POCT urine dip    Essential (primary) hypertension  -     POCT urine dip    Nephrolithiasis  -     Phosphorus; Future  -     Magnesium; Future  -     PTH, intact; Future  -     Vitamin D 25 hydroxy; Future  -     Uric acid; Future  -     Litholink Kidney Stone Panel; Future  -     Cancel: Basic metabolic panel; Future  -     POCT urine dip  -     Basic metabolic panel; Future        ASSESSMENT and PLAN:  1  Proteinuria - present on urinalyses in past, last seen May 2022  -in office urine sample shows trace protein, no blood  -repeat formal UA with microscopy along with Urine protein:cr ratio  -BP elevation, even transiently, can contribute   -low levels of proteinuria can also be seen in men with aging  -avoid NSAIDs including ibuprofen, aleve, advil, motrin, naproxen, celebrex, indomethacin, toradol, diclofenac(topical can also be systemically absorbed)    2  HTN - BP borderline in office, possibly due to pain  -avoid high salt/sodium diet  Avoid canned foods, packaged foods, [place] food, frequent restaurant/fast food  -avoid caffeine   -continue amlodipine 5mg daily, lisinopril 10mg daily, metoprolol 25mg daily, flomax  -monitor BP twice daily at home for 1 week(try to obtain at least 10 readings)  Do not check BP immediately after waking as it tends to run higher in everyone then  BP log provided  Call office or send [employer] message with BP readings and heart rate in 1 week  3  Nephrolithiasis (kidney stones), calcium oxalate noted Nov 2021, with history of obstruction at that time and lithotripsy and stone retrieval  -stay well hydrated  Drink enough to urinate 2-2 5L/day    -Avoid high salt/sodium diet as above  -F/u stone analysis bloodwork as well as 24 hr urine collection(litholink) to determine other measures for stone prevention    HISTORY OF PRESENT ILLNESS:  Requesting Physician: Sergeycarlos An is a 58 y o  male with history of kidney stones, HTN who presents in consultation for proteinuria,    Denies history of recent NSAID use, herbal/OTC medications, history of UTIs  Denies history of prior known kidney disease  Nephrolithiasis - has history of obstruction in Nov 2021, has been producing kidney stones that he usually passes on his own since late 25s to early 35s  Usually passes his stones on his own  Using voltaren nightly in large amount  HTN since at least 2010 - BP higher when in pain or nervous  Shoulders hurt all the time  Had an MI in 2010  BP has been uncontrolled in the past briefly but much better now  Drinks decaf drinks  Does not drink protein shakes  Does not exercise outside of working  His wife has brain cancer  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  Walking and lifts all night  Lifts 40-50lbs at a time  Has shoulder pain currently  Drinks 80 oz water at night  Tries to cook at home  Does not eat fast food  Usually sandwiches  Does have lunch meats       PAST MEDICAL HISTORY:  Past Medical History:   Diagnosis Date    Abdominal lump 4/20/2018    Anxiety     Arm pain     left-may have torn the bicep tendon    Arthritis     Cardiac disease     Contact lens/glasses fitting     Coronary artery disease     COVID-19 vaccine series completed     Moderna x2    Episode of dizziness 11/26/2018    Herpes zoster with complication 2/3/9485    Hyperlipidemia     Hypertension     Kidney stone     [facility]) 2010    cardiac stent x2    Obesity     Pain of right great toe 7/22/2019    PONV (postoperative nausea and vomiting)     in the past-1980's    Sepsis ([facility] [address] ) 12/2020    urinary issue-back up       PAST SURGICAL HISTORY:  Past Surgical History:   Procedure Laterality Date    ABDOMINAL SURGERY      tumor on stomach    ANGIOPLASTY      2010-x2 stents left side    BACK SURGERY      lower back-dissectomy    CARDIAC SURGERY      angio with stents    CIRCUMCISION      COLONOSCOPY      CYSTOSCOPY      EGD AND COLONOSCOPY      GASTRIC BYPASS  2005    IR ABSCESS/SEROMA DRAINAGE  1/4/2019    LITHOTRIPSY      x2    NECK SURGERY      herniated disc C4/5    OTHER SURGICAL HISTORY      removal of vocal cord lesion     MD EXC TUMOR SOFT TISSUE ABDOMINAL WALL SUBQ <3CM N/A 2/27/2019    Procedure: ABDOMINAL SEROMA EXCISION;  Surgeon: Lisa De MD;  [facility];  Service: General    ROTATOR CUFF REPAIR Bilateral     SHOULDER SURGERY Bilateral     TONSILLECTOMY      VASCULAR SURGERY      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    WISDOM TOOTH EXTRACTION      WOUND DEBRIDEMENT N/A 3/7/2019    Procedure: DEBRIDEMENT WOUND ABDOMINAL ([address]) AND WOUND VAC APPLICATION;  Surgeon: Lisa De MD;  [facility];  Service: General    WOUND DEBRIDEMENT N/A 3/9/2019    Procedure: DEBRIDEMENT WOUND ABDOMINAL ([address]), wound vac dressing change;  Surgeon: Ruby Griffith DO;  [facility];  Service: General       ALLERGIES:  Allergies   Allergen Reactions    Adhesive [Medical Tape] Swelling     Tape on eye caused eye to swell could not open! Itching large hives from on arms     CAN USE PAPER TAPE    Cephalosporins Anaphylaxis    Nuts - Food Allergy Anaphylaxis    Other Abdominal Pain     Soy  Patient can not have an NGT due to bariatric surgery    Peanut Oil - Food Allergy Anaphylaxis    Penicillins Anaphylaxis     Itching and hives    Soy Isoflavones Other (See Comments)     anaphylaxis    Shellfish Allergy - Food Allergy      Betadine/ cat scan dye is Okay  Cannot eat shrimp, crab shellfish avoids all fish      Shellfish-Derived Products - Food Allergy Other (See Comments)     Positive test       SOCIAL HISTORY:  Social History     Substance and Sexual Activity   Alcohol Use Not Currently    Comment: occasional last drink 2/2020     Social History     Substance and Sexual Activity   Drug Use No Social History     Tobacco Use   Smoking Status Never Smoker   Smokeless Tobacco Never Used   Tobacco Comment    rare cigar smoking       FAMILY HISTORY:  Family History   Problem Relation Age of Onset    Heart disease Mother         passed during open heart surgery     Heart disease Father     Diabetes Father         caused blindness     Lung cancer Sister     Cancer Sister     Heart disease Brother         MI    No Known Problems Daughter     No Known Problems Son     No Known Problems Son     Nephrolithiasis Son        MEDICATIONS:    Current Outpatient Medications:     acetaminophen (TYLENOL) 500 mg tablet, Take 1,000 mg by mouth every 6 (six) hours as needed for mild pain, Disp: , Rfl:     amLODIPine (NORVASC) 5 mg tablet, Take 1 tablet (5 mg total) by mouth daily, Disp: 90 tablet, Rfl: 1    aspirin (ECOTRIN LOW STRENGTH) 81 mg EC tablet, Take 162 mg by mouth daily , Disp: , Rfl:     atorvastatin (LIPITOR) 40 mg tablet, Take 1 tablet (40 mg total) by mouth daily, Disp: 90 tablet, Rfl: 0    cyclobenzaprine (FLEXERIL) 10 mg tablet, Take 0 5-1 tablets (5-10 mg total) by mouth daily at bedtime as needed for muscle spasms, Disp: 90 tablet, Rfl: 0    Diclofenac Sodium (VOLTAREN) 1 %, Apply 2 g topically 4 (four) times a day Apply, Disp: 400 g, Rfl: 0    gabapentin (NEURONTIN) 300 mg capsule, Take 1 capsule (300 mg total) by mouth daily, Disp: 90 capsule, Rfl: 0    lisinopril (ZESTRIL) 10 mg tablet, Take 1 tablet (10 mg total) by mouth daily, Disp: 90 tablet, Rfl: 0    metoprolol tartrate (LOPRESSOR) 25 mg tablet, Take 1 tablet (25 mg total) by mouth daily, Disp: 90 tablet, Rfl: 0    pantoprazole (PROTONIX) 40 mg tablet, Take 1 tablet (40 mg total) by mouth daily, Disp: 90 tablet, Rfl: 0    tamsulosin (FLOMAX) 0 4 mg, Take 1 capsule (0 4 mg total) by mouth daily with dinner, Disp: 90 capsule, Rfl: 0    traZODone (DESYREL) 100 mg tablet, Take 1 tablet (100 mg total) by mouth daily at bedtime, Disp: 90 tablet, Rfl: 0    venlafaxine (EFFEXOR) 37 5 mg tablet, Take 1 tablet (37 5 mg total) by mouth daily, Disp: 90 tablet, Rfl: 0    EPINEPHrine (EPIPEN) 0 3 mg/0 3 mL SOAJ, Inject 0 3 mL (0 3 mg total) into a muscle once for 1 dose, Disp: 2 each, Rfl: 0    nitroglycerin (NITROSTAT) 0 4 mg SL tablet, Place 1 tablet (0 4 mg total) under the tongue every 5 (five) minutes as needed for chest pain (Patient not taking: No sig reported), Disp: 30 tablet, Rfl: 0    oxyCODONE (Roxicodone) 5 immediate release tablet, Take 1 tablet (5 mg total) by mouth every 4 (four) hours as needed for moderate pain Max Daily Amount: 30 mg (Patient not taking: No sig reported), Disp: 20 tablet, Rfl: 0    REVIEW OF SYSTEMS:  Review of Systems   Constitutional: Negative for chills and fever  HENT: Negative for sore throat  Eyes: Negative for visual disturbance  Respiratory: Negative for cough and shortness of breath  Cardiovascular: Negative for chest pain and leg swelling  Gastrointestinal: Negative for abdominal pain, constipation, diarrhea, nausea and vomiting  Endocrine: Negative for polyuria  Genitourinary: Negative for decreased urine volume, difficulty urinating, dysuria and hematuria  Musculoskeletal: Negative for back pain and myalgias  Shoulder and knee pain   Skin: Negative for rash  Neurological: Positive for light-headedness (with getting up too fast) and numbness (sometimes first three fingers in both hands)  Negative for dizziness  Psychiatric/Behavioral: Negative for confusion  PHYSICAL EXAM:   Vitals:    09/26/22 0805   BP: 140/82   BP Location: Left arm   Patient Position: Sitting   Cuff Size: Large   Weight: 103 kg (227 lb)   Height: 5' 9" (1 753 m)     Body mass index is 33 52 kg/m²  Physical Exam  Vitals reviewed  Constitutional:       General: He is not in acute distress  Appearance: Normal appearance  He is well-developed  He is not diaphoretic     HENT:      Head: Normocephalic and atraumatic  Nose: Nose normal       Mouth/Throat:      Mouth: Mucous membranes are moist       Pharynx: No oropharyngeal exudate  Eyes:      General: No scleral icterus  Right eye: No discharge  Left eye: No discharge  Neck:      Thyroid: No thyromegaly  Cardiovascular:      Rate and Rhythm: Normal rate and regular rhythm  Heart sounds: Normal heart sounds  Pulmonary:      Effort: Pulmonary effort is normal       Breath sounds: Normal breath sounds  No wheezing or rales  Abdominal:      General: Bowel sounds are normal  There is no distension  Palpations: Abdomen is soft  Tenderness: There is no abdominal tenderness  Musculoskeletal:         General: No swelling  Normal range of motion  Cervical back: Neck supple  Lymphadenopathy:      Cervical: No cervical adenopathy  Skin:     General: Skin is warm and dry  Findings: No rash  Neurological:      General: No focal deficit present  Mental Status: He is alert  Comments: awake   Psychiatric:         Mood and Affect: Mood normal          Behavior: Behavior normal            Laboratory results:   Lab Results   Component Value Date    SODIUM 136 2022    K 4 5 2022     2022    CO2 27 2022    BUN 23 2022    CREATININE 0 99 2022    GLUC 108 2021    CALCIUM 9 8 2022        Imagin21 renal u/s: right kidney 11 5cm, left kidney 11cm, normal echogenicity, nonobstucting stones bilaterally    Portions of the record may have been created with voice recognition software   Occasional wrong word or "sound a like" substitutions may have occurred due to the inherent limitations of voice recognition software   Read the chart carefully and recognize, using context, where substitutions have occurred

## 2022-09-29 ENCOUNTER — APPOINTMENT (OUTPATIENT)
Dept: LAB | Facility: CLINIC | Age: 62
End: 2022-09-29
Payer: COMMERCIAL

## 2022-09-29 DIAGNOSIS — N20.0 NEPHROLITHIASIS: ICD-10-CM

## 2022-09-29 DIAGNOSIS — Z13.29 SCREENING FOR THYROID DISORDER: ICD-10-CM

## 2022-09-29 DIAGNOSIS — R80.9 PROTEINURIA, UNSPECIFIED TYPE: ICD-10-CM

## 2022-09-29 DIAGNOSIS — Z12.5 SCREENING FOR MALIGNANT NEOPLASM OF PROSTATE: ICD-10-CM

## 2022-09-29 LAB
25(OH)D3 SERPL-MCNC: 30.4 NG/ML (ref 30–100)
ANION GAP SERPL CALCULATED.3IONS-SCNC: 7 MMOL/L (ref 4–13)
BACTERIA UR QL AUTO: ABNORMAL /HPF
BILIRUB UR QL STRIP: NEGATIVE
BUN SERPL-MCNC: 23 MG/DL (ref 5–25)
CALCIUM SERPL-MCNC: 9.1 MG/DL (ref 8.3–10.1)
CHLORIDE SERPL-SCNC: 109 MMOL/L (ref 96–108)
CLARITY UR: CLEAR
CO2 SERPL-SCNC: 26 MMOL/L (ref 21–32)
COLOR UR: ABNORMAL
CREAT SERPL-MCNC: 1.13 MG/DL (ref 0.6–1.3)
CREAT UR-MCNC: 68.7 MG/DL
GFR SERPL CREATININE-BSD FRML MDRD: 69 ML/MIN/1.73SQ M
GLUCOSE P FAST SERPL-MCNC: 59 MG/DL (ref 65–99)
GLUCOSE UR STRIP-MCNC: ABNORMAL MG/DL
HGB UR QL STRIP.AUTO: NEGATIVE
KETONES UR STRIP-MCNC: NEGATIVE MG/DL
LEUKOCYTE ESTERASE UR QL STRIP: NEGATIVE
MAGNESIUM SERPL-MCNC: 2.2 MG/DL (ref 1.6–2.6)
MUCOUS THREADS UR QL AUTO: ABNORMAL
NITRITE UR QL STRIP: NEGATIVE
NON-SQ EPI CELLS URNS QL MICRO: ABNORMAL /HPF
PH UR STRIP.AUTO: 6 [PH]
PHOSPHATE SERPL-MCNC: 2 MG/DL (ref 2.3–4.1)
POTASSIUM SERPL-SCNC: 3.9 MMOL/L (ref 3.5–5.3)
PROT UR STRIP-MCNC: NEGATIVE MG/DL
PROT UR-MCNC: 18 MG/DL
PROT/CREAT UR: 0.26 MG/G{CREAT} (ref 0–0.1)
PSA SERPL-MCNC: 2.5 NG/ML (ref 0–4)
PTH-INTACT SERPL-MCNC: 67.9 PG/ML (ref 18.4–80.1)
RBC #/AREA URNS AUTO: ABNORMAL /HPF
SODIUM SERPL-SCNC: 142 MMOL/L (ref 135–147)
SP GR UR STRIP.AUTO: 1.02 (ref 1–1.03)
TSH SERPL DL<=0.05 MIU/L-ACNC: 1.17 UIU/ML (ref 0.45–4.5)
URATE SERPL-MCNC: 5.8 MG/DL (ref 3.5–8.5)
UROBILINOGEN UR STRIP-ACNC: <2 MG/DL
WBC #/AREA URNS AUTO: ABNORMAL /HPF

## 2022-09-29 PROCEDURE — 81001 URINALYSIS AUTO W/SCOPE: CPT

## 2022-09-29 PROCEDURE — 80048 BASIC METABOLIC PNL TOTAL CA: CPT

## 2022-09-29 PROCEDURE — 84550 ASSAY OF BLOOD/URIC ACID: CPT

## 2022-09-29 PROCEDURE — 84443 ASSAY THYROID STIM HORMONE: CPT

## 2022-09-29 PROCEDURE — 36415 COLL VENOUS BLD VENIPUNCTURE: CPT

## 2022-09-29 PROCEDURE — G0103 PSA SCREENING: HCPCS

## 2022-09-29 PROCEDURE — 83735 ASSAY OF MAGNESIUM: CPT

## 2022-09-29 PROCEDURE — 82306 VITAMIN D 25 HYDROXY: CPT

## 2022-09-29 PROCEDURE — 83970 ASSAY OF PARATHORMONE: CPT

## 2022-09-29 PROCEDURE — 82570 ASSAY OF URINE CREATININE: CPT

## 2022-09-29 PROCEDURE — 84100 ASSAY OF PHOSPHORUS: CPT

## 2022-09-29 PROCEDURE — 84156 ASSAY OF PROTEIN URINE: CPT

## 2022-10-12 PROBLEM — N39.0 UTI (URINARY TRACT INFECTION): Status: RESOLVED | Noted: 2020-11-30 | Resolved: 2022-10-12

## 2022-10-26 ENCOUNTER — TELEPHONE (OUTPATIENT)
Dept: FAMILY MEDICINE CLINIC | Facility: CLINIC | Age: 62
End: 2022-10-26

## 2022-11-04 ENCOUNTER — OFFICE VISIT (OUTPATIENT)
Dept: OBGYN CLINIC | Facility: CLINIC | Age: 62
End: 2022-11-04

## 2022-11-04 VITALS
BODY MASS INDEX: 33.33 KG/M2 | HEART RATE: 56 BPM | DIASTOLIC BLOOD PRESSURE: 79 MMHG | HEIGHT: 69 IN | WEIGHT: 225 LBS | SYSTOLIC BLOOD PRESSURE: 135 MMHG

## 2022-11-04 DIAGNOSIS — M17.11 PRIMARY OSTEOARTHRITIS OF RIGHT KNEE: Primary | ICD-10-CM

## 2022-11-04 DIAGNOSIS — M17.12 PRIMARY OSTEOARTHRITIS OF LEFT KNEE: ICD-10-CM

## 2022-11-04 RX ORDER — BUPIVACAINE HYDROCHLORIDE 2.5 MG/ML
2 INJECTION, SOLUTION INFILTRATION; PERINEURAL
Status: COMPLETED | OUTPATIENT
Start: 2022-11-04 | End: 2022-11-04

## 2022-11-04 RX ORDER — MELOXICAM 15 MG/1
15 TABLET ORAL DAILY
Qty: 30 TABLET | Refills: 0 | Status: SHIPPED | OUTPATIENT
Start: 2022-11-04

## 2022-11-04 RX ORDER — METHYLPREDNISOLONE ACETATE 40 MG/ML
2 INJECTION, SUSPENSION INTRA-ARTICULAR; INTRALESIONAL; INTRAMUSCULAR; SOFT TISSUE
Status: COMPLETED | OUTPATIENT
Start: 2022-11-04 | End: 2022-11-04

## 2022-11-04 RX ADMIN — BUPIVACAINE HYDROCHLORIDE 2 ML: 2.5 INJECTION, SOLUTION INFILTRATION; PERINEURAL at 08:33

## 2022-11-04 RX ADMIN — BUPIVACAINE HYDROCHLORIDE 2 ML: 2.5 INJECTION, SOLUTION INFILTRATION; PERINEURAL at 08:32

## 2022-11-04 RX ADMIN — METHYLPREDNISOLONE ACETATE 2 ML: 40 INJECTION, SUSPENSION INTRA-ARTICULAR; INTRALESIONAL; INTRAMUSCULAR; SOFT TISSUE at 08:32

## 2022-11-04 RX ADMIN — METHYLPREDNISOLONE ACETATE 2 ML: 40 INJECTION, SUSPENSION INTRA-ARTICULAR; INTRALESIONAL; INTRAMUSCULAR; SOFT TISSUE at 08:33

## 2022-11-04 NOTE — PROGRESS NOTES
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  · Meloxicam 15 mg was sent to the patient's [facility]  · New x-rays at the follow-up of bilateral knees will be needed    To do next visit:  Return in about 3 months (around 2/4/2023) for bilateral knee OA  The above stated was discussed in layman's terms and the patient expressed understanding  All questions were answered to the patient's satisfaction  Scribe Attestation    I,:  Mukund Le am acting as a scribe while in the presence of the attending physician :       I,:  Jaswinder Garrett MD personally performed the services described in this documentation    as scribed in my presence :             Subjective:   Scout Renee  is a 58 y o  male who presents presents today for bilateral knee pain due to osteoarthritis  Patient has been receiving periodic cortisone injections, 8/5/2022  He uses neoprene sleeves while at work at the [employer]  He works night shift in the sleeves do help keep the knee is warm  At this time patient uses gabapentin for pain but no anti-inflammatory  He notes most of his pain is located in the medial posterior aspect of the knees  Review of systems negative unless otherwise specified in HPI  Review of Systems   Constitutional: Negative for chills, fever and unexpected weight change  HENT: Negative for hearing loss, nosebleeds and sore throat      Eyes: Negative for pain, redness and visual disturbance  Respiratory: Negative for cough, shortness of breath and wheezing  Cardiovascular: Negative for chest pain, palpitations and leg swelling  Gastrointestinal: Negative for abdominal pain, nausea and vomiting  Endocrine: Negative for polydipsia and polyuria  Genitourinary: Negative for dysuria and hematuria  Musculoskeletal: Positive for arthralgias  Skin: Negative for rash and wound  Neurological: Negative for dizziness, light-headedness and headaches  Psychiatric/Behavioral: Negative for decreased concentration, dysphoric mood and suicidal ideas  The patient is not nervous/anxious          Past Medical History:   Diagnosis Date   • Abdominal lump 4/20/2018   • Anxiety    • Arm pain     left-may have torn the bicep tendon   • Arthritis    • Cardiac disease    • Contact lens/glasses fitting    • Coronary artery disease    • COVID-19 vaccine series completed     Moderna x2   • Episode of dizziness 11/26/2018   • Herpes zoster with complication 6/6/3497   • Hyperlipidemia    • Hypertension    • Kidney stone    • Myocardial infarction [facility]) 2010    cardiac stent x2   • Obesity    • Pain of right great toe 7/22/2019   • PONV (postoperative nausea and vomiting)     in the past-1980's   • Sepsis ([facility] [address] ) 12/2020    urinary issue-back up       Past Surgical History:   Procedure Laterality Date   • ABDOMINAL SURGERY      tumor on stomach   • ANGIOPLASTY      2010-x2 stents left side   • BACK SURGERY      lower back-dissectomy   • CARDIAC SURGERY      angio with stents   • CIRCUMCISION     • COLONOSCOPY     • CYSTOSCOPY     • EGD AND COLONOSCOPY     • GASTRIC BYPASS  2005   • IR ABSCESS/SEROMA DRAINAGE  1/4/2019   • LITHOTRIPSY      x2   • NECK SURGERY      herniated disc C4/5   • OTHER SURGICAL HISTORY      removal of vocal cord lesion    • MO EXC TUMOR SOFT TISSUE ABDOMINAL WALL SUBQ <3CM N/A 2/27/2019    Procedure: ABDOMINAL SEROMA EXCISION;  Surgeon: Mariah Sorto MD;  [facility];  Service: General   • ROTATOR CUFF REPAIR Bilateral    • SHOULDER SURGERY Bilateral    • TONSILLECTOMY     • VASCULAR SURGERY      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   • WISDOM TOOTH EXTRACTION     • WOUND DEBRIDEMENT N/A 3/7/2019    Procedure: DEBRIDEMENT WOUND ABDOMINAL ([address]) AND WOUND VAC APPLICATION;  Surgeon: Juan A Barrera MD;  [facility];  Service: General   • WOUND DEBRIDEMENT N/A 3/9/2019    Procedure: DEBRIDEMENT WOUND ABDOMINAL ([address]), wound vac dressing change;  Surgeon: Shaila Dalton DO;  [facility];  Service: General       Family History   Problem Relation Age of Onset   • Heart disease Mother         passed during open heart surgery    • Heart disease Father    • Diabetes Father         caused blindness    • Lung cancer Sister    • Cancer Sister    • Heart disease Brother         MI   • No Known Problems Daughter    • No Known Problems Son    • No Known Problems Son    • Nephrolithiasis Son        Social History     Occupational History   • Not on file   Tobacco Use   • Smoking status: Never Smoker   • Smokeless tobacco: Never Used   • Tobacco comment: rare cigar smoking   Vaping Use   • Vaping Use: Never used   Substance and Sexual Activity   • Alcohol use: Not Currently     Comment: occasional last drink 2/2020   • Drug use: No   • Sexual activity: Not Currently         Current Outpatient Medications:   •  acetaminophen (TYLENOL) 500 mg tablet, Take 1,000 mg by mouth every 6 (six) hours as needed for mild pain, Disp: , Rfl:   •  amLODIPine (NORVASC) 5 mg tablet, Take 1 tablet (5 mg total) by mouth daily, Disp: 90 tablet, Rfl: 1  •  aspirin (ECOTRIN LOW STRENGTH) 81 mg EC tablet, Take 162 mg by mouth daily , Disp: , Rfl:   •  atorvastatin (LIPITOR) 40 mg tablet, Take 1 tablet (40 mg total) by mouth daily, Disp: 90 tablet, Rfl: 0  •  cyclobenzaprine (FLEXERIL) 10 mg tablet, Take 0 5-1 tablets (5-10 mg total) by mouth daily at bedtime as needed for muscle spasms, Disp: 90 tablet, Rfl: 0  •  Diclofenac Sodium (VOLTAREN) 1 %, Apply 2 g topically 4 (four) times a day Apply, Disp: 400 g, Rfl: 0  •  EPINEPHrine (EPIPEN) 0 3 mg/0 3 mL SOAJ, Inject 0 3 mL (0 3 mg total) into a muscle once for 1 dose, Disp: 2 each, Rfl: 0  •  gabapentin (NEURONTIN) 300 mg capsule, Take 1 capsule (300 mg total) by mouth daily, Disp: 90 capsule, Rfl: 0  •  lisinopril (ZESTRIL) 10 mg tablet, Take 1 tablet (10 mg total) by mouth daily, Disp: 90 tablet, Rfl: 0  •  metoprolol tartrate (LOPRESSOR) 25 mg tablet, Take 1 tablet (25 mg total) by mouth daily, Disp: 90 tablet, Rfl: 0  •  nitroglycerin (NITROSTAT) 0 4 mg SL tablet, Place 1 tablet (0 4 mg total) under the tongue every 5 (five) minutes as needed for chest pain (Patient not taking: No sig reported), Disp: 30 tablet, Rfl: 0  •  oxyCODONE (Roxicodone) 5 immediate release tablet, Take 1 tablet (5 mg total) by mouth every 4 (four) hours as needed for moderate pain Max Daily Amount: 30 mg (Patient not taking: No sig reported), Disp: 20 tablet, Rfl: 0  •  pantoprazole (PROTONIX) 40 mg tablet, Take 1 tablet (40 mg total) by mouth daily, Disp: 90 tablet, Rfl: 0  •  tamsulosin (FLOMAX) 0 4 mg, Take 1 capsule (0 4 mg total) by mouth daily with dinner, Disp: 90 capsule, Rfl: 0  •  traZODone (DESYREL) 100 mg tablet, Take 1 tablet (100 mg total) by mouth daily at bedtime, Disp: 90 tablet, Rfl: 0  •  venlafaxine (EFFEXOR) 37 5 mg tablet, Take 1 tablet (37 5 mg total) by mouth daily, Disp: 90 tablet, Rfl: 0    Allergies   Allergen Reactions   • Adhesive [Medical Tape] Swelling     Tape on eye caused eye to swell could not open!   Itching large hives from on arms     CAN USE PAPER TAPE   • Cephalosporins Anaphylaxis   • Isoflavones (Soy) Other (See Comments)     anaphylaxis   • Nuts - Food Allergy Anaphylaxis   • Other Abdominal Pain     Soy  Patient can not have an NGT due to bariatric surgery   • Peanut Oil - Food Allergy Anaphylaxis   • Penicillins Anaphylaxis     Itching and hives   • Shellfish Allergy - Food Allergy      Betadine/ cat scan dye is Okay  Cannot eat shrimp, crab shellfish avoids all fish  • Shellfish-Derived Products - Food Allergy Other (See Comments)     Positive test            Vitals:    11/04/22 0752   BP: 135/79   Pulse: 56       Objective:    General:  No apparent distress   CV:  S1-S2   Abdomen:  Soft   Chest:  No audible wheezing                    Right Knee Exam     Tenderness   The patient is experiencing tenderness in the medial joint line  Range of Motion   Extension: 0   Flexion: 120     Tests   Varus: negative Valgus: negative  Drawer:  Anterior - negative        Other   Erythema: absent  Sensation: normal  Pulse: present  Swelling: none  Effusion: no effusion present    Comments:  Calf is soft and non tender      Left Knee Exam     Tenderness   The patient is experiencing tenderness in the medial joint line  Range of Motion   Extension: 0   Flexion: 110     Tests   Varus: negative Valgus: negative  Drawer:  Anterior - negative         Other   Erythema: absent  Sensation: normal  Pulse: present  Swelling: none  Effusion: no effusion present    Comments:  Calf is soft and non tender            Diagnostics, reviewed and taken today if performed as documented:    None performed      The attending physician has personally reviewed the pertinent films in PACS and interpretation is as follows:      Procedures, if performed today:    Large joint arthrocentesis: R knee  Universal Protocol:  Consent: Verbal consent obtained  Risks and benefits: risks, benefits and alternatives were discussed  Consent given by: patient  Time out: Immediately prior to procedure a "time out" was called to verify the correct patient, procedure, equipment, support staff and site/side marked as required    Timeout called at: 11/4/2022 8:24 AM   Patient understanding: patient states understanding of the procedure being performed  Site marked: the operative site was marked  Patient identity confirmed: verbally with patient    Supporting Documentation  Indications: pain   Procedure Details  Location: knee - R knee  Preparation: Patient was prepped and draped in the usual sterile fashion  Needle size: 22 G  Ultrasound guidance: no  Approach: anterolateral  Medications administered: 2 mL bupivacaine 0 25 %; 2 mL methylPREDNISolone acetate 40 mg/mL    Patient tolerance: patient tolerated the procedure well with no immediate complications  Dressing:  Sterile dressing applied    Large joint arthrocentesis: L knee  Universal Protocol:  Consent: Verbal consent obtained  Risks and benefits: risks, benefits and alternatives were discussed  Consent given by: patient  Time out: Immediately prior to procedure a "time out" was called to verify the correct patient, procedure, equipment, support staff and site/side marked as required  Timeout called at: 11/4/2022 8:33 AM   Patient understanding: patient states understanding of the procedure being performed  Site marked: the operative site was marked  Patient identity confirmed: verbally with patient    Supporting Documentation  Indications: pain   Procedure Details  Location: knee - L knee  Preparation: Patient was prepped and draped in the usual sterile fashion  Needle size: 22 G  Ultrasound guidance: no  Approach: anterolateral  Medications administered: 2 mL bupivacaine 0 25 %; 2 mL methylPREDNISolone acetate 40 mg/mL    Patient tolerance: patient tolerated the procedure well with no immediate complications  Dressing:  Sterile dressing applied          Portions of the record may have been created with voice recognition software  Occasional wrong word or "sound a like" substitutions may have occurred due to the inherent limitations of voice recognition software  Read the chart carefully and recognize, using context, where substitutions have occurred

## 2022-11-09 ENCOUNTER — TELEPHONE (OUTPATIENT)
Dept: FAMILY MEDICINE CLINIC | Facility: CLINIC | Age: 62
End: 2022-11-09

## 2022-11-09 NOTE — TELEPHONE ENCOUNTER
Patient called asking if the paperwork yo recently completed can be addended since his surgery in 12/6 - clearance has to be within 30 days - or I can schedule patient for a pre op appt    Please advise

## 2022-11-17 ENCOUNTER — OFFICE VISIT (OUTPATIENT)
Dept: FAMILY MEDICINE CLINIC | Facility: CLINIC | Age: 62
End: 2022-11-17

## 2022-11-17 VITALS
RESPIRATION RATE: 16 BRPM | DIASTOLIC BLOOD PRESSURE: 84 MMHG | BODY MASS INDEX: 33.7 KG/M2 | HEART RATE: 98 BPM | WEIGHT: 228.2 LBS | SYSTOLIC BLOOD PRESSURE: 132 MMHG | OXYGEN SATURATION: 98 % | TEMPERATURE: 97.9 F

## 2022-11-17 DIAGNOSIS — I10 ESSENTIAL (PRIMARY) HYPERTENSION: ICD-10-CM

## 2022-11-17 DIAGNOSIS — I25.10 ATHEROSCLEROSIS OF CORONARY ARTERY OF NATIVE HEART WITHOUT ANGINA PECTORIS, UNSPECIFIED VESSEL OR LESION TYPE: ICD-10-CM

## 2022-11-17 DIAGNOSIS — Z63.4 UNCOMPLICATED BEREAVEMENT: ICD-10-CM

## 2022-11-17 DIAGNOSIS — Z23 NEED FOR INFLUENZA VACCINATION: ICD-10-CM

## 2022-11-17 DIAGNOSIS — Z01.818 PRE-OP EXAMINATION: Primary | ICD-10-CM

## 2022-11-17 PROBLEM — N17.9 AKI (ACUTE KIDNEY INJURY) (HCC): Status: RESOLVED | Noted: 2020-12-08 | Resolved: 2022-11-17

## 2022-11-17 PROBLEM — D69.6 THROMBOCYTOPENIA (HCC): Status: RESOLVED | Noted: 2020-12-02 | Resolved: 2022-11-17

## 2022-11-17 SDOH — SOCIAL STABILITY - SOCIAL INSECURITY: DISSAPEARANCE AND DEATH OF FAMILY MEMBER: Z63.4

## 2022-11-17 NOTE — PROGRESS NOTES
Name: Leanne Skelton  : 1960      MRN: [id number]  Encounter Provider: Stark Snellen, CRNP  Encounter Date: 2022   Encounter department: [facility]     1  Pre-op examination  Assessment & Plan:  Treatment of chronic conditions is optimized on current therapy  Pt is cleared for proposed procedure  Expect pt will do well with this low risk procedure  2  Need for influenza vaccination  -     influenza vaccine, quadrivalent, recombinant, PF, 0 5 mL, for patients 18 yr+ (FLUBLOK)    3  Essential (primary) hypertension  Assessment & Plan:  bp well controlled, continue lisinopril, metoprolol, amlodipine  4  Atherosclerosis of coronary artery of native heart without angina pectoris, unspecified vessel or lesion type  Assessment & Plan:  Denies cp, [place]  Continue atorvastatin, aspirin, metoprolol  Overdue for f/u with cardiology  5  Uncomplicated bereavement  Assessment & Plan:  Discussed how patient has been coping with recent passing of his wife  Though it is difficult, not reporting any maladaptive behaviors  He and family are supporting each other  He plans to go back to work on Monday  Reinforced importance of maintaining good PO intake/good nutrition  Pt knows to reach out if he needs anything  Subjective      Pt is a 58 y o  male who is seen today for pre-op clearance  Pt is scheduled for cataract surgery on   We reviewed PMH, PSH, social history, medications, and allergies  Pre-op testing was not ordered  Pt denies chest pain, palpitations, shortness of breath, headache, dizziness, numbness, tingling  Appetite is normal, no N/V/D  Review of Systems   Constitutional: Positive for appetite change  Negative for activity change, chills, fatigue, fever and unexpected weight change  HENT: Negative for hearing loss  Eyes: Negative for visual disturbance     Respiratory: Negative for cough, chest tightness and shortness of breath  Cardiovascular: Negative for chest pain, palpitations and leg swelling  Gastrointestinal: Negative for abdominal pain, constipation, diarrhea, nausea and vomiting  Genitourinary: Negative for difficulty urinating, dysuria and frequency  Musculoskeletal: Negative for arthralgias and myalgias  Allergic/Immunologic: Negative for environmental allergies  Neurological: Negative for dizziness, weakness, numbness and headaches  Psychiatric/Behavioral: Negative for dysphoric mood and sleep disturbance  The patient is not nervous/anxious          Current Outpatient Medications on File Prior to Visit   Medication Sig   • acetaminophen (TYLENOL) 500 mg tablet Take 1,000 mg by mouth every 6 (six) hours as needed for mild pain   • amLODIPine (NORVASC) 5 mg tablet Take 1 tablet (5 mg total) by mouth daily   • aspirin (ECOTRIN LOW STRENGTH) 81 mg EC tablet Take 162 mg by mouth daily    • atorvastatin (LIPITOR) 40 mg tablet Take 1 tablet (40 mg total) by mouth daily   • cyclobenzaprine (FLEXERIL) 10 mg tablet Take 0 5-1 tablets (5-10 mg total) by mouth daily at bedtime as needed for muscle spasms   • Diclofenac Sodium (VOLTAREN) 1 % Apply 2 g topically 4 (four) times a day Apply   • gabapentin (NEURONTIN) 300 mg capsule Take 1 capsule (300 mg total) by mouth daily   • lisinopril (ZESTRIL) 10 mg tablet Take 1 tablet (10 mg total) by mouth daily   • meloxicam (Mobic) 15 mg tablet Take 1 tablet (15 mg total) by mouth daily   • metoprolol tartrate (LOPRESSOR) 25 mg tablet Take 1 tablet (25 mg total) by mouth daily   • pantoprazole (PROTONIX) 40 mg tablet Take 1 tablet (40 mg total) by mouth daily   • traZODone (DESYREL) 100 mg tablet Take 1 tablet (100 mg total) by mouth daily at bedtime   • venlafaxine (EFFEXOR) 37 5 mg tablet Take 1 tablet (37 5 mg total) by mouth daily   • EPINEPHrine (EPIPEN) 0 3 mg/0 3 mL SOAJ Inject 0 3 mL (0 3 mg total) into a muscle once for 1 dose • nitroglycerin (NITROSTAT) 0 4 mg SL tablet Place 1 tablet (0 4 mg total) under the tongue every 5 (five) minutes as needed for chest pain (Patient not taking: Reported on 8/5/2022)   • oxyCODONE (Roxicodone) 5 immediate release tablet Take 1 tablet (5 mg total) by mouth every 4 (four) hours as needed for moderate pain Max Daily Amount: 30 mg (Patient not taking: Reported on 2/24/2022)   • tamsulosin (FLOMAX) 0 4 mg Take 1 capsule (0 4 mg total) by mouth daily with dinner       Objective     /84   Pulse 98   Temp 97 9 °F (36 6 °C)   Resp 16   Wt 104 kg (228 lb 3 2 oz)   SpO2 98%   BMI 33 70 kg/m²     Physical Exam  Vitals reviewed  Constitutional:       General: He is awake  He is not in acute distress  Vital signs are normal       Appearance: Normal appearance  He is well-developed, well-groomed and well-nourished  HENT:      Right Ear: Hearing and external ear normal       Left Ear: Hearing and external ear normal    Eyes:      General: Lids are normal       Conjunctiva/sclera: Conjunctivae normal    Neck:      Thyroid: No thyromegaly  Vascular: No carotid bruit or JVD  Cardiovascular:      Rate and Rhythm: Normal rate and regular rhythm  Pulses: Normal pulses and intact distal pulses  Heart sounds: Normal heart sounds, S1 normal and S2 normal  No murmur heard  Pulmonary:      Effort: Pulmonary effort is normal       Breath sounds: Normal breath sounds  Abdominal:      General: Bowel sounds are normal       Palpations: Abdomen is soft  Tenderness: There is no abdominal tenderness  Musculoskeletal:         General: No edema  Normal range of motion  Cervical back: Normal range of motion  Right lower leg: No edema  Left lower leg: No edema  Skin:     General: Skin is warm, dry and intact  Neurological:      Mental Status: He is alert and oriented to person, place, and time     Psychiatric:         Attention and Perception: Attention normal          Mood and Affect: Mood and affect and mood normal          Speech: Speech normal          Behavior: Behavior normal  Behavior is cooperative  Thought Content:  Thought content normal          Cognition and Memory: Cognition and cognition and memory normal          Judgment: Judgment normal        Cherl Alfreda, CRNP

## 2022-11-17 NOTE — ASSESSMENT & PLAN NOTE
Discussed how patient has been coping with recent passing of his wife  Though it is difficult, not reporting any maladaptive behaviors  He and family are supporting each other  He plans to go back to work on Monday  Reinforced importance of maintaining good PO intake/good nutrition  Pt knows to reach out if he needs anything

## 2022-12-02 DIAGNOSIS — F41.8 DEPRESSION WITH ANXIETY: ICD-10-CM

## 2022-12-02 DIAGNOSIS — M54.9 CHRONIC BILATERAL BACK PAIN, UNSPECIFIED BACK LOCATION: ICD-10-CM

## 2022-12-02 DIAGNOSIS — E78.5 HYPERLIPIDEMIA, UNSPECIFIED HYPERLIPIDEMIA TYPE: ICD-10-CM

## 2022-12-02 DIAGNOSIS — M79.2 NERVE PAIN: ICD-10-CM

## 2022-12-02 DIAGNOSIS — N30.00 ACUTE CYSTITIS WITHOUT HEMATURIA: ICD-10-CM

## 2022-12-02 DIAGNOSIS — N40.0 BENIGN PROSTATIC HYPERPLASIA, UNSPECIFIED WHETHER LOWER URINARY TRACT SYMPTOMS PRESENT: ICD-10-CM

## 2022-12-02 DIAGNOSIS — I10 ESSENTIAL HYPERTENSION: ICD-10-CM

## 2022-12-02 DIAGNOSIS — K21.9 GASTROESOPHAGEAL REFLUX DISEASE WITHOUT ESOPHAGITIS: ICD-10-CM

## 2022-12-02 DIAGNOSIS — I10 ESSENTIAL (PRIMARY) HYPERTENSION: ICD-10-CM

## 2022-12-02 DIAGNOSIS — G89.29 CHRONIC BILATERAL BACK PAIN, UNSPECIFIED BACK LOCATION: ICD-10-CM

## 2022-12-02 DIAGNOSIS — G47.00 INSOMNIA, UNSPECIFIED TYPE: ICD-10-CM

## 2022-12-05 ENCOUNTER — TELEPHONE (OUTPATIENT)
Dept: CARDIOLOGY CLINIC | Facility: CLINIC | Age: 62
End: 2022-12-05

## 2022-12-05 RX ORDER — ATORVASTATIN CALCIUM 40 MG/1
40 TABLET, FILM COATED ORAL DAILY
Qty: 90 TABLET | Refills: 0 | Status: SHIPPED | OUTPATIENT
Start: 2022-12-05

## 2022-12-05 RX ORDER — TAMSULOSIN HYDROCHLORIDE 0.4 MG/1
0.4 CAPSULE ORAL
Qty: 90 CAPSULE | Refills: 0 | Status: SHIPPED | OUTPATIENT
Start: 2022-12-05 | End: 2023-01-04

## 2022-12-05 RX ORDER — CYCLOBENZAPRINE HCL 10 MG
5-10 TABLET ORAL
Qty: 90 TABLET | Refills: 0 | Status: SHIPPED | OUTPATIENT
Start: 2022-12-05

## 2022-12-05 RX ORDER — GABAPENTIN 300 MG/1
300 CAPSULE ORAL DAILY
Qty: 90 CAPSULE | Refills: 0 | Status: SHIPPED | OUTPATIENT
Start: 2022-12-05

## 2022-12-05 RX ORDER — PANTOPRAZOLE SODIUM 40 MG/1
40 TABLET, DELAYED RELEASE ORAL DAILY
Qty: 90 TABLET | Refills: 0 | Status: SHIPPED | OUTPATIENT
Start: 2022-12-05

## 2022-12-05 RX ORDER — AMLODIPINE BESYLATE 5 MG/1
5 TABLET ORAL DAILY
Qty: 90 TABLET | Refills: 0 | Status: SHIPPED | OUTPATIENT
Start: 2022-12-05

## 2022-12-05 RX ORDER — VENLAFAXINE 37.5 MG/1
37.5 TABLET ORAL DAILY
Qty: 90 TABLET | Refills: 0 | Status: SHIPPED | OUTPATIENT
Start: 2022-12-05

## 2022-12-05 RX ORDER — LISINOPRIL 10 MG/1
10 TABLET ORAL DAILY
Qty: 90 TABLET | Refills: 0 | Status: SHIPPED | OUTPATIENT
Start: 2022-12-05

## 2022-12-05 RX ORDER — TRAZODONE HYDROCHLORIDE 100 MG/1
100 TABLET ORAL
Qty: 90 TABLET | Refills: 0 | Status: SHIPPED | OUTPATIENT
Start: 2022-12-05

## 2022-12-05 NOTE — TELEPHONE ENCOUNTER
Al Miles a message on nurse line  from [facility],  in regards to a cardiac clearance that was sent over,   She was checking on the pt's name,   Pt goes by Ck Rivera, but legal name is Jim Ferrell,  Called and spoke to Cheli and advised, verbally understood

## 2022-12-27 NOTE — PROGRESS NOTES
Cardiology Follow Up    Madison Slade   1960  [id number]  [address] [facility] [place]  [address]  [phone]    1  Essential (primary) hypertension  POCT ECG      2  Pure hypercholesterolemia        3  Coronary arteriosclerosis  POCT ECG      4  H/O heart artery stent  POCT ECG          Interval History:   Patient is here for a follow-up visit  He has HTN, CAD and HLD  He had 2 vessel stenting performed 12/27/2010 with a VELASQUEZ in the LAD and a VELASQUEZ in the diagonal branch    Echocardiogram in January 2019 demonstrated preserved LV systolic function with mild LVH and mild LAE   There was no significant valvular heart disease  Patient was [facility] December 2020 in reference to acute kidney injury and UTI  He was seen by Nephrology   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[employer] He had issues with ureteral calculus in November 2021  Was placed on amlodipine for HTN  Nuclear ETT March 2022 demonstrated no ischemia  Lipid profile done May 2022 demonstrated total cholesterol of 206 with an HDL of 85 and a calculated LDL of 106  Unfortunately his wife passed away from brain cancer since I last saw him  I passed along my sympathy to he and his family  EKG today demonstrates sinus rhythm and is a normal tracing with no significant change compared with prior tracing done February 24, 2022      Patient Active Problem List   Diagnosis   • Acute right-sided low back pain without sciatica   • Essential (primary) hypertension   • History of MI (myocardial infarction)   • Class 2 severe obesity due to excess calories with serious comorbidity and body mass index (BMI) of 35 0 to 35 9 in adult [facility])   • Multiple food allergies   • H/O heart artery stent   • Fatigue   • Skin irritation   • Postural dizziness   • Benign prostatic hyperplasia   • Scleral icterus   • Abnormal CT of liver   • Irritant contact dermatitis   • Insomnia   • Urinary frequency   • Hyperlipidemia   • Allergy to iodine   • Anaphylactic reaction   • Anxiety   • AV malformation, peripheral, congenital   • Chronic ischemic heart disease, unspecified   • Coronary atherosclerosis   • PEYTON (generalized anxiety disorder)   • Gastroesophageal reflux disease   • Nerve pain   • Muscle cramping   • Spasm of muscle, back   • History of PTCA   • Trigger finger of right hand   • Anaphylactic shock due to peanuts   • Allergy to tree nuts or seeds   • Peanut allergy   • Soy allergy   • Rash   • Paresthesia of both hands   • Pain in both knees   • Left lower quadrant abdominal pain   • S/P bariatric surgery   • Weight loss, unintentional   • Testicular pain, unspecified   • Annual physical exam   • Acute pain of right shoulder   • Abnormal LFTs   • Gram-negative bacteremia   • Left shoulder pain   • Recent bacteremia   • Chronic pain of left knee   • Proteinuria   • Nephrolithiasis   • Pre-op examination   • Uncomplicated bereavement     Past Medical History:   Diagnosis Date   • Abdominal lump 4/20/2018   • Anxiety    • Arm pain     left-may have torn the bicep tendon   • Arthritis    • Cardiac disease    • Contact lens/glasses fitting    • Coronary artery disease    • COVID-19 vaccine series completed     [place] x2   • Episode of dizziness 11/26/2018   • Herpes zoster with complication 0/1/3271   • Hyperlipidemia    • Hypertension    • Kidney stone    • Myocardial infarction [facility]) 2010    cardiac stent x2   • Obesity    • Pain of right great toe 7/22/2019   • PONV (postoperative nausea and vomiting)     in the past-1980's   • Sepsis ([facility] [address] ) 12/2020    urinary issue-back up     Social History     Socioeconomic History   • Marital status:       Spouse name: Ashley Brian    • Number of children: 4   • Years of education: Not on file   • Highest education level: Not on file   Occupational History   • Not on file   Tobacco Use   • Smoking status: Never   • Smokeless tobacco: Never   • Tobacco comments:     rare cigar smoking   Vaping Use   • Vaping Use: Never used   Substance and Sexual Activity   • Alcohol use: Not Currently     Comment: occasional last drink 2/2020   • Drug use: No   • Sexual activity: Not Currently   Other Topics Concern   • Not on file   Social History Narrative    Patient [place] [address] [place] [address] [facility] [place]     [address] in the bedroom     Radha air    Window air conditioning bedroom     Finished basement-dry-no mold or musty smell    Dehumidifier     Air  attached to [employer] in the winter months     Home is smoke free     Does not live near wooded are or open fields         Dog x5 ([place], [place], [place], [place], and [place]) and there allowed in the bedroom         Caffeine: soda occasional                     Coffee is decaf 1 cup daily     Chocolate consumed rarely         Patient lives with wife and two children      Social Determinants of Health     Financial Resource Strain: Not on file   Food Insecurity: Not on file   Transportation Needs: Not on file   Physical Activity: Not on file   Stress: Not on file   Social Connections: Not on file   Intimate Partner Violence: Not on file   Housing Stability: Not on file      Family History   Problem Relation Age of Onset   • Heart disease Mother         passed during open heart surgery    • Heart disease Father    • Diabetes Father         caused blindness    • Lung cancer Sister    • Cancer Sister    • Heart disease Brother         MI   • No Known Problems Daughter    • No Known Problems Son    • No Known Problems Son    • Nephrolithiasis Son      Past Surgical History:   Procedure Laterality Date   • ABDOMINAL SURGERY      tumor on stomach   • ANGIOPLASTY      2010-x2 stents left side   • BACK SURGERY      lower back-dissectomy   • CARDIAC SURGERY      angio with stents   • CIRCUMCISION     • COLONOSCOPY     • CYSTOSCOPY     • EGD AND COLONOSCOPY     • GASTRIC BYPASS  2005   • IR ABSCESS/SEROMA DRAINAGE  1/4/2019   • LITHOTRIPSY      x2   • NECK SURGERY      herniated disc C4/5   • OTHER SURGICAL HISTORY      removal of vocal cord lesion    • NE EXC TUMOR SOFT TISSUE ABDOMINAL WALL SUBQ <3CM N/A 2/27/2019    Procedure: ABDOMINAL SEROMA EXCISION;  Surgeon: Lavelle Edmonds MD;  [facility];  Service: General   • ROTATOR CUFF REPAIR Bilateral    • SHOULDER SURGERY Bilateral    • TONSILLECTOMY     • VASCULAR SURGERY      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   • WISDOM TOOTH EXTRACTION     • WOUND DEBRIDEMENT N/A 3/7/2019    Procedure: DEBRIDEMENT WOUND ABDOMINAL ([address]) AND WOUND VAC APPLICATION;  Surgeon: Lavelle Edmonds MD;  [facility];  Service: General   • WOUND DEBRIDEMENT N/A 3/9/2019    Procedure: DEBRIDEMENT WOUND ABDOMINAL ([address]), wound vac dressing change;  Surgeon: Terrell Falcon DO;  [facility];  Service: General       Current Outpatient Medications:   •  acetaminophen (TYLENOL) 500 mg tablet, Take 1,000 mg by mouth every 6 (six) hours as needed for mild pain, Disp: , Rfl:   •  amLODIPine (NORVASC) 5 mg tablet, Take 1 tablet (5 mg total) by mouth daily, Disp: 90 tablet, Rfl: 0  •  aspirin (ECOTRIN LOW STRENGTH) 81 mg EC tablet, Take 162 mg by mouth daily , Disp: , Rfl:   •  atorvastatin (LIPITOR) 40 mg tablet, Take 1 tablet (40 mg total) by mouth daily, Disp: 90 tablet, Rfl: 0  •  cyclobenzaprine (FLEXERIL) 10 mg tablet, Take 0 5-1 tablets (5-10 mg total) by mouth daily at bedtime as needed for muscle spasms, Disp: 90 tablet, Rfl: 0  •  Diclofenac Sodium (VOLTAREN) 1 %, Apply 2 g topically 4 (four) times a day Apply, Disp: 400 g, Rfl: 0  •  gabapentin (NEURONTIN) 300 mg capsule, Take 1 capsule (300 mg total) by mouth daily, Disp: 90 capsule, Rfl: 0  •  lisinopril (ZESTRIL) 10 mg tablet, Take 1 tablet (10 mg total) by mouth daily, Disp: 90 tablet, Rfl: 0  •  meloxicam (Mobic) 15 mg tablet, Take 1 tablet (15 mg total) by mouth daily, Disp: 30 tablet, Rfl: 0  •  metoprolol tartrate (LOPRESSOR) 25 mg tablet, Take 1 tablet (25 mg total) by mouth daily, Disp: 90 tablet, Rfl: 0  •  pantoprazole (PROTONIX) 40 mg tablet, Take 1 tablet (40 mg total) by mouth daily, Disp: 90 tablet, Rfl: 0  •  tamsulosin (FLOMAX) 0 4 mg, Take 1 capsule (0 4 mg total) by mouth daily with dinner, Disp: 90 capsule, Rfl: 0  •  traZODone (DESYREL) 100 mg tablet, Take 1 tablet (100 mg total) by mouth daily at bedtime, Disp: 90 tablet, Rfl: 0  •  venlafaxine (EFFEXOR) 37 5 mg tablet, Take 1 tablet (37 5 mg total) by mouth daily, Disp: 90 tablet, Rfl: 0  •  EPINEPHrine (EPIPEN) 0 3 mg/0 3 mL SOAJ, Inject 0 3 mL (0 3 mg total) into a muscle once for 1 dose, Disp: 2 each, Rfl: 0  •  nitroglycerin (NITROSTAT) 0 4 mg SL tablet, Place 1 tablet (0 4 mg total) under the tongue every 5 (five) minutes as needed for chest pain (Patient not taking: Reported on 8/5/2022), Disp: 30 tablet, Rfl: 0  •  oxyCODONE (Roxicodone) 5 immediate release tablet, Take 1 tablet (5 mg total) by mouth every 4 (four) hours as needed for moderate pain Max Daily Amount: 30 mg (Patient not taking: Reported on 2/24/2022), Disp: 20 tablet, Rfl: 0  Allergies   Allergen Reactions   • Adhesive [Medical Tape] Swelling     Tape on eye caused eye to swell could not open! Itching large hives from on arms     CAN USE PAPER TAPE   • Cephalosporins Anaphylaxis   • Isoflavones (Soy) Other (See Comments)     anaphylaxis   • Nuts - Food Allergy Anaphylaxis   • Other Abdominal Pain     Soy  Patient can not have an NGT due to bariatric surgery   • Peanut Oil - Food Allergy Anaphylaxis   • Penicillins Anaphylaxis     Itching and hives   • Shellfish Allergy - Food Allergy      Betadine/ cat scan dye is Okay  Cannot eat shrimp, crab shellfish avoids all fish     • Shellfish-Derived Products - Food Allergy Other (See Comments)     Positive test       Labs:not applicable  Imaging: No results found     Review of Systems:  Review of Systems   All other systems reviewed and are negative  Physical Exam:  /78 (BP Location: Left arm, Patient Position: Sitting, Cuff Size: Large)   Pulse 61   Ht 5' 9" (1 753 m)   Wt 102 kg (225 lb 4 8 oz)   SpO2 97%   BMI 33 27 kg/m²   Physical Exam  Vitals reviewed  Constitutional:       Appearance: He is well-developed  HENT:      Head: Normocephalic and atraumatic  Cardiovascular:      Rate and Rhythm: Normal rate  Heart sounds: Normal heart sounds  Pulmonary:      Effort: Pulmonary effort is normal       Breath sounds: Normal breath sounds  Musculoskeletal:      Cervical back: Normal range of motion  Skin:     General: Skin is warm and dry  Neurological:      Mental Status: He is alert and oriented to person, place, and time  Discussion/Summary:I will continue the patient's present medical regimen  The patient appears well compensated  I have asked the patient to call if there is a problem in the interim otherwise I will see the patient in six months time

## 2023-01-05 ENCOUNTER — OFFICE VISIT (OUTPATIENT)
Dept: CARDIOLOGY CLINIC | Facility: CLINIC | Age: 63
End: 2023-01-05

## 2023-01-05 VITALS
WEIGHT: 225.3 LBS | SYSTOLIC BLOOD PRESSURE: 136 MMHG | HEIGHT: 69 IN | HEART RATE: 61 BPM | OXYGEN SATURATION: 97 % | DIASTOLIC BLOOD PRESSURE: 78 MMHG | BODY MASS INDEX: 33.37 KG/M2

## 2023-01-05 DIAGNOSIS — E78.00 PURE HYPERCHOLESTEROLEMIA: ICD-10-CM

## 2023-01-05 DIAGNOSIS — I25.10 CORONARY ARTERIOSCLEROSIS: ICD-10-CM

## 2023-01-05 DIAGNOSIS — Z95.5 H/O HEART ARTERY STENT: ICD-10-CM

## 2023-01-05 DIAGNOSIS — I10 ESSENTIAL (PRIMARY) HYPERTENSION: Primary | ICD-10-CM

## 2023-01-19 ENCOUNTER — TELEPHONE (OUTPATIENT)
Dept: OTHER | Facility: OTHER | Age: 63
End: 2023-01-19

## 2023-01-19 NOTE — TELEPHONE ENCOUNTER
Patent calling state that he have surgery schedule 2023 and he is wondering since his physical  if he can get a addendum  Please call contact the patient

## 2023-02-03 ENCOUNTER — OFFICE VISIT (OUTPATIENT)
Dept: OBGYN CLINIC | Facility: CLINIC | Age: 63
End: 2023-02-03

## 2023-02-03 ENCOUNTER — APPOINTMENT (OUTPATIENT)
Dept: RADIOLOGY | Facility: AMBULARY SURGERY CENTER | Age: 63
End: 2023-02-03
Attending: ORTHOPAEDIC SURGERY

## 2023-02-03 VITALS
HEIGHT: 69 IN | WEIGHT: 222 LBS | DIASTOLIC BLOOD PRESSURE: 75 MMHG | BODY MASS INDEX: 32.88 KG/M2 | HEART RATE: 67 BPM | SYSTOLIC BLOOD PRESSURE: 135 MMHG

## 2023-02-03 DIAGNOSIS — M17.12 PRIMARY OSTEOARTHRITIS OF LEFT KNEE: ICD-10-CM

## 2023-02-03 DIAGNOSIS — M17.11 PRIMARY OSTEOARTHRITIS OF RIGHT KNEE: Primary | ICD-10-CM

## 2023-02-03 DIAGNOSIS — M17.11 PRIMARY OSTEOARTHRITIS OF RIGHT KNEE: ICD-10-CM

## 2023-02-03 RX ORDER — MELOXICAM 15 MG/1
15 TABLET ORAL DAILY
Qty: 90 TABLET | Refills: 0 | Status: SHIPPED | OUTPATIENT
Start: 2023-02-03

## 2023-02-03 RX ORDER — BUPIVACAINE HYDROCHLORIDE 2.5 MG/ML
2 INJECTION, SOLUTION INFILTRATION; PERINEURAL
Status: COMPLETED | OUTPATIENT
Start: 2023-02-03 | End: 2023-02-03

## 2023-02-03 RX ORDER — METHYLPREDNISOLONE ACETATE 40 MG/ML
2 INJECTION, SUSPENSION INTRA-ARTICULAR; INTRALESIONAL; INTRAMUSCULAR; SOFT TISSUE
Status: COMPLETED | OUTPATIENT
Start: 2023-02-03 | End: 2023-02-03

## 2023-02-03 RX ADMIN — BUPIVACAINE HYDROCHLORIDE 2 ML: 2.5 INJECTION, SOLUTION INFILTRATION; PERINEURAL at 08:30

## 2023-02-03 RX ADMIN — METHYLPREDNISOLONE ACETATE 2 ML: 40 INJECTION, SUSPENSION INTRA-ARTICULAR; INTRALESIONAL; INTRAMUSCULAR; SOFT TISSUE at 08:30

## 2023-02-03 NOTE — PROGRESS NOTES
[address] [facility], [address]  58 y o  male MRN: [id number]      Chief Complaint:   bilateral knee pain    HPI:   58 y o male complaining of bilateral knee pain  Patient presents office today regarding bilateral knee pain left worse than right  Patient states the pain is aching nature worse with weightbearing mildly relieved with rest   Patient utilizes hinged knee braces while at work significantly reduce his pain  Localized the pain to his bilateral knees denies any radiation pain denies instability denies any locking symptoms  Patient did have significant pain relief following injections 3 months ago for approximate 2-1/2 months states the pain since returned localized to his bilateral knees denies any changes numbness and tingling                  Review Of Systems:   · Skin: Normal  · Neuro: See HPI  · Musculoskeletal: See HPI  · All other systems reviewed and are negative    Past Medical History:   Past Medical History:   Diagnosis Date   • Abdominal lump 4/20/2018   • Anxiety    • Arm pain     left-may have torn the bicep tendon   • Arthritis    • Cardiac disease    • Contact lens/glasses fitting    • Coronary artery disease    • COVID-19 vaccine series completed     [facility] x2   • Episode of dizziness 11/26/2018   • Herpes zoster with complication 7/6/1792   • Hyperlipidemia    • Hypertension    • Kidney stone    • Myocardial infarction [facility]) 2010    cardiac stent x2   • Obesity    • Pain of right great toe 7/22/2019   • PONV (postoperative nausea and vomiting)     in the past-1980's   • Sepsis ([facility] [address] ) 12/2020    urinary issue-back up       Past Surgical History:   Past Surgical History:   Procedure Laterality Date   • ABDOMINAL SURGERY      tumor on stomach   • ANGIOPLASTY      2010-x2 stents left side   • BACK SURGERY      lower back-dissectomy   • CARDIAC SURGERY      angio with stents   • CIRCUMCISION     • COLONOSCOPY     • CYSTOSCOPY     • EGD AND COLONOSCOPY     • GASTRIC BYPASS  2005 • IR ABSCESS/SEROMA DRAINAGE  1/4/2019   • LITHOTRIPSY      x2   • NECK SURGERY      herniated disc C4/5   • OTHER SURGICAL HISTORY      removal of vocal cord lesion    • OR EXC TUMOR SOFT TISSUE ABDOMINAL WALL SUBQ <3CM N/A 2/27/2019    Procedure: ABDOMINAL SEROMA EXCISION;  Surgeon: Korey Sanchez MD;  Location: [facility];  Service: General   • ROTATOR CUFF REPAIR Bilateral    • SHOULDER SURGERY Bilateral    • TONSILLECTOMY     • VASCULAR SURGERY      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   • WISDOM TOOTH EXTRACTION     • WOUND DEBRIDEMENT N/A 3/7/2019    Procedure: DEBRIDEMENT WOUND ABDOMINAL ([address]) AND WOUND VAC APPLICATION;  Surgeon: Korey Sanchez MD;  [facility];  Service: General   • WOUND DEBRIDEMENT N/A 3/9/2019    Procedure: DEBRIDEMENT WOUND ABDOMINAL ([address]), wound vac dressing change;  Surgeon: Patt Vidal DO;  [facility];  Service: General       Family History:  Family history reviewed and non-contributory  Family History   Problem Relation Age of Onset   • Heart disease Mother         passed during open heart surgery    • Heart disease Father    • Diabetes Father         caused blindness    • Lung cancer Sister    • Cancer Sister    • Heart disease Brother         MI   • No Known Problems Daughter    • No Known Problems Son    • No Known Problems Son    • Nephrolithiasis Son          Social History:  Social History     Socioeconomic History   • Marital status:       Spouse name: Noé Nguyen    • Number of children: 4   • Years of education: None   • Highest education level: None   Occupational History   • None   Tobacco Use   • Smoking status: Never   • Smokeless tobacco: Never   • Tobacco comments:     rare cigar smoking   Vaping Use   • Vaping Use: Never used   Substance and Sexual Activity   • Alcohol use: Not Currently     Comment: occasional last drink 2/2020   • Drug use: No   • Sexual activity: Not Currently   Other Topics Concern   • None   Social History Narrative    Patient lives in a home that was built in [address] [place] [address] junior in the bedroom     [place] air    Window air conditioning bedroom     Finished basement-dry-no mold or musty smell    Dehumidifier     Air  attached to [employer] in the winter months     Home is smoke free     Does not live near wooded are or open fields         Dog x5 ([place], [place], [place]oc[place]nn more[place], [place], and [place]) and there allowed in the bedroom         Caffeine: soda occasional                     Coffee is decaf 1 cup daily     Chocolate consumed rarely         Patient lives with wife and two children      Social Determinants of Health     Financial Resource Strain: Not on file   Food Insecurity: Not on file   Transportation Needs: Not on file   Physical Activity: Not on file   Stress: Not on file   Social Connections: Not on file   Intimate Partner Violence: Not on file   Housing Stability: Not on file       Allergies: Allergies   Allergen Reactions   • Adhesive [Medical Tape] Swelling     Tape on eye caused eye to swell could not open! Itching large hives from on arms     CAN USE PAPER TAPE   • Cephalosporins Anaphylaxis   • Isoflavones (Soy) Other (See Comments)     anaphylaxis   • Nuts - Food Allergy Anaphylaxis   • Other Abdominal Pain     Soy  Patient can not have an NGT due to bariatric surgery   • Peanut Oil - Food Allergy Anaphylaxis   • Penicillins Anaphylaxis     Itching and hives   • Shellfish Allergy - Food Allergy      Betadine/ cat scan dye is Okay  Cannot eat shrimp, crab shellfish avoids all fish     • Shellfish-Derived Products - Food Allergy Other (See Comments)     Positive test       Labs:  0   Lab Value Date/Time    HCT 48 0 05/31/2022 0813    HCT 49 9 (H) 03/29/2021 0710    HCT 39 8 12/10/2020 0452    HGB 15 8 05/31/2022 0813    HGB 16 5 03/29/2021 0710    HGB 13 0 12/10/2020 0452    INR 1 18 11/30/2020 1130    WBC 7 32 05/31/2022 0813    WBC 6 93 03/29/2021 0710 WBC 6 74 12/10/2020 0452    CRP <3 0 11/30/2019 1041       Meds:    Current Outpatient Medications:   •  acetaminophen (TYLENOL) 500 mg tablet, Take 1,000 mg by mouth every 6 (six) hours as needed for mild pain, Disp: , Rfl:   •  amLODIPine (NORVASC) 5 mg tablet, Take 1 tablet (5 mg total) by mouth daily, Disp: 90 tablet, Rfl: 0  •  aspirin (ECOTRIN LOW STRENGTH) 81 mg EC tablet, Take 162 mg by mouth daily , Disp: , Rfl:   •  atorvastatin (LIPITOR) 40 mg tablet, Take 1 tablet (40 mg total) by mouth daily, Disp: 90 tablet, Rfl: 0  •  cyclobenzaprine (FLEXERIL) 10 mg tablet, Take 0 5-1 tablets (5-10 mg total) by mouth daily at bedtime as needed for muscle spasms, Disp: 90 tablet, Rfl: 0  •  Diclofenac Sodium (VOLTAREN) 1 %, Apply 2 g topically 4 (four) times a day Apply, Disp: 400 g, Rfl: 0  •  gabapentin (NEURONTIN) 300 mg capsule, Take 1 capsule (300 mg total) by mouth daily, Disp: 90 capsule, Rfl: 0  •  lisinopril (ZESTRIL) 10 mg tablet, Take 1 tablet (10 mg total) by mouth daily, Disp: 90 tablet, Rfl: 0  •  meloxicam (Mobic) 15 mg tablet, Take 1 tablet (15 mg total) by mouth daily, Disp: 90 tablet, Rfl: 0  •  metoprolol tartrate (LOPRESSOR) 25 mg tablet, Take 1 tablet (25 mg total) by mouth daily, Disp: 90 tablet, Rfl: 0  •  pantoprazole (PROTONIX) 40 mg tablet, Take 1 tablet (40 mg total) by mouth daily, Disp: 90 tablet, Rfl: 0  •  traZODone (DESYREL) 100 mg tablet, Take 1 tablet (100 mg total) by mouth daily at bedtime, Disp: 90 tablet, Rfl: 0  •  venlafaxine (EFFEXOR) 37 5 mg tablet, Take 1 tablet (37 5 mg total) by mouth daily, Disp: 90 tablet, Rfl: 0  •  EPINEPHrine (EPIPEN) 0 3 mg/0 3 mL SOAJ, Inject 0 3 mL (0 3 mg total) into a muscle once for 1 dose, Disp: 2 each, Rfl: 0  •  nitroglycerin (NITROSTAT) 0 4 mg SL tablet, Place 1 tablet (0 4 mg total) under the tongue every 5 (five) minutes as needed for chest pain (Patient not taking: Reported on 8/5/2022), Disp: 30 tablet, Rfl: 0  •  oxyCODONE (Roxicodone) 5 immediate release tablet, Take 1 tablet (5 mg total) by mouth every 4 (four) hours as needed for moderate pain Max Daily Amount: 30 mg (Patient not taking: Reported on 2/24/2022), Disp: 20 tablet, Rfl: 0  •  tamsulosin (FLOMAX) 0 4 mg, Take 1 capsule (0 4 mg total) by mouth daily with dinner, Disp: 90 capsule, Rfl: 0      Physical Exam:   Vitals:    02/03/23 0753   BP: 135/75   Pulse: 67       General Appearance:    Alert, cooperative, no distress, appears stated age   Head:    Normocephalic, without obvious abnormality, atraumatic   Eyes:    conjunctiva/corneas clear, both eyes        Nose:   Nares normal, septum midline, no drainage    Throat:   Lips normal; teeth and gums normal   Neck:    symmetrical, trachea midline, ;     thyroid:  no enlargement/   Back:     Symmetric, no curvature, ROM normal   Lungs:   No audible wheezing or labored breathing   Chest Wall:    No tenderness or deformity    Heart:    Regular rate and rhythm               Pulses:   2+ and symmetric all extremities   Skin:   Skin color, texture, turgor normal, no rashes or lesions   Neurologic:   normal strength, sensation and reflexes     throughout       Musculoskeletal: bilateral lower extremity  · On examination of the right knee there is no effusion, no erythema  Range of motion to full active extension and flexion to greater than 120°  Pain on palpation medial and lateral joint lines  There is crepitus with range of motion, no warmth to palpation, bony enlargement noted  No pain on palpation pes anserine bursa region or distal iliotibial band  Stable to varus and valgus stress without pain or gapping  Negative anterior and posterior drawer testing  Sensation intact distal pulses present  · On examination of the left knee there is no effusion, no erythema  Range of motion to full active extension and flexion to greater than 120°  Pain on palpation medial and lateral joint lines    There is crepitus with range of motion, no warmth to palpation, bony enlargement noted  No pain on palpation pes anserine bursa region or distal iliotibial band  Stable to varus and valgus stress without pain or gapping  Negative anterior and posterior drawer testing  Sensation intact distal pulses present      X-rays bilateral knees reviewed x-rays reveal worsening medial joint space narrowing osteophyte formation and varus deformity with severe osteoarthritis of the bilateral knees right worse than left      Large joint arthrocentesis: R knee  Universal Protocol:  Consent given by: patient  Patient understanding: patient states understanding of the procedure being performed  Site marked: the operative site was marked  Patient identity confirmed: verbally with patient    Supporting Documentation  Indications: pain   Procedure Details  Location: knee - R knee  Needle size: 22 G  Approach: anterolateral  Medications administered: 2 mL bupivacaine 0 25 %; 2 mL methylPREDNISolone acetate 40 mg/mL    Patient tolerance: patient tolerated the procedure well with no immediate complications    Large joint arthrocentesis: L knee  Universal Protocol:  Consent given by: patient  Patient understanding: patient states understanding of the procedure being performed  Site marked: the operative site was marked  Patient identity confirmed: verbally with patient    Supporting Documentation  Indications: pain   Procedure Details  Location: knee - L knee  Needle size: 22 G  Approach: anterolateral  Medications administered: 2 mL bupivacaine 0 25 %; 2 mL methylPREDNISolone acetate 40 mg/mL    Patient tolerance: patient tolerated the procedure well with no immediate complications          _*_*_*_*_*_*_*_*_*_*_*_*_*_*_*_*_*_*_*_*_*_*_*_*_*_*_*_*_*_*_*_*_*_*_*_*_*_*_*_*_*    Assessment:  62 y o male with bilateral knee pain due to osteoarthritis    Plan:   · Weight bearing as tolerated  bilateral lower extremity  · Case discussed with Dr Zina Mendiola  · Patient wished to pursue conservative management patient given intra-articular knee corticosteroid injections as noted above  · Patient advised should they develop any increasing pain, redness, drainage, numbness, tingling or swelling surrounding the injection sight, they are to contact our office or present to the emergency department    · Advised patient to continue home range of motion stretching strength exercise  · Meloxicam sent to pharmacy  · Follow up in 3 months or sooner if needed      Harriet Duarte PA-C

## 2023-02-06 NOTE — PROGRESS NOTES
Name: Gin Oakes  : 1960      MRN: [id number]  Encounter Provider: [facility] Blount  Encounter Date: 2023   Encounter department: [facility]     1  Pre-op examination  Assessment & Plan:  Treatment of chronic conditions is optimized on current therapy  Pt is cleared for proposed procedure  Expect pt will do well with this low risk procedure  2  Episodic confusion  Assessment & Plan:  Intermittent episodes of mental confusion/fogginess that occur late into his shifts toward the end of the week, associated with tongue tingling and alleviated by eating  Not associated with cp, headache, vision change, lightheadedness, weakness, speech change  Suspect sugar is dropping  Will check labs  Pt advised to eat some low carb protein based snacks to help keep sugars more steady  Orders:  -     Comprehensive metabolic panel; Future  -     CBC and differential; Future  -     TSH, 3rd generation with Free T4 reflex; Future  -     Hemoglobin A1C; Future    3  Peanut-induced anaphylaxis, initial encounter  Assessment & Plan:  Refill sent for epi pen    Orders:  -     EPINEPHrine (EPIPEN) 0 3 mg/0 3 mL SOAJ; Inject 0 3 mL (0 3 mg total) into a muscle once for 1 dose    4  Essential (primary) hypertension  Assessment & Plan:  BP is generally well controlled, mildly elevated sbp today  He sees cardiology regularly  Continue lisinopril, metoprolol, amlodipine  5  Chronic ischemic heart disease, unspecified  Assessment & Plan:  Denies cp, palpations  Tolerates exertion well without cp, sob  Ongoing f/u with cardiology, they report that everything is stable  Continue atorvastatin, metoprolol  Reinforce healthy diet  6  Benign prostatic hyperplasia, unspecified whether lower urinary tract symptoms present  Assessment & Plan:  Stable, continue flomax               Subjective      Pt is a 58 y o  male who is seen today for pre-op clearance  Pt is scheduled for L eye cataract surgery on 2/14/23  We reviewed PMH, PSH, social history, medications, and allergies  Pre-op testing was not ordered  Pt denies chest pain, palpitations, shortness of breath, headache, dizziness, numbness, tingling  Appetite is normal, no N/V/D  He does report he's been having some isolated episodes of feeling mentally unfocused/out of it towards the end of his shifts at the end of the week  He can tell this is going to happen because he gets some yellow flashes of light  During episodes he does not feel lightheaded, dizzy, headache, numbness, tingling  His tongue gets a bit tingly  He eats something when this happens and his sx go away  Review of Systems   Constitutional: Negative for activity change, appetite change, chills, fatigue, fever and unexpected weight change  HENT: Negative for hearing loss  Eyes: Negative for visual disturbance  Respiratory: Negative for cough, chest tightness and shortness of breath  Cardiovascular: Negative for chest pain and palpitations  Gastrointestinal: Negative for diarrhea, nausea and vomiting  Genitourinary: Negative for difficulty urinating, dysuria and frequency  Musculoskeletal: Positive for arthralgias (knees, shoulders)  Negative for myalgias  Allergic/Immunologic: Negative for environmental allergies  Neurological: Negative for dizziness, syncope, weakness, light-headedness, numbness and headaches  Psychiatric/Behavioral: Positive for confusion (isolated episodes of feeling unfocused mentally, usually at the end of the week)  Negative for sleep disturbance  The patient is not nervous/anxious          Current Outpatient Medications on File Prior to Visit   Medication Sig   • acetaminophen (TYLENOL) 500 mg tablet Take 1,000 mg by mouth every 6 (six) hours as needed for mild pain   • amLODIPine (NORVASC) 5 mg tablet Take 1 tablet (5 mg total) by mouth daily   • aspirin (ECOTRIN LOW STRENGTH) 81 mg EC tablet Take 162 mg by mouth daily    • atorvastatin (LIPITOR) 40 mg tablet Take 1 tablet (40 mg total) by mouth daily   • cyclobenzaprine (FLEXERIL) 10 mg tablet Take 0 5-1 tablets (5-10 mg total) by mouth daily at bedtime as needed for muscle spasms   • Diclofenac Sodium (VOLTAREN) 1 % Apply 2 g topically 4 (four) times a day Apply   • gabapentin (NEURONTIN) 300 mg capsule Take 1 capsule (300 mg total) by mouth daily   • lisinopril (ZESTRIL) 10 mg tablet Take 1 tablet (10 mg total) by mouth daily   • meloxicam (Mobic) 15 mg tablet Take 1 tablet (15 mg total) by mouth daily (Patient taking differently: Take 15 mg by mouth as needed)   • metoprolol tartrate (LOPRESSOR) 25 mg tablet Take 1 tablet (25 mg total) by mouth daily   • nitroglycerin (NITROSTAT) 0 4 mg SL tablet Place 1 tablet (0 4 mg total) under the tongue every 5 (five) minutes as needed for chest pain   • pantoprazole (PROTONIX) 40 mg tablet Take 1 tablet (40 mg total) by mouth daily   • tamsulosin (FLOMAX) 0 4 mg Take 1 capsule (0 4 mg total) by mouth daily with dinner   • traZODone (DESYREL) 100 mg tablet Take 1 tablet (100 mg total) by mouth daily at bedtime   • venlafaxine (EFFEXOR) 37 5 mg tablet Take 1 tablet (37 5 mg total) by mouth daily   • [DISCONTINUED] EPINEPHrine (EPIPEN) 0 3 mg/0 3 mL SOAJ Inject 0 3 mL (0 3 mg total) into a muscle once for 1 dose   • oxyCODONE (Roxicodone) 5 immediate release tablet Take 1 tablet (5 mg total) by mouth every 4 (four) hours as needed for moderate pain Max Daily Amount: 30 mg (Patient not taking: Reported on 2/24/2022)       Objective     /84 (BP Location: Left arm, Patient Position: Sitting, Cuff Size: Large)   Pulse 59   Temp (!) 96 8 °F (36 °C) (Tympanic)   Resp 16   Ht 5' 9" (1 753 m)   Wt 100 kg (221 lb 6 4 oz)   SpO2 96%   BMI 32 70 kg/m²     Physical Exam  Vitals reviewed  Constitutional:       General: He is awake  He is not in acute distress       Appearance: Normal appearance  He is well-developed and well-groomed  HENT:      Head: Normocephalic  Right Ear: Hearing and external ear normal       Left Ear: Hearing and external ear normal       Nose: Nose normal       Mouth/Throat:      Mouth: Mucous membranes are moist       Tongue: Tongue does not deviate from midline  Pharynx: Oropharynx is clear  Eyes:      General: Lids are normal       Conjunctiva/sclera: Conjunctivae normal       Pupils: Pupils are equal, round, and reactive to light  Comments: Left eye cataract   Neck:      Thyroid: No thyromegaly  Vascular: Normal carotid pulses  No carotid bruit or JVD  Cardiovascular:      Rate and Rhythm: Normal rate and regular rhythm  Pulses: Normal pulses  Heart sounds: Normal heart sounds, S1 normal and S2 normal  No murmur heard  Pulmonary:      Effort: Pulmonary effort is normal       Breath sounds: Normal breath sounds  Abdominal:      General: Bowel sounds are normal       Palpations: Abdomen is soft  Tenderness: There is no abdominal tenderness  Musculoskeletal:         General: Normal range of motion  Cervical back: Normal range of motion  Right lower leg: No edema  Left lower leg: No edema  Lymphadenopathy:      Cervical: No cervical adenopathy  Skin:     General: Skin is warm and dry  Findings: No rash  Neurological:      Mental Status: He is alert and oriented to person, place, and time  Cranial Nerves: Cranial nerves 2-12 are intact  Sensory: Sensation is intact  Motor: Motor function is intact  Deep Tendon Reflexes: Reflexes are normal and symmetric  Psychiatric:         Mood and Affect: Mood normal          Speech: Speech normal          Behavior: Behavior normal  Behavior is cooperative  Thought Content:  Thought content normal          Judgment: Judgment normal        [facility] James

## 2023-02-07 ENCOUNTER — OFFICE VISIT (OUTPATIENT)
Dept: FAMILY MEDICINE CLINIC | Facility: CLINIC | Age: 63
End: 2023-02-07

## 2023-02-07 VITALS
DIASTOLIC BLOOD PRESSURE: 84 MMHG | BODY MASS INDEX: 32.79 KG/M2 | WEIGHT: 221.4 LBS | OXYGEN SATURATION: 96 % | SYSTOLIC BLOOD PRESSURE: 142 MMHG | HEIGHT: 69 IN | TEMPERATURE: 96.8 F | RESPIRATION RATE: 16 BRPM | HEART RATE: 59 BPM

## 2023-02-07 DIAGNOSIS — I10 ESSENTIAL (PRIMARY) HYPERTENSION: ICD-10-CM

## 2023-02-07 DIAGNOSIS — Z01.818 PRE-OP EXAMINATION: Primary | ICD-10-CM

## 2023-02-07 DIAGNOSIS — I25.9 CHRONIC ISCHEMIC HEART DISEASE, UNSPECIFIED: ICD-10-CM

## 2023-02-07 DIAGNOSIS — N40.0 BENIGN PROSTATIC HYPERPLASIA, UNSPECIFIED WHETHER LOWER URINARY TRACT SYMPTOMS PRESENT: ICD-10-CM

## 2023-02-07 DIAGNOSIS — R41.0 EPISODIC CONFUSION: ICD-10-CM

## 2023-02-07 DIAGNOSIS — T78.01XA PEANUT-INDUCED ANAPHYLAXIS, INITIAL ENCOUNTER: ICD-10-CM

## 2023-02-07 RX ORDER — EPINEPHRINE 0.3 MG/.3ML
0.3 INJECTION SUBCUTANEOUS ONCE
Qty: 2 EACH | Refills: 0 | Status: SHIPPED | OUTPATIENT
Start: 2023-02-07 | End: 2023-02-07

## 2023-02-07 NOTE — ASSESSMENT & PLAN NOTE
Intermittent episodes of mental confusion/fogginess that occur late into his shifts toward the end of the week, associated with tongue tingling and alleviated by eating  Not associated with cp, headache, vision change, lightheadedness, weakness, speech change  Suspect sugar is dropping  Will check labs  Pt advised to eat some low carb protein based snacks to help keep sugars more steady

## 2023-02-07 NOTE — ASSESSMENT & PLAN NOTE
Denies cp, palpations  Tolerates exertion well without cp, sob  Ongoing f/u with cardiology, they report that everything is stable  Continue atorvastatin, metoprolol  Reinforce healthy diet

## 2023-02-07 NOTE — ASSESSMENT & PLAN NOTE
BP is generally well controlled, mildly elevated sbp today  He sees cardiology regularly  Continue lisinopril, metoprolol, amlodipine

## 2023-02-13 ENCOUNTER — HOSPITAL ENCOUNTER (EMERGENCY)
Facility: HOSPITAL | Age: 63
Discharge: HOME | End: 2023-02-14
Attending: EMERGENCY MEDICINE

## 2023-02-13 ENCOUNTER — APPOINTMENT (EMERGENCY)
Dept: RADIOLOGY | Facility: HOSPITAL | Age: 63
End: 2023-02-13

## 2023-02-13 VITALS
SYSTOLIC BLOOD PRESSURE: 175 MMHG | TEMPERATURE: 97.8 F | DIASTOLIC BLOOD PRESSURE: 98 MMHG | RESPIRATION RATE: 16 BRPM | HEART RATE: 57 BPM | OXYGEN SATURATION: 99 %

## 2023-02-13 DIAGNOSIS — N50.812 LEFT TESTICULAR PAIN: Primary | ICD-10-CM

## 2023-02-14 NOTE — ED ATTENDING ATTESTATION
2/13/2023  IJayson DO, saw and evaluated the patient  I have discussed the patient with the resident/non-physician practitioner and agree with the resident's/non-physician practitioner's findings, Plan of Care, and MDM as documented in the resident's/non-physician practitioner's note, except where noted  All available labs and Radiology studies were reviewed  I was present for key portions of any procedure(s) performed by the resident/non-physician practitioner and I was immediately available to provide assistance  At this point I agree with the current assessment done in the Emergency Department  I have conducted an independent evaluation of this patient a history and physical is as follows:    Patient is a 69-year-old male, history of hypertension, coronary disease, hyperlipidemia, 4 days ago while at work was lying on a metal roller package transport area to move a package out of the way  In the process of getting off this metal roller area, he slipped and hit his left testicle area on the edge of the metal   It did not cause any laceration, he did not hit his head but said he felt like it hurt like when he had been kicked in the testicle as a child  He had some nausea but no vomiting  He was able to continue working, worked the following day but felt a little bit sore, and was still feeling sore but today noticed that he thought he had some light numbness in the left scrotal area which he describes as feeling a little bit decree sensation in little bit like pins-and-needles  No dysuria, hematuria, no nausea, no vomiting  Otherwise he is feeling okay  Pain is mild in severity he does not need pain medication for  He presents today simply because he was concerned about the decrease sensation  He says that he has completed the appropriate work comp paperwork with his employer      General:  Patient is well-appearing  Head:  Atraumatic  Eyes:  Conjunctiva pink  ENT:  Mucous membranes are moist  Neck:  Supple  Cardiac:  S1-S2, without murmurs  Lungs:  Clear to auscultation bilaterally  Abdomen:  Soft, nontender, normal bowel sounds, no CVA tenderness, no tympany, no rigidity, no guarding, well-healed surgical scars  He has no flank discoloration or tenderness  : No erythema or rash to the penis or scrotum or perineal area  He has some slight swelling of the scrotum bilaterally which he says is normal for him  Slight decrease sensation to light touch to the left side of the scrotal area when compared with the right, he has no evidence of inguinal hernia including examination while standing  He has slight testicular tenderness to the left, none on the right  There is no visible ecchymosis, no crepitus to the scrotum  Extremities:  Normal range of motion, no tenderness to either hip, able to ambulate without difficulty  Neurologic:  Awake, fluent speech, normal comprehension  AAOx3  Skin:  Pink warm and dry  Psychiatric:  Alert, pleasant, cooperative            ED Course     US scrotum and testicles   Final Result       Bilateral testicular microlithiasis  Small bilateral hydroceles  Small bilateral epididymal cysts  Tiny calcification right epididymis  Workstation performed: [id number]             On reassessment, no change in the above findings  Patient overall feeling well, has had isolated scrotal trauma  No abdominal tenderness on exam, do not believe he requires abdominal imaging  Testicular ultrasound shows no signs of testicular fracture, no acute pathology  Patient feels comfortable being discharged home  Supportive care, importance of follow-up and return precautions were discussed with the patient, who expressed understanding        MEDICAL DECISION MAKING CODING    The differential diagnosis before testing included (but is not limited to) groin contusion, blunt testicular trauma and testicular fracture which is a medical condition that poses a threat to life/function  Chronic conditions affecting care: As per HPI    COLLECTION AND INTERPRETATION OF DATA  I reviewed prior external notes, including February 7, 2023 preop office visit with PCP, which showed above-noted conditions      I ordered each unique test  Tests reviewed personally by me:  Imaging: I reviewed the ultrasound which shows no evidence of testicular fracture or acute pathology    Tests considered but not ordered: See above    Recommended over-the-counter analgesia as necessary  Patient should continue all of his current prescription medications    Critical Care Time  Procedures

## 2023-02-14 NOTE — ED PROVIDER NOTES
History  Chief Complaint   Patient presents with   • Groin Injury     Pt reports being at work on Thursday and climbing into a roller to grab a piece of equipment, pt got groin caught in roller, pt reports numbness on L side radiating into lower abd causing pressure     71-year-old male patient with a history of CAD, HTN, HLD, MI presenting with left-sided groin pain, numbness status post work injury 4 days ago  Patient states that he was on the roller moving boxes when he tried to get off and he had direct injury to his left groin  Patient states his left groin has been worsening and feels like someone kicked him in the testicles  Patient states started having numbness today in his left groin area  Denies weakness in his legs  Denies nausea, vomiting, diarrhea, urinary symptoms  Denies swelling to the area  Prior to Admission Medications   Prescriptions Last Dose Informant Patient Reported? Taking?    Diclofenac Sodium (VOLTAREN) 1 %   No No   Sig: Apply 2 g topically 4 (four) times a day Apply   EPINEPHrine (EPIPEN) 0 3 mg/0 3 mL SOAJ   No No   Sig: Inject 0 3 mL (0 3 mg total) into a muscle once for 1 dose   acetaminophen (TYLENOL) 500 mg tablet   Yes No   Sig: Take 1,000 mg by mouth every 6 (six) hours as needed for mild pain   amLODIPine (NORVASC) 5 mg tablet   No No   Sig: Take 1 tablet (5 mg total) by mouth daily   aspirin (ECOTRIN LOW STRENGTH) 81 mg EC tablet   Yes No   Sig: Take 162 mg by mouth daily    atorvastatin (LIPITOR) 40 mg tablet   No No   Sig: Take 1 tablet (40 mg total) by mouth daily   cyclobenzaprine (FLEXERIL) 10 mg tablet   No No   Sig: Take 0 5-1 tablets (5-10 mg total) by mouth daily at bedtime as needed for muscle spasms   gabapentin (NEURONTIN) 300 mg capsule   No No   Sig: Take 1 capsule (300 mg total) by mouth daily   lisinopril (ZESTRIL) 10 mg tablet   No No   Sig: Take 1 tablet (10 mg total) by mouth daily   meloxicam (Mobic) 15 mg tablet   No No   Sig: Take 1 tablet (15 mg total) by mouth daily   Patient taking differently: Take 15 mg by mouth as needed   metoprolol tartrate (LOPRESSOR) 25 mg tablet   No No   Sig: Take 1 tablet (25 mg total) by mouth daily   nitroglycerin (NITROSTAT) 0 4 mg SL tablet   No No   Sig: Place 1 tablet (0 4 mg total) under the tongue every 5 (five) minutes as needed for chest pain   oxyCODONE (Roxicodone) 5 immediate release tablet   No No   Sig: Take 1 tablet (5 mg total) by mouth every 4 (four) hours as needed for moderate pain Max Daily Amount: 30 mg   Patient not taking: Reported on 2/24/2022   pantoprazole (PROTONIX) 40 mg tablet   No No   Sig: Take 1 tablet (40 mg total) by mouth daily   tamsulosin (FLOMAX) 0 4 mg   No No   Sig: Take 1 capsule (0 4 mg total) by mouth daily with dinner   traZODone (DESYREL) 100 mg tablet   No No   Sig: Take 1 tablet (100 mg total) by mouth daily at bedtime   venlafaxine (EFFEXOR) 37 5 mg tablet   No No   Sig: Take 1 tablet (37 5 mg total) by mouth daily      Facility-Administered Medications: None       Past Medical History:   Diagnosis Date   • Abdominal lump 4/20/2018   • Anxiety    • Arm pain     left-may have torn the bicep tendon   • Arthritis    • Cardiac disease    • Contact lens/glasses fitting    • Coronary artery disease    • COVID-19 vaccine series completed     Moderna x2   • Episode of dizziness 11/26/2018   • Herpes zoster with complication 8/5/0623   • Hyperlipidemia    • Hypertension    • Kidney stone    • Myocardial infarction [facility]) 2010    cardiac stent x2   • Obesity    • Pain of right great toe 7/22/2019   • PONV (postoperative nausea and vomiting)     in the past-1980's   • Sepsis ([facility] [address] ) 12/2020    urinary issue-back up       Past Surgical History:   Procedure Laterality Date   • ABDOMINAL SURGERY      tumor on stomach   • ANGIOPLASTY      2010-x2 stents left side   • BACK SURGERY      lower back-dissectomy   • CARDIAC SURGERY      angio with stents   • CIRCUMCISION     • COLONOSCOPY     • CYSTOSCOPY     • EGD AND COLONOSCOPY     • GASTRIC BYPASS  2005   • IR ABSCESS/SEROMA DRAINAGE  1/4/2019   • LITHOTRIPSY      x2   • NECK SURGERY      herniated disc C4/5   • OTHER SURGICAL HISTORY      removal of vocal cord lesion    • [place] EXC TUMOR SOFT TISSUE ABDOMINAL WALL SUBQ <3CM N/A 2/27/2019    Procedure: ABDOMINAL SEROMA EXCISION;  Surgeon: Lisa Valdes MD;  [facility];  Service: General   • ROTATOR CUFF REPAIR Bilateral    • SHOULDER SURGERY Bilateral    • TONSILLECTOMY     • VASCULAR SURGERY      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   • WISDOM TOOTH EXTRACTION     • WOUND DEBRIDEMENT N/A 3/7/2019    Procedure: DEBRIDEMENT WOUND ABDOMINAL ([facility]) AND WOUND VAC APPLICATION;  Surgeon: Lisa Valdes MD;  [facility];  Service: General   • WOUND DEBRIDEMENT N/A 3/9/2019    Procedure: DEBRIDEMENT WOUND ABDOMINAL ([facility] OUT), wound vac dressing change;  Surgeon: Antonia Ferro DO;  [facility];  Service: General       Family History   Problem Relation Age of Onset   • Heart disease Mother         passed during open heart surgery    • Heart disease Father    • Diabetes Father         caused blindness    • Lung cancer Sister    • Cancer Sister    • Heart disease Brother         MI   • No Known Problems Daughter    • No Known Problems Son    • No Known Problems Son    • Nephrolithiasis Son      I have reviewed and agree with the history as documented      E-Cigarette/Vaping   • E-Cigarette Use Never User      E-Cigarette/Vaping Substances   • Nicotine No    • THC No    • CBD No    • Flavoring No    • Other No    • Unknown No      Social History     Tobacco Use   • Smoking status: Never   • Smokeless tobacco: Never   • Tobacco comments:     rare cigar smoking   Vaping Use   • Vaping Use: Never used   Substance Use Topics   • Alcohol use: Not Currently     Comment: occasional last drink 2/2020   • Drug use: No        Review of Systems   Gastrointestinal:        Left groin pain and numbness   All other systems reviewed and are negative  Physical Exam  ED Triage Vitals   Temperature Pulse Respirations Blood Pressure SpO2   02/13/23 2037 02/13/23 2037 02/13/23 2037 02/13/23 2037 02/13/23 2037   97 8 °F (36 6 °C) 68 18 164/96 98 %      Temp Source Heart Rate Source Patient Position - Orthostatic VS BP Location FiO2 (%)   02/13/23 2037 02/13/23 2037 02/13/23 2037 02/13/23 2037 --   Tympanic Monitor Sitting Left arm       Pain Score       02/13/23 2300       No Pain             Orthostatic Vital Signs  Vitals:    02/13/23 2037 02/13/23 2300   BP: 164/96 (!) 175/98   Pulse: 68 57   Patient Position - Orthostatic VS: Sitting Lying       Physical Exam  Vitals reviewed  Constitutional:       Appearance: Normal appearance  HENT:      Head: Normocephalic and atraumatic  Nose: Nose normal       Mouth/Throat:      Mouth: Mucous membranes are moist       Pharynx: Oropharynx is clear  Eyes:      Extraocular Movements: Extraocular movements intact  Conjunctiva/sclera: Conjunctivae normal    Cardiovascular:      Rate and Rhythm: Normal rate and regular rhythm  Pulses: Normal pulses  Heart sounds: Normal heart sounds  Pulmonary:      Effort: Pulmonary effort is normal       Breath sounds: Normal breath sounds  Abdominal:      General: Bowel sounds are normal       Palpations: Abdomen is soft  Tenderness: There is no abdominal tenderness  Genitourinary:     Penis: Normal        Comments: Mild left testicular tenderness to palpation  Musculoskeletal:         General: Normal range of motion  Cervical back: Normal range of motion  Skin:     General: Skin is warm and dry  Neurological:      General: No focal deficit present  Mental Status: He is alert and oriented to person, place, and time  Mental status is at baseline           ED Medications  Medications - No data to display    Diagnostic Studies  Results Reviewed     None                 US scrotum and testicles   Final Result by Lebron Villanueva MD (02/13 2343)       Bilateral testicular microlithiasis  Small bilateral hydroceles  Small bilateral epididymal cysts  Tiny calcification right epididymis  Workstation performed: [id number]               Procedures  Procedures      ED Course                             SBIRT 22yo+    Flowsheet Row Most Recent Value   SBIRT (25 yo +)    In order to provide better care to our patients, we are screening all of our patients for alcohol and drug use  Would it be okay to ask you these screening questions? No Filed at: 02/13/2023 2207                Medical Decision Making  42-year-old male patient presenting with left groin pain and numbness status post direct impact injury with roller  Patient states that he is able to walk in the weakness however is having worsening pain in his left testicle  Denies any swelling  Exam shows left testicular mild tenderness to palpation  Concern for possible testicular fracture  Ultrasound scrotum and testes pending  Signed out to Dr Ta oRjo pending results  Left testicular pain: acute illness or injury     Details: Concern for left testicular fracture or testicular etiology  Exam shows mild tenderness  Ultrasound pending  Amount and/or Complexity of Data Reviewed  Radiology: ordered  Details: Left testicle for testicular fracture            Disposition  Final diagnoses:   Left testicular pain     Time reflects when diagnosis was documented in both MDM as applicable and the Disposition within this note     Time User Action Codes Description Comment    2/13/2023 11:58 PM Megha Vitale Add [R92 540] Left testicular pain       ED Disposition     ED Disposition   Discharge    Condition   Stable    Date/Time   Mon Feb 13, 2023 11:58 PM    [address]  discharge to home/self care                 Follow-up Information     Follow up With Specialties Details Why Contact Jo RAMIREZ Brittany Castellon Internal Medicine, Nurse Practitioner, Family Medicine Schedule an appointment as soon as possible for a visit   [address] [facility] [place] [address] [place]  [phone]            Discharge Medication List as of 2/13/2023 11:58 PM      CONTINUE these medications which have NOT CHANGED    Details   acetaminophen (TYLENOL) 500 mg tablet Take 1,000 mg by mouth every 6 (six) hours as needed for mild pain, Historical Med      amLODIPine (NORVASC) 5 mg tablet Take 1 tablet (5 mg total) by mouth daily, Starting Mon 12/5/2022, Normal      aspirin (ECOTRIN LOW STRENGTH) 81 mg EC tablet Take 162 mg by mouth daily , Starting Fri 1/5/2018, Historical Med      atorvastatin (LIPITOR) 40 mg tablet Take 1 tablet (40 mg total) by mouth daily, Starting Mon 12/5/2022, Normal      cyclobenzaprine (FLEXERIL) 10 mg tablet Take 0 5-1 tablets (5-10 mg total) by mouth daily at bedtime as needed for muscle spasms, Starting Mon 12/5/2022, Normal      Diclofenac Sodium (VOLTAREN) 1 % Apply 2 g topically 4 (four) times a day Apply, Starting Mon 9/19/2022, Normal      EPINEPHrine (EPIPEN) 0 3 mg/0 3 mL SOAJ Inject 0 3 mL (0 3 mg total) into a muscle once for 1 dose, Starting Tue 2/7/2023, Normal      gabapentin (NEURONTIN) 300 mg capsule Take 1 capsule (300 mg total) by mouth daily, Starting Mon 12/5/2022, Normal      lisinopril (ZESTRIL) 10 mg tablet Take 1 tablet (10 mg total) by mouth daily, Starting Mon 12/5/2022, Normal      meloxicam (Mobic) 15 mg tablet Take 1 tablet (15 mg total) by mouth daily, Starting Fri 2/3/2023, Normal      metoprolol tartrate (LOPRESSOR) 25 mg tablet Take 1 tablet (25 mg total) by mouth daily, Starting Mon 12/5/2022, Normal      nitroglycerin (NITROSTAT) 0 4 mg SL tablet Place 1 tablet (0 4 mg total) under the tongue every 5 (five) minutes as needed for chest pain, Starting Mon 8/1/2022, Normal      oxyCODONE (Roxicodone) 5 immediate release tablet Take 1 tablet (5 mg total) by mouth every 4 (four) hours as needed for moderate pain Max Daily Amount: 30 mg, Starting Sun 11/7/2021, Normal      pantoprazole (PROTONIX) 40 mg tablet Take 1 tablet (40 mg total) by mouth daily, Starting Mon 12/5/2022, Normal      tamsulosin (FLOMAX) 0 4 mg Take 1 capsule (0 4 mg total) by mouth daily with dinner, Starting Mon 12/5/2022, Until Tue 2/7/2023, Normal      traZODone (DESYREL) 100 mg tablet Take 1 tablet (100 mg total) by mouth daily at bedtime, Starting Mon 12/5/2022, Normal      venlafaxine (EFFEXOR) 37 5 mg tablet Take 1 tablet (37 5 mg total) by mouth daily, Starting Mon 12/5/2022, Normal           No discharge procedures on file  PDMP Review     None           ED Provider  Attending physically available and evaluated Eyad Sellers I managed the patient along with the ED Attending      Electronically Signed by         Guevara Herman MD  02/14/23 2683

## 2023-02-16 ENCOUNTER — APPOINTMENT (OUTPATIENT)
Dept: URGENT CARE | Age: 63
End: 2023-02-16

## 2023-03-02 ENCOUNTER — TELEPHONE (OUTPATIENT)
Dept: NEPHROLOGY | Facility: HOSPITAL | Age: 63
End: 2023-03-02

## 2023-03-02 DIAGNOSIS — N20.0 NEPHROLITHIASIS: Primary | ICD-10-CM

## 2023-03-02 DIAGNOSIS — R80.9 PROTEINURIA, UNSPECIFIED TYPE: ICD-10-CM

## 2023-03-02 DIAGNOSIS — I10 ESSENTIAL (PRIMARY) HYPERTENSION: ICD-10-CM

## 2023-04-12 PROBLEM — R07.9 CHEST PAIN: Status: ACTIVE | Noted: 2023-04-12

## 2023-05-05 ENCOUNTER — OFFICE VISIT (OUTPATIENT)
Dept: OBGYN CLINIC | Facility: CLINIC | Age: 63
End: 2023-05-05

## 2023-05-05 VITALS
SYSTOLIC BLOOD PRESSURE: 148 MMHG | HEIGHT: 69 IN | WEIGHT: 218 LBS | DIASTOLIC BLOOD PRESSURE: 78 MMHG | BODY MASS INDEX: 32.29 KG/M2 | HEART RATE: 67 BPM

## 2023-05-05 DIAGNOSIS — M17.11 PRIMARY OSTEOARTHRITIS OF RIGHT KNEE: ICD-10-CM

## 2023-05-05 DIAGNOSIS — M17.12 PRIMARY OSTEOARTHRITIS OF LEFT KNEE: Primary | ICD-10-CM

## 2023-05-05 RX ORDER — METHYLPREDNISOLONE ACETATE 40 MG/ML
2 INJECTION, SUSPENSION INTRA-ARTICULAR; INTRALESIONAL; INTRAMUSCULAR; SOFT TISSUE
Status: COMPLETED | OUTPATIENT
Start: 2023-05-05 | End: 2023-05-05

## 2023-05-05 RX ORDER — BUPIVACAINE HYDROCHLORIDE 2.5 MG/ML
2 INJECTION, SOLUTION INFILTRATION; PERINEURAL
Status: COMPLETED | OUTPATIENT
Start: 2023-05-05 | End: 2023-05-05

## 2023-05-05 RX ADMIN — BUPIVACAINE HYDROCHLORIDE 2 ML: 2.5 INJECTION, SOLUTION INFILTRATION; PERINEURAL at 08:22

## 2023-05-05 RX ADMIN — METHYLPREDNISOLONE ACETATE 2 ML: 40 INJECTION, SUSPENSION INTRA-ARTICULAR; INTRALESIONAL; INTRAMUSCULAR; SOFT TISSUE at 08:22

## 2023-05-05 NOTE — PROGRESS NOTES
Assessment:   Diagnosis ICD-10-CM Associated Orders   1  Primary osteoarthritis of left knee  M17 12       2  Primary osteoarthritis of right knee  M17 11           Plan:  · Nonoperative treatments were discussed with the patient that included localized cortisone injection bilateral knees today  Patient proceeded with the injections, which he tolerated well  · Noted on exam decreased VMO on the left  Encouraged the patient to perform strengthening exercises that included the VMO and quadriceps  Sizes were demonstrated and provided for the patient in his after visit summary  To do next visit:  Return in about 3 months (around 8/5/2023) for Bilateral      The above stated was discussed in layman's terms and the patient expressed understanding  All questions were answered to the patient's satisfaction  Scribe Attestation    I,:  Nata Tate am acting as a scribe while in the presence of the attending physician :       I,:  Rosangela Whitley MD personally performed the services described in this documentation    as scribed in my presence :             Subjective:   Raheel Mills  is a 61 y o  male who presents today for follow-up for his bilateral knee pain: Left worse than right  He has been treating periodically with cortisone injections  He takes Meloxicam 15mg as needed  He does have some weakness in the left knee and notes he has difficulty with uneven ground  Review of systems negative unless otherwise specified in HPI  Review of Systems   Constitutional: Negative for chills, fever and unexpected weight change  HENT: Negative for hearing loss, nosebleeds and sore throat  Eyes: Negative for pain, redness and visual disturbance  Respiratory: Negative for cough, shortness of breath and wheezing  Cardiovascular: Negative for chest pain, palpitations and leg swelling  Gastrointestinal: Negative for abdominal pain, nausea and vomiting     Endocrine: Negative for polydipsia and polyuria  Genitourinary: Negative for dysuria and hematuria  Musculoskeletal: Positive for arthralgias  Skin: Negative for rash and wound  Neurological: Negative for dizziness, light-headedness and headaches  Psychiatric/Behavioral: Negative for decreased concentration, dysphoric mood and suicidal ideas  The patient is not nervous/anxious          Past Medical History:   Diagnosis Date    Abdominal lump 04/20/2018    Allergic 2015    Anxiety     Arm pain     left-may have torn the bicep tendon    Arthritis     Cardiac disease     Chronic kidney disease     Contact lens/glasses fitting     Coronary artery disease     COVID-19 vaccine series completed     Moderna x2    Episode of dizziness 11/26/2018    Herpes zoster with complication 13/13/4802    Hyperlipidemia     Hypertension     Kidney stone     Myocardial infarction [facility]) 2010    cardiac stent x2    Obesity     Pain of right great toe 07/22/2019    PONV (postoperative nausea and vomiting)     in the past-1980's    Sepsis ([address] ) 12/2020    urinary issue-back up    Urinary tract infection Dec 2021       Past Surgical History:   Procedure Laterality Date    ABDOMINAL SURGERY      tumor on stomach    ANGIOPLASTY      2010-x2 stents left side    BACK SURGERY      lower back-dissectomy    CARDIAC SURGERY      angio with stents    CIRCUMCISION      COLONOSCOPY      CYSTOSCOPY      EGD AND COLONOSCOPY      GASTRIC BYPASS  2005    IR ABSCESS/SEROMA DRAINAGE  1/4/2019    LITHOTRIPSY      x2    NECK SURGERY      herniated disc C4/5    OTHER SURGICAL HISTORY      removal of vocal cord lesion     [place] EXC TUMOR SOFT TISSUE ABDOMINAL WALL SUBQ <3CM N/A 2/27/2019    Procedure: ABDOMINAL SEROMA EXCISION;  Surgeon: Andre Foley MD;  [facility];  Service: General    ROTATOR CUFF REPAIR Bilateral     SHOULDER SURGERY Bilateral     TONSILLECTOMY      VASCULAR SURGERY      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    WISDOM TOOTH EXTRACTION      WOUND DEBRIDEMENT N/A 3/7/2019    Procedure: DEBRIDEMENT WOUND ABDOMINAL ([address]) AND WOUND VAC APPLICATION;  Surgeon: Jose Antonio Dyer MD;  [facility];  Service: General    WOUND DEBRIDEMENT N/A 3/9/2019    Procedure: DEBRIDEMENT WOUND ABDOMINAL ([address]), wound vac dressing change;  Surgeon: Holly Xie DO;  [facility];  Service: General       Family History   Problem Relation Age of Onset    Heart disease Mother         passed during open heart surgery     Cancer Mother     Heart disease Father     Diabetes Father         caused blindness     Lung cancer Sister     Cancer Sister     Heart disease Brother         MI    No Known Problems Daughter     No Known Problems Son     No Known Problems Son     Nephrolithiasis Son        Social History     Occupational History    Not on file   Tobacco Use    Smoking status: Never    Smokeless tobacco: Never    Tobacco comments:     rare cigar smoking   Vaping Use    Vaping Use: Never used   Substance and Sexual Activity    Alcohol use: Not Currently     Comment: occasional last drink 2/2020    Drug use: No    Sexual activity: Not Currently     Partners: Female         Current Outpatient Medications:     acetaminophen (TYLENOL) 500 mg tablet, Take 1,000 mg by mouth every 6 (six) hours as needed for mild pain, Disp: , Rfl:     amLODIPine (NORVASC) 5 mg tablet, Take 1 tablet (5 mg total) by mouth daily, Disp: 90 tablet, Rfl: 0    aspirin (ECOTRIN LOW STRENGTH) 81 mg EC tablet, Take 162 mg by mouth daily , Disp: , Rfl:     atorvastatin (LIPITOR) 40 mg tablet, Take 1 tablet (40 mg total) by mouth daily, Disp: 90 tablet, Rfl: 0    cyclobenzaprine (FLEXERIL) 10 mg tablet, Take 0 5-1 tablets (5-10 mg total) by mouth daily at bedtime, Disp: 90 tablet, Rfl: 1    Diclofenac Sodium (VOLTAREN) 1 %, Apply 2 g topically 4 (four) times a day Apply (Patient taking differently: Apply 2 g topically 4 (four) times a day as needed Apply), Disp: 400 g, Rfl: 0    EPINEPHrine (EPIPEN) 0 3 mg/0 3 mL SOAJ, Inject 0 3 mL (0 3 mg total) into a muscle once for 1 dose, Disp: 2 each, Rfl: 0    gabapentin (NEURONTIN) 300 mg capsule, Take 1 capsule (300 mg total) by mouth daily, Disp: 90 capsule, Rfl: 0    lisinopril (ZESTRIL) 10 mg tablet, Take 1 tablet (10 mg total) by mouth daily, Disp: 90 tablet, Rfl: 0    meloxicam (Mobic) 15 mg tablet, Take 1 tablet (15 mg total) by mouth as needed for mild pain, Disp: , Rfl:     metoprolol tartrate (LOPRESSOR) 25 mg tablet, Take 1 tablet (25 mg total) by mouth daily, Disp: 90 tablet, Rfl: 0    nitroglycerin (NITROSTAT) 0 4 mg SL tablet, Place 1 tablet (0 4 mg total) under the tongue every 5 (five) minutes as needed for chest pain, Disp: 30 tablet, Rfl: 0    pantoprazole (PROTONIX) 40 mg tablet, Take 1 tablet (40 mg total) by mouth daily, Disp: 90 tablet, Rfl: 0    tamsulosin (FLOMAX) 0 4 mg, Take 1 capsule (0 4 mg total) by mouth daily with dinner, Disp: 90 capsule, Rfl: 0    traZODone (DESYREL) 100 mg tablet, Take 1 tablet (100 mg total) by mouth daily at bedtime, Disp: 90 tablet, Rfl: 0    venlafaxine (EFFEXOR) 37 5 mg tablet, Take 1 tablet (37 5 mg total) by mouth daily, Disp: 90 tablet, Rfl: 0    Allergies   Allergen Reactions    Adhesive [Medical Tape] Swelling     Tape on eye caused eye to swell could not open! Itching large hives from on arms     CAN USE PAPER TAPE    Cephalosporins Anaphylaxis    Isoflavones (Soy) Other (See Comments)     anaphylaxis    Nuts - Food Allergy Anaphylaxis    Other Abdominal Pain     Soy  Patient can not have an NGT due to bariatric surgery    Peanut Oil - Food Allergy Anaphylaxis    Penicillins Anaphylaxis     Itching and hives    Shellfish Allergy - Food Allergy      Betadine/ cat scan dye is Okay  Cannot eat shrimp, crab shellfish avoids all fish      Shellfish-Derived Products - Food Allergy Other (See Comments)     Positive test "  Vitals:    05/05/23 0745   BP: 148/78   Pulse: 67       Objective:                    Right Knee Exam     Tenderness   The patient is experiencing tenderness in the medial joint line and lateral joint line  Range of Motion   Extension: 0   Flexion: 120     Tests   Varus: negative Valgus: negative    Other   Erythema: absent  Sensation: normal  Pulse: present  Swelling: none  Effusion: no effusion present    Comments:  Calf soft nontender      Left Knee Exam     Tenderness   The patient is experiencing tenderness in the medial joint line and lateral joint line  Range of Motion   Extension: 0   Flexion: 110     Tests   Varus: negative Valgus: negative    Other   Erythema: absent  Sensation: normal  Pulse: present  Swelling: none  Effusion: no effusion present    Comments:  Calf is soft and nontender            Diagnostics, reviewed and taken today if performed as documented:    None performed      The attending physician has personally reviewed the pertinent films in PACS and interpretation is as follows:      Procedures, if performed today:    Large joint arthrocentesis: R knee  Universal Protocol:  Consent: Verbal consent obtained  Risks and benefits: risks, benefits and alternatives were discussed  Consent given by: patient  Time out: Immediately prior to procedure a \"time out\" was called to verify the correct patient, procedure, equipment, support staff and site/side marked as required    Timeout called at: 5/5/2023 8:14 AM   Patient understanding: patient states understanding of the procedure being performed  Site marked: the operative site was marked  Patient identity confirmed: verbally with patient    Supporting Documentation  Indications: pain   Procedure Details  Location: knee - R knee  Preparation: Patient was prepped and draped in the usual sterile fashion  Needle size: 22 G  Ultrasound guidance: no  Approach: anterolateral  Medications administered: 2 mL bupivacaine 0 25 %; 2 mL " "methylPREDNISolone acetate 40 mg/mL    Patient tolerance: patient tolerated the procedure well with no immediate complications  Dressing:  Sterile dressing applied    Large joint arthrocentesis: L knee  Universal Protocol:  Consent: Verbal consent obtained  Risks and benefits: risks, benefits and alternatives were discussed  Consent given by: patient  Time out: Immediately prior to procedure a \"time out\" was called to verify the correct patient, procedure, equipment, support staff and site/side marked as required  Timeout called at: 5/5/2023 8:14 AM   Patient understanding: patient states understanding of the procedure being performed  Site marked: the operative site was marked  Supporting Documentation  Indications: pain   Procedure Details  Location: knee - L knee  Preparation: Patient was prepped and draped in the usual sterile fashion  Needle size: 22 G  Ultrasound guidance: no  Approach: anterolateral  Medications administered: 2 mL bupivacaine 0 25 %; 2 mL methylPREDNISolone acetate 40 mg/mL    Patient tolerance: patient tolerated the procedure well with no immediate complications  Dressing:  Sterile dressing applied          Portions of the record may have been created with voice recognition software  Occasional wrong word or \"sound a like\" substitutions may have occurred due to the inherent limitations of voice recognition software  Read the chart carefully and recognize, using context, where substitutions have occurred    "

## 2023-05-05 NOTE — PATIENT INSTRUCTIONS
STRENGTHENING:   Quadriceps:   Isometric Quad sets                        Progression for Quadriceps/VMO:  Hold at 45 degree angle (Picture #1)    Key: Slow & Intentional  Two ways to perform:  Start with 10 reps on each side maintaining neutral pelvis  *   Hold position for a 3-5 count  When form and exercise because less challenging; increase the REPITITIONS or DURATION your holding      Perform Wall Squats below: 3 Sets of 10 Reps         Hold for 3-5 count

## 2023-06-07 ENCOUNTER — SOCIAL WORK (OUTPATIENT)
Dept: BEHAVIORAL/MENTAL HEALTH CLINIC | Facility: CLINIC | Age: 63
End: 2023-06-07
Payer: COMMERCIAL

## 2023-06-07 DIAGNOSIS — F41.8 DEPRESSION WITH ANXIETY: Primary | ICD-10-CM

## 2023-06-07 PROCEDURE — 90834 PSYTX W PT 45 MINUTES: CPT | Performed by: SOCIAL WORKER

## 2023-06-07 NOTE — PSYCH
"Behavioral Health Psychotherapy Progress Note    Psychotherapy Provided: Individual Psychotherapy     1  Depression with anxiety            Goals addressed in session: Goal 1 Manage mood issues    DATA: Wong continues to struggle with stress and mood issues since the passing of his wife last October from cancer  Dealing with mood/grief issues secondary to this loss  In addition, he is dealing with a high level of stress at work  The combination of these issues contributes to his continued struggles with irritability, decreased energy, decreased motivation and decreased interest  Has very good support system but sometimes isolates from them  Questions the effectiveness of current meds for his mood issues but needs to be able to function without sedation as he needs to work to maintain family home on his income alone  Having consistent struggles with sleep including falling and staying asleep despite current meds  Would like to feel more like himself and be less irritable towards others  Denies any SI  Very invested in his children and grandchildren  During this session, this clinician used the following therapeutic modalities: Solution-Focused Therapy and Supportive Psychotherapy    Substance Abuse was addressed during this session  If the client is diagnosed with a co-occurring substance use disorder, please indicate any changes in the frequency or amount of use: none  Stage of change for addressing substance use diagnoses: No substance use/Not applicable    ASSESSMENT:  Riya Monterroso  presents with a Anxious and Dysthymic mood  his affect is Normal range and intensity and Flat, which is congruent, with his mood and the content of the session  The client has not made progress on their goals  Riya Monterroso  presents with a minimal risk of suicide, minimal risk of self-harm, and minimal risk of harm to others  For any risk assessment that surpasses a \"low\" rating, a safety plan must be developed      A " safety plan was indicated: no  If yes, describe in detail n/a    PLAN: Between sessions, Sam Barnie Brittle  will utilize social outlets activities/supports and productive outlets to manage stress and mood issues  Signed AVI for PCP  Gave me permission to consult with psychiatry about possible med alterations to better address his mood issues  Provided my direct contact information  At the next session, the therapist will use Solution-Focused Therapy and Supportive Psychotherapy to address mood issues  Behavioral Health Treatment Plan and Discharge Planning: Wale Raza  is aware of and agrees to continue to work on their treatment plan  They have identified and are working toward their discharge goals   no    Visit start and stop times: 8:00-8:45    06/07/23

## 2023-06-15 DIAGNOSIS — F41.8 DEPRESSION WITH ANXIETY: ICD-10-CM

## 2023-06-15 RX ORDER — VENLAFAXINE 75 MG/1
75 TABLET ORAL DAILY
Qty: 90 TABLET | Refills: 0 | Status: SHIPPED | OUTPATIENT
Start: 2023-06-15 | End: 2023-09-22 | Stop reason: SDUPTHER

## 2023-06-26 ENCOUNTER — APPOINTMENT (OUTPATIENT)
Dept: LAB | Facility: CLINIC | Age: 63
End: 2023-06-26
Payer: COMMERCIAL

## 2023-06-26 DIAGNOSIS — N20.0 NEPHROLITHIASIS: ICD-10-CM

## 2023-06-26 DIAGNOSIS — E78.5 HYPERLIPIDEMIA, UNSPECIFIED HYPERLIPIDEMIA TYPE: ICD-10-CM

## 2023-06-26 DIAGNOSIS — I10 ESSENTIAL (PRIMARY) HYPERTENSION: ICD-10-CM

## 2023-06-26 DIAGNOSIS — R80.9 PROTEINURIA, UNSPECIFIED TYPE: ICD-10-CM

## 2023-06-26 DIAGNOSIS — R41.0 EPISODIC CONFUSION: ICD-10-CM

## 2023-06-26 LAB
ALBUMIN SERPL BCP-MCNC: 4.4 G/DL (ref 3.5–5)
ALP SERPL-CCNC: 136 U/L (ref 46–116)
ALT SERPL W P-5'-P-CCNC: 31 U/L (ref 12–78)
ANION GAP SERPL CALCULATED.3IONS-SCNC: -1 MMOL/L
AST SERPL W P-5'-P-CCNC: 22 U/L (ref 5–45)
BACTERIA UR QL AUTO: ABNORMAL /HPF
BASOPHILS # BLD AUTO: 0.04 THOUSANDS/ÂΜL (ref 0–0.1)
BASOPHILS NFR BLD AUTO: 1 % (ref 0–1)
BILIRUB SERPL-MCNC: 0.65 MG/DL (ref 0.2–1)
BILIRUB UR QL STRIP: NEGATIVE
BUN SERPL-MCNC: 23 MG/DL (ref 5–25)
CALCIUM SERPL-MCNC: 9.9 MG/DL (ref 8.3–10.1)
CHLORIDE SERPL-SCNC: 107 MMOL/L (ref 96–108)
CHOLEST SERPL-MCNC: 197 MG/DL
CLARITY UR: CLEAR
CO2 SERPL-SCNC: 31 MMOL/L (ref 21–32)
COLOR UR: ABNORMAL
CREAT SERPL-MCNC: 1.08 MG/DL (ref 0.6–1.3)
CREAT UR-MCNC: 89.8 MG/DL
EOSINOPHIL # BLD AUTO: 0.17 THOUSAND/ÂΜL (ref 0–0.61)
EOSINOPHIL NFR BLD AUTO: 2 % (ref 0–6)
ERYTHROCYTE [DISTWIDTH] IN BLOOD BY AUTOMATED COUNT: 12.4 % (ref 11.6–15.1)
EST. AVERAGE GLUCOSE BLD GHB EST-MCNC: 91 MG/DL
GFR SERPL CREATININE-BSD FRML MDRD: 72 ML/MIN/1.73SQ M
GLUCOSE P FAST SERPL-MCNC: 88 MG/DL (ref 65–99)
GLUCOSE UR STRIP-MCNC: NEGATIVE MG/DL
HBA1C MFR BLD: 4.8 %
HCT VFR BLD AUTO: 48.4 % (ref 36.5–49.3)
HDLC SERPL-MCNC: 80 MG/DL
HGB BLD-MCNC: 15.9 G/DL (ref 12–17)
HGB UR QL STRIP.AUTO: NEGATIVE
IMM GRANULOCYTES # BLD AUTO: 0.02 THOUSAND/UL (ref 0–0.2)
IMM GRANULOCYTES NFR BLD AUTO: 0 % (ref 0–2)
KETONES UR STRIP-MCNC: NEGATIVE MG/DL
LDLC SERPL CALC-MCNC: 100 MG/DL (ref 0–100)
LEUKOCYTE ESTERASE UR QL STRIP: NEGATIVE
LYMPHOCYTES # BLD AUTO: 1.58 THOUSANDS/ÂΜL (ref 0.6–4.47)
LYMPHOCYTES NFR BLD AUTO: 19 % (ref 14–44)
MAGNESIUM SERPL-MCNC: 2.6 MG/DL (ref 1.6–2.6)
MCH RBC QN AUTO: 31.8 PG (ref 26.8–34.3)
MCHC RBC AUTO-ENTMCNC: 32.9 G/DL (ref 31.4–37.4)
MCV RBC AUTO: 97 FL (ref 82–98)
MONOCYTES # BLD AUTO: 0.47 THOUSAND/ÂΜL (ref 0.17–1.22)
MONOCYTES NFR BLD AUTO: 6 % (ref 4–12)
MUCOUS THREADS UR QL AUTO: ABNORMAL
NEUTROPHILS # BLD AUTO: 5.99 THOUSANDS/ÂΜL (ref 1.85–7.62)
NEUTS SEG NFR BLD AUTO: 72 % (ref 43–75)
NITRITE UR QL STRIP: NEGATIVE
NON-SQ EPI CELLS URNS QL MICRO: ABNORMAL /HPF
NONHDLC SERPL-MCNC: 117 MG/DL
NRBC BLD AUTO-RTO: 0 /100 WBCS
PH UR STRIP.AUTO: 6 [PH]
PHOSPHATE SERPL-MCNC: 2.5 MG/DL (ref 2.3–4.1)
PLATELET # BLD AUTO: 225 THOUSANDS/UL (ref 149–390)
PMV BLD AUTO: 11.2 FL (ref 8.9–12.7)
POTASSIUM SERPL-SCNC: 4.5 MMOL/L (ref 3.5–5.3)
PROT SERPL-MCNC: 8 G/DL (ref 6.4–8.4)
PROT UR STRIP-MCNC: ABNORMAL MG/DL
PROT UR-MCNC: 21 MG/DL
PROT/CREAT UR: 0.23 MG/G{CREAT} (ref 0–0.1)
RBC # BLD AUTO: 5 MILLION/UL (ref 3.88–5.62)
RBC #/AREA URNS AUTO: ABNORMAL /HPF
SODIUM SERPL-SCNC: 137 MMOL/L (ref 135–147)
SP GR UR STRIP.AUTO: 1.02 (ref 1–1.03)
TRIGL SERPL-MCNC: 83 MG/DL
TSH SERPL DL<=0.05 MIU/L-ACNC: 1.74 UIU/ML (ref 0.45–4.5)
UROBILINOGEN UR STRIP-ACNC: <2 MG/DL
WBC # BLD AUTO: 8.27 THOUSAND/UL (ref 4.31–10.16)
WBC #/AREA URNS AUTO: ABNORMAL /HPF

## 2023-06-26 PROCEDURE — 83735 ASSAY OF MAGNESIUM: CPT

## 2023-06-26 PROCEDURE — 80053 COMPREHEN METABOLIC PANEL: CPT

## 2023-06-26 PROCEDURE — 36415 COLL VENOUS BLD VENIPUNCTURE: CPT

## 2023-06-26 PROCEDURE — 84100 ASSAY OF PHOSPHORUS: CPT

## 2023-06-26 PROCEDURE — 83036 HEMOGLOBIN GLYCOSYLATED A1C: CPT

## 2023-06-26 PROCEDURE — 82570 ASSAY OF URINE CREATININE: CPT

## 2023-06-26 PROCEDURE — 84156 ASSAY OF PROTEIN URINE: CPT

## 2023-06-26 PROCEDURE — 85025 COMPLETE CBC W/AUTO DIFF WBC: CPT

## 2023-06-26 PROCEDURE — 84443 ASSAY THYROID STIM HORMONE: CPT

## 2023-06-26 PROCEDURE — 80061 LIPID PANEL: CPT

## 2023-06-26 PROCEDURE — 81001 URINALYSIS AUTO W/SCOPE: CPT

## 2023-06-26 NOTE — PROGRESS NOTES
Cardiology Follow Up    Noemí Miner.  1960  [id number]  [place] [address] [facility]  [address] [facility]  [phone]    1. Essential (primary) hypertension  amLODIPine (NORVASC) 5 mg tablet      2. Pure hypercholesterolemia        3. Coronary arteriosclerosis            Interval History: Patient is here for a follow-up visit. He has HTN, CAD and HLD. He had 2 vessel stenting performed 12/27/2010 with a VELASQUEZ in the LAD and a VELASQUEZ in the diagonal branch.   Echocardiogram in January 2019 demonstrated preserved LV systolic function with mild LVH and mild LAE.  There was no significant valvular heart disease.  Patient was [facility] December 2020 in reference to acute kidney injury and UTI. He was seen by Nephrology.  Patient works in [employer] at [employer] had issues with ureteral calculus in November 2021.  Was placed on amlodipine for HTN. Nuclear ETT March 2022 demonstrated no ischemia. Lipid profile done 4/2023 demonstrated total cholesterol of 141 with an HDL of 68 and a calculated LDL of 61. Unfortunately his wife passed away from brain cancer. The patient was hospitalized with chest discomfort April 2023. It was felt to be musculoskeletal.  Troponins and EKG were stable. Patient's vital signs are stable today. Patient has had no chest pain or significant dyspnea.     Patient Active Problem List   Diagnosis   • Acute right-sided low back pain without sciatica   • Essential (primary) hypertension   • History of MI (myocardial infarction)   • Class 2 severe obesity due to excess calories with serious comorbidity and body mass index (BMI) of 35.0 to 35.9 in [facility])   • Multiple food allergies   • H/O heart artery stent   • Fatigue   • Skin irritation   • Postural dizziness   • Benign prostatic hyperplasia   • Scleral icterus   • Abnormal CT of liver   • Irritant contact dermatitis   • Insomnia   • Urinary frequency   • Hyperlipidemia   • Allergy to iodine   • Anaphylactic reaction   • Anxiety   • AV malformation, peripheral, congenital   • Chronic ischemic heart disease, unspecified   • Coronary atherosclerosis   • PEYTON (generalized anxiety disorder)   • Gastroesophageal reflux disease   • Nerve pain   • Muscle cramping   • Spasm of muscle, back   • History of PTCA   • Trigger finger of right hand   • Anaphylactic shock due to peanuts   • Allergy to tree nuts or seeds   • Peanut allergy   • Soy allergy   • Rash   • Paresthesia of both hands   • Pain in both knees   • Left lower quadrant abdominal pain   • S/P bariatric surgery   • Weight loss, unintentional   • Testicular pain, unspecified   • Annual physical exam   • Acute pain of right shoulder   • Abnormal LFTs   • Gram-negative bacteremia   • Left shoulder pain   • Recent bacteremia   • Chronic pain of left knee   • Proteinuria   • Nephrolithiasis   • Pre-op examination   • Uncomplicated bereavement   • Episodic confusion   • Chest pain     Past Medical History:   Diagnosis Date   • Abdominal lump 04/20/2018   • Allergic 2015   • Anxiety    • Arm pain     left-may have torn the bicep tendon   • Arthritis    • Cardiac disease    • Chronic kidney disease    • Contact lens/glasses fitting    • Coronary artery disease    • COVID-19 vaccine series completed     Sade Medina x2   • Episode of dizziness 11/26/2018   • Herpes zoster with complication 52/77/5008   • Hyperlipidemia    • Hypertension    • Kidney stone    • Myocardial infarction [facility]) 2010    cardiac stent x2   • Obesity    • Pain of right great toe 07/22/2019   • PONV (postoperative nausea and vomiting)     in the past-1980's   • Sepsis ([address]) 12/2020    urinary issue-back up   • Urinary tract infection Dec 2021     Social History     Socioeconomic History   • Marital status:       Spouse name: Jade    • Number of children: 4   • Years of education: Not on file   • Highest education level: Not on file   Occupational History   • Not on file   Tobacco Use   • Smoking status: Never   • Smokeless tobacco: Never   • Tobacco comments:     rare cigar smoking   Vaping Use   • Vaping Use: Never used   Substance and Sexual Activity   • Alcohol use: Not Currently     Comment: occasional last drink 2/2020   • Drug use: No   • Sexual activity: Not Currently     Partners: Female   Other Topics Concern   • Not on file   Social History Narrative    Patient lives in a home that was built in [address]     [address] in the bedroom     [place] air    Window air conditioning bedroom     Finished basement-dry-no mold or musty smell    Dehumidifier     Air  attached to [employer] in the winter months     Home is smoke free     Does not live near wooded are or open fields         Dog x5 ([employer], [place], [place], [place], and [facility]) and there allowed in the bedroom         Caffeine: soda occasional                     Coffee is decaf 1 cup daily     Chocolate consumed rarely         Patient lives with wife and two children      Social Determinants of Health     Financial Resource Strain: Not on file   Food Insecurity: Not on file   Transportation Needs: Not on file   Physical Activity: Inactive (8/21/2019)    Exercise Vital Sign    • Days of Exercise per Week: 0 days    • Minutes of Exercise per Session: 0 min   Stress: Not on file   Social Connections: Not on file   Intimate Partner Violence: Not on file   Housing Stability: Not on file      Family History   Problem Relation Age of Onset   • Heart disease Mother         passed during open heart surgery    • Cancer Mother    • Heart disease Father    • Diabetes Father         caused blindness    • Lung cancer Sister    • Cancer Sister    • Heart disease Brother         MI   • No Known Problems Daughter    • No Known Problems Son    • No Known Problems Son    • Nephrolithiasis Son      Past Surgical History:   Procedure Laterality Date   • ABDOMINAL SURGERY      tumor on stomach   • ANGIOPLASTY      2010-x2 stents left side   • BACK SURGERY      lower back-dissectomy   • CARDIAC SURGERY      angio with stents   • CIRCUMCISION     • COLONOSCOPY     • CYSTOSCOPY     • EGD AND COLONOSCOPY     • GASTRIC BYPASS  2005   • IR ABSCESS/SEROMA DRAINAGE  1/4/2019   • LITHOTRIPSY      x2   • NECK SURGERY      herniated disc C4/5   • OTHER SURGICAL HISTORY      removal of vocal cord lesion    • W[place] EXC TUMOR SOFT TISSUE ABDOMINAL WALL SUBQ <3CM N/A 2/27/2019    Procedure: ABDOMINAL SEROMA EXCISION;  Surgeon: Anthony Sequeira MD;  [facility];  Service: General   • ROTATOR CUFF REPAIR Bilateral    • SHOULDER SURGERY Bilateral    • TONSILLECTOMY     • VASCULAR SURGERY      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   • WISDOM TOOTH EXTRACTION     • WOUND DEBRIDEMENT N/A 3/7/2019    Procedure: DEBRIDEMENT WOUND ABDOMINAL ([address]) AND WOUND VAC APPLICATION;  Surgeon: Anthony Sequeira MD;  [facility];  Service: General   • WOUND DEBRIDEMENT N/A 3/9/2019    Procedure: DEBRIDEMENT WOUND ABDOMINAL ([facility]), wound vac dressing change;  Surgeon: Ruby Nicole DO;  [facility];  Service: General       Current Outpatient Medications:   •  acetaminophen (TYLENOL) 500 mg tablet, Take 1,000 mg by mouth every 6 (six) hours as needed for mild pain, Disp: , Rfl:   •  amLODIPine (NORVASC) 5 mg tablet, Take 1 tablet (5 mg total) by mouth daily, Disp: 90 tablet, Rfl: 3  •  aspirin (ECOTRIN LOW STRENGTH) 81 mg EC tablet, Take 162 mg by mouth daily , Disp: , Rfl:   •  atorvastatin (LIPITOR) 40 mg tablet, Take 1 tablet (40 mg total) by mouth daily, Disp: 90 tablet, Rfl: 0  •  cyclobenzaprine (FLEXERIL) 10 mg tablet, Take 0.5-1 tablets (5-10 mg total) by mouth daily at bedtime, Disp: 90 tablet, Rfl: 1  •  Diclofenac Sodium (VOLTAREN) 1 %, Apply 2 g topically 4 (four) times a day Apply (Patient taking differently: Apply 2 g topically 4 (four) times a day as needed Apply), Disp: 400 g, Rfl: 0  •  EPINEPHrine (EPIPEN) 0.3 mg/0.3 mL SOAJ, Inject 0.3 mL (0.3 mg total) into a muscle once for 1 dose, Disp: 2 each, Rfl: 0  •  gabapentin (NEURONTIN) 300 mg capsule, Take 1 capsule (300 mg total) by mouth daily, Disp: 90 capsule, Rfl: 0  •  lisinopril (ZESTRIL) 10 mg tablet, Take 1 tablet (10 mg total) by mouth daily, Disp: 90 tablet, Rfl: 0  •  meloxicam (Mobic) 15 mg tablet, Take 1 tablet (15 mg total) by mouth as needed for mild pain, Disp: , Rfl:   •  metoprolol tartrate (LOPRESSOR) 25 mg tablet, Take 1 tablet (25 mg total) by mouth daily, Disp: 90 tablet, Rfl: 0  •  nitroglycerin (NITROSTAT) 0.4 mg SL tablet, Place 1 tablet (0.4 mg total) under the tongue every 5 (five) minutes as needed for chest pain, Disp: 30 tablet, Rfl: 0  •  pantoprazole (PROTONIX) 40 mg tablet, Take 1 tablet (40 mg total) by mouth daily, Disp: 90 tablet, Rfl: 0  •  tamsulosin (FLOMAX) 0.4 mg, Take 1 capsule (0.4 mg total) by mouth daily with dinner, Disp: 90 capsule, Rfl: 0  •  traZODone (DESYREL) 100 mg tablet, Take 1 tablet (100 mg total) by mouth daily at bedtime, Disp: 90 tablet, Rfl: 0  •  venlafaxine (EFFEXOR) 75 mg tablet, Take 1 tablet (75 mg total) by mouth daily, Disp: 90 tablet, Rfl: 0  Allergies   Allergen Reactions   • Adhesive [Medical Tape] Swelling     Tape on eye caused eye to swell could not open! Itching large hives from on arms     CAN USE PAPER TAPE   • Cephalosporins Anaphylaxis   • Isoflavones (Soy) Other (See Comments)     anaphylaxis   • Nuts - Food Allergy Anaphylaxis   • Other Abdominal Pain     Soy  Patient can not have an NGT due to bariatric surgery   • Peanut Oil - Food Allergy Anaphylaxis   • Penicillins Anaphylaxis     Itching and hives   • Shellfish Allergy - Food Allergy      Betadine/ cat scan dye is Okay. Cannot eat shrimp, crab shellfish avoids all fish.    • Shellfish-Derived Products - Food Allergy Other (See Comments)     Positive test       Labs:not applicable  Imaging: No results found. Review of Systems:  Review of Systems   All other systems reviewed and are negative. Physical Exam:  /68 (BP Location: Left arm, Patient Position: Sitting, Cuff Size: Standard)   Pulse 65   Ht 5' 9" (1.753 m)   Wt 97.1 kg (214 lb)   SpO2 96%   BMI 31.60 kg/m²   Physical Exam  Vitals reviewed. Constitutional:       Appearance: He is well-developed. HENT:      Head: Normocephalic and atraumatic. Cardiovascular:      Rate and Rhythm: Normal rate. Heart sounds: Normal heart sounds. Pulmonary:      Effort: Pulmonary effort is normal.      Breath sounds: Normal breath sounds. Musculoskeletal:      Cervical back: Normal range of motion. Skin:     General: Skin is warm and dry. Neurological:      Mental Status: He is alert and oriented to person, place, and time. Discussion/Summary:I will continue the patient's present medical regimen. The patient appears well compensated. I have asked the patient to call if there is a problem in the interim otherwise I will see the patient in six months time.

## 2023-07-06 ENCOUNTER — OFFICE VISIT (OUTPATIENT)
Dept: CARDIOLOGY CLINIC | Facility: CLINIC | Age: 63
End: 2023-07-06
Payer: COMMERCIAL

## 2023-07-06 VITALS
OXYGEN SATURATION: 96 % | DIASTOLIC BLOOD PRESSURE: 68 MMHG | HEIGHT: 69 IN | BODY MASS INDEX: 31.7 KG/M2 | WEIGHT: 214 LBS | SYSTOLIC BLOOD PRESSURE: 118 MMHG | HEART RATE: 65 BPM

## 2023-07-06 DIAGNOSIS — I10 ESSENTIAL (PRIMARY) HYPERTENSION: Primary | ICD-10-CM

## 2023-07-06 DIAGNOSIS — I25.10 CORONARY ARTERIOSCLEROSIS: ICD-10-CM

## 2023-07-06 DIAGNOSIS — E78.00 PURE HYPERCHOLESTEROLEMIA: ICD-10-CM

## 2023-07-06 PROCEDURE — 99214 OFFICE O/P EST MOD 30 MIN: CPT | Performed by: INTERNAL MEDICINE

## 2023-07-06 RX ORDER — AMLODIPINE BESYLATE 5 MG/1
5 TABLET ORAL DAILY
Qty: 90 TABLET | Refills: 3 | Status: SHIPPED | OUTPATIENT
Start: 2023-07-06

## 2023-07-21 DIAGNOSIS — M79.2 NERVE PAIN: ICD-10-CM

## 2023-07-21 DIAGNOSIS — I10 ESSENTIAL HYPERTENSION: ICD-10-CM

## 2023-07-21 DIAGNOSIS — N40.0 BENIGN PROSTATIC HYPERPLASIA, UNSPECIFIED WHETHER LOWER URINARY TRACT SYMPTOMS PRESENT: ICD-10-CM

## 2023-07-21 DIAGNOSIS — I10 ESSENTIAL (PRIMARY) HYPERTENSION: ICD-10-CM

## 2023-07-21 DIAGNOSIS — G89.29 CHRONIC BILATERAL BACK PAIN, UNSPECIFIED BACK LOCATION: ICD-10-CM

## 2023-07-21 DIAGNOSIS — T78.01XA PEANUT-INDUCED ANAPHYLAXIS, INITIAL ENCOUNTER: ICD-10-CM

## 2023-07-21 DIAGNOSIS — N30.00 ACUTE CYSTITIS WITHOUT HEMATURIA: ICD-10-CM

## 2023-07-21 DIAGNOSIS — I25.2 HISTORY OF MI (MYOCARDIAL INFARCTION): ICD-10-CM

## 2023-07-21 DIAGNOSIS — M54.9 CHRONIC BILATERAL BACK PAIN, UNSPECIFIED BACK LOCATION: ICD-10-CM

## 2023-07-21 RX ORDER — EPINEPHRINE 0.3 MG/.3ML
0.3 INJECTION SUBCUTANEOUS ONCE
Qty: 2 EACH | Refills: 0 | Status: SHIPPED | OUTPATIENT
Start: 2023-07-21 | End: 2023-07-21

## 2023-07-21 RX ORDER — GABAPENTIN 300 MG/1
300 CAPSULE ORAL DAILY
Qty: 90 CAPSULE | Refills: 0 | Status: SHIPPED | OUTPATIENT
Start: 2023-07-21

## 2023-07-21 RX ORDER — AMLODIPINE BESYLATE 5 MG/1
5 TABLET ORAL DAILY
Qty: 90 TABLET | Refills: 0 | Status: SHIPPED | OUTPATIENT
Start: 2023-07-21

## 2023-07-21 RX ORDER — TAMSULOSIN HYDROCHLORIDE 0.4 MG/1
0.4 CAPSULE ORAL
Qty: 90 CAPSULE | Refills: 0 | Status: SHIPPED | OUTPATIENT
Start: 2023-07-21 | End: 2023-08-20

## 2023-07-21 RX ORDER — NITROGLYCERIN 0.4 MG/1
0.4 TABLET SUBLINGUAL
Qty: 30 TABLET | Refills: 0 | Status: SHIPPED | OUTPATIENT
Start: 2023-07-21

## 2023-07-21 RX ORDER — CYCLOBENZAPRINE HCL 10 MG
5-10 TABLET ORAL
Qty: 90 TABLET | Refills: 0 | Status: SHIPPED | OUTPATIENT
Start: 2023-07-21

## 2023-08-09 ENCOUNTER — OFFICE VISIT (OUTPATIENT)
Dept: FAMILY MEDICINE CLINIC | Facility: CLINIC | Age: 63
End: 2023-08-09
Payer: COMMERCIAL

## 2023-08-09 VITALS
BODY MASS INDEX: 32.65 KG/M2 | WEIGHT: 208 LBS | OXYGEN SATURATION: 99 % | HEART RATE: 70 BPM | TEMPERATURE: 97.3 F | DIASTOLIC BLOOD PRESSURE: 76 MMHG | RESPIRATION RATE: 18 BRPM | HEIGHT: 67 IN | SYSTOLIC BLOOD PRESSURE: 126 MMHG

## 2023-08-09 DIAGNOSIS — Z00.00 ANNUAL PHYSICAL EXAM: Primary | ICD-10-CM

## 2023-08-09 DIAGNOSIS — K21.9 GASTROESOPHAGEAL REFLUX DISEASE WITHOUT ESOPHAGITIS: ICD-10-CM

## 2023-08-09 DIAGNOSIS — E78.5 HYPERLIPIDEMIA, UNSPECIFIED HYPERLIPIDEMIA TYPE: ICD-10-CM

## 2023-08-09 DIAGNOSIS — T78.01XA PEANUT-INDUCED ANAPHYLAXIS, INITIAL ENCOUNTER: ICD-10-CM

## 2023-08-09 DIAGNOSIS — N40.0 BENIGN PROSTATIC HYPERPLASIA, UNSPECIFIED WHETHER LOWER URINARY TRACT SYMPTOMS PRESENT: ICD-10-CM

## 2023-08-09 DIAGNOSIS — I10 ESSENTIAL (PRIMARY) HYPERTENSION: ICD-10-CM

## 2023-08-09 DIAGNOSIS — E66.09 CLASS 1 OBESITY DUE TO EXCESS CALORIES WITH SERIOUS COMORBIDITY AND BODY MASS INDEX (BMI) OF 32.0 TO 32.9 IN ADULT: ICD-10-CM

## 2023-08-09 DIAGNOSIS — F41.1 GAD (GENERALIZED ANXIETY DISORDER): ICD-10-CM

## 2023-08-09 DIAGNOSIS — G47.00 INSOMNIA, UNSPECIFIED TYPE: ICD-10-CM

## 2023-08-09 PROBLEM — Z87.898 HISTORY OF BACTEREMIA: Status: RESOLVED | Noted: 2020-12-09 | Resolved: 2023-08-09

## 2023-08-09 PROBLEM — R21 RASH: Status: RESOLVED | Noted: 2019-11-27 | Resolved: 2023-08-09

## 2023-08-09 PROBLEM — R41.0 EPISODIC CONFUSION: Status: RESOLVED | Noted: 2023-02-07 | Resolved: 2023-08-09

## 2023-08-09 PROCEDURE — 99396 PREV VISIT EST AGE 40-64: CPT | Performed by: NURSE PRACTITIONER

## 2023-08-09 RX ORDER — EPINEPHRINE 0.3 MG/.3ML
0.3 INJECTION SUBCUTANEOUS ONCE
Qty: 2 EACH | Refills: 0 | Status: SHIPPED | OUTPATIENT
Start: 2023-08-09 | End: 2023-08-09

## 2023-08-09 RX ORDER — ATORVASTATIN CALCIUM 40 MG/1
40 TABLET, FILM COATED ORAL DAILY
Qty: 90 TABLET | Refills: 0 | Status: SHIPPED | OUTPATIENT
Start: 2023-08-09

## 2023-08-09 RX ORDER — PANTOPRAZOLE SODIUM 40 MG/1
40 TABLET, DELAYED RELEASE ORAL DAILY
Qty: 90 TABLET | Refills: 0 | Status: SHIPPED | OUTPATIENT
Start: 2023-08-09

## 2023-08-09 RX ORDER — LISINOPRIL 10 MG/1
10 TABLET ORAL DAILY
Qty: 90 TABLET | Refills: 0 | Status: SHIPPED | OUTPATIENT
Start: 2023-08-09

## 2023-08-09 NOTE — PROGRESS NOTES
[address] [facility]    NAME: Harrison Osborne. AGE: 61 y.o. SEX: male  : 1960     DATE: 2023     Assessment and Plan:     Problem List Items Addressed This Visit        Digestive    Gastroesophageal reflux disease     Stable with current management. , continue pantoprazole         Relevant Medications    pantoprazole (PROTONIX) 40 mg tablet       Cardiovascular and Mediastinum    Essential (primary) hypertension     Stable with current management. , continue lisinopril, amlodipine, metoprolol. Relevant Medications    EPINEPHrine (EPIPEN) 0.3 mg/0.3 mL SOAJ    lisinopril (ZESTRIL) 10 mg tablet       Genitourinary    Benign prostatic hyperplasia     Stable with current management. , continue tamsulosin. Other    Anaphylactic shock due to peanuts    Relevant Medications    EPINEPHrine (EPIPEN) 0.3 mg/0.3 mL SOAJ    Annual physical exam - Primary     Unremarkable exam of adult male. Labs reviewed. Screenings and vaccines are up to date. Continue to remain physically active, encourage healthier food choices. Class 1 obesity due to excess calories with serious comorbidity and body mass index (BMI) of 32.0 to 32.9 in adult     Weight is decreasing. He is extremely physically active and PO intake is decreased because of his work schedule. Food choices are poor, encouraged healthier choices, more vegetables. PEYTON (generalized anxiety disorder)     Stable with current management. , continue venlafaxine         Hyperlipidemia     Stable with current management. , continue with atorvastatin, aspirin. Relevant Medications    atorvastatin (LIPITOR) 40 mg tablet    Insomnia     Stable with current management. Continue trazodone. Immunizations and preventive care screenings were discussed with patient today.  Appropriate education was printed on patient's after visit summary. Discussed risks and benefits of prostate cancer screening. We discussed the controversial history of PSA screening for prostate cancer in the [facility] as well as the risk of over detection and over treatment of prostate cancer by way of PSA screening. The patient understands that PSA blood testing is an imperfect way to screen for prostate cancer and that elevated PSA levels in the blood may also be caused by infection, inflammation, prostatic trauma or manipulation, urological procedures, or by benign prostatic enlargement. The role of the digital rectal examination in prostate cancer screening was also discussed and I discussed with him that there is large interobserver variability in the findings of digital rectal examination. Counseling:  Dental Health: discussed importance of regular tooth brushing, flossing, and dental visits. Injury prevention: discussed safety/seat belts, safety helmets, smoke detectors, carbon dioxide detectors, and smoking near bedding or upholstery. · Exercise: the importance of regular exercise/physical activity was discussed. Recommend exercise 3-5 times per week for at least 30 minutes. BMI Counseling: Body mass index is 32.65 kg/m². The BMI is above normal. Nutrition recommendations include decreasing portion sizes, encouraging healthy choices of fruits and vegetables, consuming healthier snacks, moderation in carbohydrate intake, increasing intake of lean protein, reducing intake of saturated and trans fat and reducing intake of cholesterol. Exercise recommendations include moderate physical activity 150 minutes/week, exercising 3-5 times per week and strength training exercises. No pharmacotherapy was ordered. Rationale for BMI follow-up plan is due to patient being overweight or obese. Depression Screening and Follow-up Plan: Patient was screened for depression during today's encounter. They screened negative with a PHQ-2 score of 0.         No follow-ups on file. Chief Complaint:     Chief Complaint   Patient presents with   • Physical Exam     Patient is being seen for an annual physical exam.       History of Present Illness:     Adult Annual Physical   Patient here for a comprehensive physical exam. The patient reports no problems. Diet and Physical Activity  · Diet/Nutrition: poor diet, frequent junk food and limited fruits/vegetables. · Exercise: no formal exercise and job is extremely physically active . Depression Screening  PHQ-2/9 Depression Screening    Little interest or pleasure in doing things: 0 - not at all  Feeling down, depressed, or hopeless: 0 - not at all  PHQ-2 Score: 0  PHQ-2 Interpretation: Negative depression screen       General Health  · Sleep: sleeps well, gets 4-6 hours of sleep on average and gets 7-8 hours of sleep on average. · Hearing: normal - bilateral.  · Vision: no vision problems. · Dental: regular dental visits, brushes teeth twice daily and flosses teeth occasionally.  Health  · Symptoms include: none     Review of Systems:     Review of Systems   Constitutional: Negative for activity change, appetite change, chills, diaphoresis, fatigue, fever and unexpected weight change. HENT: Negative for hearing loss. Eyes: Negative for visual disturbance. Respiratory: Negative for cough, chest tightness, shortness of breath and wheezing. Cardiovascular: Negative for chest pain, palpitations and leg swelling. Gastrointestinal: Negative for abdominal distention, abdominal pain, constipation, diarrhea, nausea and vomiting. Genitourinary: Negative for difficulty urinating, dysuria and frequency. Musculoskeletal: Negative for arthralgias and myalgias. Allergic/Immunologic: Negative for environmental allergies. Neurological: Negative for dizziness, weakness, light-headedness, numbness and headaches.    Psychiatric/Behavioral: Negative for dysphoric mood, self-injury, sleep disturbance and suicidal ideas. The patient is not nervous/anxious.        Past Medical History:     Past Medical History:   Diagnosis Date   • Abdominal lump 04/20/2018   • Allergic 2015   • Anxiety    • Arm pain     left-may have torn the bicep tendon   • Arthritis    • Cardiac disease    • Chronic kidney disease    • Contact lens/glasses fitting    • Coronary artery disease    • COVID-19 vaccine series completed     Moderna x2   • Episode of dizziness 11/26/2018   • Herpes zoster with complication 60/02/5533   • Hyperlipidemia    • Hypertension    • Kidney stone    • Myocardial infarction [facility]) 2010    cardiac stent x2   • Obesity    • Pain of right great toe 07/22/2019   • PONV (postoperative nausea and vomiting)     in the past-1980's   • Sepsis ([address]) 12/2020    urinary issue-back up   • Urinary tract infection Dec 2021      Past Surgical History:     Past Surgical History:   Procedure Laterality Date   • ABDOMINAL SURGERY      tumor on stomach   • ANGIOPLASTY      2010-x2 stents left side   • BACK SURGERY      lower back-dissectomy   • CARDIAC SURGERY      angio with stents   • CIRCUMCISION     • COLONOSCOPY     • CYSTOSCOPY     • EGD AND COLONOSCOPY     • GASTRIC BYPASS  2005   • IR ABSCESS/SEROMA DRAINAGE  1/4/2019   • LITHOTRIPSY      x2   • NECK SURGERY      herniated disc C4/5   • OTHER SURGICAL HISTORY      removal of vocal cord lesion    • CA EXC TUMOR SOFT TISSUE ABDOMINAL WALL SUBQ <3CM N/A 2/27/2019    Procedure: ABDOMINAL SEROMA EXCISION;  Surgeon: Krystin Gupta MD;  Location: [facility];  Service: General   • ROTATOR CUFF REPAIR Bilateral    • SHOULDER SURGERY Bilateral    • TONSILLECTOMY     • VASCULAR SURGERY      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   • WISDOM TOOTH EXTRACTION     • WOUND DEBRIDEMENT N/A 3/7/2019    Procedure: DEBRIDEMENT WOUND ABDOMINAL ([facility]) AND WOUND VAC APPLICATION;  Surgeon: Krystin Gupta MD;  Location: [facility];  Service: General   • WOUND DEBRIDEMENT N/A 3/9/2019 Procedure: DEBRIDEMENT WOUND ABDOMINAL [facility] OUT), wound vac dressing change;  Surgeon: David Viera DO;  [facility];  Service: General      Family History:     Family History   Problem Relation Age of Onset   • Heart disease Mother         passed during open heart surgery    • Cancer Mother    • Heart disease Father    • Diabetes Father         caused blindness    • Lung cancer Sister    • Cancer Sister    • Heart disease Brother         MI   • No Known Problems Daughter    • No Known Problems Son    • No Known Problems Son    • Nephrolithiasis Son       Social History:     Social History     Socioeconomic History   • Marital status:       Spouse name: Bro Nath    • Number of children: 4   • Years of education: None   • Highest education level: None   Occupational History   • None   Tobacco Use   • Smoking status: Never     Passive exposure: Never   • Smokeless tobacco: Never   • Tobacco comments:     rare cigar smoking   Vaping Use   • Vaping Use: Never used   Substance and Sexual Activity   • Alcohol use: Not Currently     Comment: occasional last drink 2/2020   • Drug use: No   • Sexual activity: Not Currently     Partners: Female   Other Topics Concern   • None   Social History Narrative    Patient lives in a home that was built in [facility] [address] in the bedroom     [place] air    Window air conditioning bedroom     Finished basement-dry-no mold or musty smell    [employer]  attached to [employer] in the winter months     Home is smoke free     Does not live near wooded are or open fields         Dog x5 (GRIMMET, [place], [employer], [place], and [place]) and there allowed in the bedroom         Caffeine: soda occasional                     Coffee is decaf 1 cup daily     Chocolate consumed rarely         Patient lives with wife and two children      Social Determinants of Health     Financial Resource Strain: Not on file   Food Insecurity: Not on file   Transportation Needs: Not on file   Physical Activity: Inactive (8/21/2019)    Exercise Vital Sign    • Days of Exercise per Week: 0 days    • Minutes of Exercise per Session: 0 min   Stress: Not on file   Social Connections: Not on file   Intimate Partner Violence: Not on file   Housing Stability: Not on file      Current Medications:     Current Outpatient Medications   Medication Sig Dispense Refill   • acetaminophen (TYLENOL) 500 mg tablet Take 1,000 mg by mouth every 6 (six) hours as needed for mild pain     • amLODIPine (NORVASC) 5 mg tablet Take 1 tablet (5 mg total) by mouth daily 90 tablet 0   • aspirin (ECOTRIN LOW STRENGTH) 81 mg EC tablet Take 162 mg by mouth daily      • atorvastatin (LIPITOR) 40 mg tablet Take 1 tablet (40 mg total) by mouth daily 90 tablet 0   • cyclobenzaprine (FLEXERIL) 10 mg tablet Take 0.5-1 tablets (5-10 mg total) by mouth daily at bedtime 90 tablet 0   • EPINEPHrine (EPIPEN) 0.3 mg/0.3 mL SOAJ Inject 0.3 mL (0.3 mg total) into a muscle once for 1 dose 2 each 0   • gabapentin (NEURONTIN) 300 mg capsule Take 1 capsule (300 mg total) by mouth daily 90 capsule 0   • lisinopril (ZESTRIL) 10 mg tablet Take 1 tablet (10 mg total) by mouth daily 90 tablet 0   • meloxicam (Mobic) 15 mg tablet Take 1 tablet (15 mg total) by mouth as needed for mild pain     • metoprolol tartrate (LOPRESSOR) 25 mg tablet Take 1 tablet (25 mg total) by mouth daily 90 tablet 0   • nitroglycerin (NITROSTAT) 0.4 mg SL tablet Place 1 tablet (0.4 mg total) under the tongue every 5 (five) minutes as needed for chest pain 30 tablet 0   • pantoprazole (PROTONIX) 40 mg tablet Take 1 tablet (40 mg total) by mouth daily 90 tablet 0   • tamsulosin (FLOMAX) 0.4 mg Take 1 capsule (0.4 mg total) by mouth daily with dinner 90 capsule 0   • traZODone (DESYREL) 100 mg tablet Take 1 tablet (100 mg total) by mouth daily at bedtime 90 tablet 0   • venlafaxine (EFFEXOR) 75 mg tablet Take 1 tablet (75 mg total) by mouth daily 90 tablet 0   • Diclofenac Sodium (VOLTAREN) 1 % Apply 2 g topically 4 (four) times a day Apply (Patient not taking: Reported on 8/9/2023) 400 g 0     No current facility-administered medications for this visit. Allergies: Allergies   Allergen Reactions   • Adhesive [Medical Tape] Swelling     Tape on eye caused eye to swell could not open! Itching large hives from on arms     CAN USE PAPER TAPE   • Cephalosporins Anaphylaxis   • Isoflavones (Soy) Other (See Comments)     anaphylaxis   • Nuts - Food Allergy Anaphylaxis   • Other Abdominal Pain     Soy  Patient can not have an NGT due to bariatric surgery   • Peanut Oil - Food Allergy Anaphylaxis   • Penicillins Anaphylaxis     Itching and hives   • Shellfish Allergy - Food Allergy      Betadine/ cat scan dye is Okay. Cannot eat shrimp, crab shellfish avoids all fish. • Shellfish-Derived Products - Food Allergy Other (See Comments)     Positive test      Physical Exam:     /76 (BP Location: Left arm, Patient Position: Sitting, Cuff Size: Large)   Pulse 70   Temp (!) 97.3 °F (36.3 °C) (Tympanic)   Resp 18   Ht 5' 6.93" (1.7 m)   Wt 94.3 kg (208 lb)   SpO2 99%   BMI 32.65 kg/m²     Physical Exam  Vitals reviewed. Constitutional:       General: He is awake. He is not in acute distress. Appearance: Normal appearance. He is well-developed and well-groomed. He is not ill-appearing. HENT:      Head: Normocephalic and atraumatic. Right Ear: Hearing, tympanic membrane, ear canal and external ear normal.      Left Ear: Hearing, tympanic membrane, ear canal and external ear normal.      Nose: Nose normal.      Mouth/Throat:      Lips: Pink. Mouth: Mucous membranes are moist.      Pharynx: Oropharynx is clear. Eyes:      General: Lids are normal.      Conjunctiva/sclera: Conjunctivae normal.      Pupils: Pupils are equal, round, and reactive to light. Neck:      Thyroid: No thyromegaly.       Vascular: Normal carotid pulses. No carotid bruit. Cardiovascular:      Rate and Rhythm: Normal rate and regular rhythm. Pulses: Normal pulses. Heart sounds: Normal heart sounds. No murmur heard. Pulmonary:      Effort: Pulmonary effort is normal.      Breath sounds: Normal breath sounds. Abdominal:      General: Abdomen is flat. Bowel sounds are normal.      Palpations: Abdomen is soft. Tenderness: There is no abdominal tenderness. Musculoskeletal:      Right shoulder: Decreased range of motion. Left shoulder: Decreased range of motion. Cervical back: Normal range of motion. Right lower leg: No edema. Left lower leg: No edema. Lymphadenopathy:      Cervical: No cervical adenopathy. Skin:     General: Skin is warm and dry. Capillary Refill: Capillary refill takes less than 2 seconds. Neurological:      Mental Status: He is alert and oriented to person, place, and time. Motor: Motor function is intact. Psychiatric:         Attention and Perception: Attention normal.         Mood and Affect: Mood normal.         Speech: Speech normal.         Behavior: Behavior normal. Behavior is cooperative. Thought Content:  Thought content normal.         Cognition and Memory: Cognition normal.         Judgment: Judgment normal.          Talisha Mcdaniel, [address] [facility]

## 2023-08-09 NOTE — ASSESSMENT & PLAN NOTE
Weight is decreasing. He is extremely physically active and PO intake is decreased because of his work schedule. Food choices are poor, encouraged healthier choices, more vegetables.

## 2023-08-09 NOTE — ASSESSMENT & PLAN NOTE
Unremarkable exam of adult male. Labs reviewed. Screenings and vaccines are up to date. Continue to remain physically active, encourage healthier food choices.

## 2023-08-25 ENCOUNTER — OFFICE VISIT (OUTPATIENT)
Dept: OBGYN CLINIC | Facility: CLINIC | Age: 63
End: 2023-08-25
Payer: COMMERCIAL

## 2023-08-25 VITALS
WEIGHT: 207 LBS | BODY MASS INDEX: 30.66 KG/M2 | HEIGHT: 69 IN | SYSTOLIC BLOOD PRESSURE: 150 MMHG | HEART RATE: 70 BPM | DIASTOLIC BLOOD PRESSURE: 76 MMHG

## 2023-08-25 DIAGNOSIS — M17.11 PRIMARY OSTEOARTHRITIS OF RIGHT KNEE: ICD-10-CM

## 2023-08-25 DIAGNOSIS — M17.12 PRIMARY OSTEOARTHRITIS OF LEFT KNEE: Primary | ICD-10-CM

## 2023-08-25 PROCEDURE — 99214 OFFICE O/P EST MOD 30 MIN: CPT | Performed by: ORTHOPAEDIC SURGERY

## 2023-08-25 PROCEDURE — 20610 DRAIN/INJ JOINT/BURSA W/O US: CPT | Performed by: ORTHOPAEDIC SURGERY

## 2023-08-25 RX ORDER — METHYLPREDNISOLONE ACETATE 40 MG/ML
2 INJECTION, SUSPENSION INTRA-ARTICULAR; INTRALESIONAL; INTRAMUSCULAR; SOFT TISSUE
Status: COMPLETED | OUTPATIENT
Start: 2023-08-25 | End: 2023-08-25

## 2023-08-25 RX ORDER — KETOROLAC TROMETHAMINE 30 MG/ML
30 INJECTION, SOLUTION INTRAMUSCULAR; INTRAVENOUS
Status: COMPLETED | OUTPATIENT
Start: 2023-08-25 | End: 2023-08-25

## 2023-08-25 RX ORDER — BUPIVACAINE HYDROCHLORIDE 2.5 MG/ML
1 INJECTION, SOLUTION INFILTRATION; PERINEURAL
Status: COMPLETED | OUTPATIENT
Start: 2023-08-25 | End: 2023-08-25

## 2023-08-25 RX ADMIN — KETOROLAC TROMETHAMINE 30 MG: 30 INJECTION, SOLUTION INTRAMUSCULAR; INTRAVENOUS at 07:30

## 2023-08-25 RX ADMIN — BUPIVACAINE HYDROCHLORIDE 1 ML: 2.5 INJECTION, SOLUTION INFILTRATION; PERINEURAL at 07:30

## 2023-08-25 RX ADMIN — METHYLPREDNISOLONE ACETATE 2 ML: 40 INJECTION, SUSPENSION INTRA-ARTICULAR; INTRALESIONAL; INTRAMUSCULAR; SOFT TISSUE at 07:30

## 2023-08-25 NOTE — PROGRESS NOTES
Assessment:   Diagnosis ICD-10-CM Associated Orders   1. Primary osteoarthritis of left knee  M17.12 Large joint arthrocentesis: L knee      2. Primary osteoarthritis of right knee  M17.11 Large joint arthrocentesis: R knee          Plan:  • On exam patient maintains functional range of motion with good strength. Most of his pain is on the medial lateral aspects of the knee. • We did address the patient's concerns with losing 7 pounds since his last weigh-in on 7/6/2023. We reviewed briefly what he is taking it on a daily basis and recommended to reach out to one of our dietitians through our caring starts with you program.  He feels that this is a good option and will consider this. • Patient wishes to continue treating with cortisone injections for his knees since he gets 3-1/2 months of relief with them. We administered the injections today, which she tolerated well. To do next visit:  Return in about 3 months (around 11/25/2023) for Bilateral knee. The above stated was discussed in layman's terms and the patient expressed understanding. All questions were answered to the patient's satisfaction. Scribe Attestation    I,:  Lissette Koenig am acting as a scribe while in the presence of the attending physician.:       I,:  Marisol Nieves MD personally performed the services described in this documentation    as scribed in my presence.:             Subjective:   Asia Ro is a 61 y.o. male who presents today for his bilaterlal knee pain due to osteoarthritis. He continues to treat his pain conservatively with periodic cortisone injections. They continue to give him significant relief of his pain, he also takes meloxicam 15mg. He has been doing HEP VMO strengthening due to left knee weakness. He has brought up his weight loss to his primary care physician and again today noting that he has lost a considerable amount of weight over the last 6 weeks.   His primary care monitors his Damian.  He is a [employer] employee working in the [employer]. Review of systems negative unless otherwise specified in HPI  Review of Systems   Constitutional: Negative for chills, fever and unexpected weight change. HENT: Negative for hearing loss, nosebleeds and sore throat. Eyes: Negative for pain, redness and visual disturbance. Respiratory: Negative for cough, shortness of breath and wheezing. Cardiovascular: Negative for chest pain, palpitations and leg swelling. Gastrointestinal: Negative for abdominal pain, nausea and vomiting. Endocrine: Negative for polydipsia and polyuria. Genitourinary: Negative for dysuria and hematuria. Musculoskeletal: Positive for arthralgias. Skin: Negative for rash and wound. Neurological: Negative for dizziness, light-headedness and headaches. Psychiatric/Behavioral: Negative for decreased concentration, dysphoric mood and suicidal ideas. The patient is not nervous/anxious.         Past Medical History:   Diagnosis Date   • Abdominal lump 04/20/2018   • Allergic 2015   • Anxiety    • Arm pain     left-may have torn the bicep tendon   • Arthritis    • Cardiac disease    • Chronic kidney disease    • Contact lens/glasses fitting    • Coronary artery disease    • COVID-19 vaccine series completed     Ovi Lav x2   • Episode of dizziness 11/26/2018   • Herpes zoster with complication 35/41/7493   • Hyperlipidemia    • Hypertension    • Kidney stone    • Myocardial infarction [facility]) 2010    cardiac stent x2   • Obesity    • Pain of right great toe 07/22/2019   • PONV (postoperative nausea and vomiting)     in the past-1980's   • Sepsis ([address]) 12/2020    urinary issue-back up   • Urinary tract infection Dec 2021       Past Surgical History:   Procedure Laterality Date   • ABDOMINAL SURGERY      tumor on stomach   • ANGIOPLASTY      2010-x2 stents left side   • BACK SURGERY      lower back-dissectomy   • CARDIAC SURGERY      angio with stents • CIRCUMCISION     • COLONOSCOPY     • CYSTOSCOPY     • EGD AND COLONOSCOPY     • GASTRIC BYPASS  2005   • IR ABSCESS/SEROMA DRAINAGE  1/4/2019   • LITHOTRIPSY      x2   • NECK SURGERY      herniated disc C4/5   • OTHER SURGICAL HISTORY      removal of vocal cord lesion    • [place] EXC TUMOR SOFT TISSUE ABDOMINAL WALL SUBQ <3CM N/A 2/27/2019    Procedure: ABDOMINAL SEROMA EXCISION;  Surgeon: Bhakti Currie MD;  [facility];  Service: General   • ROTATOR CUFF REPAIR Bilateral    • SHOULDER SURGERY Bilateral    • TONSILLECTOMY     • VASCULAR SURGERY      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   • WISDOM TOOTH EXTRACTION     • WOUND DEBRIDEMENT N/A 3/7/2019    Procedure: DEBRIDEMENT WOUND ABDOMINAL ([address]) AND WOUND VAC APPLICATION;  Surgeon: Bhakti Currie MD;  Location[facility];  Service: General   • WOUND DEBRIDEMENT N/A 3/9/2019    Procedure: DEBRIDEMENT WOUND ABDOMINAL ([address]), wound vac dressing change;  Surgeon: Luz Lazo DO;  [facility];  Service: General       Family History   Problem Relation Age of Onset   • Heart disease Mother         passed during open heart surgery    • Cancer Mother    • Heart disease Father    • Diabetes Father         caused blindness    • Lung cancer Sister    • Cancer Sister    • Heart disease Brother         MI   • No Known Problems Daughter    • No Known Problems Son    • No Known Problems Son    • Nephrolithiasis Son        Social History     Occupational History   • Not on file   Tobacco Use   • Smoking status: Never     Passive exposure: Never   • Smokeless tobacco: Never   • Tobacco comments:     rare cigar smoking   Vaping Use   • Vaping Use: Never used   Substance and Sexual Activity   • Alcohol use: Not Currently     Comment: occasional last drink 2/2020   • Drug use: No   • Sexual activity: Not Currently     Partners: Female         Current Outpatient Medications:   •  acetaminophen (TYLENOL) 500 mg tablet, Take 1,000 mg by mouth every 6 (six) hours as needed for mild pain, Disp: , Rfl:   •  amLODIPine (NORVASC) 5 mg tablet, Take 1 tablet (5 mg total) by mouth daily, Disp: 90 tablet, Rfl: 0  •  aspirin (ECOTRIN LOW STRENGTH) 81 mg EC tablet, Take 162 mg by mouth daily , Disp: , Rfl:   •  atorvastatin (LIPITOR) 40 mg tablet, Take 1 tablet (40 mg total) by mouth daily, Disp: 90 tablet, Rfl: 0  •  cyclobenzaprine (FLEXERIL) 10 mg tablet, Take 0.5-1 tablets (5-10 mg total) by mouth daily at bedtime, Disp: 90 tablet, Rfl: 0  •  gabapentin (NEURONTIN) 300 mg capsule, Take 1 capsule (300 mg total) by mouth daily, Disp: 90 capsule, Rfl: 0  •  lisinopril (ZESTRIL) 10 mg tablet, Take 1 tablet (10 mg total) by mouth daily, Disp: 90 tablet, Rfl: 0  •  meloxicam (Mobic) 15 mg tablet, Take 1 tablet (15 mg total) by mouth as needed for mild pain, Disp: , Rfl:   •  metoprolol tartrate (LOPRESSOR) 25 mg tablet, Take 1 tablet (25 mg total) by mouth daily, Disp: 90 tablet, Rfl: 0  •  nitroglycerin (NITROSTAT) 0.4 mg SL tablet, Place 1 tablet (0.4 mg total) under the tongue every 5 (five) minutes as needed for chest pain, Disp: 30 tablet, Rfl: 0  •  pantoprazole (PROTONIX) 40 mg tablet, Take 1 tablet (40 mg total) by mouth daily, Disp: 90 tablet, Rfl: 0  •  traZODone (DESYREL) 100 mg tablet, Take 1 tablet (100 mg total) by mouth daily at bedtime, Disp: 90 tablet, Rfl: 0  •  venlafaxine (EFFEXOR) 75 mg tablet, Take 1 tablet (75 mg total) by mouth daily, Disp: 90 tablet, Rfl: 0  •  Diclofenac Sodium (VOLTAREN) 1 %, Apply 2 g topically 4 (four) times a day Apply (Patient not taking: Reported on 8/9/2023), Disp: 400 g, Rfl: 0  •  EPINEPHrine (EPIPEN) 0.3 mg/0.3 mL SOAJ, Inject 0.3 mL (0.3 mg total) into a muscle once for 1 dose, Disp: 2 each, Rfl: 0  •  tamsulosin (FLOMAX) 0.4 mg, Take 1 capsule (0.4 mg total) by mouth daily with dinner, Disp: 90 capsule, Rfl: 0    Allergies   Allergen Reactions   • Adhesive [Medical Tape] Swelling     Tape on eye caused eye to swell could not open! Itching large hives from on arms     CAN USE PAPER TAPE   • Cephalosporins Anaphylaxis   • Isoflavones (Soy) Other (See Comments)     anaphylaxis   • Nuts - Food Allergy Anaphylaxis   • Other Abdominal Pain     Soy  Patient can not have an NGT due to bariatric surgery   • Peanut Oil - Food Allergy Anaphylaxis   • Penicillins Anaphylaxis     Itching and hives   • Shellfish Allergy - Food Allergy      Betadine/ cat scan dye is Okay. Cannot eat shrimp, crab shellfish avoids all fish. • Shellfish-Derived Products - Food Allergy Other (See Comments)     Positive test            Vitals:    08/25/23 0733   BP: 150/76   Pulse: 70       Objective:    General: No apparent distress  CV: S1-S2  Chest: No audible wheezing  Abdomen: Soft                    Right Knee Exam     Tenderness   The patient is experiencing tenderness in the medial joint line and lateral joint line. Range of Motion   Extension: 0   Flexion: 120     Tests   Varus: negative Valgus: negative    Other   Erythema: absent  Sensation: normal  Pulse: present  Swelling: none  Effusion: no effusion present    Comments:  Calf is soft and non tender      Left Knee Exam     Tenderness   The patient is experiencing tenderness in the medial joint line and lateral joint line. Range of Motion   Extension: 0   Left knee flexion: 115. Tests   Varus: negative Valgus: negative    Other   Erythema: absent  Sensation: normal  Pulse: present  Swelling: none  Effusion: no effusion present    Comments:  Calf is soft and non tender            Diagnostics, reviewed and taken today if performed as documented:    None performed      The attending physician has personally reviewed the pertinent films in PACS and interpretation is as follows:      Procedures, if performed today:    Large joint arthrocentesis: R knee  Universal Protocol:  Consent: Verbal consent obtained.   Risks and benefits: risks, benefits and alternatives were discussed  Consent given by: patient  Time out: Immediately prior to procedure a "time out" was called to verify the correct patient, procedure, equipment, support staff and site/side marked as required. Timeout called at: 8/25/2023 8:08 AM.  Patient understanding: patient states understanding of the procedure being performed  Site marked: the operative site was marked  Patient identity confirmed: verbally with patient    Supporting Documentation  Indications: pain   Procedure Details  Location: knee - R knee  Preparation: Patient was prepped and draped in the usual sterile fashion  Needle size: 22 G  Ultrasound guidance: no  Approach: anterolateral  Medications administered: 2 mL methylPREDNISolone acetate 40 mg/mL; 1 mL bupivacaine 0.25 %; 30 mg ketorolac 30 mg/mL    Patient tolerance: patient tolerated the procedure well with no immediate complications  Dressing:  Sterile dressing applied    Large joint arthrocentesis: L knee  Universal Protocol:  Consent: Verbal consent obtained. Risks and benefits: risks, benefits and alternatives were discussed  Consent given by: patient  Time out: Immediately prior to procedure a "time out" was called to verify the correct patient, procedure, equipment, support staff and site/side marked as required.   Timeout called at: 8/25/2023 8:10 AM.  Patient understanding: patient states understanding of the procedure being performed  Site marked: the operative site was marked  Patient identity confirmed: verbally with patient    Supporting Documentation  Indications: pain   Procedure Details  Location: knee - L knee  Preparation: Patient was prepped and draped in the usual sterile fashion  Needle size: 22 G  Ultrasound guidance: no  Approach: anterolateral  Medications administered: 2 mL methylPREDNISolone acetate 40 mg/mL; 1 mL bupivacaine 0.25 %; 30 mg ketorolac 30 mg/mL    Patient tolerance: patient tolerated the procedure well with no immediate complications  Dressing:  Sterile dressing applied          Portions of the record may have been created with voice recognition software. Occasional wrong word or "sound a like" substitutions may have occurred due to the inherent limitations of voice recognition software. Read the chart carefully and recognize, using context, where substitutions have occurred.

## 2023-08-27 ENCOUNTER — TELEPHONE (OUTPATIENT)
Dept: OTHER | Facility: OTHER | Age: 63
End: 2023-08-27

## 2023-08-27 DIAGNOSIS — N52.1 ERECTILE DYSFUNCTION DUE TO DISEASES CLASSIFIED ELSEWHERE: Primary | ICD-10-CM

## 2023-08-27 RX ORDER — SILDENAFIL 25 MG/1
25 TABLET, FILM COATED ORAL DAILY PRN
Qty: 10 TABLET | Refills: 1 | Status: SHIPPED | OUTPATIENT
Start: 2023-08-27

## 2023-08-27 NOTE — TELEPHONE ENCOUNTER
Pt called in stating he would like to discuss a personal matter with Dr. Angel Del Rosario. He's requesting a call back at [phone].

## 2023-09-22 DIAGNOSIS — F41.8 DEPRESSION WITH ANXIETY: ICD-10-CM

## 2023-09-22 RX ORDER — VENLAFAXINE 75 MG/1
37.5 TABLET ORAL DAILY
Qty: 45 TABLET | Refills: 2 | Status: SHIPPED | OUTPATIENT
Start: 2023-09-22 | End: 2023-11-08

## 2023-10-16 DIAGNOSIS — I10 ESSENTIAL HYPERTENSION: ICD-10-CM

## 2023-10-16 DIAGNOSIS — N52.1 ERECTILE DYSFUNCTION DUE TO DISEASES CLASSIFIED ELSEWHERE: ICD-10-CM

## 2023-10-16 DIAGNOSIS — N30.00 ACUTE CYSTITIS WITHOUT HEMATURIA: ICD-10-CM

## 2023-10-16 DIAGNOSIS — T78.01XA PEANUT-INDUCED ANAPHYLAXIS, INITIAL ENCOUNTER: ICD-10-CM

## 2023-10-16 DIAGNOSIS — G47.00 INSOMNIA, UNSPECIFIED TYPE: ICD-10-CM

## 2023-10-16 DIAGNOSIS — I10 ESSENTIAL (PRIMARY) HYPERTENSION: ICD-10-CM

## 2023-10-16 DIAGNOSIS — M25.512 LEFT SHOULDER PAIN, UNSPECIFIED CHRONICITY: ICD-10-CM

## 2023-10-16 DIAGNOSIS — M79.2 NERVE PAIN: ICD-10-CM

## 2023-10-16 DIAGNOSIS — N40.0 BENIGN PROSTATIC HYPERPLASIA, UNSPECIFIED WHETHER LOWER URINARY TRACT SYMPTOMS PRESENT: ICD-10-CM

## 2023-10-16 RX ORDER — TAMSULOSIN HYDROCHLORIDE 0.4 MG/1
0.4 CAPSULE ORAL
Qty: 90 CAPSULE | Refills: 0 | Status: SHIPPED | OUTPATIENT
Start: 2023-10-16 | End: 2024-01-14

## 2023-10-16 RX ORDER — TRAZODONE HYDROCHLORIDE 100 MG/1
100 TABLET ORAL
Qty: 90 TABLET | Refills: 0 | Status: SHIPPED | OUTPATIENT
Start: 2023-10-16

## 2023-10-16 RX ORDER — EPINEPHRINE 0.3 MG/.3ML
0.3 INJECTION SUBCUTANEOUS ONCE
Qty: 2 EACH | Refills: 0 | Status: SHIPPED | OUTPATIENT
Start: 2023-10-16 | End: 2023-10-16

## 2023-10-16 RX ORDER — GABAPENTIN 300 MG/1
300 CAPSULE ORAL DAILY
Qty: 90 CAPSULE | Refills: 0 | Status: SHIPPED | OUTPATIENT
Start: 2023-10-16

## 2023-10-23 RX ORDER — AMLODIPINE BESYLATE 5 MG/1
5 TABLET ORAL DAILY
Qty: 90 TABLET | Refills: 3 | Status: SHIPPED | OUTPATIENT
Start: 2023-10-23

## 2023-10-23 RX ORDER — SILDENAFIL 25 MG/1
25 TABLET, FILM COATED ORAL DAILY PRN
Qty: 10 TABLET | Refills: 3 | OUTPATIENT
Start: 2023-10-23

## 2023-11-06 ENCOUNTER — CLINICAL SUPPORT (OUTPATIENT)
Dept: FAMILY MEDICINE CLINIC | Facility: CLINIC | Age: 63
End: 2023-11-06
Payer: COMMERCIAL

## 2023-11-06 VITALS — SYSTOLIC BLOOD PRESSURE: 140 MMHG | DIASTOLIC BLOOD PRESSURE: 80 MMHG

## 2023-11-06 DIAGNOSIS — I10 HYPERTENSION, UNSPECIFIED TYPE: ICD-10-CM

## 2023-11-06 DIAGNOSIS — Z23 ENCOUNTER FOR IMMUNIZATION: Primary | ICD-10-CM

## 2023-11-06 PROCEDURE — 90471 IMMUNIZATION ADMIN: CPT

## 2023-11-06 PROCEDURE — 90686 IIV4 VACC NO PRSV 0.5 ML IM: CPT

## 2023-11-06 NOTE — PROGRESS NOTES
Name: Geetha Wisdom. : 1960      MRN: [id number]  Encounter Provider: Everett Salas  Encounter Date: 2023   Encounter department: [facility]     1.  Encounter for immunization  -     influenza vaccine, quadrivalent, 0.5 mL, preservative-free, for adult and pediatric patients 6 mos+ (AFLURIA, FLUARIX, FLULAVAL, FLUZONE)    2. Hypertension, unspecified type           Subjective          Current Outpatient Medications on File Prior to Visit   Medication Sig    acetaminophen (TYLENOL) 500 mg tablet Take 1,000 mg by mouth every 6 (six) hours as needed for mild pain    amLODIPine (NORVASC) 5 mg tablet Take 1 tablet (5 mg total) by mouth daily    aspirin (ECOTRIN LOW STRENGTH) 81 mg EC tablet Take 162 mg by mouth daily     atorvastatin (LIPITOR) 40 mg tablet Take 1 tablet (40 mg total) by mouth daily    cyclobenzaprine (FLEXERIL) 10 mg tablet Take 0.5-1 tablets (5-10 mg total) by mouth daily at bedtime    Diclofenac Sodium (VOLTAREN) 1 % Apply 2 g topically 4 (four) times a day Apply    EPINEPHrine (EPIPEN) 0.3 mg/0.3 mL SOAJ Inject 0.3 mL (0.3 mg total) into a muscle once for 1 dose    gabapentin (NEURONTIN) 300 mg capsule Take 1 capsule (300 mg total) by mouth daily    lisinopril (ZESTRIL) 10 mg tablet Take 1 tablet (10 mg total) by mouth daily    meloxicam (Mobic) 15 mg tablet Take 1 tablet (15 mg total) by mouth as needed for mild pain    metoprolol tartrate (LOPRESSOR) 25 mg tablet Take 1 tablet (25 mg total) by mouth daily    nitroglycerin (NITROSTAT) 0.4 mg SL tablet Place 1 tablet (0.4 mg total) under the tongue every 5 (five) minutes as needed for chest pain    pantoprazole (PROTONIX) 40 mg tablet Take 1 tablet (40 mg total) by mouth daily    sildenafil (VIAGRA) 25 MG tablet Take 1 tablet (25 mg total) by mouth daily as needed for erectile dysfunction    tamsulosin (FLOMAX) 0.4 mg Take 1 capsule (0.4 mg total) by mouth daily with dinner traZODone (DESYREL) 100 mg tablet Take 1 tablet (100 mg total) by mouth daily at bedtime    venlafaxine (EFFEXOR) 75 mg tablet Take 0.5 tablets (37.5 mg total) by mouth daily       Objective   B/P 140\80-Patient was advised to start Lisinopril 1 1/2 tabs every morning. Patient has OV 11/8/23 with [place], [place].       Ulices Hayden

## 2023-11-08 ENCOUNTER — OFFICE VISIT (OUTPATIENT)
Dept: FAMILY MEDICINE CLINIC | Facility: CLINIC | Age: 63
End: 2023-11-08
Payer: COMMERCIAL

## 2023-11-08 VITALS
BODY MASS INDEX: 30.63 KG/M2 | HEIGHT: 69 IN | DIASTOLIC BLOOD PRESSURE: 78 MMHG | HEART RATE: 63 BPM | RESPIRATION RATE: 16 BRPM | WEIGHT: 206.8 LBS | TEMPERATURE: 95.6 F | OXYGEN SATURATION: 99 % | SYSTOLIC BLOOD PRESSURE: 132 MMHG

## 2023-11-08 DIAGNOSIS — N52.9 ERECTILE DYSFUNCTION, UNSPECIFIED ERECTILE DYSFUNCTION TYPE: ICD-10-CM

## 2023-11-08 DIAGNOSIS — R19.09 BULGE IN GROIN AREA: Primary | ICD-10-CM

## 2023-11-08 DIAGNOSIS — I10 ESSENTIAL (PRIMARY) HYPERTENSION: ICD-10-CM

## 2023-11-08 DIAGNOSIS — F41.8 DEPRESSION WITH ANXIETY: ICD-10-CM

## 2023-11-08 PROCEDURE — 99214 OFFICE O/P EST MOD 30 MIN: CPT | Performed by: NURSE PRACTITIONER

## 2023-11-08 RX ORDER — VENLAFAXINE 37.5 MG/1
37.5 TABLET ORAL EVERY OTHER DAY
Qty: 15 TABLET | Refills: 0 | Status: SHIPPED | OUTPATIENT
Start: 2023-11-08 | End: 2023-12-08

## 2023-11-08 RX ORDER — LISINOPRIL 10 MG/1
15 TABLET ORAL DAILY
Qty: 90 TABLET | Refills: 0 | Status: SHIPPED | OUTPATIENT
Start: 2023-11-08

## 2023-11-08 NOTE — ASSESSMENT & PLAN NOTE
Stable with current management; can further decrease to 37.5 mg qod and if stable after 1 month can d/c completely.

## 2023-11-08 NOTE — PROGRESS NOTES
Name: Dorcas Jacobo. : 1960      MRN: [id number]  Encounter Provider: N[facility] Lynn  Encounter Date: 2023   Encounter department: [facility]     1. Bulge in groin area  Assessment & Plan:  Referral to general surgery for discussion of management. Orders:  -     Ambulatory Referral to General Surgery; Future    2. Depression with anxiety  Assessment & Plan:  Stable with current management; can further decrease to 37.5 mg qod and if stable after 1 month can d/c completely. Orders:  -     venlafaxine (EFFEXOR) 37.5 mg tablet; Take 1 tablet (37.5 mg total) by mouth every other day    3. Essential (primary) hypertension  Assessment & Plan:  Blood pressure is well controlled now, continue lisinopril 15 mg, amlodipine 5 mg metoprolol. Orders:  -     lisinopril (ZESTRIL) 10 mg tablet; Take 1.5 tablets (15 mg total) by mouth daily    4. Erectile dysfunction, unspecified erectile dysfunction type  Assessment & Plan:  Taking sildenafil, having difficulty ejaculating. May improve after he decreases venlafaxine and ultimately d/c's it. Continue to monitor. Orders:  -     Testosterone, free, total; Future  -     CBC and differential; Future  -     TSH, 3rd generation with Free T4 reflex; Future  -     Basic metabolic panel; Future           Subjective      Pt is a 61 y.o. y/o male who is seen today for evaluation of suspected hernia. Past medical history of GERD, AV malformation, ischemic heart disease, coronary atherosclerosis, HTN, BPH, kidney stones, anxiety, obesity, hyperlipidemia. He suspects he has an inguinal hernia in he's left groin. He says this has been there for about 2 months and he keeps "pushing it back in" because he does not want to miss work. He says there is no pain associated with this. No problems with his bowels. No nausea, vomiting, appetite loss.   His lisinopril was increased at nurse visit earlier this week from 10 mg to 15 mg. He says that he is adjusting to the higher dose, he feels a bit lightheaded when he takes it but is getting used to it. He has been taking 37.5 mg of venlafaxine since September and has been doing well with the lower dose. Would like to continue to wean off. He notes that he has started taking sildenafil 25 mg, and it helps him have an erection but he is unable to ejaculate. Review of Systems   Constitutional:  Negative for appetite change, chills, fatigue and fever. Respiratory:  Negative for shortness of breath. Cardiovascular:  Negative for chest pain, palpitations and leg swelling. Gastrointestinal:  Negative for abdominal distention, abdominal pain, constipation, diarrhea, nausea and vomiting. Left groin bulge   Genitourinary:  Negative for difficulty urinating and dysuria. Musculoskeletal:  Positive for arthralgias (bilateral knee pain. ). Neurological:  Negative for light-headedness.        Current Outpatient Medications on File Prior to Visit   Medication Sig    acetaminophen (TYLENOL) 500 mg tablet Take 1,000 mg by mouth every 6 (six) hours as needed for mild pain    amLODIPine (NORVASC) 5 mg tablet Take 1 tablet (5 mg total) by mouth daily    aspirin (ECOTRIN LOW STRENGTH) 81 mg EC tablet Take 162 mg by mouth daily     atorvastatin (LIPITOR) 40 mg tablet Take 1 tablet (40 mg total) by mouth daily    cyclobenzaprine (FLEXERIL) 10 mg tablet Take 0.5-1 tablets (5-10 mg total) by mouth daily at bedtime    Diclofenac Sodium (VOLTAREN) 1 % Apply 2 g topically 4 (four) times a day Apply    EPINEPHrine (EPIPEN) 0.3 mg/0.3 mL SOAJ Inject 0.3 mL (0.3 mg total) into a muscle once for 1 dose    gabapentin (NEURONTIN) 300 mg capsule Take 1 capsule (300 mg total) by mouth daily    meloxicam (Mobic) 15 mg tablet Take 1 tablet (15 mg total) by mouth as needed for mild pain    metoprolol tartrate (LOPRESSOR) 25 mg tablet Take 1 tablet (25 mg total) by mouth daily nitroglycerin (NITROSTAT) 0.4 mg SL tablet Place 1 tablet (0.4 mg total) under the tongue every 5 (five) minutes as needed for chest pain    pantoprazole (PROTONIX) 40 mg tablet Take 1 tablet (40 mg total) by mouth daily    sildenafil (VIAGRA) 25 MG tablet Take 1 tablet (25 mg total) by mouth daily as needed for erectile dysfunction    tamsulosin (FLOMAX) 0.4 mg Take 1 capsule (0.4 mg total) by mouth daily with dinner    traZODone (DESYREL) 100 mg tablet Take 1 tablet (100 mg total) by mouth daily at bedtime    [DISCONTINUED] lisinopril (ZESTRIL) 10 mg tablet Take 1 tablet (10 mg total) by mouth daily    [DISCONTINUED] venlafaxine (EFFEXOR) 75 mg tablet Take 0.5 tablets (37.5 mg total) by mouth daily       Objective     /78   Pulse 63   Temp (!) 95.6 °F (35.3 °C) (Tympanic)   Resp 16   Ht 5' 9" (1.753 m)   Wt 93.8 kg (206 lb 12.8 oz)   SpO2 99%   BMI 30.54 kg/m²     Physical Exam  Vitals reviewed. Constitutional:       General: He is awake. He is not in acute distress. Appearance: Normal appearance. He is well-developed and well-groomed. He is not ill-appearing. Eyes:      General: Lids are normal.      Conjunctiva/sclera: Conjunctivae normal.   Cardiovascular:      Rate and Rhythm: Normal rate. Pulses: Normal pulses. Heart sounds: Normal heart sounds. No murmur heard. Pulmonary:      Effort: Pulmonary effort is normal. No respiratory distress. Breath sounds: Normal breath sounds. Abdominal:      General: Bowel sounds are normal.      Palpations: Abdomen is soft. Tenderness: There is no abdominal tenderness. Hernia: No hernia is present. Comments: Left groin bulge, non-tender   Skin:     General: Skin is warm and dry. Neurological:      Mental Status: He is alert and oriented to person, place, and time.    Psychiatric:         Attention and Perception: Attention normal.         Mood and Affect: Mood normal.         Speech: Speech normal.         Behavior: Behavior normal. Behavior is cooperative. Thought Content:  Thought content normal.         Cognition and Memory: Cognition normal.         Judgment: Judgment normal.       NU Copeland

## 2023-11-08 NOTE — ASSESSMENT & PLAN NOTE
Taking sildenafil, having difficulty ejaculating. May improve after he decreases venlafaxine and ultimately d/c's it. Continue to monitor.

## 2023-11-13 ENCOUNTER — APPOINTMENT (OUTPATIENT)
Dept: LAB | Facility: CLINIC | Age: 63
End: 2023-11-13
Payer: COMMERCIAL

## 2023-11-13 DIAGNOSIS — N52.9 ERECTILE DYSFUNCTION, UNSPECIFIED ERECTILE DYSFUNCTION TYPE: ICD-10-CM

## 2023-11-13 LAB
ANION GAP SERPL CALCULATED.3IONS-SCNC: 4 MMOL/L
BASOPHILS # BLD AUTO: 0.04 THOUSANDS/ÂΜL (ref 0–0.1)
BASOPHILS NFR BLD AUTO: 1 % (ref 0–1)
BUN SERPL-MCNC: 22 MG/DL (ref 5–25)
CALCIUM SERPL-MCNC: 9.6 MG/DL (ref 8.4–10.2)
CHLORIDE SERPL-SCNC: 104 MMOL/L (ref 96–108)
CO2 SERPL-SCNC: 30 MMOL/L (ref 21–32)
CREAT SERPL-MCNC: 1 MG/DL (ref 0.6–1.3)
EOSINOPHIL # BLD AUTO: 0.32 THOUSAND/ÂΜL (ref 0–0.61)
EOSINOPHIL NFR BLD AUTO: 6 % (ref 0–6)
ERYTHROCYTE [DISTWIDTH] IN BLOOD BY AUTOMATED COUNT: 12.2 % (ref 11.6–15.1)
GFR SERPL CREATININE-BSD FRML MDRD: 79 ML/MIN/1.73SQ M
GLUCOSE P FAST SERPL-MCNC: 90 MG/DL (ref 65–99)
HCT VFR BLD AUTO: 45.6 % (ref 36.5–49.3)
HGB BLD-MCNC: 15 G/DL (ref 12–17)
IMM GRANULOCYTES # BLD AUTO: 0.02 THOUSAND/UL (ref 0–0.2)
IMM GRANULOCYTES NFR BLD AUTO: 0 % (ref 0–2)
LYMPHOCYTES # BLD AUTO: 1.62 THOUSANDS/ÂΜL (ref 0.6–4.47)
LYMPHOCYTES NFR BLD AUTO: 29 % (ref 14–44)
MCH RBC QN AUTO: 31.8 PG (ref 26.8–34.3)
MCHC RBC AUTO-ENTMCNC: 32.9 G/DL (ref 31.4–37.4)
MCV RBC AUTO: 97 FL (ref 82–98)
MONOCYTES # BLD AUTO: 0.5 THOUSAND/ÂΜL (ref 0.17–1.22)
MONOCYTES NFR BLD AUTO: 9 % (ref 4–12)
NEUTROPHILS # BLD AUTO: 3.14 THOUSANDS/ÂΜL (ref 1.85–7.62)
NEUTS SEG NFR BLD AUTO: 55 % (ref 43–75)
NRBC BLD AUTO-RTO: 0 /100 WBCS
PLATELET # BLD AUTO: 215 THOUSANDS/UL (ref 149–390)
PMV BLD AUTO: 10.8 FL (ref 8.9–12.7)
POTASSIUM SERPL-SCNC: 4.7 MMOL/L (ref 3.5–5.3)
RBC # BLD AUTO: 4.72 MILLION/UL (ref 3.88–5.62)
SODIUM SERPL-SCNC: 138 MMOL/L (ref 135–147)
TSH SERPL DL<=0.05 MIU/L-ACNC: 1.11 UIU/ML (ref 0.45–4.5)
WBC # BLD AUTO: 5.64 THOUSAND/UL (ref 4.31–10.16)

## 2023-11-13 PROCEDURE — 36415 COLL VENOUS BLD VENIPUNCTURE: CPT

## 2023-11-13 PROCEDURE — 84443 ASSAY THYROID STIM HORMONE: CPT

## 2023-11-13 PROCEDURE — 84402 ASSAY OF FREE TESTOSTERONE: CPT

## 2023-11-13 PROCEDURE — 84403 ASSAY OF TOTAL TESTOSTERONE: CPT

## 2023-11-13 PROCEDURE — 80048 BASIC METABOLIC PNL TOTAL CA: CPT

## 2023-11-13 PROCEDURE — 85025 COMPLETE CBC W/AUTO DIFF WBC: CPT

## 2023-11-15 LAB
TESTOST FREE SERPL-MCNC: 6 PG/ML (ref 6.6–18.1)
TESTOST SERPL-MCNC: 795 NG/DL (ref 264–916)

## 2023-11-21 ENCOUNTER — CONSULT (OUTPATIENT)
Dept: SURGERY | Facility: CLINIC | Age: 63
End: 2023-11-21
Payer: COMMERCIAL

## 2023-11-21 VITALS
WEIGHT: 204 LBS | HEIGHT: 69 IN | SYSTOLIC BLOOD PRESSURE: 151 MMHG | DIASTOLIC BLOOD PRESSURE: 91 MMHG | BODY MASS INDEX: 30.21 KG/M2 | HEART RATE: 94 BPM

## 2023-11-21 DIAGNOSIS — K40.90 INGUINAL HERNIA WITHOUT OBSTRUCTION OR GANGRENE: Primary | ICD-10-CM

## 2023-11-21 DIAGNOSIS — R19.09 BULGE IN GROIN AREA: ICD-10-CM

## 2023-11-21 PROCEDURE — 99203 OFFICE O/P NEW LOW 30 MIN: CPT | Performed by: SURGERY

## 2023-11-21 NOTE — PROGRESS NOTES
Assessment/Plan: Patient presents with a left inguinal hernia. Injury occurred at work. 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 He developed stretching burning sensation within the left inguinal region. This was diagnosed as left inguinal herniation. I would concur left inguinal hernia is present. This is reducible. No evidence for right inguinal hernia. Previous surgical scars noted. All well-healed. Patient has a history for previous Idris-en-Y bypass. History of seroma. Interventional radiology placed a drain which is later infected the cavity. This required abdominal wall debridement in 2019. I recommended elective left inguinal hernia repair. Risks and benefits explained. All questions answered. Diagnoses and all orders for this visit:    [place] Can in groin area  -     Ambulatory Referral to General Surgery        Subjective:      Patient ID: Cristian Abarca. is a 61 y.o. male. Patient presents for left inguinal hernia consult. States he has had a bulge LLQ for 2 months. Denies pain. Does not limit his activities. The following portions of the patient's history were reviewed and updated as appropriate:     He  has a past medical history of Abdominal lump (04/20/2018), Allergic (2015), Anxiety, Arm pain, Arthritis, Cardiac disease, Chronic kidney disease, Contact lens/glasses fitting, Coronary artery disease, COVID-19 vaccine series completed, Episode of dizziness (11/26/2018), Herpes zoster with complication (55/32/3339), Hyperlipidemia, Hypertension, Kidney stone, Myocardial infarction ([address]) (2010), Obesity, Pain of right great toe (07/22/2019), PONV (postoperative nausea and vomiting), Sepsis ([address]) (12/2020), and Urinary tract infection (Dec 2021). He  has a past surgical history that includes Gastric bypass (2005); Neck surgery; Shoulder surgery (Bilateral); IR abscess/seroma drainage (1/4/2019);  Cardiac surgery; pr exc tumor soft tissue abdominal wall subq <3cm (N/A, 2/27/2019); Wound debridement (N/A, 3/7/2019); Wound debridement (N/A, 3/9/2019); Coden tooth extraction; Tonsillectomy; EGD AND COLONOSCOPY; Circumcision; Other surgical history; Colonoscopy; Abdominal surgery; Back surgery; Rotator cuff repair (Bilateral); Vascular surgery; Angioplasty; Cystoscopy; and Lithotripsy. His family history includes Cancer in his mother and sister; Diabetes in his father; Heart disease in his brother, father, and mother; Lung cancer in his sister; Nephrolithiasis in his son; No Known Problems in his daughter, son, and son. He  reports that he has never smoked. He has never been exposed to tobacco smoke. He has never used smokeless tobacco. He reports that he does not currently use alcohol. He reports that he does not use drugs.   Current Outpatient Medications   Medication Sig Dispense Refill    acetaminophen (TYLENOL) 500 mg tablet Take 1,000 mg by mouth every 6 (six) hours as needed for mild pain      amLODIPine (NORVASC) 5 mg tablet Take 1 tablet (5 mg total) by mouth daily 90 tablet 3    aspirin (ECOTRIN LOW STRENGTH) 81 mg EC tablet Take 162 mg by mouth daily       atorvastatin (LIPITOR) 40 mg tablet Take 1 tablet (40 mg total) by mouth daily 90 tablet 0    cyclobenzaprine (FLEXERIL) 10 mg tablet Take 0.5-1 tablets (5-10 mg total) by mouth daily at bedtime 90 tablet 0    Diclofenac Sodium (VOLTAREN) 1 % Apply 2 g topically 4 (four) times a day Apply 400 g 0    EPINEPHrine (EPIPEN) 0.3 mg/0.3 mL SOAJ Inject 0.3 mL (0.3 mg total) into a muscle once for 1 dose 2 each 0    gabapentin (NEURONTIN) 300 mg capsule Take 1 capsule (300 mg total) by mouth daily 90 capsule 0    lisinopril (ZESTRIL) 10 mg tablet Take 1.5 tablets (15 mg total) by mouth daily 90 tablet 0    meloxicam (Mobic) 15 mg tablet Take 1 tablet (15 mg total) by mouth as needed for mild pain      metoprolol tartrate (LOPRESSOR) 25 mg tablet Take 1 tablet (25 mg total) by mouth daily 90 tablet 0    nitroglycerin (NITROSTAT) 0.4 mg SL tablet Place 1 tablet (0.4 mg total) under the tongue every 5 (five) minutes as needed for chest pain 30 tablet 0    pantoprazole (PROTONIX) 40 mg tablet Take 1 tablet (40 mg total) by mouth daily 90 tablet 0    sildenafil (VIAGRA) 25 MG tablet Take 1 tablet (25 mg total) by mouth daily as needed for erectile dysfunction 10 tablet 1    tamsulosin (FLOMAX) 0.4 mg Take 1 capsule (0.4 mg total) by mouth daily with dinner 90 capsule 0    traZODone (DESYREL) 100 mg tablet Take 1 tablet (100 mg total) by mouth daily at bedtime 90 tablet 0    venlafaxine (EFFEXOR) 37.5 mg tablet Take 1 tablet (37.5 mg total) by mouth every other day 15 tablet 0     No current facility-administered medications for this visit. He is allergic to adhesive [medical tape], cephalosporins, isoflavones (soy), nuts - food allergy, other, peanut oil - food allergy, penicillins, shellfish allergy - food allergy, and shellfish-derived products - food allergy. .    Review of Systems   Constitutional: Negative. Negative for activity change. HENT: Negative. Eyes: Negative. Respiratory: Negative. Cardiovascular: Negative. Gastrointestinal: Negative. Endocrine: Negative. Genitourinary: Negative. Musculoskeletal: Negative. Skin: Negative. Allergic/Immunologic: Negative. Neurological: Negative. Psychiatric/Behavioral:  Negative for agitation, behavioral problems and confusion. The patient is not nervous/anxious. Objective:      /91   Pulse 94   Ht 5' 9" (1.753 m)   Wt 92.5 kg (204 lb)   BMI 30.13 kg/m²          Physical Exam  Constitutional:       Appearance: He is well-developed. He is not diaphoretic. HENT:      Head: Normocephalic and atraumatic. Eyes:      General: No scleral icterus. Right eye: No discharge. Left eye: No discharge. Extraocular Movements: Extraocular movements intact.       Conjunctiva/sclera: Conjunctivae normal. Neck:      Thyroid: No thyromegaly. Trachea: No tracheal deviation. Cardiovascular:      Rate and Rhythm: Normal rate and regular rhythm. Heart sounds: Normal heart sounds. No murmur heard. No friction rub. Pulmonary:      Effort: Pulmonary effort is normal. No respiratory distress. Breath sounds: Normal breath sounds. No stridor. No wheezing. Chest:      Chest wall: No tenderness. Abdominal:      General: There is no distension. Palpations: There is no mass. Tenderness: There is no abdominal tenderness. There is no guarding or rebound. Hernia: A hernia (Left inguinal hernia is present. No evidence for right inguinal herniation.) is present. Musculoskeletal:         General: No tenderness. Right lower leg: No edema. Left lower leg: No edema. Lymphadenopathy:      Cervical: No cervical adenopathy. Skin:     General: Skin is warm and dry. Findings: No erythema or rash. Neurological:      Mental Status: He is alert and oriented to person, place, and time. Cranial Nerves: No cranial nerve deficit.       Coordination: Coordination normal.   Psychiatric:         Behavior: Behavior normal.         Judgment: Judgment normal.

## 2023-11-22 DIAGNOSIS — N30.00 ACUTE CYSTITIS WITHOUT HEMATURIA: ICD-10-CM

## 2023-11-22 DIAGNOSIS — I10 ESSENTIAL (PRIMARY) HYPERTENSION: ICD-10-CM

## 2023-11-22 DIAGNOSIS — N40.0 BENIGN PROSTATIC HYPERPLASIA, UNSPECIFIED WHETHER LOWER URINARY TRACT SYMPTOMS PRESENT: ICD-10-CM

## 2023-11-22 DIAGNOSIS — M79.2 NERVE PAIN: ICD-10-CM

## 2023-11-22 DIAGNOSIS — K21.9 GASTROESOPHAGEAL REFLUX DISEASE WITHOUT ESOPHAGITIS: ICD-10-CM

## 2023-11-22 RX ORDER — GABAPENTIN 300 MG/1
300 CAPSULE ORAL DAILY
Qty: 90 CAPSULE | Refills: 1 | Status: SHIPPED | OUTPATIENT
Start: 2023-11-22

## 2023-11-22 RX ORDER — LISINOPRIL 10 MG/1
15 TABLET ORAL DAILY
Qty: 140 TABLET | Refills: 1 | Status: SHIPPED | OUTPATIENT
Start: 2023-11-22

## 2023-11-22 RX ORDER — PANTOPRAZOLE SODIUM 40 MG/1
40 TABLET, DELAYED RELEASE ORAL DAILY
Qty: 90 TABLET | Refills: 1 | Status: SHIPPED | OUTPATIENT
Start: 2023-11-22

## 2023-11-22 RX ORDER — TAMSULOSIN HYDROCHLORIDE 0.4 MG/1
0.4 CAPSULE ORAL
Qty: 90 CAPSULE | Refills: 1 | Status: SHIPPED | OUTPATIENT
Start: 2023-11-22 | End: 2024-05-20

## 2023-11-26 NOTE — ASSESSMENT & PLAN NOTE
BP well controlled, continue with lopressor  Repeat labs and continue to monitor 
Erick cp, CROSS  Continue with atorvastatin, aspirin, metoprolol  Will repeat lipids prior to next visit  Continue with healthy diet, exercise 
Related to low back pain  Symptoms stable, on current tx  Continue with gabapentin 
Severe pain in left knee, has been worsening over time  Pt describes bending knee like a grinding sensation  Uses tylenol with some relief and voltaren gel also with some relief  He avoids oral nsaids due to hx of xiomara  Referral to ortho to discuss other tx options 
Spinning sensation with laying down or standing, resolves within seconds  No syncope, blurry vision, cp, sob  Sounds suggestive of bppv; referral to PT for evaluation and treatment 
Stable on venlafaxine  Continue with current tx 
Stable, continue atorvastatin, repeat lipid panel 
Weight decreasing with pt getting a lot of physical activity at work  Encouraged he continue to work on eating a healthy diet including fruits, vegetables regularly as well as whole grains 
Well controlled on trazodone 100 mg  Continue with current tx 
no pain, swelling or deformity of joints

## 2023-11-29 ENCOUNTER — PREP FOR PROCEDURE (OUTPATIENT)
Dept: SURGERY | Facility: CLINIC | Age: 63
End: 2023-11-29

## 2023-11-29 DIAGNOSIS — K40.90 INGUINAL HERNIA: Primary | ICD-10-CM

## 2023-12-01 ENCOUNTER — OFFICE VISIT (OUTPATIENT)
Dept: OBGYN CLINIC | Facility: CLINIC | Age: 63
End: 2023-12-01
Payer: COMMERCIAL

## 2023-12-01 VITALS
WEIGHT: 206 LBS | DIASTOLIC BLOOD PRESSURE: 80 MMHG | HEART RATE: 59 BPM | BODY MASS INDEX: 30.51 KG/M2 | SYSTOLIC BLOOD PRESSURE: 142 MMHG | HEIGHT: 69 IN

## 2023-12-01 DIAGNOSIS — M17.11 PRIMARY OSTEOARTHRITIS OF RIGHT KNEE: ICD-10-CM

## 2023-12-01 DIAGNOSIS — M17.12 PRIMARY OSTEOARTHRITIS OF LEFT KNEE: Primary | ICD-10-CM

## 2023-12-01 PROCEDURE — 99213 OFFICE O/P EST LOW 20 MIN: CPT | Performed by: ORTHOPAEDIC SURGERY

## 2023-12-01 PROCEDURE — 20610 DRAIN/INJ JOINT/BURSA W/O US: CPT | Performed by: ORTHOPAEDIC SURGERY

## 2023-12-01 RX ORDER — KETOROLAC TROMETHAMINE 30 MG/ML
30 INJECTION, SOLUTION INTRAMUSCULAR; INTRAVENOUS
Status: COMPLETED | OUTPATIENT
Start: 2023-12-01 | End: 2023-12-01

## 2023-12-01 RX ORDER — METHYLPREDNISOLONE ACETATE 40 MG/ML
2 INJECTION, SUSPENSION INTRA-ARTICULAR; INTRALESIONAL; INTRAMUSCULAR; SOFT TISSUE
Status: COMPLETED | OUTPATIENT
Start: 2023-12-01 | End: 2023-12-01

## 2023-12-01 RX ORDER — BUPIVACAINE HYDROCHLORIDE 2.5 MG/ML
2 INJECTION, SOLUTION INFILTRATION; PERINEURAL
Status: COMPLETED | OUTPATIENT
Start: 2023-12-01 | End: 2023-12-01

## 2023-12-01 RX ADMIN — KETOROLAC TROMETHAMINE 30 MG: 30 INJECTION, SOLUTION INTRAMUSCULAR; INTRAVENOUS at 07:30

## 2023-12-01 RX ADMIN — METHYLPREDNISOLONE ACETATE 2 ML: 40 INJECTION, SUSPENSION INTRA-ARTICULAR; INTRALESIONAL; INTRAMUSCULAR; SOFT TISSUE at 07:30

## 2023-12-01 RX ADMIN — BUPIVACAINE HYDROCHLORIDE 2 ML: 2.5 INJECTION, SOLUTION INFILTRATION; PERINEURAL at 07:30

## 2023-12-01 NOTE — PROGRESS NOTES
[address] [place] [address]. 61 y.o. male MRN: [id number]      Chief Complaint:   bilateral knee pain    HPI:   61 y.o.male complaining of bilateral knee pain. He states pain is aching nature worse with weightbearing mildly relieved with rest.  He states having approximately 3 months of pain relief following his injections. He states the pain is aching nature worse weightbearing mildly relieved with rest.  Denies any radiation of pain changes numbness in bilateral lower extremities.                 Review Of Systems:   Skin: Normal  Neuro: See HPI  Musculoskeletal: See HPI  All other systems reviewed and are negative    Past Medical History:   Past Medical History:   Diagnosis Date    Abdominal lump 04/20/2018    Allergic 2015    Anxiety     Arm pain     left-may have torn the bicep tendon    Arthritis     Cardiac disease     Chronic kidney disease     Contact lens/glasses fitting     Coronary artery disease     COVID-19 vaccine series completed     Moderna x2    Episode of dizziness 11/26/2018    Herpes zoster with complication 72/37/7006    Hyperlipidemia     Hypertension     Kidney stone     Myocardial infarction [facility]) 2010    cardiac stent x2    Obesity     Pain of right great toe 07/22/2019    PONV (postoperative nausea and vomiting)     in the past-1980's    Sepsis ([address]) 12/2020    urinary issue-back up    Urinary tract infection Dec 2021       Past Surgical History:   Past Surgical History:   Procedure Laterality Date    ABDOMINAL SURGERY      tumor on stomach    ANGIOPLASTY      2010-x2 stents left side    BACK SURGERY      lower back-dissectomy    CARDIAC SURGERY      angio with stents    CIRCUMCISION      COLONOSCOPY      CYSTOSCOPY      EGD AND COLONOSCOPY      GASTRIC BYPASS  2005    IR ABSCESS/SEROMA DRAINAGE  1/4/2019    LITHOTRIPSY      x2    NECK SURGERY      herniated disc C4/5    OTHER SURGICAL HISTORY      removal of vocal cord lesion     WV EXC TUMOR SOFT TISSUE ABDOMINAL WALL SUBQ <3CM N/A 2/27/2019    Procedure: ABDOMINAL SEROMA EXCISION;  Surgeon: Carter Huerta MD;  Location[facility];  Service: General    ROTATOR CUFF REPAIR Bilateral     SHOULDER SURGERY Bilateral     TONSILLECTOMY      VASCULAR SURGERY      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    WISDOM TOOTH EXTRACTION      WOUND DEBRIDEMENT N/A 3/7/2019    Procedure: DEBRIDEMENT WOUND ABDOMINAL ([address]) AND WOUND VAC APPLICATION;  Surgeon: Carter Huerta MD;  [facility];  Service: General    WOUND DEBRIDEMENT N/A 3/9/2019    Procedure: DEBRIDEMENT WOUND ABDOMINAL ([address]), wound vac dressing change;  Surgeon: Amy Del Cid DO;  [facility];  Service: General       Family History:  Family history reviewed and non-contributory  Family History   Problem Relation Age of Onset    Heart disease Mother         passed during open heart surgery     Cancer Mother     Heart disease Father     Diabetes Father         caused blindness     Lung cancer Sister     Cancer Sister     Heart disease Brother         MI    No Known Problems Son     No Known Problems Son     Nephrolithiasis Son     No Known Problems Daughter          Social History:  Social History     Socioeconomic History    Marital status:       Spouse name: Refugio Vaughn     Number of children: 4    Years of education: None    Highest education level: None   Occupational History    None   Tobacco Use    Smoking status: Never     Passive exposure: Never    Smokeless tobacco: Never    Tobacco comments:     rare cigar smoking   Vaping Use    Vaping Use: Never used   Substance and Sexual Activity    Alcohol use: Not Currently     Comment: occasional last drink 2/2020    Drug use: No    Sexual activity: Yes     Partners: Female     Birth control/protection: None   Other Topics Concern    None   Social History Narrative    Patient lives in a home that was built in [address] [place] [address] [address] in the bedroom     [facility] air conditioning bedroom     Finished basement-dry-no mold or musty smell    Dehumidifier     Air  attached to [employer] in the winter months     Home is smoke free     Does not live near wooded are or open fields         Dog x5 (Refugio Hammonds, Superior Patrick, and Mila) and there allowed in the bedroom         Caffeine: soda occasional                     Coffee is decaf 1 cup daily     Chocolate consumed rarely         Patient lives with wife and two children      Social Determinants of Health     Financial Resource Strain: Not on file   Food Insecurity: Not on file   Transportation Needs: Not on file   Physical Activity: Inactive (8/21/2019)    Exercise Vital Sign     Days of Exercise per Week: 0 days     Minutes of Exercise per Session: 0 min   Stress: Not on file   Social Connections: Not on file   Intimate Partner Violence: Not on file   Housing Stability: Not on file       Allergies: Allergies   Allergen Reactions    Adhesive [Medical Tape] Swelling     Tape on eye caused eye to swell could not open! Itching large hives from on arms     CAN USE PAPER TAPE    Cephalosporins Anaphylaxis    Isoflavones (Soy) Other (See Comments)     anaphylaxis    Nuts - Food Allergy Anaphylaxis    Other Abdominal Pain     Soy  Patient can not have an NGT due to bariatric surgery    Peanut Oil - Food Allergy Anaphylaxis    Penicillins Anaphylaxis     Itching and hives    Shellfish Allergy - Food Allergy      Betadine/ cat scan dye is Okay. Cannot eat shrimp, crab shellfish avoids all fish.     Shellfish-Derived Products - Food Allergy Other (See Comments)     Positive test       Labs:  0   Lab Value Date/Time    HCT 45.6 11/13/2023 0710    HCT 48.4 06/26/2023 0708    HCT 40.1 04/12/2023 0815    HGB 15.0 11/13/2023 0710    HGB 15.9 06/26/2023 0708    HGB 13.4 04/12/2023 0815    INR 1.18 11/30/2020 1130    WBC 5.64 11/13/2023 0710    WBC 8.27 06/26/2023 0708    WBC 7.01 04/12/2023 0815    CRP <3.0 11/30/2019 1041       Meds:    Current Outpatient Medications:     acetaminophen (TYLENOL) 500 mg tablet, Take 1,000 mg by mouth every 6 (six) hours as needed for mild pain, Disp: , Rfl:     amLODIPine (NORVASC) 5 mg tablet, Take 1 tablet (5 mg total) by mouth daily, Disp: 90 tablet, Rfl: 3    aspirin (ECOTRIN LOW STRENGTH) 81 mg EC tablet, Take 162 mg by mouth daily , Disp: , Rfl:     atorvastatin (LIPITOR) 40 mg tablet, Take 1 tablet (40 mg total) by mouth daily, Disp: 90 tablet, Rfl: 0    cyclobenzaprine (FLEXERIL) 10 mg tablet, Take 0.5-1 tablets (5-10 mg total) by mouth daily at bedtime, Disp: 90 tablet, Rfl: 0    Diclofenac Sodium (VOLTAREN) 1 %, Apply 2 g topically 4 (four) times a day Apply, Disp: 400 g, Rfl: 0    EPINEPHrine (EPIPEN) 0.3 mg/0.3 mL SOAJ, Inject 0.3 mL (0.3 mg total) into a muscle once for 1 dose, Disp: 2 each, Rfl: 0    gabapentin (NEURONTIN) 300 mg capsule, Take 1 capsule (300 mg total) by mouth daily, Disp: 90 capsule, Rfl: 1    lisinopril (ZESTRIL) 10 mg tablet, Take 1.5 tablets (15 mg total) by mouth daily, Disp: 140 tablet, Rfl: 1    meloxicam (Mobic) 15 mg tablet, Take 1 tablet (15 mg total) by mouth as needed for mild pain, Disp: , Rfl:     metoprolol tartrate (LOPRESSOR) 25 mg tablet, Take 1 tablet (25 mg total) by mouth daily, Disp: 90 tablet, Rfl: 0    nitroglycerin (NITROSTAT) 0.4 mg SL tablet, Place 1 tablet (0.4 mg total) under the tongue every 5 (five) minutes as needed for chest pain, Disp: 30 tablet, Rfl: 0    pantoprazole (PROTONIX) 40 mg tablet, Take 1 tablet (40 mg total) by mouth daily, Disp: 90 tablet, Rfl: 1    sildenafil (VIAGRA) 25 MG tablet, Take 1 tablet (25 mg total) by mouth daily as needed for erectile dysfunction, Disp: 10 tablet, Rfl: 1    tamsulosin (FLOMAX) 0.4 mg, Take 1 capsule (0.4 mg total) by mouth daily with dinner, Disp: 90 capsule, Rfl: 1    traZODone (DESYREL) 100 mg tablet, Take 1 tablet (100 mg total) by mouth daily at bedtime, Disp: 90 tablet, Rfl: 0 venlafaxine (EFFEXOR) 37.5 mg tablet, Take 1 tablet (37.5 mg total) by mouth every other day, Disp: 15 tablet, Rfl: 0    Body mass index is 30.42 kg/m². Wt Readings from Last 3 Encounters:   12/01/23 93.4 kg (206 lb)   11/21/23 92.5 kg (204 lb)   11/08/23 93.8 kg (206 lb 12.8 oz)     Physical Exam:   Vitals:    12/01/23 0744   BP: 142/80   Pulse: 59       General Appearance:    Alert, cooperative, no distress, appears stated age   Head:    Normocephalic, without obvious abnormality, atraumatic   Eyes:    conjunctiva/corneas clear, both eyes        Nose:   Nares normal, septum midline, no drainage    Throat:   Lips normal; teeth and gums normal   Neck:    symmetrical, trachea midline, ;     thyroid:  no enlargement/   Back:     Symmetric, no curvature, ROM normal   Lungs:   No audible wheezing or labored breathing   Chest Wall:    No tenderness or deformity    Heart:    Regular rate and rhythm               Pulses:   2+ and symmetric all extremities   Skin:   Skin color, texture, turgor normal, no rashes or lesions   Neurologic:   normal strength, sensation and reflexes     throughout       Musculoskeletal: bilateral lower extremity  On examination of the right knee there is no effusion, no erythema. Range of motion to full active extension and flexion to greater than 120°. Pain on palpation medial and lateral joint lines. There is crepitus with range of motion, no warmth to palpation, bony enlargement noted. No pain on palpation pes anserine bursa region or distal iliotibial band. Stable to varus and valgus stress without pain or gapping. Negative anterior and posterior drawer testing. Sensation intact distal pulses present. On examination of the left knee there is no effusion, no erythema. Range of motion to full active extension and flexion to greater than 120°. Pain on palpation medial and lateral joint lines. There is crepitus with range of motion, no warmth to palpation, bony enlargement noted.  No pain on palpation pes anserine bursa region or distal iliotibial band. Stable to varus and valgus stress without pain or gapping. Negative anterior and posterior drawer testing. Sensation intact distal pulses present. Large joint arthrocentesis: R knee  Universal Protocol:  Consent: Verbal consent obtained. Consent given by: patient  Patient understanding: patient states understanding of the procedure being performed  Site marked: the operative site was marked  Patient identity confirmed: verbally with patient  Supporting Documentation  Indications: pain   Procedure Details  Location: knee - R knee  Needle size: 22 G  Approach: anterolateral  Medications administered: 2 mL bupivacaine 0.25 %; 2 mL methylPREDNISolone acetate 40 mg/mL; 30 mg ketorolac 30 mg/mL    Patient tolerance: patient tolerated the procedure well with no immediate complications      Large joint arthrocentesis: L knee  Universal Protocol:  Consent: Verbal consent obtained.   Consent given by: patient  Patient understanding: patient states understanding of the procedure being performed  Site marked: the operative site was marked  Patient identity confirmed: verbally with patient  Supporting Documentation  Indications: pain   Procedure Details  Location: knee - L knee  Needle size: 22 G  Medications administered: 2 mL bupivacaine 0.25 %; 2 mL methylPREDNISolone acetate 40 mg/mL; 30 mg ketorolac 30 mg/mL    Patient tolerance: patient tolerated the procedure well with no immediate complications         _*_*_*_*_*_*_*_*_*_*_*_*_*_*_*_*_*_*_*_*_*_*_*_*_*_*_*_*_*_*_*_*_*_*_*_*_*_*_*_*_*    Assessment:  63 y.o.male with bilateral knee pain due to arthritis    Plan:   Weight bearing as tolerated  bilateral lower extremity  Bilateral knee intra-articular corticosteroid injections administered as noted above  Patient advised should they develop any increasing pain, redness, drainage, numbness, tingling or swelling surrounding the injection site, they are to contact our office or present to the emergency department.   Follow up in 3 months or sooner if needed      Lubna Driver PA-C

## 2023-12-05 ENCOUNTER — APPOINTMENT (OUTPATIENT)
Dept: URGENT CARE | Age: 63
End: 2023-12-05
Payer: OTHER MISCELLANEOUS

## 2023-12-05 PROCEDURE — G0384 LEV 5 HOSP TYPE B ED VISIT: HCPCS | Performed by: EMERGENCY MEDICINE

## 2023-12-05 PROCEDURE — 99285 EMERGENCY DEPT VISIT HI MDM: CPT | Performed by: EMERGENCY MEDICINE

## 2023-12-08 DIAGNOSIS — N52.1 ERECTILE DYSFUNCTION DUE TO DISEASES CLASSIFIED ELSEWHERE: ICD-10-CM

## 2023-12-08 DIAGNOSIS — M54.9 CHRONIC BILATERAL BACK PAIN, UNSPECIFIED BACK LOCATION: ICD-10-CM

## 2023-12-08 DIAGNOSIS — M17.11 PRIMARY OSTEOARTHRITIS OF RIGHT KNEE: ICD-10-CM

## 2023-12-08 DIAGNOSIS — G89.29 CHRONIC BILATERAL BACK PAIN, UNSPECIFIED BACK LOCATION: ICD-10-CM

## 2023-12-08 DIAGNOSIS — I25.2 HISTORY OF MI (MYOCARDIAL INFARCTION): ICD-10-CM

## 2023-12-08 DIAGNOSIS — G47.00 INSOMNIA, UNSPECIFIED TYPE: ICD-10-CM

## 2023-12-08 DIAGNOSIS — M17.12 PRIMARY OSTEOARTHRITIS OF LEFT KNEE: ICD-10-CM

## 2023-12-08 DIAGNOSIS — E78.5 HYPERLIPIDEMIA, UNSPECIFIED HYPERLIPIDEMIA TYPE: ICD-10-CM

## 2023-12-08 DIAGNOSIS — T78.01XA PEANUT-INDUCED ANAPHYLAXIS, INITIAL ENCOUNTER: ICD-10-CM

## 2023-12-08 DIAGNOSIS — I10 ESSENTIAL HYPERTENSION: ICD-10-CM

## 2023-12-08 RX ORDER — SILDENAFIL 25 MG/1
25 TABLET, FILM COATED ORAL DAILY PRN
Qty: 10 TABLET | Refills: 1 | Status: SHIPPED | OUTPATIENT
Start: 2023-12-08

## 2023-12-08 RX ORDER — CYCLOBENZAPRINE HCL 10 MG
5-10 TABLET ORAL
Qty: 90 TABLET | Refills: 0 | Status: SHIPPED | OUTPATIENT
Start: 2023-12-08

## 2023-12-08 RX ORDER — EPINEPHRINE 0.3 MG/.3ML
0.3 INJECTION SUBCUTANEOUS ONCE
Qty: 2 EACH | Refills: 0 | Status: SHIPPED | OUTPATIENT
Start: 2023-12-08 | End: 2023-12-08

## 2023-12-08 RX ORDER — MELOXICAM 15 MG/1
15 TABLET ORAL AS NEEDED
Qty: 30 TABLET | Refills: 0 | Status: SHIPPED | OUTPATIENT
Start: 2023-12-08

## 2023-12-08 RX ORDER — NITROGLYCERIN 0.4 MG/1
0.4 TABLET SUBLINGUAL
Qty: 30 TABLET | Refills: 0 | Status: SHIPPED | OUTPATIENT
Start: 2023-12-08

## 2023-12-08 RX ORDER — TRAZODONE HYDROCHLORIDE 100 MG/1
100 TABLET ORAL
Qty: 90 TABLET | Refills: 0 | Status: SHIPPED | OUTPATIENT
Start: 2023-12-08

## 2023-12-08 RX ORDER — ATORVASTATIN CALCIUM 40 MG/1
40 TABLET, FILM COATED ORAL DAILY
Qty: 90 TABLET | Refills: 0 | Status: SHIPPED | OUTPATIENT
Start: 2023-12-08

## 2023-12-08 NOTE — TELEPHONE ENCOUNTER
Reason for call:   [x] Refill   [] Prior Auth  [] Other:     Office:   [] PCP/Provider -   [x] Specialty/Provider - [facility]/ [facility]    Medication: Mobic    Dose/Frequency: 15 mg     Quantity:     Pharmacy: [facility]    Does the patient have enough for 3 days?    [] Yes   [x] No - Send as HP to POD

## 2023-12-08 NOTE — TELEPHONE ENCOUNTER
Reason for call:   [x] Refill   [] Prior Auth  [] Other:     Office:   [x] PCP/Provider -   [] Specialty/Provider -     Medication: Lipitor    Dose/Frequency: 40 mg     Quantity: #90    Pharmacy: [facility]    Does the patient have enough for 3 days? [] Yes   [x] No - Send as HP to POD               Reason for call:   [x] Refill   [] Prior Auth  [] Other:     Office:   [x] PCP/Provider -   [] Specialty/Provider -     Medication: Flexeril    Dose/Frequency: 10 mg     Quantity: #90    Pharmacy: [facility]    Does the patient have enough for 3 days? [] Yes   [x] No - Send as HP to POD              Reason for call:   [x] Refill   [] Prior Auth  [] Other:     Office:   [x] PCP/Provider -   [] Specialty/Provider -     Medication: Epipen    Dose/Frequency: 0.3 mg/0.3 ml    Quantity: #2    Pharmacy: [facility]    Does the patient have enough for 3 days? [] Yes   [x] No - Send as HP to POD                Reason for call:   [x] Refill   [] Prior Auth  [] Other:     Office:   [x] PCP/Provider -   [] Specialty/Provider -     Medication: Lopressor    Dose/Frequency: 25 mg     Quantity: #90    Pharmacy: [facility]    Does the patient have enough for 3 days? [] Yes   [x] No - Send as HP to POD                Reason for call:   [x] Refill   [] Prior Auth  [] Other:     Office:   [x] PCP/Provider -   [] Specialty/Provider -     Medication: Nitrostat    Dose/Frequency: 04. Mg     Quantity: #30    Pharmacy: [facility]    Does the patient have enough for 3 days? [] Yes   [x] No - Send as HP to POD              Reason for call:   [x] Refill   [] Prior Auth  [] Other:     Office:   [x] PCP/Provider -   [] Specialty/Provider -     Medication: Trazodone    Dose/Frequency: 100 mg     Quantity: #90    Pharmacy: [facility]    Does the patient have enough for 3 days?    [] Yes   [x] No - Send as HP to POD

## 2024-01-07 NOTE — PROGRESS NOTES
Cardiology Follow Up    Uriah Mora Jr.  1960  [id number]  [facility] [place]  [address]  [place] [id number] [phone]    1. Essential (primary) hypertension  POCT ECG    LDL cholesterol, direct    Hepatic function panel    Lipid panel    metoprolol succinate (TOPROL-XL) 25 mg 24 hr tablet    Echo stress test, exercise    amLODIPine (NORVASC) 5 mg tablet    lisinopril (ZESTRIL) 10 mg tablet      2. Pure hypercholesterolemia  POCT ECG    LDL cholesterol, direct    Hepatic function panel    Lipid panel    metoprolol succinate (TOPROL-XL) 25 mg 24 hr tablet    Echo stress test, exercise      3. Coronary arteriosclerosis  POCT ECG    LDL cholesterol, direct    Hepatic function panel    Lipid panel    metoprolol succinate (TOPROL-XL) 25 mg 24 hr tablet    Echo stress test, exercise      4. H/O heart artery stent  POCT ECG    LDL cholesterol, direct    Hepatic function panel    Lipid panel    metoprolol succinate (TOPROL-XL) 25 mg 24 hr tablet    Echo stress test, exercise      5. Erectile dysfunction due to diseases classified elsewhere  LDL cholesterol, direct    Hepatic function panel    Lipid panel    metoprolol succinate (TOPROL-XL) 25 mg 24 hr tablet    Echo stress test, exercise    sildenafil (VIAGRA) 25 MG tablet      6. Chest pain, unspecified type  LDL cholesterol, direct    Hepatic function panel    Lipid panel    metoprolol succinate (TOPROL-XL) 25 mg 24 hr tablet    Echo stress test, exercise          Interval History: Patient is here for a follow-up visit. He has HTN, CAD and HLD. He had 2 vessel stenting performed 12/27/2010 with a VELASQUEZ in the LAD and a VELASQUEZ in the diagonal branch.   Echocardiogram in January 2019 demonstrated preserved LV systolic function with mild LVH and mild LAE.  There was no significant valvular heart disease.  Patient was hospitalized December 2020 in reference to acute kidney  injury and UTI. He was seen by Nephrology.  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  He had issues with ureteral calculus in November 2021.  Was placed on amlodipine for HTN.  Nuclear ETT March 2022 demonstrated no ischemia.  Lipid profile done 6/2023 demonstrated total cholesterol of 197 with an HDL of 80 and a calculated LDL of 100.  This was up compared to a prior profile done April 2023.  Unfortunately his wife passed away from brain cancer.  The patient was hospitalized with chest discomfort April 2023.  It was felt to be musculoskeletal.  Troponins and EKG were stable.  Patient's vital signs are stable today.  EKG demonstrates NSR and is a normal tracing with no significant change compared to a prior tracing done 1/5/2023.  Patient is scheduled to have a herniorrhaphy February 1, 2024.  He lifted a container of soda on Sunday and has had some chest discomfort since.  It occasionally goes into his throat.  It is atypical but we will check a stress echo given his upcoming surgery.  Will switch metoprolol to tartrate to succinate.  Will recheck lipids.    Patient Active Problem List   Diagnosis   • Acute right-sided low back pain without sciatica   • Essential (primary) hypertension   • History of MI (myocardial infarction)   • Class 1 obesity due to excess calories with serious comorbidity and body mass index (BMI) of 32.0 to 32.9 in adult   • Multiple food allergies   • H/O heart artery stent   • Fatigue   • Skin irritation   • Postural dizziness   • Benign prostatic hyperplasia   • Scleral icterus   • Abnormal CT of liver   • Irritant contact dermatitis   • Insomnia   • Urinary frequency   • Hyperlipidemia   • Allergy to iodine   • Anaphylactic reaction   • Anxiety   • AV malformation, peripheral, congenital   • Chronic ischemic heart disease, unspecified   • Coronary atherosclerosis   • PEYTON (generalized anxiety disorder)   • Gastroesophageal reflux disease   • Nerve pain   • Muscle cramping   •  Spasm of muscle, back   • History of PTCA   • Trigger finger of right hand   • Anaphylactic shock due to peanuts   • Allergy to tree nuts or seeds   • Peanut allergy   • Soy allergy   • Paresthesia of both hands   • Pain in both knees   • Left lower quadrant abdominal pain   • S/P bariatric surgery   • Weight loss, unintentional   • Testicular pain, unspecified   • Annual physical exam   • Acute pain of right shoulder   • Abnormal LFTs   • Gram-negative bacteremia   • Left shoulder pain   • Chronic pain of left knee   • Proteinuria   • Nephrolithiasis   • Pre-op examination   • Uncomplicated bereavement   • Chest pain   • Bulge in groin area   • Depression with anxiety   • Erectile dysfunction   • Inguinal hernia without obstruction or gangrene     Past Medical History:   Diagnosis Date   • Abdominal lump 04/20/2018   • Allergic 2015   • Anxiety    • Arm pain     left-may have torn the bicep tendon   • Arthritis    • Cardiac disease    • Chronic kidney disease    • Contact lens/glasses fitting    • Coronary artery disease    • COVID-19 vaccine series completed     Moderna x2   • Episode of dizziness 11/26/2018   • Herpes zoster with complication 06/08/2018   • Hyperlipidemia    • Hypertension    • Kidney stone    • Myocardial infarction (HCC) 2010    cardiac stent x2   • Obesity    • Pain of right great toe 07/22/2019   • PONV (postoperative nausea and vomiting)     in the past-1980's   • Sepsis ([facility]) 12/2020    urinary issue-back up   • Urinary tract infection Dec 2021     Social History     Socioeconomic History   • Marital status:      Spouse name: Rosa Elena    • Number of children: 4   • Years of education: Not on file   • Highest education level: Not on file   Occupational History   • Not on file   Tobacco Use   • Smoking status: Never     Passive exposure: Never   • Smokeless tobacco: Never   • Tobacco comments:     rare cigar smoking   Vaping Use   • Vaping status: Never Used   Substance and Sexual  Activity   • Alcohol use: Not Currently     Comment: occasional last drink 2/2020   • Drug use: No   • Sexual activity: Yes     Partners: Female     Birth control/protection: None   Other Topics Concern   • Not on file   Social History Narrative    Patient lives in a home that was built in 1954    Oil/forced hot air     Carpet junior in the bedroom     Central air    Window air conditioning bedroom     Finished basement-dry-no mold or musty smell    Dehumidifier     Air  attached to furnace     Humidifier in the winter months     Home is smoke free     Does not live near wooded are or open fields         Dog x5 (Zara, Abimbola, Seferino, Obdulia, and Winslow) and there allowed in the bedroom         Caffeine: soda occasional                     Coffee is decaf 1 cup daily     Chocolate consumed rarely         Patient lives with wife and two children      Social Determinants of Health     Financial Resource Strain: Not on file   Food Insecurity: Not on file   Transportation Needs: Not on file   Physical Activity: Inactive (8/21/2019)    Exercise Vital Sign    • Days of Exercise per Week: 0 days    • Minutes of Exercise per Session: 0 min   Stress: Not on file   Social Connections: Not on file   Intimate Partner Violence: Not on file   Housing Stability: Not on file      Family History   Problem Relation Age of Onset   • Heart disease Mother         passed during open heart surgery    • Cancer Mother    • Heart disease Father    • Diabetes Father         caused blindness    • Lung cancer Sister    • Cancer Sister    • Heart disease Brother         MI   • No Known Problems Son    • No Known Problems Son    • Nephrolithiasis Son    • No Known Problems Daughter      Past Surgical History:   Procedure Laterality Date   • ABDOMINAL SURGERY      tumor on stomach   • ANGIOPLASTY      2010-x2 stents left side   • BACK SURGERY      lower back-dissectomy   • CARDIAC SURGERY      angio with stents   • CIRCUMCISION     •  COLONOSCOPY     • CYSTOSCOPY     • EGD AND COLONOSCOPY     • GASTRIC BYPASS  2005   • IR ABSCESS/SEROMA DRAINAGE  1/4/2019   • LITHOTRIPSY      x2   • NECK SURGERY      herniated disc C4/5   • OTHER SURGICAL HISTORY      removal of vocal cord lesion    • AL EXC TUMOR SOFT TISSUE ABDOMINAL WALL SUBQ <3CM N/A 2/27/2019    Procedure: ABDOMINAL SEROMA EXCISION;  Surgeon: Seferino Jones MD;  Location: [facility];  Service: General   • ROTATOR CUFF REPAIR Bilateral    • SHOULDER SURGERY Bilateral    • TONSILLECTOMY     • VASCULAR SURGERY      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   • WISDOM TOOTH EXTRACTION     • WOUND DEBRIDEMENT N/A 3/7/2019    Procedure: DEBRIDEMENT WOUND ABDOMINAL (WASH OUT) AND WOUND VAC APPLICATION;  Surgeon: Seferino Jones MD;  Location[facility];  Service: General   • WOUND DEBRIDEMENT N/A 3/9/2019    Procedure: DEBRIDEMENT WOUND ABDOMINAL (WASH OUT), wound vac dressing change;  Surgeon: Walter Garcia DO;  [facility];  Service: General       Current Outpatient Medications:   •  acetaminophen (TYLENOL) 500 mg tablet, Take 1,000 mg by mouth every 6 (six) hours as needed for mild pain, Disp: , Rfl:   •  amLODIPine (NORVASC) 5 mg tablet, Take 3 tablets (15 mg total) by mouth daily, Disp: 270 tablet, Rfl: 3  •  aspirin (ECOTRIN LOW STRENGTH) 81 mg EC tablet, Take 162 mg by mouth daily , Disp: , Rfl:   •  atorvastatin (LIPITOR) 40 mg tablet, Take 1 tablet (40 mg total) by mouth daily, Disp: 90 tablet, Rfl: 0  •  cyclobenzaprine (FLEXERIL) 10 mg tablet, Take 0.5-1 tablets (5-10 mg total) by mouth daily at bedtime, Disp: 90 tablet, Rfl: 0  •  gabapentin (NEURONTIN) 300 mg capsule, Take 1 capsule (300 mg total) by mouth daily, Disp: 90 capsule, Rfl: 1  •  lisinopril (ZESTRIL) 10 mg tablet, Take 1.5 tablets (15 mg total) by mouth daily, Disp: 140 tablet, Rfl: 1  •  meloxicam (Mobic) 15 mg tablet, Take 1 tablet (15 mg total) by mouth as needed for mild pain, Disp: 30 tablet, Rfl: 0  •   metoprolol succinate (TOPROL-XL) 25 mg 24 hr tablet, Take 1 tablet (25 mg total) by mouth daily, Disp: 90 tablet, Rfl: 3  •  nitroglycerin (NITROSTAT) 0.4 mg SL tablet, Place 1 tablet (0.4 mg total) under the tongue every 5 (five) minutes as needed for chest pain, Disp: 30 tablet, Rfl: 0  •  pantoprazole (PROTONIX) 40 mg tablet, Take 1 tablet (40 mg total) by mouth daily, Disp: 90 tablet, Rfl: 1  •  sildenafil (VIAGRA) 25 MG tablet, Take 1 tablet (25 mg total) by mouth daily as needed for erectile dysfunction, Disp: 10 tablet, Rfl: 1  •  tamsulosin (FLOMAX) 0.4 mg, Take 1 capsule (0.4 mg total) by mouth daily with dinner, Disp: 90 capsule, Rfl: 1  •  traZODone (DESYREL) 100 mg tablet, Take 1 tablet (100 mg total) by mouth daily at bedtime, Disp: 90 tablet, Rfl: 0  •  Diclofenac Sodium (VOLTAREN) 1 %, Apply 2 g topically 4 (four) times a day Apply (Patient not taking: Reported on 1/17/2024), Disp: 400 g, Rfl: 0  •  EPINEPHrine (EPIPEN) 0.3 mg/0.3 mL SOAJ, Inject 0.3 mL (0.3 mg total) into a muscle once for 1 dose, Disp: 2 each, Rfl: 0  •  venlafaxine (EFFEXOR) 37.5 mg tablet, Take 1 tablet (37.5 mg total) by mouth every other day (Patient not taking: Reported on 1/17/2024), Disp: 15 tablet, Rfl: 0  Allergies   Allergen Reactions   • Adhesive [Medical Tape] Swelling     Tape on eye caused eye to swell could not open!  Itching large hives from on arms     CAN USE PAPER TAPE   • Cephalosporins Anaphylaxis   • Isoflavones (Soy) Other (See Comments)     anaphylaxis   • Nuts - Food Allergy Anaphylaxis   • Other Abdominal Pain     Soy  Patient can not have an NGT due to bariatric surgery   • Peanut Oil - Food Allergy Anaphylaxis   • Penicillins Anaphylaxis     Itching and hives   • Shellfish Allergy - Food Allergy      Betadine/ cat scan dye is Okay.  Cannot eat shrimp, crab shellfish avoids all fish.   • Shellfish-Derived Products - Food Allergy Other (See Comments)     Positive test       Labs:not applicable  Imaging: No  "results found.    Review of Systems:  Review of Systems   All other systems reviewed and are negative.      Physical Exam:  /80 (BP Location: Left arm, Patient Position: Sitting, Cuff Size: Large)   Pulse 60   Ht 5' 9\" (1.753 m)   Wt 93.2 kg (205 lb 8 oz)   SpO2 99%   BMI 30.35 kg/m²   Physical Exam  Vitals reviewed.   Constitutional:       Appearance: He is well-developed.   HENT:      Head: Normocephalic and atraumatic.   Cardiovascular:      Rate and Rhythm: Normal rate.      Heart sounds: Normal heart sounds.   Pulmonary:      Effort: Pulmonary effort is normal.      Breath sounds: Normal breath sounds.   Musculoskeletal:      Cervical back: Normal range of motion.   Skin:     General: Skin is warm and dry.   Neurological:      Mental Status: He is alert and oriented to person, place, and time.         Discussion/Summary: Given chest discomfort we will check stress echo prior to clearance for herniorrhaphy.  Will recheck lipids with eye towards titrating atorvastatin as necessary.  Will change from metoprolol to tartrate to succinate for better daily coverage.  I have asked the patient to call if there is a problem in the interim otherwise I will see him in follow-up in 6 months and sooner as is necessary.  "

## 2024-01-17 ENCOUNTER — OFFICE VISIT (OUTPATIENT)
Dept: CARDIOLOGY CLINIC | Facility: CLINIC | Age: 64
End: 2024-01-17
Payer: COMMERCIAL

## 2024-01-17 VITALS
WEIGHT: 205.5 LBS | BODY MASS INDEX: 30.44 KG/M2 | SYSTOLIC BLOOD PRESSURE: 142 MMHG | HEIGHT: 69 IN | OXYGEN SATURATION: 99 % | DIASTOLIC BLOOD PRESSURE: 80 MMHG | HEART RATE: 60 BPM

## 2024-01-17 DIAGNOSIS — I10 ESSENTIAL (PRIMARY) HYPERTENSION: Primary | ICD-10-CM

## 2024-01-17 DIAGNOSIS — R07.9 CHEST PAIN, UNSPECIFIED TYPE: ICD-10-CM

## 2024-01-17 DIAGNOSIS — E78.00 PURE HYPERCHOLESTEROLEMIA: ICD-10-CM

## 2024-01-17 DIAGNOSIS — Z95.5 H/O HEART ARTERY STENT: ICD-10-CM

## 2024-01-17 DIAGNOSIS — N52.1 ERECTILE DYSFUNCTION DUE TO DISEASES CLASSIFIED ELSEWHERE: ICD-10-CM

## 2024-01-17 DIAGNOSIS — I25.10 CORONARY ARTERIOSCLEROSIS: ICD-10-CM

## 2024-01-17 PROCEDURE — 99214 OFFICE O/P EST MOD 30 MIN: CPT | Performed by: INTERNAL MEDICINE

## 2024-01-17 PROCEDURE — 93000 ELECTROCARDIOGRAM COMPLETE: CPT | Performed by: INTERNAL MEDICINE

## 2024-01-17 RX ORDER — SILDENAFIL 25 MG/1
25 TABLET, FILM COATED ORAL DAILY PRN
Qty: 10 TABLET | Refills: 1 | Status: SHIPPED | OUTPATIENT
Start: 2024-01-17

## 2024-01-17 RX ORDER — LISINOPRIL 10 MG/1
15 TABLET ORAL DAILY
Qty: 140 TABLET | Refills: 1 | Status: SHIPPED | OUTPATIENT
Start: 2024-01-17

## 2024-01-17 RX ORDER — METOPROLOL SUCCINATE 25 MG/1
25 TABLET, EXTENDED RELEASE ORAL DAILY
Qty: 90 TABLET | Refills: 3 | Status: SHIPPED | OUTPATIENT
Start: 2024-01-17

## 2024-01-17 RX ORDER — AMLODIPINE BESYLATE 5 MG/1
15 TABLET ORAL DAILY
Qty: 270 TABLET | Refills: 3 | Status: SHIPPED | OUTPATIENT
Start: 2024-01-17

## 2024-01-17 NOTE — PATIENT INSTRUCTIONS
Will schedule cardiac testing.  Please discontinue metoprolol tartrate and start metoprolol succinate 25 mg/day.  Will check blood work in reference to lipids.  Please call if there is a problem.  I will see you in follow-up.

## 2024-01-18 ENCOUNTER — ANESTHESIA EVENT (OUTPATIENT)
Dept: PERIOP | Facility: AMBULARY SURGERY CENTER | Age: 64
End: 2024-01-18
Payer: OTHER MISCELLANEOUS

## 2024-01-19 DIAGNOSIS — M79.2 NERVE PAIN: ICD-10-CM

## 2024-01-19 DIAGNOSIS — I25.9 CHRONIC ISCHEMIC HEART DISEASE, UNSPECIFIED: Primary | ICD-10-CM

## 2024-01-19 DIAGNOSIS — N40.0 BENIGN PROSTATIC HYPERPLASIA, UNSPECIFIED WHETHER LOWER URINARY TRACT SYMPTOMS PRESENT: ICD-10-CM

## 2024-01-19 DIAGNOSIS — K21.9 GASTROESOPHAGEAL REFLUX DISEASE WITHOUT ESOPHAGITIS: ICD-10-CM

## 2024-01-19 DIAGNOSIS — N30.00 ACUTE CYSTITIS WITHOUT HEMATURIA: ICD-10-CM

## 2024-01-19 RX ORDER — PANTOPRAZOLE SODIUM 40 MG/1
40 TABLET, DELAYED RELEASE ORAL DAILY
Qty: 90 TABLET | Refills: 1 | Status: SHIPPED | OUTPATIENT
Start: 2024-01-19

## 2024-01-19 RX ORDER — GABAPENTIN 300 MG/1
300 CAPSULE ORAL DAILY
Qty: 90 CAPSULE | Refills: 1 | Status: SHIPPED | OUTPATIENT
Start: 2024-01-19

## 2024-01-19 RX ORDER — TAMSULOSIN HYDROCHLORIDE 0.4 MG/1
0.4 CAPSULE ORAL
Qty: 90 CAPSULE | Refills: 1 | Status: SHIPPED | OUTPATIENT
Start: 2024-01-19 | End: 2024-07-17

## 2024-01-22 ENCOUNTER — APPOINTMENT (OUTPATIENT)
Dept: LAB | Facility: CLINIC | Age: 64
End: 2024-01-22
Payer: COMMERCIAL

## 2024-01-22 DIAGNOSIS — I10 ESSENTIAL (PRIMARY) HYPERTENSION: ICD-10-CM

## 2024-01-22 DIAGNOSIS — K40.90 INGUINAL HERNIA: ICD-10-CM

## 2024-01-22 DIAGNOSIS — R07.9 CHEST PAIN, UNSPECIFIED TYPE: ICD-10-CM

## 2024-01-22 DIAGNOSIS — N52.1 ERECTILE DYSFUNCTION DUE TO DISEASES CLASSIFIED ELSEWHERE: ICD-10-CM

## 2024-01-22 DIAGNOSIS — I25.10 CORONARY ARTERIOSCLEROSIS: ICD-10-CM

## 2024-01-22 DIAGNOSIS — Z95.5 H/O HEART ARTERY STENT: ICD-10-CM

## 2024-01-22 DIAGNOSIS — E78.00 PURE HYPERCHOLESTEROLEMIA: ICD-10-CM

## 2024-01-22 LAB
ALBUMIN SERPL BCP-MCNC: 4.5 G/DL (ref 3.5–5)
ALP SERPL-CCNC: 129 U/L (ref 34–104)
ALT SERPL W P-5'-P-CCNC: 21 U/L (ref 7–52)
ANION GAP SERPL CALCULATED.3IONS-SCNC: 8 MMOL/L
AST SERPL W P-5'-P-CCNC: 25 U/L (ref 13–39)
BILIRUB DIRECT SERPL-MCNC: 0.24 MG/DL (ref 0–0.2)
BILIRUB SERPL-MCNC: 1.02 MG/DL (ref 0.2–1)
BUN SERPL-MCNC: 16 MG/DL (ref 5–25)
CALCIUM SERPL-MCNC: 9.9 MG/DL (ref 8.4–10.2)
CHLORIDE SERPL-SCNC: 102 MMOL/L (ref 96–108)
CHOLEST SERPL-MCNC: 167 MG/DL
CO2 SERPL-SCNC: 29 MMOL/L (ref 21–32)
CREAT SERPL-MCNC: 0.94 MG/DL (ref 0.6–1.3)
ERYTHROCYTE [DISTWIDTH] IN BLOOD BY AUTOMATED COUNT: 11.9 % (ref 11.6–15.1)
GFR SERPL CREATININE-BSD FRML MDRD: 85 ML/MIN/1.73SQ M
GLUCOSE P FAST SERPL-MCNC: 83 MG/DL (ref 65–99)
HCT VFR BLD AUTO: 47.5 % (ref 36.5–49.3)
HDLC SERPL-MCNC: 68 MG/DL
HGB BLD-MCNC: 15.3 G/DL (ref 12–17)
LDLC SERPL CALC-MCNC: 83 MG/DL (ref 0–100)
LDLC SERPL DIRECT ASSAY-MCNC: 87 MG/DL (ref 0–100)
MCH RBC QN AUTO: 31.2 PG (ref 26.8–34.3)
MCHC RBC AUTO-ENTMCNC: 32.2 G/DL (ref 31.4–37.4)
MCV RBC AUTO: 97 FL (ref 82–98)
NONHDLC SERPL-MCNC: 99 MG/DL
PLATELET # BLD AUTO: 229 THOUSANDS/UL (ref 149–390)
PMV BLD AUTO: 10.4 FL (ref 8.9–12.7)
POTASSIUM SERPL-SCNC: 4.1 MMOL/L (ref 3.5–5.3)
PROT SERPL-MCNC: 7.6 G/DL (ref 6.4–8.4)
RBC # BLD AUTO: 4.91 MILLION/UL (ref 3.88–5.62)
SODIUM SERPL-SCNC: 139 MMOL/L (ref 135–147)
TRIGL SERPL-MCNC: 79 MG/DL
WBC # BLD AUTO: 6.28 THOUSAND/UL (ref 4.31–10.16)

## 2024-01-22 PROCEDURE — 83721 ASSAY OF BLOOD LIPOPROTEIN: CPT

## 2024-01-22 PROCEDURE — 85027 COMPLETE CBC AUTOMATED: CPT

## 2024-01-22 PROCEDURE — 80061 LIPID PANEL: CPT

## 2024-01-22 PROCEDURE — 80053 COMPREHEN METABOLIC PANEL: CPT

## 2024-01-22 PROCEDURE — 82248 BILIRUBIN DIRECT: CPT

## 2024-01-22 PROCEDURE — 36415 COLL VENOUS BLD VENIPUNCTURE: CPT

## 2024-01-23 ENCOUNTER — OFFICE VISIT (OUTPATIENT)
Dept: SURGERY | Facility: CLINIC | Age: 64
End: 2024-01-23
Payer: OTHER MISCELLANEOUS

## 2024-01-23 VITALS
HEART RATE: 76 BPM | WEIGHT: 204 LBS | SYSTOLIC BLOOD PRESSURE: 146 MMHG | BODY MASS INDEX: 30.21 KG/M2 | DIASTOLIC BLOOD PRESSURE: 84 MMHG | HEIGHT: 69 IN

## 2024-01-23 DIAGNOSIS — K40.90 INGUINAL HERNIA WITHOUT OBSTRUCTION OR GANGRENE: Primary | ICD-10-CM

## 2024-01-23 PROCEDURE — 99212 OFFICE O/P EST SF 10 MIN: CPT | Performed by: SURGERY

## 2024-01-23 NOTE — PROGRESS NOTES
Assessment/Plan: Patient presents with a left inguinal hernia.  This injury occurred at work.  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  He developed symptoms within the left inguinal region.  This was diagnosed as a herniation.    Operative repair is recommended.  Risks and benefits explained.  Patient is agreeable.    He is due to obtain cardiac clearance from his cardiologist.  Stress test is planned.    There are no diagnoses linked to this encounter.    Subjective:      Patient ID: Uriah Mora Jr. is a 63 y.o. male.    Patient presents for pre op consult.  Scheduled for left inguinal hernia repair 2/1/2024.        The following portions of the patient's history were reviewed and updated as appropriate:     He  has a past medical history of Abdominal lump (04/20/2018), Allergic (2015), Anxiety, Arm pain, Arthritis, Cardiac disease, Chronic kidney disease, Contact lens/glasses fitting, Coronary artery disease, COVID-19 vaccine series completed, Episode of dizziness (11/26/2018), Herpes zoster with complication (06/08/2018), Hyperlipidemia, Hypertension, Kidney stone, Myocardial infarction (HCC) (2010), Obesity, Pain of right great toe (07/22/2019), PONV (postoperative nausea and vomiting), Sepsis ([facility]) (12/2020), and Urinary tract infection (Dec 2021).  He  has a past surgical history that includes Gastric bypass (2005); Neck surgery; Shoulder surgery (Bilateral); IR abscess/seroma drainage (1/4/2019); Cardiac surgery; pr exc tumor soft tissue abdominal wall subq <3cm (N/A, 2/27/2019); Wound debridement (N/A, 3/7/2019); Wound debridement (N/A, 3/9/2019); [place] tooth extraction; Tonsillectomy; EGD AND COLONOSCOPY; Circumcision; Other surgical history; Colonoscopy; Abdominal surgery; Back surgery; Rotator cuff repair (Bilateral); Vascular surgery; Angioplasty; Cystoscopy; and Lithotripsy.  His family history includes Cancer in his mother and sister; Diabetes in his father; Heart disease  in his brother, father, and mother; Lung cancer in his sister; Nephrolithiasis in his son; No Known Problems in his daughter, son, and son.  He  reports that he has never smoked. He has never been exposed to tobacco smoke. He has never used smokeless tobacco. He reports that he does not currently use alcohol. He reports that he does not use drugs.  Current Outpatient Medications   Medication Sig Dispense Refill    acetaminophen (TYLENOL) 500 mg tablet Take 1,000 mg by mouth every 6 (six) hours as needed for mild pain      amLODIPine (NORVASC) 5 mg tablet Take 3 tablets (15 mg total) by mouth daily 270 tablet 3    aspirin (ECOTRIN LOW STRENGTH) 81 mg EC tablet Take 2 tablets (162 mg total) by mouth daily 180 tablet 1    atorvastatin (LIPITOR) 40 mg tablet Take 1 tablet (40 mg total) by mouth daily 90 tablet 0    cyclobenzaprine (FLEXERIL) 10 mg tablet Take 0.5-1 tablets (5-10 mg total) by mouth daily at bedtime 90 tablet 0    Diclofenac Sodium (VOLTAREN) 1 % Apply 2 g topically 4 (four) times a day Apply (Patient not taking: Reported on 1/17/2024) 400 g 0    EPINEPHrine (EPIPEN) 0.3 mg/0.3 mL SOAJ Inject 0.3 mL (0.3 mg total) into a muscle once for 1 dose 2 each 0    gabapentin (NEURONTIN) 300 mg capsule Take 1 capsule (300 mg total) by mouth daily 90 capsule 1    lisinopril (ZESTRIL) 10 mg tablet Take 1.5 tablets (15 mg total) by mouth daily 140 tablet 1    meloxicam (Mobic) 15 mg tablet Take 1 tablet (15 mg total) by mouth as needed for mild pain 30 tablet 0    metoprolol succinate (TOPROL-XL) 25 mg 24 hr tablet Take 1 tablet (25 mg total) by mouth daily 90 tablet 3    nitroglycerin (NITROSTAT) 0.4 mg SL tablet Place 1 tablet (0.4 mg total) under the tongue every 5 (five) minutes as needed for chest pain 30 tablet 0    pantoprazole (PROTONIX) 40 mg tablet Take 1 tablet (40 mg total) by mouth daily 90 tablet 1    sildenafil (VIAGRA) 25 MG tablet Take 1 tablet (25 mg total) by mouth daily as needed for erectile  "dysfunction 10 tablet 1    tamsulosin (FLOMAX) 0.4 mg Take 1 capsule (0.4 mg total) by mouth daily with dinner 90 capsule 1    traZODone (DESYREL) 100 mg tablet Take 1 tablet (100 mg total) by mouth daily at bedtime 90 tablet 0    venlafaxine (EFFEXOR) 37.5 mg tablet Take 1 tablet (37.5 mg total) by mouth every other day (Patient not taking: Reported on 1/17/2024) 15 tablet 0     No current facility-administered medications for this visit.     He is allergic to adhesive [medical tape], cephalosporins, isoflavones (soy), nuts - food allergy, other, peanut oil - food allergy, penicillins, shellfish allergy - food allergy, and shellfish-derived products - food allergy..    Review of Systems   Constitutional: Negative.  Negative for activity change.   HENT: Negative.     Eyes: Negative.    Respiratory: Negative.     Cardiovascular: Negative.    Gastrointestinal: Negative.    Endocrine: Negative.    Genitourinary: Negative.    Musculoskeletal: Negative.    Skin: Negative.    Allergic/Immunologic: Negative.    Neurological: Negative.    Psychiatric/Behavioral:  Negative for agitation, behavioral problems and confusion. The patient is not nervous/anxious.          Objective:      /84   Pulse 76   Ht 5' 9\" (1.753 m)   Wt 92.5 kg (204 lb)   BMI 30.13 kg/m²          Physical Exam  Constitutional:       Appearance: Normal appearance.   Eyes:      Extraocular Movements: Extraocular movements intact.   Cardiovascular:      Rate and Rhythm: Normal rate and regular rhythm.      Pulses: Normal pulses.      Heart sounds: Normal heart sounds.   Abdominal:      Hernia: A hernia (Left inguinal hernia is present.  This is reducible.) is present.   Skin:     General: Skin is warm and dry.   Neurological:      General: No focal deficit present.         "

## 2024-01-23 NOTE — H&P (VIEW-ONLY)
Assessment/Plan: Patient presents with a left inguinal hernia.  This injury occurred at work.  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  He developed symptoms within the left inguinal region.  This was diagnosed as a herniation.    Operative repair is recommended.  Risks and benefits explained.  Patient is agreeable.    He is due to obtain cardiac clearance from his cardiologist.  Stress test is planned.    There are no diagnoses linked to this encounter.    Subjective:      Patient ID: Uriah Mora Jr. is a 63 y.o. male.    Patient presents for pre op consult.  Scheduled for left inguinal hernia repair 2/1/2024.        The following portions of the patient's history were reviewed and updated as appropriate:     He  has a past medical history of Abdominal lump (04/20/2018), Allergic (2015), Anxiety, Arm pain, Arthritis, Cardiac disease, Chronic kidney disease, Contact lens/glasses fitting, Coronary artery disease, COVID-19 vaccine series completed, Episode of dizziness (11/26/2018), Herpes zoster with complication (06/08/2018), Hyperlipidemia, Hypertension, Kidney stone, Myocardial infarction (HCC) (2010), Obesity, Pain of right great toe (07/22/2019), PONV (postoperative nausea and vomiting), Sepsis ([facility]) (12/2020), and Urinary tract infection (Dec 2021).  He  has a past surgical history that includes Gastric bypass (2005); Neck surgery; Shoulder surgery (Bilateral); IR abscess/seroma drainage (1/4/2019); Cardiac surgery; pr exc tumor soft tissue abdominal wall subq <3cm (N/A, 2/27/2019); Wound debridement (N/A, 3/7/2019); Wound debridement (N/A, 3/9/2019); [place] tooth extraction; Tonsillectomy; EGD AND COLONOSCOPY; Circumcision; Other surgical history; Colonoscopy; Abdominal surgery; Back surgery; Rotator cuff repair (Bilateral); Vascular surgery; Angioplasty; Cystoscopy; and Lithotripsy.  His family history includes Cancer in his mother and sister; Diabetes in his father; Heart disease  in his brother, father, and mother; Lung cancer in his sister; Nephrolithiasis in his son; No Known Problems in his daughter, son, and son.  He  reports that he has never smoked. He has never been exposed to tobacco smoke. He has never used smokeless tobacco. He reports that he does not currently use alcohol. He reports that he does not use drugs.  Current Outpatient Medications   Medication Sig Dispense Refill    acetaminophen (TYLENOL) 500 mg tablet Take 1,000 mg by mouth every 6 (six) hours as needed for mild pain      amLODIPine (NORVASC) 5 mg tablet Take 3 tablets (15 mg total) by mouth daily 270 tablet 3    aspirin (ECOTRIN LOW STRENGTH) 81 mg EC tablet Take 2 tablets (162 mg total) by mouth daily 180 tablet 1    atorvastatin (LIPITOR) 40 mg tablet Take 1 tablet (40 mg total) by mouth daily 90 tablet 0    cyclobenzaprine (FLEXERIL) 10 mg tablet Take 0.5-1 tablets (5-10 mg total) by mouth daily at bedtime 90 tablet 0    Diclofenac Sodium (VOLTAREN) 1 % Apply 2 g topically 4 (four) times a day Apply (Patient not taking: Reported on 1/17/2024) 400 g 0    EPINEPHrine (EPIPEN) 0.3 mg/0.3 mL SOAJ Inject 0.3 mL (0.3 mg total) into a muscle once for 1 dose 2 each 0    gabapentin (NEURONTIN) 300 mg capsule Take 1 capsule (300 mg total) by mouth daily 90 capsule 1    lisinopril (ZESTRIL) 10 mg tablet Take 1.5 tablets (15 mg total) by mouth daily 140 tablet 1    meloxicam (Mobic) 15 mg tablet Take 1 tablet (15 mg total) by mouth as needed for mild pain 30 tablet 0    metoprolol succinate (TOPROL-XL) 25 mg 24 hr tablet Take 1 tablet (25 mg total) by mouth daily 90 tablet 3    nitroglycerin (NITROSTAT) 0.4 mg SL tablet Place 1 tablet (0.4 mg total) under the tongue every 5 (five) minutes as needed for chest pain 30 tablet 0    pantoprazole (PROTONIX) 40 mg tablet Take 1 tablet (40 mg total) by mouth daily 90 tablet 1    sildenafil (VIAGRA) 25 MG tablet Take 1 tablet (25 mg total) by mouth daily as needed for erectile  "dysfunction 10 tablet 1    tamsulosin (FLOMAX) 0.4 mg Take 1 capsule (0.4 mg total) by mouth daily with dinner 90 capsule 1    traZODone (DESYREL) 100 mg tablet Take 1 tablet (100 mg total) by mouth daily at bedtime 90 tablet 0    venlafaxine (EFFEXOR) 37.5 mg tablet Take 1 tablet (37.5 mg total) by mouth every other day (Patient not taking: Reported on 1/17/2024) 15 tablet 0     No current facility-administered medications for this visit.     He is allergic to adhesive [medical tape], cephalosporins, isoflavones (soy), nuts - food allergy, other, peanut oil - food allergy, penicillins, shellfish allergy - food allergy, and shellfish-derived products - food allergy..    Review of Systems   Constitutional: Negative.  Negative for activity change.   HENT: Negative.     Eyes: Negative.    Respiratory: Negative.     Cardiovascular: Negative.    Gastrointestinal: Negative.    Endocrine: Negative.    Genitourinary: Negative.    Musculoskeletal: Negative.    Skin: Negative.    Allergic/Immunologic: Negative.    Neurological: Negative.    Psychiatric/Behavioral:  Negative for agitation, behavioral problems and confusion. The patient is not nervous/anxious.          Objective:      /84   Pulse 76   Ht 5' 9\" (1.753 m)   Wt 92.5 kg (204 lb)   BMI 30.13 kg/m²          Physical Exam  Constitutional:       Appearance: Normal appearance.   Eyes:      Extraocular Movements: Extraocular movements intact.   Cardiovascular:      Rate and Rhythm: Normal rate and regular rhythm.      Pulses: Normal pulses.      Heart sounds: Normal heart sounds.   Abdominal:      Hernia: A hernia (Left inguinal hernia is present.  This is reducible.) is present.   Skin:     General: Skin is warm and dry.   Neurological:      General: No focal deficit present.         "

## 2024-01-24 NOTE — PRE-PROCEDURE INSTRUCTIONS
Pre-Surgery Instructions:   Medication Instructions    acetaminophen (TYLENOL) 500 mg tablet Uses PRN- OK to take day of surgery    amLODIPine (NORVASC) 5 mg tablet Take day of surgery.    aspirin (ECOTRIN LOW STRENGTH) 81 mg EC tablet Instructions provided by MD Already stopped    atorvastatin (LIPITOR) 40 mg tablet Take day of surgery.    cyclobenzaprine (FLEXERIL) 10 mg tablet Uses PRN- OK to take day of surgery    Diclofenac Sodium (VOLTAREN) 1 % Hold day of surgery.    EPINEPHrine (EPIPEN) 0.3 mg/0.3 mL SOAJ PRN    gabapentin (NEURONTIN) 300 mg capsule Take day of surgery.    lisinopril (ZESTRIL) 10 mg tablet Hold day of surgery.    meloxicam (Mobic) 15 mg tablet Stop taking 3 days prior to surgery.    metoprolol succinate (TOPROL-XL) 25 mg 24 hr tablet Take day of surgery.    nitroglycerin (NITROSTAT) 0.4 mg SL tablet PRN    pantoprazole (PROTONIX) 40 mg tablet Take day of surgery.    sildenafil (VIAGRA) 25 MG tablet Hold day of surgery.    tamsulosin (FLOMAX) 0.4 mg Take night before surgery    traZODone (DESYREL) 100 mg tablet Take night before surgery   Medication instructions for day surgery reviewed. Please use only a sip of water to take your instructed medications. Avoid all over the counter vitamins, supplements and NSAIDS for one week prior to surgery per anesthesia guidelines. Tylenol is ok to take as needed.     You will receive a call one business day prior to surgery with an arrival time and hospital directions. If your surgery is scheduled on a Monday, the hospital will be calling you on the Friday prior to your surgery. If you have not heard from anyone by 8pm, please call the hospital supervisor through the hospital  at [phone]. (Bharathi [phone]).    Do not eat or drink anything after midnight the night before your surgery, including candy, mints, lifesavers, or chewing gum. Do not drink alcohol 24hrs before your surgery. Try not to smoke at least 24hrs before your surgery.        Follow the pre surgery showering instructions as listed in the “My Surgical Experience Booklet” or otherwise provided by your surgeon's office. Do not use a blade to shave the surgical area 1 week before surgery. It is okay to use a clean electric clippers up to 24 hours before surgery. Do not apply any lotions, creams, including makeup, cologne, deodorant, or perfumes after showering on the day of your surgery. Do not use dry shampoo, hair spray, hair gel, or any type of hair products.     No contact lenses, eye make-up, or artificial eyelashes. Remove nail polish, including gel polish, and any artificial, gel, or acrylic nails if possible. Remove all jewelry including rings and body piercing jewelry.     Wear causal clothing that is easy to take on and off. Consider your type of surgery.    Keep any valuables, jewelry, piercings at home. Please bring any specially ordered equipment (sling, braces) if indicated.    Arrange for a responsible person to drive you to and from the hospital on the day of your surgery. Visitor Guidelines discussed.     Call the surgeon's office with any new illnesses, exposures, or additional questions prior to surgery.    Please reference your “My Surgical Experience Booklet” for additional information to prepare for your upcoming surgery.

## 2024-01-26 ENCOUNTER — HOSPITAL ENCOUNTER (OUTPATIENT)
Dept: NON INVASIVE DIAGNOSTICS | Facility: CLINIC | Age: 64
Discharge: HOME | End: 2024-01-26
Payer: COMMERCIAL

## 2024-01-26 VITALS
BODY MASS INDEX: 30.21 KG/M2 | HEART RATE: 74 BPM | DIASTOLIC BLOOD PRESSURE: 80 MMHG | SYSTOLIC BLOOD PRESSURE: 136 MMHG | HEIGHT: 69 IN | WEIGHT: 204 LBS | OXYGEN SATURATION: 99 %

## 2024-01-26 DIAGNOSIS — I25.10 CORONARY ARTERIOSCLEROSIS: ICD-10-CM

## 2024-01-26 DIAGNOSIS — N52.1 ERECTILE DYSFUNCTION DUE TO DISEASES CLASSIFIED ELSEWHERE: ICD-10-CM

## 2024-01-26 DIAGNOSIS — Z95.5 H/O HEART ARTERY STENT: ICD-10-CM

## 2024-01-26 DIAGNOSIS — E78.00 PURE HYPERCHOLESTEROLEMIA: ICD-10-CM

## 2024-01-26 DIAGNOSIS — I10 ESSENTIAL (PRIMARY) HYPERTENSION: ICD-10-CM

## 2024-01-26 DIAGNOSIS — R07.9 CHEST PAIN, UNSPECIFIED TYPE: ICD-10-CM

## 2024-01-26 LAB
MAX HR PERCENT: 91 %
MAX HR: 144 BPM
RATE PRESSURE PRODUCT: NORMAL
SL CV LV EF: 60
SL CV STRESS RECOVERY BP: NORMAL MMHG
SL CV STRESS RECOVERY HR: 93 BPM
SL CV STRESS RECOVERY O2 SAT: 98 %
SL CV STRESS STAGE REACHED: 3
STRESS ANGINA INDEX: 0
STRESS BASELINE BP: NORMAL MMHG
STRESS BASELINE HR: 74 BPM
STRESS O2 SAT REST: 99 %
STRESS PEAK HR: 144 BPM
STRESS POST ESTIMATED WORKLOAD: 10.1 METS
STRESS POST EXERCISE DUR MIN: 9 MIN
STRESS POST EXERCISE DUR SEC: 0 SEC
STRESS POST O2 SAT PEAK: 98 %
STRESS POST PEAK BP: 140 MMHG
TR MAX PG: 13 MMHG
TR PEAK VELOCITY: 1.8 M/S
TRICUSPID VALVE PEAK REGURGITATION VELOCITY: 1.82 M/S

## 2024-01-26 PROCEDURE — 93350 STRESS TTE ONLY: CPT | Performed by: INTERNAL MEDICINE

## 2024-01-26 PROCEDURE — 93350 STRESS TTE ONLY: CPT

## 2024-01-28 RX ORDER — OXYCODONE HYDROCHLORIDE AND ACETAMINOPHEN 5; 325 MG/1; MG/1
1 TABLET ORAL EVERY 4 HOURS PRN
Qty: 10 TABLET | Refills: 0 | Status: SHIPPED | OUTPATIENT
Start: 2024-01-28

## 2024-01-28 NOTE — DISCHARGE INSTR - AVS FIRST PAGE
Please call the office when you leave to schedule an appointment for 2 weeks.              Please call [phone]. [address] [facility], [address], [place], [address]. Off of [address] between [place] and [place].     Activity:    May lift 10 lb as many times as desired the 1st week,       20 lb in 2 weeks,       30 lb in 3 weeks.                Walking is encouraged  Normal daily activities including climbing steps are okay  Do not engage in strenuous activity ( sit-ups or crutches) or contact sports for 4-6 weeks post-operatively    Return to Work:   Okay to return to work when you feel well if you desire.        Diet:   You may return to your normal healthy diet.    Wound Care:  Your wound is closed with dissolvable stitches and glue.  It is okay to shower. Wash incision gently with soap and water and pat dry. Do not soak incisions in bath water or swim for two weeks. Do not apply any creams or ointments.    Pain Medication:   Please take as directed if needed. May use Advil or Motrin in addition.  Recall, the pain medicine and anesthesia is associated with constipation.    No driving while taking narcotic pain medications.      Other:  It is normal to developed a “healing ridge” / firm incision after surgery.  This is your body making scar tissue.  It is a good sign  Constipation is very common after general anesthesia.  Please use milk of magnesia as needed in order to help prevent constipation.  It is normal to get bruising after surgery.  If you have questions after discharge please call the office.    If you have increased pain, fever >101.5, increased drainage, redness or a bad smell at your surgery site, please call us immediately or come directly to the Emergency Room

## 2024-01-29 LAB
CHEST PAIN STATEMENT: NORMAL
MAX DIASTOLIC BP: 82 MMHG
MAX PREDICTED HEART RATE: 157 BPM
PROTOCOL NAME: NORMAL
STRESS POST EXERCISE DUR MIN: 9 MIN
STRESS POST EXERCISE DUR SEC: 0 SEC
STRESS POST PEAK HR: 144 BPM
STRESS POST PEAK SYSTOLIC BP: 158 MMHG
TARGET HR FORMULA: NORMAL
TEST INDICATION: NORMAL

## 2024-01-31 NOTE — ANESTHESIA PREPROCEDURE EVALUATION
Procedure:  REPAIR HERNIA INGUINAL (Left: Groin)    Relevant Problems   CARDIO   (+) Chest pain   (+) Coronary atherosclerosis   (+) Essential (primary) hypertension   (+) History of PTCA   (+) Hyperlipidemia      GI/HEPATIC   (+) Gastroesophageal reflux disease      /RENAL   (+) Benign prostatic hyperplasia   (+) Nephrolithiasis      MUSCULOSKELETAL   (+) Acute right-sided low back pain without sciatica      NEURO/PSYCH   (+) Anxiety   (+) Depression with anxiety   (+) PEYTON (generalized anxiety disorder)   (+) Paresthesia of both hands     On metoprolol     1/26/24      Stress ECG: No ST deviation is noted. There were no arrhythmias during recovery. The ECG was negative for ischemia. The stress ECG is negative for ischemia after maximal exercise, without reproduction of symptoms.    Left Ventricle: Left ventricular cavity size is normal. Wall thickness is normal. The left ventricular ejection fraction is 60% by visual estimation. Systolic function is normal. Wall motion is normal.    Right Ventricle: Right ventricular cavity size is normal. Systolic function is normal.    Aortic Valve: The aortic valve is trileaflet. The leaflets are mildly thickened. The leaflets are moderately calcified. There is mildly reduced mobility. Based on limited Doppler evaluation, there is likely mild stenosis.    Mitral Valve: There is mild annular calcification.    Tricuspid Valve: There is mild regurgitation.    Post Stress Echo: Left ventricle cavity has normal reduction in size post-stress. The left ventricle systolic function is hyperdynamic post-stress. The post-stress echo showed normal wall motion which was hyperdynamic compared to baseline.    Echo Post Impression: The study is normal, after maximal exercise.    EKG demonstrates NSR and is a normal tracing with no significant change compared to a prior tracing done 1/5/2023.         Physical Exam    Airway    Mallampati score: II  TM Distance: >3 FB  Neck ROM: full      Dental   No notable dental hx     Cardiovascular  Rhythm: regular    Pulmonary   Breath sounds clear to auscultation    Other Findings        Anesthesia Plan  ASA Score- 3     Anesthesia Type- general with ASA Monitors.         Additional Monitors:     Airway Plan: LMA.           Plan Factors-Exercise tolerance (METS): >4 METS.    Chart reviewed. EKG reviewed. Imaging results reviewed. Existing labs reviewed. Patient summary reviewed.    Patient is not a current smoker. Patient not instructed to abstain from smoking on day of procedure. Patient did not smoke on day of surgery.    Obstructive sleep apnea risk education given perioperatively.        Induction- intravenous.    Postoperative Plan-     Informed Consent- Anesthetic plan and risks discussed with patient.  I personally reviewed this patient with the CRNA. Discussed and agreed on the Anesthesia Plan with the CRNA..

## 2024-02-01 ENCOUNTER — ANESTHESIA (OUTPATIENT)
Dept: PERIOP | Facility: AMBULARY SURGERY CENTER | Age: 64
End: 2024-02-01
Payer: OTHER MISCELLANEOUS

## 2024-02-01 ENCOUNTER — HOSPITAL ENCOUNTER (OUTPATIENT)
Facility: AMBULARY SURGERY CENTER | Age: 64
Setting detail: OUTPATIENT SURGERY
Discharge: HOME | End: 2024-02-01
Attending: SURGERY | Admitting: SURGERY
Payer: OTHER MISCELLANEOUS

## 2024-02-01 VITALS
HEART RATE: 86 BPM | DIASTOLIC BLOOD PRESSURE: 76 MMHG | RESPIRATION RATE: 18 BRPM | OXYGEN SATURATION: 98 % | TEMPERATURE: 98.1 F | SYSTOLIC BLOOD PRESSURE: 139 MMHG

## 2024-02-01 DIAGNOSIS — K40.90 INGUINAL HERNIA WITHOUT OBSTRUCTION OR GANGRENE: Primary | ICD-10-CM

## 2024-02-01 PROCEDURE — 49650 LAP ING HERNIA REPAIR INIT: CPT | Performed by: SURGERY

## 2024-02-01 PROCEDURE — C1781 MESH (IMPLANTABLE): HCPCS | Performed by: SURGERY

## 2024-02-01 DEVICE — MODIFIED ONFLEX MESH
Type: IMPLANTABLE DEVICE | Site: INGUINAL | Status: FUNCTIONAL
Brand: MODIFIED ONFLEX MESH

## 2024-02-01 RX ORDER — OXYCODONE HYDROCHLORIDE AND ACETAMINOPHEN 5; 325 MG/1; MG/1
1 TABLET ORAL EVERY 4 HOURS PRN
Status: DISCONTINUED | OUTPATIENT
Start: 2024-02-01 | End: 2024-02-01 | Stop reason: HOSPADM

## 2024-02-01 RX ORDER — KETOROLAC TROMETHAMINE 30 MG/ML
INJECTION, SOLUTION INTRAMUSCULAR; INTRAVENOUS AS NEEDED
Status: DISCONTINUED | OUTPATIENT
Start: 2024-02-01 | End: 2024-02-01

## 2024-02-01 RX ORDER — ALBUTEROL SULFATE 2.5 MG/3ML
2.5 SOLUTION RESPIRATORY (INHALATION) ONCE AS NEEDED
Status: DISCONTINUED | OUTPATIENT
Start: 2024-02-01 | End: 2024-02-01 | Stop reason: HOSPADM

## 2024-02-01 RX ORDER — LIDOCAINE HYDROCHLORIDE 10 MG/ML
INJECTION, SOLUTION EPIDURAL; INFILTRATION; INTRACAUDAL; PERINEURAL AS NEEDED
Status: DISCONTINUED | OUTPATIENT
Start: 2024-02-01 | End: 2024-02-01

## 2024-02-01 RX ORDER — HYDROMORPHONE HCL/PF 1 MG/ML
0.2 SYRINGE (ML) INJECTION
Status: DISCONTINUED | OUTPATIENT
Start: 2024-02-01 | End: 2024-02-01 | Stop reason: HOSPADM

## 2024-02-01 RX ORDER — FUROSEMIDE 10 MG/ML
INJECTION INTRAMUSCULAR; INTRAVENOUS AS NEEDED
Status: DISCONTINUED | OUTPATIENT
Start: 2024-02-01 | End: 2024-02-01

## 2024-02-01 RX ORDER — FENTANYL CITRATE/PF 50 MCG/ML
25 SYRINGE (ML) INJECTION
Status: DISCONTINUED | OUTPATIENT
Start: 2024-02-01 | End: 2024-02-01 | Stop reason: HOSPADM

## 2024-02-01 RX ORDER — ONDANSETRON 2 MG/ML
INJECTION INTRAMUSCULAR; INTRAVENOUS AS NEEDED
Status: DISCONTINUED | OUTPATIENT
Start: 2024-02-01 | End: 2024-02-01

## 2024-02-01 RX ORDER — MAGNESIUM HYDROXIDE 1200 MG/15ML
LIQUID ORAL AS NEEDED
Status: DISCONTINUED | OUTPATIENT
Start: 2024-02-01 | End: 2024-02-01 | Stop reason: HOSPADM

## 2024-02-01 RX ORDER — DEXAMETHASONE SODIUM PHOSPHATE 10 MG/ML
INJECTION, SOLUTION INTRAMUSCULAR; INTRAVENOUS AS NEEDED
Status: DISCONTINUED | OUTPATIENT
Start: 2024-02-01 | End: 2024-02-01

## 2024-02-01 RX ORDER — BUPIVACAINE HYDROCHLORIDE 2.5 MG/ML
INJECTION, SOLUTION EPIDURAL; INFILTRATION; INTRACAUDAL AS NEEDED
Status: DISCONTINUED | OUTPATIENT
Start: 2024-02-01 | End: 2024-02-01 | Stop reason: HOSPADM

## 2024-02-01 RX ORDER — MIDAZOLAM HYDROCHLORIDE 2 MG/2ML
INJECTION, SOLUTION INTRAMUSCULAR; INTRAVENOUS AS NEEDED
Status: DISCONTINUED | OUTPATIENT
Start: 2024-02-01 | End: 2024-02-01

## 2024-02-01 RX ORDER — FENTANYL CITRATE 50 UG/ML
INJECTION, SOLUTION INTRAMUSCULAR; INTRAVENOUS AS NEEDED
Status: DISCONTINUED | OUTPATIENT
Start: 2024-02-01 | End: 2024-02-01

## 2024-02-01 RX ORDER — ONDANSETRON 2 MG/ML
4 INJECTION INTRAMUSCULAR; INTRAVENOUS ONCE AS NEEDED
Status: DISCONTINUED | OUTPATIENT
Start: 2024-02-01 | End: 2024-02-01 | Stop reason: HOSPADM

## 2024-02-01 RX ORDER — CLINDAMYCIN PHOSPHATE 900 MG/50ML
900 INJECTION, SOLUTION INTRAVENOUS ONCE
Status: COMPLETED | OUTPATIENT
Start: 2024-02-01 | End: 2024-02-01

## 2024-02-01 RX ORDER — METOCLOPRAMIDE HYDROCHLORIDE 5 MG/ML
10 INJECTION INTRAMUSCULAR; INTRAVENOUS ONCE AS NEEDED
Status: DISCONTINUED | OUTPATIENT
Start: 2024-02-01 | End: 2024-02-01 | Stop reason: HOSPADM

## 2024-02-01 RX ORDER — PROPOFOL 10 MG/ML
INJECTION, EMULSION INTRAVENOUS AS NEEDED
Status: DISCONTINUED | OUTPATIENT
Start: 2024-02-01 | End: 2024-02-01

## 2024-02-01 RX ORDER — HYDROMORPHONE HCL/PF 1 MG/ML
SYRINGE (ML) INJECTION AS NEEDED
Status: DISCONTINUED | OUTPATIENT
Start: 2024-02-01 | End: 2024-02-01

## 2024-02-01 RX ORDER — SODIUM CHLORIDE, SODIUM LACTATE, POTASSIUM CHLORIDE, CALCIUM CHLORIDE 600; 310; 30; 20 MG/100ML; MG/100ML; MG/100ML; MG/100ML
INJECTION, SOLUTION INTRAVENOUS CONTINUOUS PRN
Status: DISCONTINUED | OUTPATIENT
Start: 2024-02-01 | End: 2024-02-01

## 2024-02-01 RX ADMIN — MIDAZOLAM 2 MG: 1 INJECTION INTRAMUSCULAR; INTRAVENOUS at 10:04

## 2024-02-01 RX ADMIN — LIDOCAINE HYDROCHLORIDE 50 MG: 10 INJECTION, SOLUTION EPIDURAL; INFILTRATION; INTRACAUDAL; PERINEURAL at 10:07

## 2024-02-01 RX ADMIN — DEXAMETHASONE SODIUM PHOSPHATE 10 MG: 10 INJECTION INTRAMUSCULAR; INTRAVENOUS at 10:08

## 2024-02-01 RX ADMIN — SODIUM CHLORIDE, SODIUM LACTATE, POTASSIUM CHLORIDE, AND CALCIUM CHLORIDE: .6; .31; .03; .02 INJECTION, SOLUTION INTRAVENOUS at 10:33

## 2024-02-01 RX ADMIN — FENTANYL CITRATE 25 MCG: 50 INJECTION INTRAMUSCULAR; INTRAVENOUS at 10:18

## 2024-02-01 RX ADMIN — FUROSEMIDE 5 MG: 10 INJECTION, SOLUTION INTRAMUSCULAR; INTRAVENOUS at 11:22

## 2024-02-01 RX ADMIN — FENTANYL CITRATE 50 MCG: 50 INJECTION INTRAMUSCULAR; INTRAVENOUS at 10:24

## 2024-02-01 RX ADMIN — OXYCODONE HYDROCHLORIDE AND ACETAMINOPHEN 1 TABLET: 5; 325 TABLET ORAL at 13:08

## 2024-02-01 RX ADMIN — FENTANYL CITRATE 25 MCG: 50 INJECTION INTRAMUSCULAR; INTRAVENOUS at 10:07

## 2024-02-01 RX ADMIN — PROPOFOL 200 MG: 10 INJECTION, EMULSION INTRAVENOUS at 10:07

## 2024-02-01 RX ADMIN — KETOROLAC TROMETHAMINE 15 MG: 30 INJECTION, SOLUTION INTRAMUSCULAR; INTRAVENOUS at 10:22

## 2024-02-01 RX ADMIN — CLINDAMYCIN PHOSPHATE 900 MG: 900 INJECTION, SOLUTION INTRAVENOUS at 10:10

## 2024-02-01 RX ADMIN — SODIUM CHLORIDE, SODIUM LACTATE, POTASSIUM CHLORIDE, AND CALCIUM CHLORIDE: .6; .31; .03; .02 INJECTION, SOLUTION INTRAVENOUS at 10:03

## 2024-02-01 RX ADMIN — HYDROMORPHONE HYDROCHLORIDE 0.5 MG: 1 INJECTION, SOLUTION INTRAMUSCULAR; INTRAVENOUS; SUBCUTANEOUS at 10:57

## 2024-02-01 RX ADMIN — ONDANSETRON 4 MG: 2 INJECTION INTRAMUSCULAR; INTRAVENOUS at 10:10

## 2024-02-01 NOTE — ANESTHESIA POSTPROCEDURE EVALUATION
Post-Op Assessment Note    CV Status:  Stable    Pain management: adequate       Mental Status:  Alert and awake   Hydration Status:  Euvolemic   PONV Controlled:  Controlled   Airway Patency:  Patent  Two or more mitigation strategies used for obstructive sleep apnea   Post Op Vitals Reviewed: Yes    No anethesia notable event occurred.    Staff: CRNA               BP   136/78   Temp   97.8   Pulse 82 (02/01/24 1136)   Resp   16   SpO2   100%

## 2024-02-01 NOTE — INTERVAL H&P NOTE
H&P reviewed. After examining the patient I find no changes in the patients condition since the H&P had been written.    Vitals:    02/01/24 0931   BP: 153/73   Pulse: 68   Resp: 18   Temp: 99 °F (37.2 °C)   SpO2: 97%

## 2024-02-01 NOTE — OP NOTE
OPERATIVE REPORT  PATIENT NAME: Uriah Mora Jr.    :  1960  MRN: [id number]  Pt Location: [facility] OR ROOM 04    SURGERY DATE: 2024    Surgeons and Role:     * Seferino Jones MD - Primary    Preop Diagnosis:  Inguinal hernia [K40.90]    Post-Op Diagnosis Codes:     * Inguinal hernia [K40.90]    Procedure(s):  Left - REPAIR HERNIA INGUINAL with mesh     Specimen(s):  * No specimens in log *    Estimated Blood Loss:   10 mL    Drains:  Closed/Suction Drain Right Abdomen 14.5 Fr. (Active)   Number of days:        Anesthesia Type:   General    Operative Indications:  Inguinal hernia [K40.90]    Independent, non-smoker, ASA 2, wound class I, BMI 30, weight 200, height 69    Chest pain    (+) Coronary atherosclerosis   (+) Essential (primary) hypertension   (+) History of PTCA   (+) Hyperlipidemia       GI/HEPATIC   (+) Gastroesophageal reflux disease       /RENAL   (+) Benign prostatic hyperplasia   (+) Nephrolithiasis       MUSCULOSKELETAL   (+) Acute right-sided low back pain without sciatica       NEURO/PSYCH   (+) Anxiety   (+) Depression with anxiety   (+) PEYTON (generalized anxiety disorder)   (+) Paresthesia of both hands      On metoprolol       Complications:   None    Procedure and Technique:  Uriah Mora Jr. is a 63 y.o. male was brought into the operative suite and identified visually and by arm band. The patient was placed in the supine position.  Careful attention was made towards padding and positioning of the extremities.  After sterile prep and drape, a timeout was completed. The prep was dry.  Antibiotics were provided. Athrombic pumps in place.    0.25% Marcaine with epinephrine was utilized throughout the procedure.  An incision was made  within the left inguinal region.  The incision was carried down through the skin and subcutaneous tissue. The superficial epigastric vein was clamped, ligated and  divided. . The external oblique fibers were identified.  The  external ring was identified.  The external oblique fibers were then split in the direction of their fibers.  Hemostats were placed on the cut edges.  A self-retaining retractor was then placed.    Exploration of the inguinal canal commenced.  The cremasteric fibers were then divided along the fiber direction of its fibers the cord structures.   The cord was encircled in a atraumatic Penrose drain and preserved during dissection.  Identification of a indirect and /      or direct inguinal hernia was performed. High dissection was completed at the level of the internal ring.  Preperitoneal dissection commenced identifying and preserving the inferior epigastric vessels.    A preperitoneal mesh was then inserted.  The branch of the genitofemoral nerve was divided in order to help prevent postoperative neuralgia.  The inguinal floor was then repaired with interrupted figure-of-eight 0 Vicryl suture.  The indirect inguinal ring was repositioned more superiorly while repairing the inguinal transversalis muscular floor.  An onlay mesh was then secured to the tendon overlying the lateral pubic tubercle The external oblique fascia was closed with a running 3-0 PDS suture.  Additional 0.25% Marcaine with epinephrine was injected over the ilioinguinal and iliohypogastric nerves.    3-0 Vicryl subcutaneous suture was placed into the Deisy's fascia .   4-0 Monocryl suture was used for the subcuticular layer. Dermabond was then applied.    The patient was then awakened from general anesthesia and transferred to the recovery room in stable condition. Sponge and instrument count correct ×2.     I was present for the entire procedure.    Patient Disposition:  PACU         SIGNATURE: Seferino Jones MD  DATE: February 1, 2024  TIME: 2:29 PM

## 2024-02-09 NOTE — PROGRESS NOTES
Name: Uriah Mora Jr.      : 1960      MRN: [id number]  Encounter Provider: Juan Cummins MD  Encounter Date: 2024   Encounter department: [facility]    Assessment & Plan     Seen today following hernia repair. Complains of ongoing fatigue. Reviewed recent labs and ECHO stress test. No ischemic finding and mild valvular disease present. Lipids looked good. Labs ordered and testosterone repeated to confirm androgen deficiency. Will trend bilirubin level. BP at goal today on amlodipine 15 mg, lisinopril 15 mg, and metoprolol 25 mg daily. This is managed by cardiology and dose confirmed based on last note in January. Expected swelling and bruising noted a the hernia site. No drainage or pulsatile mass. Care per gen surgery. Anxiety well controlled on trazodone 100 mg daily     1. Fatigue, unspecified type  -     Vitamin D 25 hydroxy; Future  -     Vitamin B12; Future  -     Iron Panel (Includes Ferritin, Iron Sat%, Iron, and TIBC); Future    2. Hyperbilirubinemia  -     Bilirubin, total; Future  -     Bilirubin, direct; Future    3. Essential hypertension    4. Erectile dysfunction, unspecified erectile dysfunction type  -     Testosterone, free, total; Future    5. Essential (primary) hypertension    6. Anxiety    7. Hyperlipidemia, unspecified hyperlipidemia type    8. Unilateral inguinal hernia without obstruction or gangrene, recurrence not specified    9. Colon cancer screening  -     Cologuard           Subjective      Pt is a 62 yo male here today for routine follow up.  Past medical history of GERD, av malformation, CAD, HTN, MI, kidney stones, bph, anxiety, obesity, hyperlipidemia, bariatric surgery, food allergies.  Underwent inguinal hernia repair this month. Recovering. Has a follow up with gen surgery next week. Noticed bruising in the area. Moving his bowels. Apply cold compresses the the area. No fevers. Has been feeling fatigued the last few months. 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 When he gets home his sleep is interrupted. He will sleep 2-3 hours before waking up. Last free testosterone level was low at 6.     Review of Systems    Current Outpatient Medications on File Prior to Visit   Medication Sig   • acetaminophen (TYLENOL) 500 mg tablet Take 1,000 mg by mouth every 6 (six) hours as needed for mild pain   • amLODIPine (NORVASC) 5 mg tablet Take 3 tablets (15 mg total) by mouth daily   • aspirin (ECOTRIN LOW STRENGTH) 81 mg EC tablet Take 2 tablets (162 mg total) by mouth daily   • atorvastatin (LIPITOR) 40 mg tablet Take 1 tablet (40 mg total) by mouth daily   • cyclobenzaprine (FLEXERIL) 10 mg tablet Take 0.5-1 tablets (5-10 mg total) by mouth daily at bedtime   • Diclofenac Sodium (VOLTAREN) 1 % Apply 2 g topically 4 (four) times a day Apply (Patient taking differently: Apply 2 g topically if needed Apply)   • EPINEPHrine (EPIPEN) 0.3 mg/0.3 mL SOAJ Inject 0.3 mL (0.3 mg total) into a muscle once for 1 dose   • gabapentin (NEURONTIN) 300 mg capsule Take 1 capsule (300 mg total) by mouth daily   • lisinopril (ZESTRIL) 10 mg tablet Take 1.5 tablets (15 mg total) by mouth daily   • meloxicam (Mobic) 15 mg tablet Take 1 tablet (15 mg total) by mouth as needed for mild pain   • metoprolol succinate (TOPROL-XL) 25 mg 24 hr tablet Take 1 tablet (25 mg total) by mouth daily   • nitroglycerin (NITROSTAT) 0.4 mg SL tablet Place 1 tablet (0.4 mg total) under the tongue every 5 (five) minutes as needed for chest pain   • pantoprazole (PROTONIX) 40 mg tablet Take 1 tablet (40 mg total) by mouth daily   • sildenafil (VIAGRA) 25 MG tablet Take 1 tablet (25 mg total) by mouth daily as needed for erectile dysfunction   • tamsulosin (FLOMAX) 0.4 mg Take 1 capsule (0.4 mg total) by mouth daily with dinner   • traZODone (DESYREL) 100 mg tablet Take 1 tablet (100 mg total) by mouth daily at bedtime   • oxyCODONE-acetaminophen (PERCOCET) 5-325 mg  "per tablet Take 1 tablet by mouth every 4 (four) hours as needed for moderate pain for up to 10 doses Max Daily Amount: 6 tablets (Patient not taking: Reported on 2/12/2024)       Objective     /66 (BP Location: Left arm)   Pulse 70   Temp (!) 96.8 °F (36 °C) (Temporal)   Resp 16   Ht 5' 9\" (1.753 m)   Wt 94.1 kg (207 lb 6.4 oz)   SpO2 98%   BMI 30.63 kg/m²     Physical Exam  Constitutional:       General: He is not in acute distress.     Appearance: Normal appearance. He is not ill-appearing or toxic-appearing.   HENT:      Head: Normocephalic and atraumatic.   Eyes:      Extraocular Movements: Extraocular movements intact.   Cardiovascular:      Rate and Rhythm: Normal rate and regular rhythm.      Heart sounds: No murmur heard.  Pulmonary:      Effort: Pulmonary effort is normal. No respiratory distress.      Breath sounds: Normal breath sounds. No stridor. No wheezing or rales.   Abdominal:      General: Bowel sounds are normal.      Tenderness: Tenderness: LLQ near the inguinal repair. There is guarding.       Musculoskeletal:      Right lower leg: No edema.      Left lower leg: Edema (trace pretibial) present.   Lymphadenopathy:      Cervical: No cervical adenopathy.   Skin:     Findings: Bruising (along the inguinal incision) present.   Neurological:      Mental Status: He is alert and oriented to person, place, and time.   Psychiatric:         Mood and Affect: Mood normal.         Behavior: Behavior normal.     Juan Cummins MD    "

## 2024-02-12 ENCOUNTER — TELEPHONE (OUTPATIENT)
Dept: FAMILY MEDICINE CLINIC | Facility: CLINIC | Age: 64
End: 2024-02-12

## 2024-02-12 ENCOUNTER — OFFICE VISIT (OUTPATIENT)
Dept: FAMILY MEDICINE CLINIC | Facility: CLINIC | Age: 64
End: 2024-02-12
Payer: COMMERCIAL

## 2024-02-12 VITALS
SYSTOLIC BLOOD PRESSURE: 129 MMHG | DIASTOLIC BLOOD PRESSURE: 66 MMHG | HEART RATE: 70 BPM | OXYGEN SATURATION: 98 % | HEIGHT: 69 IN | WEIGHT: 207.4 LBS | RESPIRATION RATE: 16 BRPM | BODY MASS INDEX: 30.72 KG/M2 | TEMPERATURE: 96.8 F

## 2024-02-12 DIAGNOSIS — Z12.11 COLON CANCER SCREENING: ICD-10-CM

## 2024-02-12 DIAGNOSIS — E78.5 HYPERLIPIDEMIA, UNSPECIFIED HYPERLIPIDEMIA TYPE: ICD-10-CM

## 2024-02-12 DIAGNOSIS — E80.6 HYPERBILIRUBINEMIA: ICD-10-CM

## 2024-02-12 DIAGNOSIS — F41.9 ANXIETY: ICD-10-CM

## 2024-02-12 DIAGNOSIS — K40.90 UNILATERAL INGUINAL HERNIA WITHOUT OBSTRUCTION OR GANGRENE, RECURRENCE NOT SPECIFIED: ICD-10-CM

## 2024-02-12 DIAGNOSIS — N52.9 ERECTILE DYSFUNCTION, UNSPECIFIED ERECTILE DYSFUNCTION TYPE: ICD-10-CM

## 2024-02-12 DIAGNOSIS — R53.83 FATIGUE, UNSPECIFIED TYPE: Primary | ICD-10-CM

## 2024-02-12 DIAGNOSIS — I10 ESSENTIAL (PRIMARY) HYPERTENSION: ICD-10-CM

## 2024-02-12 DIAGNOSIS — I10 ESSENTIAL HYPERTENSION: ICD-10-CM

## 2024-02-12 PROCEDURE — 99214 OFFICE O/P EST MOD 30 MIN: CPT | Performed by: FAMILY MEDICINE

## 2024-02-19 ENCOUNTER — OFFICE VISIT (OUTPATIENT)
Dept: SURGERY | Facility: CLINIC | Age: 64
End: 2024-02-19

## 2024-02-19 DIAGNOSIS — K40.90 UNILATERAL INGUINAL HERNIA WITHOUT OBSTRUCTION OR GANGRENE, RECURRENCE NOT SPECIFIED: Primary | ICD-10-CM

## 2024-02-19 PROCEDURE — 99024 POSTOP FOLLOW-UP VISIT: CPT | Performed by: SURGERY

## 2024-02-19 NOTE — PROGRESS NOTES
Assessment/Plan: Patient is post left inguinal hernia repair.  Overall he is doing well.  He is advance his activities nicely.  Incisions clean healing well.  All questions answered.    There are no diagnoses linked to this encounter.    Pathology: Reviewed with patient, all questions answered.       Postoperative restrictions reviewed. All questions answered.       ______________________________________________________  HPI: Patient presents post operatively.  Left inguinal hernia repair 2/1/2024.               ROS:  General ROS: negative for - chills, fatigue, fever or night sweats, weight loss  Respiratory ROS: no cough, shortness of breath, or wheezing  Cardiovascular ROS: no chest pain or dyspnea on exertion  Genito-Urinary ROS: no dysuria, trouble voiding, or hematuria  Musculoskeletal ROS: negative for - gait disturbance, joint pain or muscle pain  Neurological ROS: no TIA or stroke symptoms  GI ROS: see HPI  Skin ROS: no new rashes or lesions   Lymphatic ROS: no new adenopathy noted by pt.   GYN ROS: see HPI, no new GYN history or bleeding noted  Psy ROS: no new mental or behavioral disturbances         Patient Active Problem List   Diagnosis    Acute right-sided low back pain without sciatica    Essential (primary) hypertension    History of MI (myocardial infarction)    Class 1 obesity due to excess calories with serious comorbidity and body mass index (BMI) of 32.0 to 32.9 in adult    Multiple food allergies    H/O heart artery stent    Fatigue    Skin irritation    Postural dizziness    Benign prostatic hyperplasia    Scleral icterus    Abnormal CT of liver    Irritant contact dermatitis    Insomnia    Urinary frequency    Hyperlipidemia    Allergy to iodine    Anaphylactic reaction    Anxiety    AV malformation, peripheral, congenital    Chronic ischemic heart disease, unspecified    Coronary atherosclerosis    PEYTON (generalized anxiety disorder)    Gastroesophageal reflux disease    Nerve pain    Muscle  cramping    Spasm of muscle, back    History of PTCA    Trigger finger of right hand    Anaphylactic shock due to peanuts    Allergy to tree nuts or seeds    Peanut allergy    Soy allergy    Paresthesia of both hands    Pain in both knees    Left lower quadrant abdominal pain    S/P bariatric surgery    Weight loss, unintentional    Testicular pain, unspecified    Annual physical exam    Acute pain of right shoulder    Abnormal LFTs    Gram-negative bacteremia    Left shoulder pain    Chronic pain of left knee    Proteinuria    Nephrolithiasis    Pre-op examination    Uncomplicated bereavement    Chest pain    Bulge in groin area    Depression with anxiety    Erectile dysfunction    Inguinal hernia without obstruction or gangrene       Allergies:  Adhesive [medical tape], Cephalosporins, Isoflavones (soy), Nuts - food allergy, Other, Peanut oil - food allergy, Penicillins, Shellfish allergy - food allergy, and Shellfish-derived products - food allergy      Current Outpatient Medications:     acetaminophen (TYLENOL) 500 mg tablet, Take 1,000 mg by mouth every 6 (six) hours as needed for mild pain, Disp: , Rfl:     amLODIPine (NORVASC) 5 mg tablet, Take 3 tablets (15 mg total) by mouth daily, Disp: 270 tablet, Rfl: 3    aspirin (ECOTRIN LOW STRENGTH) 81 mg EC tablet, Take 2 tablets (162 mg total) by mouth daily, Disp: 180 tablet, Rfl: 1    atorvastatin (LIPITOR) 40 mg tablet, Take 1 tablet (40 mg total) by mouth daily, Disp: 90 tablet, Rfl: 0    cyclobenzaprine (FLEXERIL) 10 mg tablet, Take 0.5-1 tablets (5-10 mg total) by mouth daily at bedtime, Disp: 90 tablet, Rfl: 0    Diclofenac Sodium (VOLTAREN) 1 %, Apply 2 g topically 4 (four) times a day Apply (Patient taking differently: Apply 2 g topically if needed Apply), Disp: 400 g, Rfl: 0    EPINEPHrine (EPIPEN) 0.3 mg/0.3 mL SOAJ, Inject 0.3 mL (0.3 mg total) into a muscle once for 1 dose, Disp: 2 each, Rfl: 0    gabapentin (NEURONTIN) 300 mg capsule, Take 1 capsule  (300 mg total) by mouth daily, Disp: 90 capsule, Rfl: 1    lisinopril (ZESTRIL) 10 mg tablet, Take 1.5 tablets (15 mg total) by mouth daily, Disp: 140 tablet, Rfl: 1    meloxicam (Mobic) 15 mg tablet, Take 1 tablet (15 mg total) by mouth as needed for mild pain, Disp: 30 tablet, Rfl: 0    metoprolol succinate (TOPROL-XL) 25 mg 24 hr tablet, Take 1 tablet (25 mg total) by mouth daily, Disp: 90 tablet, Rfl: 3    nitroglycerin (NITROSTAT) 0.4 mg SL tablet, Place 1 tablet (0.4 mg total) under the tongue every 5 (five) minutes as needed for chest pain, Disp: 30 tablet, Rfl: 0    oxyCODONE-acetaminophen (PERCOCET) 5-325 mg per tablet, Take 1 tablet by mouth every 4 (four) hours as needed for moderate pain for up to 10 doses Max Daily Amount: 6 tablets (Patient not taking: Reported on 2/12/2024), Disp: 10 tablet, Rfl: 0    pantoprazole (PROTONIX) 40 mg tablet, Take 1 tablet (40 mg total) by mouth daily, Disp: 90 tablet, Rfl: 1    sildenafil (VIAGRA) 25 MG tablet, Take 1 tablet (25 mg total) by mouth daily as needed for erectile dysfunction, Disp: 10 tablet, Rfl: 1    tamsulosin (FLOMAX) 0.4 mg, Take 1 capsule (0.4 mg total) by mouth daily with dinner, Disp: 90 capsule, Rfl: 1    traZODone (DESYREL) 100 mg tablet, Take 1 tablet (100 mg total) by mouth daily at bedtime, Disp: 90 tablet, Rfl: 0    Past Medical History:   Diagnosis Date    Abdominal lump 04/20/2018    Allergic 2015    Anxiety     Arm pain     left-may have torn the bicep tendon    Arthritis     Cardiac disease     Chronic kidney disease     Contact lens/glasses fitting     Coronary artery disease     COVID-19 vaccine series completed     Moderna x2    Episode of dizziness 11/26/2018    Herpes zoster with complication 06/08/2018    Hyperlipidemia     Hypertension     Kidney stone     Myocardial infarction (HCC) 2010    cardiac stent x2    Obesity     Pain of right great toe 07/22/2019    PONV (postoperative nausea and vomiting)     in the past-1980's    Sepsis  ([facility]) 12/2020    urinary issue-back up    Urinary tract infection Dec 2021       Past Surgical History:   Procedure Laterality Date    ABDOMINAL SURGERY      tumor on stomach    ANGIOPLASTY      2010-x2 stents left side    BACK SURGERY      lower back-dissectomy    CARDIAC SURGERY      angio with stents    CIRCUMCISION      COLONOSCOPY      CYSTOSCOPY      EGD AND COLONOSCOPY      GASTRIC BYPASS  2005    IR ABSCESS/SEROMA DRAINAGE  1/4/2019    LITHOTRIPSY      x2    NECK SURGERY      herniated disc C4/5    OTHER SURGICAL HISTORY      removal of vocal cord lesion     NV EXC TUMOR SOFT TISSUE ABDOMINAL WALL SUBQ <3CM N/A 2/27/2019    Procedure: ABDOMINAL SEROMA EXCISION;  Surgeon: Seferino Jones MD;  [facility];  Service: General    NV RPR 1ST INGUN HRNA AGE 5 YRS/> REDUCIBLE Left 2/1/2024    Procedure: REPAIR HERNIA INGUINAL;  Surgeon: Seferino Jones MD;  [facility];  Service: General    ROTATOR CUFF REPAIR Bilateral     SHOULDER SURGERY Bilateral     TONSILLECTOMY      VASCULAR SURGERY      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    WISDOM TOOTH EXTRACTION      WOUND DEBRIDEMENT N/A 3/7/2019    Procedure: DEBRIDEMENT WOUND ABDOMINAL (WASH OUT) AND WOUND VAC APPLICATION;  Surgeon: Seferino Jones MD;  [facility];  Service: General    WOUND DEBRIDEMENT N/A 3/9/2019    Procedure: DEBRIDEMENT WOUND ABDOMINAL (WASH OUT), wound vac dressing change;  Surgeon: Walter Garcia DO;  [facility];  Service: General       Family History   Problem Relation Age of Onset    Heart disease Mother         passed during open heart surgery     Cancer Mother     Heart disease Father     Diabetes Father         caused blindness     Lung cancer Sister     Cancer Sister     Heart disease Brother         MI    No Known Problems Son     No Known Problems Son     Nephrolithiasis Son     No Known Problems Daughter         reports that he has never smoked. He has never been exposed to tobacco  smoke. He has never used smokeless tobacco. He reports that he does not currently use alcohol. He reports that he does not use drugs.    PHYSICAL EXAM    There were no vitals taken for this visit.    General: normal, cooperative, no distress  Abdominal: soft, nondistended, or nontender  Incision: clean, dry, and intact and healing well      Seferino Jones MD    Date: 2/19/2024 Time: 10:03 AM

## 2024-02-21 ENCOUNTER — TELEPHONE (OUTPATIENT)
Age: 64
End: 2024-02-21

## 2024-02-21 NOTE — TELEPHONE ENCOUNTER
Patient saw his eye doctor yesterday who advised he may have a basel cell carcinoma near the tear duct on his right eye and wants him to see a dermatologist. Where do you recommend he go and can a referral order be placed.  Please advise.

## 2024-02-22 ENCOUNTER — TELEPHONE (OUTPATIENT)
Age: 64
End: 2024-02-22

## 2024-02-22 DIAGNOSIS — L98.9 SKIN LESION OF FACE: Primary | ICD-10-CM

## 2024-02-22 NOTE — TELEPHONE ENCOUNTER
Note faxed to [employer] human resources stating return to work date of 3/11/24 with no restrictions.

## 2024-02-22 NOTE — TELEPHONE ENCOUNTER
Patient calling in and asking for a return to work letter stating he can return to work on 3/11/2024      Fax to: [phone] [facility]

## 2024-02-27 NOTE — TELEPHONE ENCOUNTER
Pt called in stating he can't get into the dermatologists office until August and wants to know if a biopsy can be done in this office.

## 2024-03-01 ENCOUNTER — TELEPHONE (OUTPATIENT)
Age: 64
End: 2024-03-01

## 2024-03-01 ENCOUNTER — OFFICE VISIT (OUTPATIENT)
Dept: OBGYN CLINIC | Facility: CLINIC | Age: 64
End: 2024-03-01
Payer: COMMERCIAL

## 2024-03-01 VITALS
HEIGHT: 69 IN | DIASTOLIC BLOOD PRESSURE: 76 MMHG | WEIGHT: 210 LBS | SYSTOLIC BLOOD PRESSURE: 114 MMHG | HEART RATE: 83 BPM | BODY MASS INDEX: 31.1 KG/M2

## 2024-03-01 DIAGNOSIS — M17.12 PRIMARY OSTEOARTHRITIS OF LEFT KNEE: Primary | ICD-10-CM

## 2024-03-01 DIAGNOSIS — M17.11 PRIMARY OSTEOARTHRITIS OF RIGHT KNEE: ICD-10-CM

## 2024-03-01 PROCEDURE — 99213 OFFICE O/P EST LOW 20 MIN: CPT | Performed by: ORTHOPAEDIC SURGERY

## 2024-03-01 PROCEDURE — 20610 DRAIN/INJ JOINT/BURSA W/O US: CPT | Performed by: ORTHOPAEDIC SURGERY

## 2024-03-01 RX ORDER — BUPIVACAINE HYDROCHLORIDE 2.5 MG/ML
2 INJECTION, SOLUTION INFILTRATION; PERINEURAL
Status: COMPLETED | OUTPATIENT
Start: 2024-03-01 | End: 2024-03-01

## 2024-03-01 RX ORDER — METHYLPREDNISOLONE ACETATE 40 MG/ML
2 INJECTION, SUSPENSION INTRA-ARTICULAR; INTRALESIONAL; INTRAMUSCULAR; SOFT TISSUE
Status: COMPLETED | OUTPATIENT
Start: 2024-03-01 | End: 2024-03-01

## 2024-03-01 RX ORDER — KETOROLAC TROMETHAMINE 30 MG/ML
30 INJECTION, SOLUTION INTRAMUSCULAR; INTRAVENOUS
Status: COMPLETED | OUTPATIENT
Start: 2024-03-01 | End: 2024-03-01

## 2024-03-01 RX ADMIN — KETOROLAC TROMETHAMINE 30 MG: 30 INJECTION, SOLUTION INTRAMUSCULAR; INTRAVENOUS at 07:45

## 2024-03-01 RX ADMIN — BUPIVACAINE HYDROCHLORIDE 2 ML: 2.5 INJECTION, SOLUTION INFILTRATION; PERINEURAL at 07:45

## 2024-03-01 RX ADMIN — METHYLPREDNISOLONE ACETATE 2 ML: 40 INJECTION, SUSPENSION INTRA-ARTICULAR; INTRALESIONAL; INTRAMUSCULAR; SOFT TISSUE at 07:45

## 2024-03-01 NOTE — PROGRESS NOTES
Orthopedics          Uriahjose Mora . 63 y.o. male MRN: [id number]      Chief Complaint:   bilateral knee pain    HPI:   63 y.o.male complaining of bilateral knee pain.  He presents to the office today regarding bilateral knee pain.  Patient is previously diagnosed with bilateral knee pain due to osteoarthritis.  Has had multiple intra-articular corticosteroid injections significant pain relief however the pain since returned.  Denies any instability clicking or catching.  States pain is localized to his bilateral knees aching in nature without radiation denies any changes numbness and tingling.                Review Of Systems:   Skin: Normal  Neuro: See HPI  Musculoskeletal: See HPI  All other systems reviewed and are negative    Past Medical History:   Past Medical History:   Diagnosis Date    Abdominal lump 04/20/2018    Allergic 2015    Anxiety     Arm pain     left-may have torn the bicep tendon    Arthritis     Cardiac disease     Chronic kidney disease     Contact lens/glasses fitting     Coronary artery disease     COVID-19 vaccine series completed     Moderna x2    Episode of dizziness 11/26/2018    Herpes zoster with complication 06/08/2018    Hyperlipidemia     Hypertension     Kidney stone     Myocardial infarction (HCC) 2010    cardiac stent x2    Obesity     Pain of right great toe 07/22/2019    PONV (postoperative nausea and vomiting)     in the past-1980's    Sepsis (HCC) 12/2020    urinary issue-back up    Urinary tract infection Dec 2021       Past Surgical History:   Past Surgical History:   Procedure Laterality Date    ABDOMINAL SURGERY      tumor on stomach    ANGIOPLASTY      2010-x2 stents left side    BACK SURGERY      lower back-dissectomy    CARDIAC SURGERY      angio with stents    CIRCUMCISION      COLONOSCOPY      CYSTOSCOPY      EGD AND COLONOSCOPY      GASTRIC BYPASS  2005    IR ABSCESS/SEROMA DRAINAGE  1/4/2019    LITHOTRIPSY      x2    NECK SURGERY      herniated disc C4/5     OTHER SURGICAL HISTORY      removal of vocal cord lesion     PA EXC TUMOR SOFT TISSUE ABDOMINAL WALL SUBQ <3CM N/A 2/27/2019    Procedure: ABDOMINAL SEROMA EXCISION;  Surgeon: Seferino Jones MD;  [facility];  Service: General    PA RPR 1ST INGUN HRNA AGE 5 YRS/> REDUCIBLE Left 2/1/2024    Procedure: REPAIR HERNIA INGUINAL;  Surgeon: Seferino Jones MD;  [facility];  Service: General    ROTATOR CUFF REPAIR Bilateral     SHOULDER SURGERY Bilateral     TONSILLECTOMY      VASCULAR SURGERY      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    WISDOM TOOTH EXTRACTION      WOUND DEBRIDEMENT N/A 3/7/2019    Procedure: DEBRIDEMENT WOUND ABDOMINAL (WASH OUT) AND WOUND VAC APPLICATION;  Surgeon: Seferino Jones MD;  [facility];  Service: General    WOUND DEBRIDEMENT N/A 3/9/2019    Procedure: DEBRIDEMENT WOUND ABDOMINAL (WASH OUT), wound vac dressing change;  Surgeon: Walter Garcia DO;  [facility];  Service: General       Family History:  Family history reviewed and non-contributory  Family History   Problem Relation Age of Onset    Heart disease Mother         passed during open heart surgery     Cancer Mother     Heart disease Father     Diabetes Father         caused blindness     Lung cancer Sister     Cancer Sister     Heart disease Brother         MI    No Known Problems Son     No Known Problems Son     Nephrolithiasis Son     No Known Problems Daughter          Social History:  Social History     Socioeconomic History    Marital status:      Spouse name: Rosa Elena     Number of children: 4    Years of education: None    Highest education level: None   Occupational History    None   Tobacco Use    Smoking status: Never     Passive exposure: Never    Smokeless tobacco: Never    Tobacco comments:     rare cigar smoking   Vaping Use    Vaping status: Never Used   Substance and Sexual Activity    Alcohol use: Not Currently     Comment: occasional last drink 2/2020     Drug use: Never    Sexual activity: Yes     Partners: Female     Birth control/protection: None   Other Topics Concern    None   Social History Narrative    Patient lives in a home that was built in 1954    Oil/forced hot air     Carpet junior in the bedroom     Central air    Window air conditioning bedroom     Finished basement-dry-no mold or musty smell    Dehumidifier     Air  attached to furnace     Humidifier in the winter months     Home is smoke free     Does not live near wooded are or open fields         Dog x5 (Zara, Abimbola, Seferino, Obdulia, and Hubbard) and there allowed in the bedroom         Caffeine: soda occasional                     Coffee is decaf 1 cup daily     Chocolate consumed rarely         Patient lives with wife and two children      Social Determinants of Health     Financial Resource Strain: Not on file   Food Insecurity: Not on file   Transportation Needs: Not on file   Physical Activity: Inactive (8/21/2019)    Exercise Vital Sign     Days of Exercise per Week: 0 days     Minutes of Exercise per Session: 0 min   Stress: Not on file   Social Connections: Not on file   Intimate Partner Violence: Not on file   Housing Stability: Not on file       Allergies:   Allergies   Allergen Reactions    Adhesive [Medical Tape] Swelling     Tape on eye caused eye to swell could not open!  Itching large hives from on arms     CAN USE PAPER TAPE    Cephalosporins Anaphylaxis    Isoflavones (Soy) Other (See Comments)     anaphylaxis    Nuts - Food Allergy Anaphylaxis    Other Abdominal Pain     Soy  Patient can not have an NGT due to bariatric surgery    Peanut Oil - Food Allergy Anaphylaxis    Penicillins Anaphylaxis     Itching and hives    Shellfish Allergy - Food Allergy      Betadine/ cat scan dye is Okay.  Cannot eat shrimp, crab shellfish avoids all fish.    Shellfish-Derived Products - Food Allergy Other (See Comments)     Positive test       Labs:  0   Lab Value Date/Time    HCT 47.5  01/22/2024 0918    HCT 45.6 11/13/2023 0710    HCT 48.4 06/26/2023 0708    HGB 15.3 01/22/2024 0918    HGB 15.0 11/13/2023 0710    HGB 15.9 06/26/2023 0708    INR 1.18 11/30/2020 1130    WBC 6.28 01/22/2024 0918    WBC 5.64 11/13/2023 0710    WBC 8.27 06/26/2023 0708    CRP <3.0 11/30/2019 1041       Meds:    Current Outpatient Medications:     acetaminophen (TYLENOL) 500 mg tablet, Take 1,000 mg by mouth every 6 (six) hours as needed for mild pain, Disp: , Rfl:     amLODIPine (NORVASC) 5 mg tablet, Take 3 tablets (15 mg total) by mouth daily, Disp: 270 tablet, Rfl: 3    aspirin (ECOTRIN LOW STRENGTH) 81 mg EC tablet, Take 2 tablets (162 mg total) by mouth daily, Disp: 180 tablet, Rfl: 1    atorvastatin (LIPITOR) 40 mg tablet, Take 1 tablet (40 mg total) by mouth daily, Disp: 90 tablet, Rfl: 0    cyclobenzaprine (FLEXERIL) 10 mg tablet, Take 0.5-1 tablets (5-10 mg total) by mouth daily at bedtime, Disp: 90 tablet, Rfl: 0    Diclofenac Sodium (VOLTAREN) 1 %, Apply 2 g topically 4 (four) times a day Apply (Patient taking differently: Apply 2 g topically if needed Apply), Disp: 400 g, Rfl: 0    EPINEPHrine (EPIPEN) 0.3 mg/0.3 mL SOAJ, Inject 0.3 mL (0.3 mg total) into a muscle once for 1 dose, Disp: 2 each, Rfl: 0    gabapentin (NEURONTIN) 300 mg capsule, Take 1 capsule (300 mg total) by mouth daily, Disp: 90 capsule, Rfl: 1    lisinopril (ZESTRIL) 10 mg tablet, Take 1.5 tablets (15 mg total) by mouth daily, Disp: 140 tablet, Rfl: 1    meloxicam (Mobic) 15 mg tablet, Take 1 tablet (15 mg total) by mouth as needed for mild pain, Disp: 30 tablet, Rfl: 0    metoprolol succinate (TOPROL-XL) 25 mg 24 hr tablet, Take 1 tablet (25 mg total) by mouth daily, Disp: 90 tablet, Rfl: 3    nitroglycerin (NITROSTAT) 0.4 mg SL tablet, Place 1 tablet (0.4 mg total) under the tongue every 5 (five) minutes as needed for chest pain, Disp: 30 tablet, Rfl: 0    oxyCODONE-acetaminophen (PERCOCET) 5-325 mg per tablet, Take 1 tablet by mouth  every 4 (four) hours as needed for moderate pain for up to 10 doses Max Daily Amount: 6 tablets (Patient not taking: Reported on 2/12/2024), Disp: 10 tablet, Rfl: 0    pantoprazole (PROTONIX) 40 mg tablet, Take 1 tablet (40 mg total) by mouth daily, Disp: 90 tablet, Rfl: 1    sildenafil (VIAGRA) 25 MG tablet, Take 1 tablet (25 mg total) by mouth daily as needed for erectile dysfunction, Disp: 10 tablet, Rfl: 1    tamsulosin (FLOMAX) 0.4 mg, Take 1 capsule (0.4 mg total) by mouth daily with dinner, Disp: 90 capsule, Rfl: 1    traZODone (DESYREL) 100 mg tablet, Take 1 tablet (100 mg total) by mouth daily at bedtime, Disp: 90 tablet, Rfl: 0    Body mass index is 31.01 kg/m².  Wt Readings from Last 3 Encounters:   03/01/24 95.3 kg (210 lb)   02/12/24 94.1 kg (207 lb 6.4 oz)   01/26/24 92.5 kg (204 lb)     Physical Exam:   Vitals:    03/01/24 0754   BP: 114/76   Pulse: 83       General Appearance:    Alert, cooperative, no distress, appears stated age   Head:    Normocephalic, without obvious abnormality, atraumatic   Eyes:    conjunctiva/corneas clear, both eyes        Nose:   Nares normal, septum midline, no drainage    Throat:   Lips normal; teeth and gums normal   Neck:    symmetrical, trachea midline, ;     thyroid:  no enlargement/   Back:     Symmetric, no curvature, ROM normal   Lungs:   No audible wheezing or labored breathing   Chest Wall:    No tenderness or deformity    Heart:    Regular rate and rhythm               Pulses:   2+ and symmetric all extremities   Skin:   Skin color, texture, turgor normal, no rashes or lesions   Neurologic:   normal strength, sensation and reflexes     throughout       Musculoskeletal: bilateral lower extremity  On examination of the right knee there is no effusion, no erythema.  Range of motion to full active extension and flexion to greater than 120°.  Pain on palpation medial and lateral joint lines.  There is crepitus with range of motion, no warmth to palpation, bony  enlargement noted. No pain on palpation pes anserine bursa region or distal iliotibial band.  Stable to varus and valgus stress without pain or gapping.  Negative anterior and posterior drawer testing.  Sensation intact distal pulses present.  On examination of the left knee there is no effusion, no erythema.  Range of motion to full active extension and flexion to greater than 120°.  Pain on palpation medial and lateral joint lines.  There is crepitus with range of motion, no warmth to palpation, bony enlargement noted. No pain on palpation pes anserine bursa region or distal iliotibial band.  Stable to varus and valgus stress without pain or gapping.  Negative anterior and posterior drawer testing.  Sensation intact distal pulses present.    Large joint arthrocentesis: R knee  Universal Protocol:  Consent: Verbal consent obtained.  Consent given by: patient  Patient understanding: patient states understanding of the procedure being performed  Site marked: the operative site was marked  Patient identity confirmed: verbally with patient  Supporting Documentation  Indications: pain   Procedure Details  Location: knee - R knee  Needle size: 22 G  Approach: superior  Medications administered: 2 mL bupivacaine 0.25 %; 2 mL methylPREDNISolone acetate 40 mg/mL; 30 mg ketorolac 30 mg/mL    Patient tolerance: patient tolerated the procedure well with no immediate complications      Large joint arthrocentesis: L knee  Universal Protocol:  Consent: Verbal consent obtained.  Consent given by: patient  Patient understanding: patient states understanding of the procedure being performed  Site marked: the operative site was marked  Patient identity confirmed: verbally with patient  Supporting Documentation  Indications: pain   Procedure Details  Location: knee - L knee  Needle size: 22 G  Approach: superior  Medications administered: 2 mL bupivacaine 0.25 %; 2 mL methylPREDNISolone acetate 40 mg/mL; 30 mg ketorolac 30  mg/mL    Patient tolerance: patient tolerated the procedure well with no immediate complications         _*_*_*_*_*_*_*_*_*_*_*_*_*_*_*_*_*_*_*_*_*_*_*_*_*_*_*_*_*_*_*_*_*_*_*_*_*_*_*_*_*    Assessment:  63 y.o.male with bilateral knee pain due to osteoarthritis    Plan:   Weight bearing as tolerated  bilateral lower extremity  Bilateral intra-articular corticosteroid and Toradol injection ministered as noted above  Patient advised should they develop any increasing pain, redness, drainage, numbness, tingling or swelling surrounding the injection site, they are to contact our office or present to the emergency department.  Advised patient and home range of motion stretching strengthening exercises  Follow up in 3 months or sooner if needed      Chris Fan PA-C                    .

## 2024-03-01 NOTE — TELEPHONE ENCOUNTER
Pt called because Dr. Jones did hernia repair ans it was under Workmans Comp.  He needed something changed on the paper work. HE asked if he could email it. I provided him with Kellen Kramer Email address.

## 2024-03-25 LAB — COLOGUARD RESULT REPORTABLE: NEGATIVE

## (undated) DEVICE — SUT VICRYL 2-0 REEL 54 IN J286G

## (undated) DEVICE — ABDOMINAL PAD: Brand: DERMACEA

## (undated) DEVICE — TELFA NON-ADHERENT ABSORBENT DRESSING: Brand: TELFA

## (undated) DEVICE — ADHESIVE SKIN HIGH VISCOSITY EXOFIN 1ML

## (undated) DEVICE — SUT MONOCRYL 4-0 PS-2 27 IN Y426H

## (undated) DEVICE — INSTRUMENT TRACKER 9733533XOM ENT 1PK

## (undated) DEVICE — ANTI-FOG SOLUTION WITH FOAM PAD: Brand: DEVON

## (undated) DEVICE — UNDYED BRAIDED (POLYGLACTIN 910), SYNTHETIC ABSORBABLE SUTURE: Brand: COATED VICRYL

## (undated) DEVICE — PENCIL ELECTROSURG E-Z CLEAN -0035H

## (undated) DEVICE — NEEDLE 23G X 1 1/2 SAFETY-GLIDE THIN WALL

## (undated) DEVICE — SPLINT 1527005 10PK PAIR NASAL STD THICK

## (undated) DEVICE — CULTURE TUBE AEROBIC

## (undated) DEVICE — BETHLEHEM UNIVERSAL MINOR GEN: Brand: CARDINAL HEALTH

## (undated) DEVICE — WAND COBLATION REFLEX ULTRA 45

## (undated) DEVICE — SUT VICRYL 3-0 SH 27 IN J416H

## (undated) DEVICE — TUBING 1895522 5PK STRAIGHTSHOT TO XPS: Brand: STRAIGHTSHOT®

## (undated) DEVICE — SUT PLAIN 4-0 SC-1 18 IN 1828H

## (undated) DEVICE — THE SIMPULSE SOLO SYSTEM WITH ULTREX RETRACTABLE SPLASH SHIELD TIP: Brand: SIMPULSE SOLO

## (undated) DEVICE — CHLORAPREP HI-LITE 26ML ORANGE

## (undated) DEVICE — DRAPE EQUIPMENT RF WAND

## (undated) DEVICE — GLOVE SRG BIOGEL ORTHOPEDIC 8

## (undated) DEVICE — SCD SEQUENTIAL COMPRESSION COMFORT SLEEVE MEDIUM KNEE LENGTH: Brand: KENDALL SCD

## (undated) DEVICE — LIGHT HANDLE COVER SLEEVE DISP BLUE STELLAR

## (undated) DEVICE — PATIENT TRACKER 9734887XOM NON-INVASIVE

## (undated) DEVICE — INTENDED FOR TISSUE SEPARATION, AND OTHER PROCEDURES THAT REQUIRE A SHARP SURGICAL BLADE TO PUNCTURE OR CUT.: Brand: BARD-PARKER SAFETY BLADES SIZE 15, STERILE

## (undated) DEVICE — BETHLEHEM MAJOR GENERAL PACK: Brand: CARDINAL HEALTH

## (undated) DEVICE — GLOVE SRG BIOGEL ECLIPSE 7

## (undated) DEVICE — SPECIMEN CONTAINER STERILE PEEL PACK

## (undated) DEVICE — GLOVE SRG BIOGEL ORTHOPEDIC 7

## (undated) DEVICE — SUT VICRYL 1 CT-1 27 IN J261H

## (undated) DEVICE — GAUZE SPONGES,16 PLY: Brand: CURITY

## (undated) DEVICE — INTENDED FOR TISSUE SEPARATION, AND OTHER PROCEDURES THAT REQUIRE A SHARP SURGICAL BLADE TO PUNCTURE OR CUT.: Brand: BARD-PARKER SAFETY BLADES SIZE 10, STERILE

## (undated) DEVICE — SUT PDS II 3-0 SH 27 IN Z316H

## (undated) DEVICE — NEURO PATTIES 1/2 X 1 1/2

## (undated) DEVICE — DECANTER: Brand: UNBRANDED

## (undated) DEVICE — BLADE 1884004HR TRICUT 5PK M4 4MM ROTATE: Brand: TRICUT

## (undated) DEVICE — CULTURE TUBE ANAEROBIC

## (undated) DEVICE — SUT VICRYL 2-0 SH 27 IN UNDYED J417H

## (undated) DEVICE — TUBING SUCTION 5MM X 12 FT

## (undated) DEVICE — 3M™ V.A.C.® GRANUFOAM™ DRESSING KIT, M8275052, MEDIUM: Brand: 3M™ V.A.C.® GRANUFOAM™

## (undated) DEVICE — SUT PROLENE 3-0 SH 36 IN 8522H

## (undated) DEVICE — ADHESIVE SKN CLSR HISTOACRYL FLEX 0.5ML LF

## (undated) DEVICE — PLUMEPEN PRO 10FT

## (undated) DEVICE — MAYO STAND COVER: Brand: CONVERTORS

## (undated) DEVICE — ELECTRODE BLADE MOD E-Z CLEAN  2.75IN 7CM -0012AM

## (undated) DEVICE — SUT CHROMIC 4-0 P-3 18 MM 1654G

## (undated) DEVICE — NEEDLE SPINAL 22G X 3.5IN  QUINCKE

## (undated) DEVICE — STERILE NASAL PACK: Brand: CARDINAL HEALTH

## (undated) DEVICE — TOWEL SURG XR DETECT GREEN STRL RFD

## (undated) DEVICE — NEEDLE 25G X 1 1/2

## (undated) DEVICE — 3M™ TEGADERM™ TRANSPARENT FILM DRESSING FRAME STYLE, 1626W, 4 IN X 4-3/4 IN (10 CM X 12 CM), 50/CT 4CT/CASE: Brand: 3M™ TEGADERM™

## (undated) DEVICE — MEDIUM SOFT CONE SPLASH SHIELD

## (undated) DEVICE — VAC CANISTER 500ML

## (undated) DEVICE — INTENDED FOR TISSUE SEPARATION, AND OTHER PROCEDURES THAT REQUIRE A SHARP SURGICAL BLADE TO PUNCTURE OR CUT.: Brand: BARD-PARKER ® CARBON RIB-BACK BLADES

## (undated) DEVICE — GAUZE SPONGES,USP TYPE VII GAUZE, 12 PLY: Brand: CURITY